# Patient Record
Sex: FEMALE | Race: WHITE | Employment: FULL TIME | URBAN - METROPOLITAN AREA
[De-identification: names, ages, dates, MRNs, and addresses within clinical notes are randomized per-mention and may not be internally consistent; named-entity substitution may affect disease eponyms.]

---

## 2017-03-01 RX ORDER — LORAZEPAM 1 MG/1
TABLET ORAL
Qty: 30 TABLET | Refills: 0 | Status: SHIPPED | OUTPATIENT
Start: 2017-03-01 | End: 2017-03-06

## 2017-03-01 NOTE — TELEPHONE ENCOUNTER
Dr. Saranya Glover, Rx request for Lorazepam 1mg, UNABLE to fill per protocol. Please review. Thank you.     Refill Protocol Appointment Criteria  · Appointment scheduled in the past 6 months or in the next 3 months

## 2017-03-06 RX ORDER — LORAZEPAM 1 MG/1
TABLET ORAL
Qty: 30 TABLET | Refills: 0 | Status: SHIPPED | OUTPATIENT
Start: 2017-03-06 | End: 2017-04-05

## 2017-04-05 ENCOUNTER — APPOINTMENT (OUTPATIENT)
Dept: LAB | Facility: HOSPITAL | Age: 52
End: 2017-04-05
Attending: INTERNAL MEDICINE
Payer: COMMERCIAL

## 2017-04-05 ENCOUNTER — OFFICE VISIT (OUTPATIENT)
Dept: RHEUMATOLOGY | Facility: CLINIC | Age: 52
End: 2017-04-05

## 2017-04-05 VITALS
SYSTOLIC BLOOD PRESSURE: 135 MMHG | WEIGHT: 173 LBS | HEIGHT: 61.75 IN | BODY MASS INDEX: 31.83 KG/M2 | HEART RATE: 90 BPM | DIASTOLIC BLOOD PRESSURE: 91 MMHG

## 2017-04-05 DIAGNOSIS — Z51.81 THERAPEUTIC DRUG MONITORING: ICD-10-CM

## 2017-04-05 DIAGNOSIS — M35.1 MCTD (MIXED CONNECTIVE TISSUE DISEASE) (HCC): Primary | ICD-10-CM

## 2017-04-05 DIAGNOSIS — E55.9 VITAMIN D DEFICIENCY: ICD-10-CM

## 2017-04-05 DIAGNOSIS — R74.8 ELEVATED SERUM GGT LEVEL: ICD-10-CM

## 2017-04-05 DIAGNOSIS — M35.1 MCTD (MIXED CONNECTIVE TISSUE DISEASE) (HCC): ICD-10-CM

## 2017-04-05 PROCEDURE — 86038 ANTINUCLEAR ANTIBODIES: CPT

## 2017-04-05 PROCEDURE — 85652 RBC SED RATE AUTOMATED: CPT

## 2017-04-05 PROCEDURE — 80076 HEPATIC FUNCTION PANEL: CPT

## 2017-04-05 PROCEDURE — 86235 NUCLEAR ANTIGEN ANTIBODY: CPT

## 2017-04-05 PROCEDURE — 99244 OFF/OP CNSLTJ NEW/EST MOD 40: CPT | Performed by: INTERNAL MEDICINE

## 2017-04-05 PROCEDURE — 81001 URINALYSIS AUTO W/SCOPE: CPT

## 2017-04-05 PROCEDURE — 86140 C-REACTIVE PROTEIN: CPT

## 2017-04-05 PROCEDURE — 82977 ASSAY OF GGT: CPT

## 2017-04-05 PROCEDURE — 82306 VITAMIN D 25 HYDROXY: CPT

## 2017-04-05 PROCEDURE — 99213 OFFICE O/P EST LOW 20 MIN: CPT | Performed by: INTERNAL MEDICINE

## 2017-04-05 PROCEDURE — 36415 COLL VENOUS BLD VENIPUNCTURE: CPT

## 2017-04-05 RX ORDER — MULTIVIT-MIN/IRON/FOLIC ACID/K 18-600-40
CAPSULE ORAL DAILY
COMMUNITY
End: 2017-06-30

## 2017-04-05 RX ORDER — DICYCLOMINE HYDROCHLORIDE 10 MG/1
10 CAPSULE ORAL AS NEEDED
COMMUNITY
End: 2017-06-30

## 2017-04-05 RX ORDER — MILNACIPRAN HYDROCHLORIDE 50 MG/1
TABLET, FILM COATED ORAL
Refills: 3 | COMMUNITY
Start: 2017-01-24 | End: 2017-08-28

## 2017-04-05 NOTE — PROGRESS NOTES
Christiana Kat is a 46year old female who presents for Patient presents with:  Mixed Connective Tissue Disease: past dx., establish RHE  Fibromyalgia Syndrome  Knee Pain: right   .    HPI:     I had the pleasure of seeing Christiana Kat on 4/5/2017 f kg)  07/13/16 : 183 lb (83.008 kg)    Body mass index is 31.92 kg/(m^2). Current Outpatient Prescriptions:  Flaxseed, Linseed, (FLAXSEED OIL OR) Take by mouth daily. Disp:  Rfl:    Omega-3 Fatty Acids (FISH OIL OR) Take by mouth.  Take 2 caps every d Cerebrovascular accident (Stroke)   • Heart Disease Paternal Grandmother      CAD   • Breast Cancer Maternal Aunt    • Heart Disease Maternal Aunt      CAD   • Heart Disease Maternal Aunt    • osteoarthritis[other] [OTHER] Mother    • psoriasis[other] HPI,   All other ROS are negative.      EXAM:   /91 mmHg  Pulse 90  Ht 5' 1.75\" (1.568 m)  Wt 173 lb (78.472 kg)  BMI 31.92 kg/m2  HEENT: Clear oropharynx, no oral ulcers, EOM intact, clear sclear, PERRLA, pleasant, no acute distress, no CAD, no neck 50mg bid and lyrica 150mg bid   - was on effexor and gabpentin without complete relief  - melxoicam as needed. 3. Elevated GGT - repeat liver tests   4. Right knee pain - seems like bakers cysts - meloxicma prn info given    Summary:  1. Check labs  2.  C

## 2017-04-05 NOTE — PATIENT INSTRUCTIONS
1. Check labs  2. Cont. hydroxychlroquine 200mg twice a day   3. Cont. lyrica 150mg twice a day   4. Cont. savella 50mg twice a day   5. Return to clinic in 2 weeks -   6.  Info on baker's cyst  Treatment for Baker’s Cyst (Popliteal Cyst)  A Baker’s cyst (p · Compartment syndrome. This causes intense pain and problems moving the foot or toes. Compartment syndrome is a medical emergency. It needs immediate surgery. It can lead to permanent muscle damage if not treated right away.   When to call the health care Symptoms of a Baker’s cyst  A Baker’s cyst often doesn’t cause symptoms. A cyst will more often be seen on an imaging test, like MRI, done for other reasons.  If you do have symptoms, they may include:  · Pain in the back of the knee  · Knee stiffness  · Se

## 2017-04-06 ENCOUNTER — TELEPHONE (OUTPATIENT)
Dept: GASTROENTEROLOGY | Facility: CLINIC | Age: 52
End: 2017-04-06

## 2017-04-06 ENCOUNTER — TELEPHONE (OUTPATIENT)
Dept: RHEUMATOLOGY | Facility: CLINIC | Age: 52
End: 2017-04-06

## 2017-04-06 DIAGNOSIS — R79.89 ABNORMAL LIVER FUNCTION TESTS: Primary | ICD-10-CM

## 2017-04-06 NOTE — TELEPHONE ENCOUNTER
Dr. Jaison Neil,     Pt completed the following labs on 4/5/17 ordered by Dr. Michael Arzola: CRP, Sed Rate-Westergren, GGT, Hepatic Function Panel, Holcomb/RVP Antibodies, Urinalysis, and Vitamin D.   Per Dr. Michael Arzola note he wanted the patient to have these review

## 2017-04-06 NOTE — TELEPHONE ENCOUNTER
Pt states she received results from Dr Clau Berry and requesting EMS to review as she was advised to speak with her. Pls call. THank you.

## 2017-04-07 NOTE — TELEPHONE ENCOUNTER
I reviewed the patient's records. Patient had normal screening colonoscopy in January 2014. Now with abnormal GGTP.   I advise that she get liver ultrasound, hepatitis viral serology and autoimmune serology, (ordered) and discontinue alcohol until the jarrod

## 2017-04-07 NOTE — TELEPHONE ENCOUNTER
Noted. See below where pt stated she does not drink alcohol - this was reviewed again.   Pt will get lab and US orders done prior to appt 5/30 as ordered    Pt already had RAINE 4/5 ordered by Dr Fenton Overall and normal.  Cancelled this lab

## 2017-04-10 ENCOUNTER — APPOINTMENT (OUTPATIENT)
Dept: LAB | Facility: HOSPITAL | Age: 52
End: 2017-04-10
Attending: INTERNAL MEDICINE
Payer: COMMERCIAL

## 2017-04-10 ENCOUNTER — HOSPITAL ENCOUNTER (OUTPATIENT)
Dept: ULTRASOUND IMAGING | Facility: HOSPITAL | Age: 52
Discharge: HOME | End: 2017-04-10
Attending: INTERNAL MEDICINE
Payer: COMMERCIAL

## 2017-04-10 DIAGNOSIS — R79.89 ABNORMAL LIVER FUNCTION TESTS: ICD-10-CM

## 2017-04-10 PROCEDURE — 86704 HEP B CORE ANTIBODY TOTAL: CPT

## 2017-04-10 PROCEDURE — 86803 HEPATITIS C AB TEST: CPT

## 2017-04-10 PROCEDURE — 86706 HEP B SURFACE ANTIBODY: CPT

## 2017-04-10 PROCEDURE — 86255 FLUORESCENT ANTIBODY SCREEN: CPT

## 2017-04-10 PROCEDURE — 86256 FLUORESCENT ANTIBODY TITER: CPT

## 2017-04-10 PROCEDURE — 86708 HEPATITIS A ANTIBODY: CPT

## 2017-04-10 PROCEDURE — 82728 ASSAY OF FERRITIN: CPT

## 2017-04-10 PROCEDURE — 87340 HEPATITIS B SURFACE AG IA: CPT

## 2017-04-10 PROCEDURE — 80500 HEPATITIS A B + C PROFILE: CPT

## 2017-04-10 PROCEDURE — 36415 COLL VENOUS BLD VENIPUNCTURE: CPT

## 2017-04-10 PROCEDURE — 76705 ECHO EXAM OF ABDOMEN: CPT

## 2017-04-13 ENCOUNTER — TELEPHONE (OUTPATIENT)
Dept: GASTROENTEROLOGY | Facility: CLINIC | Age: 52
End: 2017-04-13

## 2017-04-19 ENCOUNTER — OFFICE VISIT (OUTPATIENT)
Dept: RHEUMATOLOGY | Facility: CLINIC | Age: 52
End: 2017-04-19

## 2017-04-19 VITALS
HEIGHT: 61.75 IN | DIASTOLIC BLOOD PRESSURE: 83 MMHG | WEIGHT: 177 LBS | SYSTOLIC BLOOD PRESSURE: 123 MMHG | BODY MASS INDEX: 32.57 KG/M2 | HEART RATE: 93 BPM

## 2017-04-19 DIAGNOSIS — M79.7 FIBROMYALGIA: ICD-10-CM

## 2017-04-19 DIAGNOSIS — M35.1 MCTD (MIXED CONNECTIVE TISSUE DISEASE) (HCC): Primary | ICD-10-CM

## 2017-04-19 DIAGNOSIS — M71.21 BAKER'S CYST OF KNEE, RIGHT: ICD-10-CM

## 2017-04-19 PROCEDURE — 99213 OFFICE O/P EST LOW 20 MIN: CPT | Performed by: INTERNAL MEDICINE

## 2017-04-19 PROCEDURE — 99214 OFFICE O/P EST MOD 30 MIN: CPT | Performed by: INTERNAL MEDICINE

## 2017-04-19 RX ORDER — CYCLOBENZAPRINE HCL 10 MG
TABLET ORAL
Refills: 0 | COMMUNITY
Start: 2017-04-06 | End: 2017-06-30

## 2017-04-19 NOTE — PATIENT INSTRUCTIONS
1. Diclofenac gel 1% otc -   2. Change  hydroxychlroquine 200mg to once a day   3. Cont. lyrica 150mg twice a day   4. Cont. savella 50mg twice a day   5. If pain in right knee increases can get u/s righ tknee.    6. Info on baker's cyst

## 2017-04-19 NOTE — PROGRESS NOTES
Sukhwinder Trinidad is a 46year old female who presents for Patient presents with:  Mixed Connective Tissue Disease: leg and arm pain  . HPI:     I had the pleasure of seeing Sukhwinder Trinidad on 4/5/2017 for evaluation.  Referred here by Dr. Radha Daly and  has been having n/v. That' sbeen ahving a lot. She doesn's tknow if it's her IBS. seh didn't see GI yet. She got labs and us liver. She has been having loose bowel movement and goto the bathrrom and then she throws up. - did this easter Sunday.  Then ha needed  Disp:  Rfl: 0   diazepam (VALIUM) 5 MG Oral Tab Take 5 mg by mouth nightly as needed. Disp:  Rfl: 0   Acidophilus/Pectin (PROBIOTIC) Oral Cap Take 1 capsule by mouth daily.  Disp:  Rfl:       Past Medical History   Diagnosis Date   • Abnormal vagi hands always cold, no nasal ulcers, no parotid swelling, no neck pain, no jaw pain, no temple pain  Eyes: No visual changes, eye exam - 10 or 11/2016 - was done - with dr. Rosaura Hill, has contacts as well, episode of eye swelling left eye - in 10-11/2016.    C 1.002-1.035  1.004   PH, URINE      5.0-8.0  7.0   PROTEIN (URINE DIPSTICK)      Negative mg/dL  Negative   GLUCOSE (URINE DIPSTICK)      Negative mg/dL  Negative   KETONES (URINE DIPSTICK)      Negative mg/dL  Negative   BILIRUBIN      Negative  Negative without obvious complication. 2. Degenerative arthritis. 3. Limited range of motion but no abnormal motion. 4. IUD in the pelvis    3/17/2016 - c spine xray   1. Straightening of cervical curve.   2. Moderate localized degenerative arthritis which has pr

## 2017-05-11 ENCOUNTER — HOSPITAL ENCOUNTER (OUTPATIENT)
Dept: ULTRASOUND IMAGING | Facility: HOSPITAL | Age: 52
Discharge: HOME | End: 2017-05-11
Attending: INTERNAL MEDICINE
Payer: COMMERCIAL

## 2017-05-11 DIAGNOSIS — M71.21 BAKER'S CYST OF KNEE, RIGHT: ICD-10-CM

## 2017-05-11 PROCEDURE — 76882 US LMTD JT/FCL EVL NVASC XTR: CPT | Performed by: INTERNAL MEDICINE

## 2017-05-30 ENCOUNTER — OFFICE VISIT (OUTPATIENT)
Dept: GASTROENTEROLOGY | Facility: CLINIC | Age: 52
End: 2017-05-30

## 2017-05-30 VITALS
SYSTOLIC BLOOD PRESSURE: 144 MMHG | BODY MASS INDEX: 33.49 KG/M2 | HEART RATE: 96 BPM | DIASTOLIC BLOOD PRESSURE: 86 MMHG | HEIGHT: 62 IN | WEIGHT: 182 LBS

## 2017-05-30 DIAGNOSIS — K76.0 NAFLD (NONALCOHOLIC FATTY LIVER DISEASE): ICD-10-CM

## 2017-05-30 DIAGNOSIS — R79.89 ABNORMAL LIVER FUNCTION TEST: Primary | ICD-10-CM

## 2017-05-30 PROCEDURE — 99214 OFFICE O/P EST MOD 30 MIN: CPT | Performed by: INTERNAL MEDICINE

## 2017-05-30 PROCEDURE — 99212 OFFICE O/P EST SF 10 MIN: CPT | Performed by: INTERNAL MEDICINE

## 2017-05-30 NOTE — H&P
History of present illness: This is a 42-year-old female referred by Dr. Maxi Marin, and Dr. Angelito Ocampo who I last saw in 2014.   At that time she underwent screening colonoscopy and was found to have internal hemorrhoids otherwise normal colonoscopy to the colorectal screening and is not due for repeat colonoscopy until 2024 unless other signs, symptoms, family history, or anemia develop in the interim timeframe.

## 2017-05-30 NOTE — PROGRESS NOTES
HPI:    Patient ID: Mickey Vallejo is a 46year old female. HPI    Review of Systems   Constitutional: Negative for fever, chills, diaphoresis, activity change, appetite change, fatigue and unexpected weight change.    HENT: Negative for congestion, e mouth daily. Disp:  Rfl: 0   Acidophilus/Pectin (PROBIOTIC) Oral Cap Take 1 capsule by mouth daily. Disp:  Rfl:    Diclofenac Sodium 1 % Transdermal Gel Apply 4 g topically 4 (four) times daily.  Disp: 1 Tube Rfl: 3   Dicyclomine HCl 10 MG Oral Cap Take 1 Skin: Skin is warm and dry. No rash noted. She is not diaphoretic. No erythema. No pallor. Psychiatric: She has a normal mood and affect. Her behavior is normal. Judgment and thought content normal.   Nursing note and vitals reviewed.              ASSES

## 2017-05-30 NOTE — PATIENT INSTRUCTIONS
1.  Continue weight loss efforts, goal that body mass index is between 21-25 kg/m²    2. Your next screening colonoscopy is due in 2024 unless other signs, symptoms, anemia, or family history develop in the interim    3. Return visit in 1 year.   We will

## 2017-06-27 ENCOUNTER — TELEPHONE (OUTPATIENT)
Dept: OBGYN CLINIC | Facility: CLINIC | Age: 52
End: 2017-06-27

## 2017-06-27 NOTE — TELEPHONE ENCOUNTER
Pt calling to report a couple weeks ago she had burning with urination and went to [facility] urgent care. She had a UA and culture and was sent home with abx.  [facility] called 2 days later to inform her the urine culture came back negative, but there was blood in the

## 2017-06-27 NOTE — TELEPHONE ENCOUNTER
Per pt she would like to see JEROME for a iud removal, was told she needs to have a test before the appt, to check if she is menopausal or not ? Also has been having vaginal burning and pain during intercourse.  pls note pt would like to see JEROME only

## 2017-06-30 ENCOUNTER — APPOINTMENT (OUTPATIENT)
Dept: LAB | Facility: HOSPITAL | Age: 52
End: 2017-06-30
Attending: OBSTETRICS & GYNECOLOGY
Payer: COMMERCIAL

## 2017-06-30 ENCOUNTER — OFFICE VISIT (OUTPATIENT)
Dept: OBGYN CLINIC | Facility: CLINIC | Age: 52
End: 2017-06-30

## 2017-06-30 VITALS
HEART RATE: 96 BPM | DIASTOLIC BLOOD PRESSURE: 88 MMHG | BODY MASS INDEX: 33 KG/M2 | SYSTOLIC BLOOD PRESSURE: 129 MMHG | WEIGHT: 181 LBS

## 2017-06-30 DIAGNOSIS — N95.1 MENOPAUSAL SYNDROME: ICD-10-CM

## 2017-06-30 DIAGNOSIS — N76.0 VAGINITIS AND VULVOVAGINITIS: Primary | ICD-10-CM

## 2017-06-30 DIAGNOSIS — Z12.31 SCREENING MAMMOGRAM, ENCOUNTER FOR: ICD-10-CM

## 2017-06-30 LAB
ESTRADIOL SERPL-MCNC: <20 PG/ML
FSH SERPL-ACNC: 83.6 MIU/ML

## 2017-06-30 PROCEDURE — 99213 OFFICE O/P EST LOW 20 MIN: CPT | Performed by: OBSTETRICS & GYNECOLOGY

## 2017-06-30 PROCEDURE — 36415 COLL VENOUS BLD VENIPUNCTURE: CPT

## 2017-06-30 PROCEDURE — 83001 ASSAY OF GONADOTROPIN (FSH): CPT

## 2017-06-30 PROCEDURE — 82670 ASSAY OF TOTAL ESTRADIOL: CPT

## 2017-06-30 RX ORDER — FLUCONAZOLE 150 MG/1
150 TABLET ORAL ONCE
Qty: 1 TABLET | Refills: 0 | Status: SHIPPED | OUTPATIENT
Start: 2017-06-30 | End: 2017-06-30

## 2017-06-30 RX ORDER — NEOMYCIN SULFATE, POLYMYXIN B SULFATE, HYDROCORTISONE 3.5; 10000; 1 MG/ML; [USP'U]/ML; MG/ML
SOLUTION/ DROPS AURICULAR (OTIC)
Refills: 0 | COMMUNITY
Start: 2017-06-23 | End: 2017-07-03 | Stop reason: ALTCHOICE

## 2017-07-02 LAB
CANDIDA SCREEN: NEGATIVE
GENITAL VAGINOSIS SCREEN: NEGATIVE
TRICHOMONAS SCREEN: NEGATIVE

## 2017-07-03 ENCOUNTER — OFFICE VISIT (OUTPATIENT)
Dept: OBGYN CLINIC | Facility: CLINIC | Age: 52
End: 2017-07-03

## 2017-07-03 VITALS — DIASTOLIC BLOOD PRESSURE: 83 MMHG | HEART RATE: 71 BPM | SYSTOLIC BLOOD PRESSURE: 135 MMHG

## 2017-07-03 DIAGNOSIS — Z30.432 ENCOUNTER FOR REMOVAL OF INTRAUTERINE CONTRACEPTIVE DEVICE: Primary | ICD-10-CM

## 2017-07-03 PROCEDURE — 58301 REMOVE INTRAUTERINE DEVICE: CPT | Performed by: OBSTETRICS & GYNECOLOGY

## 2017-07-03 RX ORDER — NITROFURANTOIN 25; 75 MG/1; MG/1
100 CAPSULE ORAL 2 TIMES DAILY
Qty: 14 CAPSULE | Refills: 0 | Status: SHIPPED | OUTPATIENT
Start: 2017-07-03 | End: 2017-07-10

## 2017-07-03 NOTE — PROGRESS NOTES
IUD Removal   Reviewed FSH and E2 c/w menopause. Vaginosis screen negative. Still with recurrent dysuria and urgency. MacroBID Rx sent. Pregnancy Results: n/a  Birth control method(s) used:   Consent signed.   Procedure discussed with the patient in

## 2017-07-05 NOTE — PROGRESS NOTES
HPI:    Patient ID: Mike Macedo is a 46year old female. HPI  with amenorrhea on Mirena for [facility]. SHe has reached the 5 year navya and we did labs last year in lori-menopausal range.  I wanted to repeat prior to deciding on need for a new Yesenia Exam           ASSESSMENT/PLAN:   Vaginitis and vulvovaginitis  (primary encounter diagnosis)  Screening mammogram, encounter for  Menopausal syndrome  If labs c/w menopause, then plan IUD removal on Monday AM while I'm on call.      Orders Placed This Enco

## 2017-08-28 ENCOUNTER — APPOINTMENT (OUTPATIENT)
Dept: LAB | Facility: HOSPITAL | Age: 52
End: 2017-08-28
Attending: INTERNAL MEDICINE
Payer: COMMERCIAL

## 2017-08-28 ENCOUNTER — OFFICE VISIT (OUTPATIENT)
Dept: RHEUMATOLOGY | Facility: CLINIC | Age: 52
End: 2017-08-28

## 2017-08-28 VITALS
SYSTOLIC BLOOD PRESSURE: 146 MMHG | HEIGHT: 62 IN | BODY MASS INDEX: 34.78 KG/M2 | WEIGHT: 189 LBS | DIASTOLIC BLOOD PRESSURE: 91 MMHG | HEART RATE: 94 BPM

## 2017-08-28 DIAGNOSIS — M35.1 MCTD (MIXED CONNECTIVE TISSUE DISEASE) (HCC): ICD-10-CM

## 2017-08-28 DIAGNOSIS — M25.50 POLYARTHRALGIA: Primary | ICD-10-CM

## 2017-08-28 DIAGNOSIS — M25.50 POLYARTHRALGIA: ICD-10-CM

## 2017-08-28 DIAGNOSIS — M79.7 FIBROMYALGIA: ICD-10-CM

## 2017-08-28 LAB
CRP SERPL-MCNC: 0.5 MG/DL (ref 0–0.9)
ERYTHROCYTE [SEDIMENTATION RATE] IN BLOOD: 15 MM/HR (ref 0–30)
RHEUMATOID FACT SER QL: <5 IU/ML

## 2017-08-28 PROCEDURE — 86140 C-REACTIVE PROTEIN: CPT

## 2017-08-28 PROCEDURE — 86431 RHEUMATOID FACTOR QUANT: CPT

## 2017-08-28 PROCEDURE — 85652 RBC SED RATE AUTOMATED: CPT

## 2017-08-28 PROCEDURE — 36415 COLL VENOUS BLD VENIPUNCTURE: CPT

## 2017-08-28 PROCEDURE — 99212 OFFICE O/P EST SF 10 MIN: CPT | Performed by: INTERNAL MEDICINE

## 2017-08-28 PROCEDURE — 86200 CCP ANTIBODY: CPT

## 2017-08-28 PROCEDURE — 99214 OFFICE O/P EST MOD 30 MIN: CPT | Performed by: INTERNAL MEDICINE

## 2017-08-28 RX ORDER — MILNACIPRAN HYDROCHLORIDE 50 MG/1
50 TABLET, FILM COATED ORAL 2 TIMES DAILY
Qty: 60 TABLET | Refills: 6 | Status: SHIPPED | OUTPATIENT
Start: 2017-08-28 | End: 2018-07-19 | Stop reason: ALTCHOICE

## 2017-08-28 RX ORDER — OFLOXACIN 3 MG/ML
SOLUTION/ DROPS OPHTHALMIC
Refills: 0 | COMMUNITY
Start: 2017-08-26 | End: 2018-02-01

## 2017-08-28 RX ORDER — HYDROXYCHLOROQUINE SULFATE 200 MG/1
200 TABLET, FILM COATED ORAL 2 TIMES DAILY
Qty: 60 TABLET | Refills: 1 | Status: SHIPPED | OUTPATIENT
Start: 2017-08-28 | End: 2018-01-08

## 2017-08-28 NOTE — PATIENT INSTRUCTIONS
1. Increase the lyrica back to 150mg twice ad ay x 2 weeks, then if not better,   Then increase the savella back to 50mg twice a day x 2 weeks,   Then if you are still not feeling better,   Then increaease the hydroxychrouqine 200mg twice a day     2.  Cons

## 2017-08-28 NOTE — PROGRESS NOTES
Sukhwinder Trinidad is a 46year old female who presents for Patient presents with:  Joint Pain: Hips/LEYDI Arms/RT Knee/Lower Back [Pain Score: 5]  . HPI:     I had the pleasure of seeing Sukhwinder Trinidad on 4/5/2017 for evaluation.  Referred here by Dr. Bishnu Sheppard sob.   She has been having n/v. That' sbeen ahving a lot. She doesn's tknow if it's her IBS. seh didn't see GI yet. She got labs and us liver.    She has been having loose bowel movement and goto the bathrrom and then she throws up. - did this easter Sund Solution INT 1 GTT IN OD QID Disp:  Rfl: 0   diazepam (VALIUM) 5 MG Oral Tab Take 5 mg by mouth nightly as needed.    Disp:  Rfl: 0      Past Medical History:   Diagnosis Date   • Abnormal vaginal bleeding     polypectomy   • Fibromyalgia    • Hemorrhoids 2 cold, no nasal ulcers, no parotid swelling, no neck pain, no jaw pain, no temple pain  Eyes: No visual changes, eye exam - 10 or 11/2016 - was done - with dr. Blaine Sandoval, has contacts as well, episode of eye swelling left eye - in 10-11/2016.    CVS: No chest DIPSTICK)      Negative mg/dL  Negative   GLUCOSE (URINE DIPSTICK)      Negative mg/dL  Negative   KETONES (URINE DIPSTICK)      Negative mg/dL  Negative   BILIRUBIN      Negative  Negative   OCCULT BLOOD      Negative  Negative   NITRITE, URINE      Negat range of motion but no abnormal motion. 4. IUD in the pelvis    3/17/2016 - c spine xray   1. Straightening of cervical curve. 2. Moderate localized degenerative arthritis which has progressed     significantly since September 20, 2006. \    4/10/2017 - u 3. Return to clinic in 2-3 months.    4. Check labs today -       Lupe Lentz MD  8/28/2017   5:06 PM

## 2017-08-29 LAB — CCP IGG SERPL-ACNC: 0.5 U/ML (ref 0–6.9)

## 2017-09-06 ENCOUNTER — HOSPITAL ENCOUNTER (OUTPATIENT)
Dept: MAMMOGRAPHY | Facility: HOSPITAL | Age: 52
Discharge: HOME | End: 2017-09-06
Attending: OBSTETRICS & GYNECOLOGY
Payer: COMMERCIAL

## 2017-09-06 DIAGNOSIS — Z12.31 SCREENING MAMMOGRAM, ENCOUNTER FOR: ICD-10-CM

## 2017-09-06 PROCEDURE — 77067 SCR MAMMO BI INCL CAD: CPT | Performed by: OBSTETRICS & GYNECOLOGY

## 2017-10-19 ENCOUNTER — TELEPHONE (OUTPATIENT)
Dept: RHEUMATOLOGY | Facility: CLINIC | Age: 52
End: 2017-10-19

## 2017-10-19 NOTE — TELEPHONE ENCOUNTER
Script approved by provider at [address] [place] [address]. Verified with provider, okay to call script in. Verbal given to Johana at the pharmacy for Lyrica. Pharmacy to call pt when script is ready for . Summary:  1.  Increase the lyrica back to 150mg twice ad ay x 2 w

## 2017-10-19 NOTE — TELEPHONE ENCOUNTER
Pharmacy calling regarding refill for LYRICA 150 MG Oral Cap. Advised pharmacy that Dr. Agata Barbosa sent medication with 6 refills in aug. pharmacy states that they have not filled medication since June.   Please advise

## 2017-11-08 ENCOUNTER — HOSPITAL ENCOUNTER (OUTPATIENT)
Dept: ULTRASOUND IMAGING | Age: 52
Discharge: HOME | End: 2017-11-08
Attending: INTERNAL MEDICINE
Payer: COMMERCIAL

## 2017-11-08 DIAGNOSIS — R11.2 NAUSEA WITH VOMITING: ICD-10-CM

## 2017-11-08 PROCEDURE — 76705 ECHO EXAM OF ABDOMEN: CPT | Performed by: INTERNAL MEDICINE

## 2017-11-28 ENCOUNTER — TELEPHONE (OUTPATIENT)
Dept: INTERNAL MEDICINE CLINIC | Facility: CLINIC | Age: 52
End: 2017-11-28

## 2017-11-28 NOTE — TELEPHONE ENCOUNTER
Pt had an apt on 11-29 but was rescheduled to 12/1 and wont be able to make that appointment pt would like to reschedule to a sooner apt.  Pt wa told next available was at the end of the month and she can't wait that long

## 2017-11-28 NOTE — TELEPHONE ENCOUNTER
LMTCB appt has been place on hold with Dr. Carlos Strong on 11/30 at 10:40 am in [place] for pt. If pt cannot make that appt, she can see Dr. Francisco Flaherty in [place] tomorrow if she would like.

## 2017-11-30 ENCOUNTER — OFFICE VISIT (OUTPATIENT)
Dept: RHEUMATOLOGY | Facility: CLINIC | Age: 52
End: 2017-11-30

## 2017-11-30 ENCOUNTER — APPOINTMENT (OUTPATIENT)
Dept: LAB | Age: 52
End: 2017-11-30
Attending: INTERNAL MEDICINE
Payer: COMMERCIAL

## 2017-11-30 VITALS
BODY MASS INDEX: 35.15 KG/M2 | SYSTOLIC BLOOD PRESSURE: 145 MMHG | DIASTOLIC BLOOD PRESSURE: 86 MMHG | HEIGHT: 62 IN | HEART RATE: 102 BPM | WEIGHT: 191 LBS

## 2017-11-30 DIAGNOSIS — M79.7 FIBROMYALGIA: ICD-10-CM

## 2017-11-30 DIAGNOSIS — M35.1 MCTD (MIXED CONNECTIVE TISSUE DISEASE) (HCC): ICD-10-CM

## 2017-11-30 DIAGNOSIS — M79.7 FIBROMYALGIA: Primary | ICD-10-CM

## 2017-11-30 PROCEDURE — 36415 COLL VENOUS BLD VENIPUNCTURE: CPT

## 2017-11-30 PROCEDURE — 99212 OFFICE O/P EST SF 10 MIN: CPT | Performed by: INTERNAL MEDICINE

## 2017-11-30 PROCEDURE — 80053 COMPREHEN METABOLIC PANEL: CPT

## 2017-11-30 PROCEDURE — 99214 OFFICE O/P EST MOD 30 MIN: CPT | Performed by: INTERNAL MEDICINE

## 2017-11-30 RX ORDER — PREDNISONE 10 MG/1
TABLET ORAL
Qty: 21 TABLET | Refills: 0 | Status: SHIPPED | OUTPATIENT
Start: 2017-11-30 | End: 2018-02-01

## 2017-11-30 RX ORDER — OMEPRAZOLE 40 MG/1
40 CAPSULE, DELAYED RELEASE ORAL DAILY
Refills: 2 | COMMUNITY
Start: 2017-11-02 | End: 2018-03-01

## 2017-11-30 NOTE — PATIENT INSTRUCTIONS
1.  Lyrica 150mg twice a day   2.  savella back to 50mg twice a day   3.  hydroxychrouqine 200mg once  a day   4. melxoicam once a day - take with dinner   5.  Prednisone 10mg - Take 6 tabsx 1 day, take 5 tabsx 1 day, take 4 tabsx 1 day,take 3 tabsx 1 day,

## 2017-11-30 NOTE — PROGRESS NOTES
Donal Montez is a 45year old female who presents for Patient presents with:  Hip Pain  Arm Pain  Leg Pain  . HPI:     I had the pleasure of seeing Donal Montez on 4/5/2017 for evaluation. Referred here by Dr. Patricio Basurto and Dr. Eddi Dawson.      She is a That' sbeen ahving a lot. She doesn's tknow if it's her IBS. seh didn't see GI yet. She got labs and us liver. She has been having loose bowel movement and goto the bathrrom and then she throws up. - did this easter Sunday. Then has been fine .  X 1 day laying on it now. No rashes. She has 5/10 apin in her hips, legs and arms . Wt Readings from Last 2 Encounters:  11/30/17 : 191 lb (86.6 kg)  08/28/17 : 189 lb (85.7 kg)    Body mass index is 34.93 kg/m².         Current Outpatient Prescriptions: Grandmother      CAD   • Breast Cancer Maternal Aunt      76s    • Heart Disease Maternal Aunt      CAD   • Heart Disease Maternal Aunt    • osteoarthritis [OTHER] Mother    • psoriasis [OTHER] Sister    mom - has arthriits   Sister doesn't have psa. 3rd c are negative.      EXAM:   /86 (BP Location: Right arm, Patient Position: Sitting, Cuff Size: large)   Pulse 102   Ht 5' 2\" (1.575 m)   Wt 191 lb (86.6 kg)   LMP 09/06/2013 (Approximate)   BMI 34.93 kg/m²   HEENT: Clear oropharynx, no oral ulcers, EO Nonreactive    HEPATITIS B SURFACE AB QUAL      Nonreactive  Nonreactive    HEPATITIS B SURF AB QUANT      <10.00 mIU/mL <3.10    HEPATITIS C VIRUS ANTIBODY      Nonreactive Nonreactive    C-REACTIVE PROTEIN      0.0-0.9 mg/dL  <0.5   SED RATE      0-30 mm 200mg once daily .    - eye exam was done 11/2016 - dr. Carolina Perdomo   - will hold off on  baseline echo and pfts   - got work up from dr. Zina Velasco and the were not showing the RNP or RAINE - genreal labs normal -   - repeat prednisone burst 10mg -   Take 6 tabsx 1

## 2017-12-06 ENCOUNTER — TELEPHONE (OUTPATIENT)
Dept: RHEUMATOLOGY | Facility: CLINIC | Age: 52
End: 2017-12-06

## 2017-12-06 NOTE — TELEPHONE ENCOUNTER
Pt is requesting a call back for her lab results done on 11/30. She states thru [employer] it shows abnormality. pls advise. Thank you.

## 2018-01-08 NOTE — TELEPHONE ENCOUNTER
LOV:11-30  Last Filled:8-28, #60 with 1 refill  Labs:Per LOV notes had eye exam 11-16  Future Appointments  Date Time Provider Erin Marlene   1/10/2018 3:45 PM Pam Lanier MD [facility]   3/1/2018 1:20 PM Tye Watson MD [facility]       Please Advise

## 2018-01-09 RX ORDER — HYDROXYCHLOROQUINE SULFATE 200 MG/1
TABLET, FILM COATED ORAL
Qty: 180 TABLET | Refills: 2 | Status: SHIPPED | OUTPATIENT
Start: 2018-01-09 | End: 2019-06-24

## 2018-01-10 ENCOUNTER — OFFICE VISIT (OUTPATIENT)
Dept: GASTROENTEROLOGY | Facility: CLINIC | Age: 53
End: 2018-01-10

## 2018-01-10 ENCOUNTER — TELEPHONE (OUTPATIENT)
Dept: GASTROENTEROLOGY | Facility: CLINIC | Age: 53
End: 2018-01-10

## 2018-01-10 VITALS
BODY MASS INDEX: 35.15 KG/M2 | HEART RATE: 94 BPM | SYSTOLIC BLOOD PRESSURE: 112 MMHG | DIASTOLIC BLOOD PRESSURE: 72 MMHG | WEIGHT: 191 LBS | HEIGHT: 62 IN

## 2018-01-10 DIAGNOSIS — R11.2 NAUSEA AND VOMITING, INTRACTABILITY OF VOMITING NOT SPECIFIED, UNSPECIFIED VOMITING TYPE: ICD-10-CM

## 2018-01-10 DIAGNOSIS — K82.4 POLYP OF GALLBLADDER: Primary | ICD-10-CM

## 2018-01-10 DIAGNOSIS — R10.13 EPIGASTRIC ABDOMINAL PAIN: ICD-10-CM

## 2018-01-10 DIAGNOSIS — K82.4 GALLBLADDER POLYP: Primary | ICD-10-CM

## 2018-01-10 DIAGNOSIS — R10.13 ABDOMINAL PAIN, EPIGASTRIC: ICD-10-CM

## 2018-01-10 DIAGNOSIS — Z01.818 PREOPERATIVE CLEARANCE: ICD-10-CM

## 2018-01-10 PROCEDURE — 99214 OFFICE O/P EST MOD 30 MIN: CPT | Performed by: INTERNAL MEDICINE

## 2018-01-10 NOTE — PATIENT INSTRUCTIONS
1. Schedule EGD with IV or MAC  2. Avoid caffeine, high fat meals , spicy foods. Frequent small meals  3.  Stay on omeprazole the morning before breakfast.

## 2018-01-10 NOTE — PROGRESS NOTES
HPI:    Patient ID: Eliezer Baker is a 45year old female. HPI    Review of Systems   Constitutional: Positive for unexpected weight change. Negative for activity change, appetite change, chills, diaphoresis, fatigue and fever.         Weight gain MG Oral Tab Take 1 tablet by mouth daily. Disp:  Rfl: 0   diazepam (VALIUM) 5 MG Oral Tab Take 5 mg by mouth nightly as needed.    Disp:  Rfl: 0   PREDNISONE 10 MG Oral Tab Take 6 tabsx 1 day, take 5 tabsx 1 day, take 4 tabsx 1 day,take 3 tabsx 1 day, lin Nursing note and vitals reviewed.              ASSESSMENT/PLAN:   Gallbladder polyp  (primary encounter diagnosis)  Epigastric abdominal pain  Nausea and vomiting, intractability of vomiting not specified, unspecified vomiting type    No orders of the def

## 2018-01-10 NOTE — TELEPHONE ENCOUNTER
Scheduled for:  EGD - [address]  Provider Name:  Dr. Jaison Neil  Date:  1/18/18  Location:  [facility]  Sedation:  IV  Time:  11:00 am (pt is aware to arrive at 10:00 am)  Prep:  NPO after midnight, Prep instructions were given to pt in the office, pt verbalized understand

## 2018-01-16 ENCOUNTER — TELEPHONE (OUTPATIENT)
Dept: GASTROENTEROLOGY | Facility: CLINIC | Age: 53
End: 2018-01-16

## 2018-01-16 NOTE — TELEPHONE ENCOUNTER
I advised the pt it was ok for her to take Tylenol or Advil due to her headache. She has a cold. I advised if she starts to run a fever or her symptoms worsen, she should call us back.  She verbalizes understanding

## 2018-01-16 NOTE — TELEPHONE ENCOUNTER
Pt has CLN scheduled for 1/18/18 and would like to know if she can take Advil or Tylenol due to a headache.  Please call thank you [phone]

## 2018-01-17 ENCOUNTER — TELEPHONE (OUTPATIENT)
Dept: GASTROENTEROLOGY | Facility: CLINIC | Age: 53
End: 2018-01-17

## 2018-01-17 NOTE — TELEPHONE ENCOUNTER
[facility] [employer] verifier calling to inform that she has gotten a auth for EGD  - [phone], but clinicals are needed to be faxed to Attn: Medardo Gaspar. at [facility] - Fax # [phone]. Procedure Auth # [id number].

## 2018-01-18 ENCOUNTER — TELEPHONE (OUTPATIENT)
Dept: GASTROENTEROLOGY | Facility: CLINIC | Age: 53
End: 2018-01-18

## 2018-01-18 ENCOUNTER — SURGERY (OUTPATIENT)
Age: 53
End: 2018-01-18

## 2018-01-18 ENCOUNTER — HOSPITAL ENCOUNTER (OUTPATIENT)
Facility: HOSPITAL | Age: 53
Setting detail: HOSPITAL OUTPATIENT SURGERY
Discharge: HOME | End: 2018-01-18
Attending: INTERNAL MEDICINE | Admitting: INTERNAL MEDICINE
Payer: COMMERCIAL

## 2018-01-18 VITALS
DIASTOLIC BLOOD PRESSURE: 87 MMHG | SYSTOLIC BLOOD PRESSURE: 118 MMHG | RESPIRATION RATE: 17 BRPM | HEART RATE: 100 BPM | OXYGEN SATURATION: 100 % | HEIGHT: 62 IN | WEIGHT: 194 LBS | BODY MASS INDEX: 35.7 KG/M2

## 2018-01-18 DIAGNOSIS — R11.2 NAUSEA AND VOMITING, INTRACTABILITY OF VOMITING NOT SPECIFIED, UNSPECIFIED VOMITING TYPE: ICD-10-CM

## 2018-01-18 DIAGNOSIS — K82.4 POLYP OF GALLBLADDER: ICD-10-CM

## 2018-01-18 DIAGNOSIS — R10.13 ABDOMINAL PAIN, EPIGASTRIC: ICD-10-CM

## 2018-01-18 PROBLEM — K31.9 GASTROPATHY: Status: ACTIVE | Noted: 2018-01-18

## 2018-01-18 PROCEDURE — G0500 MOD SEDAT ENDO SERVICE >5YRS: HCPCS | Performed by: INTERNAL MEDICINE

## 2018-01-18 PROCEDURE — 43239 EGD BIOPSY SINGLE/MULTIPLE: CPT | Performed by: INTERNAL MEDICINE

## 2018-01-18 PROCEDURE — 0DB78ZX EXCISION OF STOMACH, PYLORUS, VIA NATURAL OR ARTIFICIAL OPENING ENDOSCOPIC, DIAGNOSTIC: ICD-10-PCS | Performed by: INTERNAL MEDICINE

## 2018-01-18 PROCEDURE — 0DB68ZX EXCISION OF STOMACH, VIA NATURAL OR ARTIFICIAL OPENING ENDOSCOPIC, DIAGNOSTIC: ICD-10-PCS | Performed by: INTERNAL MEDICINE

## 2018-01-18 RX ORDER — SODIUM CHLORIDE, SODIUM LACTATE, POTASSIUM CHLORIDE, CALCIUM CHLORIDE 600; 310; 30; 20 MG/100ML; MG/100ML; MG/100ML; MG/100ML
INJECTION, SOLUTION INTRAVENOUS CONTINUOUS
Status: DISCONTINUED | OUTPATIENT
Start: 2018-01-18 | End: 2018-01-18

## 2018-01-18 RX ORDER — SODIUM CHLORIDE 0.9 % (FLUSH) 0.9 %
10 SYRINGE (ML) INJECTION AS NEEDED
Status: DISCONTINUED | OUTPATIENT
Start: 2018-01-18 | End: 2018-01-18

## 2018-01-18 RX ORDER — MIDAZOLAM HYDROCHLORIDE 1 MG/ML
1 INJECTION INTRAMUSCULAR; INTRAVENOUS EVERY 5 MIN PRN
Status: DISCONTINUED | OUTPATIENT
Start: 2018-01-18 | End: 2018-01-18

## 2018-01-18 RX ORDER — MIDAZOLAM HYDROCHLORIDE 1 MG/ML
INJECTION INTRAMUSCULAR; INTRAVENOUS
Status: DISCONTINUED | OUTPATIENT
Start: 2018-01-18 | End: 2018-01-18

## 2018-01-18 NOTE — H&P
History & Physical Examination    Patient Name: Mary Judd  MRN: [id number]  CSN: [id number]  YOB: 1965    Diagnosis: N/V, epigastric pain      Prescriptions Prior to Admission:  HYDROXYCHLOROQUINE SULFATE 200 MG Oral Tab TAKE 1 TABLET moved in.\"   • Prediabetes    • Spinal stenosis     Comments:  spondylosis. Management:  surgery in 12/2009.    • Visual impairment      Past Surgical History:  No date: BACK SURGERY  2014: COLONOSCOPY  08/2006: HYSTEROSCOPY,DIAGNOSTIC      Comment: polyp

## 2018-01-18 NOTE — OPERATIVE REPORT
[facility]    PATIENT'S NAME: Gabriella NUNEZ   ATTENDING PHYSICIAN: Michelle Myers MD   OPERATING PHYSICIAN: Michelle Myers MD   PATIENT ACCOUNT#:   [id number]    LOCATION:  [facility] ROOM [address] [place] [address] moderate pangastric erythema. Biopsies x4 were taken of the gastric antrum. Biopsies x4 were taken of the gastric body. Retroflexion of the endoscope showed a normal cardia and GE junction.   The pylorus was normal.  4.   The duodenum showed a normal bul

## 2018-01-18 NOTE — BRIEF OP NOTE
Pre-Operative Diagnosis: Abdominal pain, epigastric,  Nausea and vomiting, intractability of vomiting not specified, unspecified vomiting type     Post-Operative Diagnosis: Mild gastropathy     Procedure Performed:   Procedure(s):  ESOPHAGOGASTRODUODENO

## 2018-02-01 ENCOUNTER — OFFICE VISIT (OUTPATIENT)
Dept: OBGYN CLINIC | Facility: CLINIC | Age: 53
End: 2018-02-01

## 2018-02-01 ENCOUNTER — APPOINTMENT (OUTPATIENT)
Dept: LAB | Facility: HOSPITAL | Age: 53
End: 2018-02-01
Attending: CLINICAL NURSE SPECIALIST
Payer: COMMERCIAL

## 2018-02-01 VITALS
WEIGHT: 194 LBS | HEART RATE: 89 BPM | BODY MASS INDEX: 35 KG/M2 | DIASTOLIC BLOOD PRESSURE: 93 MMHG | SYSTOLIC BLOOD PRESSURE: 156 MMHG

## 2018-02-01 DIAGNOSIS — N89.8 VAGINAL ITCHING: Primary | ICD-10-CM

## 2018-02-01 DIAGNOSIS — R82.90 ABNORMAL URINE ODOR: ICD-10-CM

## 2018-02-01 LAB
BILIRUB UR QL: NEGATIVE
CLARITY UR: CLEAR
COLOR UR: YELLOW
GLUCOSE UR-MCNC: NEGATIVE MG/DL
HGB UR QL STRIP.AUTO: NEGATIVE
KETONES UR-MCNC: NEGATIVE MG/DL
NITRITE UR QL STRIP.AUTO: NEGATIVE
PH UR: 7 [PH] (ref 5–8)
PROT UR-MCNC: NEGATIVE MG/DL
RBC #/AREA URNS AUTO: 0 /HPF
SP GR UR STRIP: 1.01 (ref 1–1.03)
UROBILINOGEN UR STRIP-ACNC: <2
VIT C UR-MCNC: NEGATIVE MG/DL
WBC #/AREA URNS AUTO: 2 /HPF

## 2018-02-01 PROCEDURE — 81001 URINALYSIS AUTO W/SCOPE: CPT

## 2018-02-01 PROCEDURE — 87186 SC STD MICRODIL/AGAR DIL: CPT

## 2018-02-01 PROCEDURE — 87086 URINE CULTURE/COLONY COUNT: CPT

## 2018-02-01 PROCEDURE — 99213 OFFICE O/P EST LOW 20 MIN: CPT | Performed by: CLINICAL NURSE SPECIALIST

## 2018-02-01 NOTE — PROGRESS NOTES
Mario Olvera is a 45year old female [id number] Patient's last menstrual period was 09/06/2013 (approximate). Patient presents with:  Gyn Problem: Itching, odor in urine for a month  Vaginal itching x 1 week and \"foul\" odor in urine for the last month. daily.  , Disp: , Rfl: 0    ALLERGIES:  No Known Allergies      Review of Systems:  Gastrointestinal:  denies heartburn, abdominal pain, diarrhea or constipation  Genitourinary:  denies dysuria, incontinence, abnormal vaginal discharge, vaginal itching  Mu

## 2018-02-03 LAB
GENITAL VAGINOSIS SCREEN: NEGATIVE
TRICHOMONAS SCREEN: NEGATIVE

## 2018-02-07 ENCOUNTER — TELEPHONE (OUTPATIENT)
Dept: OBGYN CLINIC | Facility: CLINIC | Age: 53
End: 2018-02-07

## 2018-02-07 RX ORDER — NITROFURANTOIN 25; 75 MG/1; MG/1
100 CAPSULE ORAL 2 TIMES DAILY
Qty: 14 CAPSULE | Refills: 0 | Status: SHIPPED | OUTPATIENT
Start: 2018-02-07 | End: 2018-02-14

## 2018-02-07 NOTE — TELEPHONE ENCOUNTER
----- Message from NEELAM Curtis sent at 2/7/2018 11:32 AM CST -----  Please let pt know she has a UTI. Rx for Macrobid sent to pharmacy.      MAF

## 2018-03-01 ENCOUNTER — OFFICE VISIT (OUTPATIENT)
Dept: INTERNAL MEDICINE CLINIC | Facility: CLINIC | Age: 53
End: 2018-03-01

## 2018-03-01 VITALS
DIASTOLIC BLOOD PRESSURE: 85 MMHG | WEIGHT: 189.19 LBS | HEART RATE: 89 BPM | SYSTOLIC BLOOD PRESSURE: 126 MMHG | HEIGHT: 62 IN | BODY MASS INDEX: 34.82 KG/M2

## 2018-03-01 DIAGNOSIS — R11.15 NON-INTRACTABLE CYCLICAL VOMITING WITH NAUSEA: ICD-10-CM

## 2018-03-01 DIAGNOSIS — Z00.00 ROUTINE HEALTH MAINTENANCE: Primary | ICD-10-CM

## 2018-03-01 DIAGNOSIS — K58.0 IRRITABLE BOWEL SYNDROME WITH DIARRHEA: ICD-10-CM

## 2018-03-01 DIAGNOSIS — K76.0 NAFLD (NONALCOHOLIC FATTY LIVER DISEASE): ICD-10-CM

## 2018-03-01 DIAGNOSIS — I10 ESSENTIAL HYPERTENSION: ICD-10-CM

## 2018-03-01 DIAGNOSIS — M79.7 FIBROMYALGIA: ICD-10-CM

## 2018-03-01 PROCEDURE — 99386 PREV VISIT NEW AGE 40-64: CPT | Performed by: INTERNAL MEDICINE

## 2018-03-01 PROCEDURE — 99214 OFFICE O/P EST MOD 30 MIN: CPT | Performed by: INTERNAL MEDICINE

## 2018-03-01 PROCEDURE — 99212 OFFICE O/P EST SF 10 MIN: CPT | Performed by: INTERNAL MEDICINE

## 2018-03-01 RX ORDER — ONDANSETRON 4 MG/1
4 TABLET, ORALLY DISINTEGRATING ORAL EVERY 8 HOURS PRN
Qty: 20 TABLET | Refills: 1 | Status: SHIPPED | OUTPATIENT
Start: 2018-03-01 | End: 2018-07-19

## 2018-03-01 NOTE — PATIENT INSTRUCTIONS
See Dr. Kat Santiago (gastroenterologist) again for follow-up. [phone]    Please find out when you had your last tetanus or Tdap shot.

## 2018-03-01 NOTE — ASSESSMENT & PLAN NOTE
Unremarkable exam.  She is up-to-date on her colonoscopy  Immunizations were reviewed. Counseled pt regarding diet and exercise.

## 2018-03-01 NOTE — PROGRESS NOTES
HPI:    Patient ID: Missy Duran is a 45year old female. New pt. Pt is here for a physical.    She has 2 ongoing problems. She has left arm and shoulder pain. She has had cortisone injections in the past.  She started PT.   She has vomiting for Comment: polypectomy  12/2009: OTHER SURGICAL HISTORY      Comment: Surgery (due to spinal stenosis/spondylosis).   No date: OTHER SURGICAL HISTORY Right      Comment: ankle surgery         Current Outpatient Prescriptions:  ondansetron 4 MG Oral Tablet Dis time. She appears well-developed and well-nourished. No distress. HENT:   Head: Normocephalic and atraumatic.    Right Ear: Tympanic membrane and ear canal normal.   Left Ear: Tympanic membrane and ear canal normal.   Nose: Nose normal.   Mouth/Throat: Or nausea and vomiting. Her EGD was negative. Patient will follow up with Dr. Karissa Rodas. Will Rx Zofran. Relevant Medications    ondansetron 4 MG Oral Tablet Dispersible    Essential hypertension     Controlled. Continue present management.          Quinton Goltz

## 2018-03-01 NOTE — ASSESSMENT & PLAN NOTE
Patient has frequent episodes of nausea and vomiting. Her EGD was negative. Patient will follow up with Dr. Neyda Ely. Will Rx Zofran.

## 2018-03-14 ENCOUNTER — LAB ENCOUNTER (OUTPATIENT)
Dept: LAB | Facility: HOSPITAL | Age: 53
End: 2018-03-14
Attending: INTERNAL MEDICINE
Payer: COMMERCIAL

## 2018-03-14 DIAGNOSIS — Z00.00 ROUTINE HEALTH MAINTENANCE: ICD-10-CM

## 2018-03-14 LAB
ALBUMIN SERPL BCP-MCNC: 4.4 G/DL (ref 3.5–4.8)
ALBUMIN/GLOB SERPL: 1.5 {RATIO} (ref 1–2)
ALP SERPL-CCNC: 54 U/L (ref 32–100)
ALT SERPL-CCNC: 17 U/L (ref 14–54)
ANION GAP SERPL CALC-SCNC: 7 MMOL/L (ref 0–18)
AST SERPL-CCNC: 15 U/L (ref 15–41)
BASOPHILS # BLD: 0.1 K/UL (ref 0–0.2)
BASOPHILS NFR BLD: 1 %
BILIRUB SERPL-MCNC: 0.6 MG/DL (ref 0.3–1.2)
BUN SERPL-MCNC: 13 MG/DL (ref 8–20)
BUN/CREAT SERPL: 17.8 (ref 10–20)
CALCIUM SERPL-MCNC: 9.7 MG/DL (ref 8.5–10.5)
CHLORIDE SERPL-SCNC: 101 MMOL/L (ref 95–110)
CHOLEST SERPL-MCNC: 231 MG/DL (ref 110–200)
CO2 SERPL-SCNC: 28 MMOL/L (ref 22–32)
CREAT SERPL-MCNC: 0.73 MG/DL (ref 0.5–1.5)
EOSINOPHIL # BLD: 0.2 K/UL (ref 0–0.7)
EOSINOPHIL NFR BLD: 3 %
ERYTHROCYTE [DISTWIDTH] IN BLOOD BY AUTOMATED COUNT: 12.3 % (ref 11–15)
GLOBULIN PLAS-MCNC: 2.9 G/DL (ref 2.5–3.7)
GLUCOSE SERPL-MCNC: 131 MG/DL (ref 70–99)
HBA1C MFR BLD: 6 % (ref 4–6)
HCT VFR BLD AUTO: 37.1 % (ref 35–48)
HDLC SERPL-MCNC: 58 MG/DL
HGB BLD-MCNC: 12.7 G/DL (ref 12–16)
LDLC SERPL CALC-MCNC: 149 MG/DL (ref 0–99)
LYMPHOCYTES # BLD: 2.3 K/UL (ref 1–4)
LYMPHOCYTES NFR BLD: 41 %
MCH RBC QN AUTO: 30.5 PG (ref 27–32)
MCHC RBC AUTO-ENTMCNC: 34.3 G/DL (ref 32–37)
MCV RBC AUTO: 88.9 FL (ref 80–100)
MONOCYTES # BLD: 0.4 K/UL (ref 0–1)
MONOCYTES NFR BLD: 8 %
NEUTROPHILS # BLD AUTO: 2.6 K/UL (ref 1.8–7.7)
NEUTROPHILS NFR BLD: 47 %
NONHDLC SERPL-MCNC: 173 MG/DL
OSMOLALITY UR CALC.SUM OF ELEC: 284 MOSM/KG (ref 275–295)
PATIENT FASTING: YES
PLATELET # BLD AUTO: 255 K/UL (ref 140–400)
PMV BLD AUTO: 8.6 FL (ref 7.4–10.3)
POTASSIUM SERPL-SCNC: 3.9 MMOL/L (ref 3.3–5.1)
PROT SERPL-MCNC: 7.3 G/DL (ref 5.9–8.4)
RBC # BLD AUTO: 4.18 M/UL (ref 3.7–5.4)
SODIUM SERPL-SCNC: 136 MMOL/L (ref 136–144)
TRIGL SERPL-MCNC: 120 MG/DL (ref 1–149)
TSH SERPL-ACNC: 2.63 UIU/ML (ref 0.45–5.33)
WBC # BLD AUTO: 5.6 K/UL (ref 4–11)

## 2018-03-14 PROCEDURE — 36415 COLL VENOUS BLD VENIPUNCTURE: CPT

## 2018-03-14 PROCEDURE — 80050 GENERAL HEALTH PANEL: CPT

## 2018-03-14 PROCEDURE — 83036 HEMOGLOBIN GLYCOSYLATED A1C: CPT

## 2018-03-14 PROCEDURE — 80061 LIPID PANEL: CPT

## 2018-04-04 ENCOUNTER — TELEPHONE (OUTPATIENT)
Dept: RHEUMATOLOGY | Facility: CLINIC | Age: 53
End: 2018-04-04

## 2018-04-04 RX ORDER — NABUMETONE 750 MG/1
750 TABLET, FILM COATED ORAL 2 TIMES DAILY
Qty: 60 TABLET | Refills: 0 | Status: SHIPPED | OUTPATIENT
Start: 2018-04-04 | End: 2021-12-16 | Stop reason: ALTCHOICE

## 2018-04-04 NOTE — TELEPHONE ENCOUNTER
Call pt.  And let her know I called in nabumetone - to take that rather than meloixcam - will d/w her more on 4/11

## 2018-04-04 NOTE — TELEPHONE ENCOUNTER
Please see below. Reports symptoms started about 2 weeks ago. She is taking meloxicam but it is not helping, uncertain of dosage. She reports she was off of [address] [place], [place] [address]in [facility] for ago 1 and 1/2 weeks due to insurance issue. She is slowly starting back on it today.  Sh

## 2018-04-04 NOTE — TELEPHONE ENCOUNTER
Pt. States that she is having pain in her neck and shoulder, and Meloxicam med is not helping. Pt. Requesting to speak to RN.

## 2018-04-05 NOTE — TELEPHONE ENCOUNTER
Left message provider sent in nabumetone to take BID instead of meloxicam. Will discuss medications at F/U appt. Call office back with questions.

## 2018-04-11 ENCOUNTER — OFFICE VISIT (OUTPATIENT)
Dept: RHEUMATOLOGY | Facility: CLINIC | Age: 53
End: 2018-04-11

## 2018-04-11 VITALS
HEART RATE: 109 BPM | HEIGHT: 62 IN | BODY MASS INDEX: 35.88 KG/M2 | WEIGHT: 195 LBS | DIASTOLIC BLOOD PRESSURE: 92 MMHG | SYSTOLIC BLOOD PRESSURE: 136 MMHG

## 2018-04-11 DIAGNOSIS — Z51.81 THERAPEUTIC DRUG MONITORING: ICD-10-CM

## 2018-04-11 DIAGNOSIS — M79.7 FIBROMYALGIA: ICD-10-CM

## 2018-04-11 DIAGNOSIS — M35.1 MCTD (MIXED CONNECTIVE TISSUE DISEASE) (HCC): Primary | ICD-10-CM

## 2018-04-11 PROCEDURE — 99212 OFFICE O/P EST SF 10 MIN: CPT | Performed by: INTERNAL MEDICINE

## 2018-04-11 PROCEDURE — 99214 OFFICE O/P EST MOD 30 MIN: CPT | Performed by: INTERNAL MEDICINE

## 2018-04-11 NOTE — PATIENT INSTRUCTIONS
1.  Lyrica 150mg twice a day   2.  savella back to 50mg- try going back up to  twice a day - take 1/2 tablet twice a day x 2 weeks, then increase to 50mg twice ad ay -   3.  hydroxychrouqine 200mg - once ad ay   4. nabuemtone 750mg twice a day -   5.   Retu

## 2018-04-11 NOTE — PROGRESS NOTES
Mary Judd is a 45year old female who presents for Patient presents with:  Fibromyalgia Syndrome  . HPI:     I had the pleasure of seeing Mary Judd on 4/5/2017 for evaluation. Referred here by Dr. Johnson Del Angel and Dr. Neel Farrell.      She is a pleasajourdan sbeen ahving a lot. She doesn's tknow if it's her IBS. seh didn't see GI yet. She got labs and us liver. She has been having loose bowel movement and goto the bathrrom and then she throws up. - did this easter Sunday. Then has been fine . X 1 days.  She on it now. No rashes. She has 5/10 apin in her hips, legs and arms . 4/11/2018   She had a bad flare of neck pain 1-2 weeks ago. She felt advil and tyelnol wasn't helping. She's been having improved right arm funciton after physical therapy. polypectomy   • Fibromyalgia    • Gastropathy 1/18/2018   • Hemorrhoids 2014   • High blood pressure    • High cholesterol    • MCTD (mixed connective tissue disease) (HCC)    • Mood disorder ([facility]    • Other ill-defined conditions(799.89)     Per [employer]: pain, no jaw pain, no temple pain  Eyes: No visual changes, eye exam - 10 or 11/2016 - was done - with dr. Stone Barragan, has contacts as well, episode of eye swelling left eye - in 10-11/2016.    CVS: No chest pain, no heart disease, had echo long time ago - [facility] URINE      5.0-8.0  7.0   PROTEIN (URINE DIPSTICK)      Negative mg/dL  Negative   GLUCOSE (URINE DIPSTICK)      Negative mg/dL  Negative   KETONES (URINE DIPSTICK)      Negative mg/dL  Negative   BILIRUBIN      Negative  Negative   OCCULT BLOOD      Negat complication. 2. Degenerative arthritis. 3. Limited range of motion but no abnormal motion. 4. IUD in the pelvis    3/17/2016 - c spine xray   1. Straightening of cervical curve.   2. Moderate localized degenerative arthritis which has progressed     sig cysts - meloxicma prn info given  5/2017 -  u/s of right knee - poplitleat area was normal  - if pain increases she can get this. dicofenac gel 1%. - didn't help her.   -     Summary:  1.   Lyrica 150mg twice a day   2.  savella back to 50mg- try going ba

## 2018-05-04 ENCOUNTER — HOSPITAL ENCOUNTER (OUTPATIENT)
Dept: RESPIRATORY THERAPY | Facility: HOSPITAL | Age: 53
Discharge: HOME | End: 2018-05-04
Attending: INTERNAL MEDICINE
Payer: COMMERCIAL

## 2018-05-04 ENCOUNTER — HOSPITAL ENCOUNTER (OUTPATIENT)
Dept: CV DIAGNOSTICS | Facility: HOSPITAL | Age: 53
Discharge: HOME | End: 2018-05-04
Attending: INTERNAL MEDICINE
Payer: COMMERCIAL

## 2018-05-04 DIAGNOSIS — M35.1 MCTD (MIXED CONNECTIVE TISSUE DISEASE) (HCC): ICD-10-CM

## 2018-05-04 PROCEDURE — 94060 EVALUATION OF WHEEZING: CPT | Performed by: INTERNAL MEDICINE

## 2018-05-04 PROCEDURE — 94726 PLETHYSMOGRAPHY LUNG VOLUMES: CPT | Performed by: INTERNAL MEDICINE

## 2018-05-04 PROCEDURE — 93306 TTE W/DOPPLER COMPLETE: CPT | Performed by: INTERNAL MEDICINE

## 2018-05-04 PROCEDURE — 94729 DIFFUSING CAPACITY: CPT | Performed by: INTERNAL MEDICINE

## 2018-05-14 NOTE — ADDENDUM NOTE
Encounter addended by: Casie Gaitan MD on: 5/14/2018 12:08 PM<BR>    Actions taken: Sign clinical note, Charge Capture section accepted

## 2018-05-14 NOTE — PROCEDURES
[facility]    Patient's Name Sanchez Anthony MRN [id number]    1965 Pulmonologist Stefano Lombardo MD   Location [facility] PCP Wendy Mccoy MD     IMPRESSION:    The PFTs are Normal.      Please click on

## 2018-05-15 NOTE — TELEPHONE ENCOUNTER
LOV: 4/11/18  Future Appointments  Date Time Provider Erin Rosario   6/14/2018 3:00 PM Amari Montenegro MD [facility]   8/15/2018 3:10 PM Mk Barroso MD [address] [place] [address]     Please advise

## 2018-05-16 NOTE — TELEPHONE ENCOUNTER
[facility] [phone]. Confirmation received.

## 2018-05-23 ENCOUNTER — TELEPHONE (OUTPATIENT)
Dept: GASTROENTEROLOGY | Facility: CLINIC | Age: 53
End: 2018-05-23

## 2018-05-23 NOTE — TELEPHONE ENCOUNTER
Pt states PCP is requesting pt to consult with EBS regarding any additional treatment she can receive from  EBS to control vomiting.  Please call thank you [phone]

## 2018-05-24 NOTE — TELEPHONE ENCOUNTER
Pt states Dr. Yannick Nielson (PCP) is inquiring if there are any additional recommendations from Dr. Mina Colby regarding vomiting incidents. Pt had EGD on 1/18/18. States has f/u appt with Dr. Yannick Nielson on 6/14/18.  Pt states vomiting incident, are on and off states afte

## 2018-05-25 NOTE — TELEPHONE ENCOUNTER
Dr Tom Chan,    appt scheduled. Do you want to see her sooner or is 06/20/18 ok?     Future Appointments  Date Time Provider Erin Rosario   6/14/2018 3:00 PM Mario Alberto MD [facility]   6/20/2018 4:30 PM Rebeca Thomas MD [facility]

## 2018-05-30 ENCOUNTER — OFFICE VISIT (OUTPATIENT)
Dept: INTERNAL MEDICINE CLINIC | Facility: CLINIC | Age: 53
End: 2018-05-30

## 2018-05-30 ENCOUNTER — NURSE TRIAGE (OUTPATIENT)
Dept: INTERNAL MEDICINE CLINIC | Facility: CLINIC | Age: 53
End: 2018-05-30

## 2018-05-30 VITALS
DIASTOLIC BLOOD PRESSURE: 80 MMHG | HEIGHT: 62 IN | BODY MASS INDEX: 36.1 KG/M2 | WEIGHT: 196.19 LBS | TEMPERATURE: 98 F | SYSTOLIC BLOOD PRESSURE: 124 MMHG | HEART RATE: 90 BPM

## 2018-05-30 DIAGNOSIS — R73.03 PREDIABETES: ICD-10-CM

## 2018-05-30 DIAGNOSIS — R11.15 NON-INTRACTABLE CYCLICAL VOMITING WITH NAUSEA: ICD-10-CM

## 2018-05-30 DIAGNOSIS — R39.9 UTI SYMPTOMS: Primary | ICD-10-CM

## 2018-05-30 DIAGNOSIS — R30.9 PAIN WITH URINATION: Primary | ICD-10-CM

## 2018-05-30 PROCEDURE — 81002 URINALYSIS NONAUTO W/O SCOPE: CPT | Performed by: INTERNAL MEDICINE

## 2018-05-30 PROCEDURE — 99212 OFFICE O/P EST SF 10 MIN: CPT | Performed by: INTERNAL MEDICINE

## 2018-05-30 PROCEDURE — 99214 OFFICE O/P EST MOD 30 MIN: CPT | Performed by: INTERNAL MEDICINE

## 2018-05-30 RX ORDER — CEPHALEXIN 500 MG/1
500 CAPSULE ORAL 2 TIMES DAILY
Qty: 14 CAPSULE | Refills: 0 | Status: SHIPPED | OUTPATIENT
Start: 2018-05-30 | End: 2018-06-06

## 2018-05-30 NOTE — TELEPHONE ENCOUNTER
Patient will be at [facility] with LV at 4:15pm

## 2018-05-30 NOTE — PROGRESS NOTES
HPI:    Patient ID: Rmoain Del Real is a 45year old female. Pt c/o burning with urination and an odor. She still has the vomiting and the ondansetron doesn't help. It happens a few times a week. It lasts for 12 hours.       Urinary   This is a ne Oral Cap Take 1 capsule (150 mg total) by mouth 2 (two) times daily. Disp: 60 capsule Rfl: 6   Nabumetone 750 MG Oral Tab Take 1 tablet (750 mg total) by mouth 2 (two) times daily.  Disp: 60 tablet Rfl: 0   HYDROXYCHLOROQUINE SULFATE 200 MG Oral Tab TAKE 1 time. She appears well-developed and well-nourished. HENT:   Head: Normocephalic and atraumatic. Cardiovascular: Normal rate and regular rhythm. Pulmonary/Chest: Effort normal. No respiratory distress. Abdominal: Soft.  There is tenderness in the s

## 2018-05-30 NOTE — TELEPHONE ENCOUNTER
Action Requested: Summary for Provider     []  Critical Lab, Recommendations Needed  [] Need Additional Advice  []   FYI    [x]   Need Orders  [] Need Medications Sent to Pharmacy  []  Other     SUMMARY: Requesting urine studies - scheduled to see you 6/14

## 2018-06-20 ENCOUNTER — TELEPHONE (OUTPATIENT)
Dept: GASTROENTEROLOGY | Facility: CLINIC | Age: 53
End: 2018-06-20

## 2018-06-20 ENCOUNTER — OFFICE VISIT (OUTPATIENT)
Dept: GASTROENTEROLOGY | Facility: CLINIC | Age: 53
End: 2018-06-20

## 2018-06-20 VITALS
WEIGHT: 193 LBS | HEIGHT: 61 IN | HEART RATE: 120 BPM | DIASTOLIC BLOOD PRESSURE: 80 MMHG | BODY MASS INDEX: 36.44 KG/M2 | SYSTOLIC BLOOD PRESSURE: 123 MMHG

## 2018-06-20 DIAGNOSIS — M35.1 MIXED CONNECTIVE TISSUE DISEASE (HCC): ICD-10-CM

## 2018-06-20 DIAGNOSIS — R10.13 EPIGASTRIC ABDOMINAL PAIN OF UNKNOWN ETIOLOGY: ICD-10-CM

## 2018-06-20 DIAGNOSIS — R11.2 NAUSEA AND VOMITING, INTRACTABILITY OF VOMITING NOT SPECIFIED, UNSPECIFIED VOMITING TYPE: Primary | ICD-10-CM

## 2018-06-20 PROCEDURE — 99214 OFFICE O/P EST MOD 30 MIN: CPT | Performed by: INTERNAL MEDICINE

## 2018-06-20 NOTE — H&P
History of present illness: This is a 27-year-old female patient of Dr. Lundy Schilder with a history of mixed connective tissue disease who comes here for follow-up of vomiting and EGD in January. He has a history of crest syndrome.   She has a history of fat mg daily before breakfast with antireflux precautions.   3.       Follow up if symptoms persist.     Dictated By Fabricio Leahy MD  d:     01/18/2018 11:24:38\"    Pathology of gastric biopsies showed minimal changes without H. pylori bacteri

## 2018-06-20 NOTE — TELEPHONE ENCOUNTER
Called patient to see if she can come in early due to cancellations at 2:00pm and 2:30pm but she did not answer. If patient calls back please ask to come in anywhere between now at 2:15pm.     Per LEONARDB.

## 2018-06-20 NOTE — PROGRESS NOTES
HPI:    Patient ID: Carmine Hackett is a 45year old female. HPI    Review of Systems   Constitutional: Positive for fatigue and unexpected weight change. Negative for activity change, appetite change, chills, diaphoresis and fever.    HENT: Negative f not apply Misc  Disp:  Rfl: 3   Losartan Potassium-HCTZ (HYZAAR) 50-12.5 MG Oral Tab Take 1 tablet by mouth daily. Disp:  Rfl: 0   ondansetron 4 MG Oral Tablet Dispersible Take 1 tablet (4 mg total) by mouth every 8 (eight) hours as needed for Nausea.  Rudy Hickey reviewed. ASSESSMENT/PLAN:   Nausea and vomiting, intractability of vomiting not specified, unspecified vomiting type  (primary encounter diagnosis)    No orders of the defined types were placed in this encounter.       Meds This Visit:  No pres

## 2018-06-20 NOTE — PATIENT INSTRUCTIONS
1. Stop Glory Nan, and keep a diary of timing of your symptoms in relation to meals    2. Schedule gastric emptying scan    3. Schedule abdominal ultrasound as ordered by Dr. Valeria Solorio.     4.  Frequent small meals, decrease caffeine consumption, avoid precipit

## 2018-06-26 ENCOUNTER — HOSPITAL ENCOUNTER (OUTPATIENT)
Dept: NUCLEAR MEDICINE | Facility: HOSPITAL | Age: 53
Discharge: HOME | End: 2018-06-26
Attending: INTERNAL MEDICINE
Payer: COMMERCIAL

## 2018-06-26 ENCOUNTER — HOSPITAL ENCOUNTER (OUTPATIENT)
Dept: ULTRASOUND IMAGING | Facility: HOSPITAL | Age: 53
Discharge: HOME | End: 2018-06-26
Attending: INTERNAL MEDICINE
Payer: COMMERCIAL

## 2018-06-26 DIAGNOSIS — R11.2 NAUSEA AND VOMITING, INTRACTABILITY OF VOMITING NOT SPECIFIED, UNSPECIFIED VOMITING TYPE: ICD-10-CM

## 2018-06-26 DIAGNOSIS — R11.15 NON-INTRACTABLE CYCLICAL VOMITING WITH NAUSEA: ICD-10-CM

## 2018-06-26 PROCEDURE — 76700 US EXAM ABDOM COMPLETE: CPT | Performed by: INTERNAL MEDICINE

## 2018-06-26 PROCEDURE — 78264 GASTRIC EMPTYING IMG STUDY: CPT | Performed by: INTERNAL MEDICINE

## 2018-07-03 ENCOUNTER — TELEPHONE (OUTPATIENT)
Dept: GASTROENTEROLOGY | Facility: CLINIC | Age: 53
End: 2018-07-03

## 2018-07-19 ENCOUNTER — OFFICE VISIT (OUTPATIENT)
Dept: INTERNAL MEDICINE CLINIC | Facility: CLINIC | Age: 53
End: 2018-07-19
Payer: COMMERCIAL

## 2018-07-19 VITALS
WEIGHT: 189 LBS | HEIGHT: 62 IN | BODY MASS INDEX: 34.78 KG/M2 | DIASTOLIC BLOOD PRESSURE: 85 MMHG | SYSTOLIC BLOOD PRESSURE: 126 MMHG | HEART RATE: 76 BPM

## 2018-07-19 DIAGNOSIS — Z12.39 SCREENING FOR BREAST CANCER: ICD-10-CM

## 2018-07-19 DIAGNOSIS — M79.7 FIBROMYALGIA: Primary | ICD-10-CM

## 2018-07-19 DIAGNOSIS — R73.03 PREDIABETES: ICD-10-CM

## 2018-07-19 DIAGNOSIS — J30.9 ALLERGIC RHINITIS, UNSPECIFIED SEASONALITY, UNSPECIFIED TRIGGER: ICD-10-CM

## 2018-07-19 DIAGNOSIS — I10 ESSENTIAL HYPERTENSION: ICD-10-CM

## 2018-07-19 PROBLEM — R11.15 NON-INTRACTABLE CYCLICAL VOMITING WITH NAUSEA: Status: RESOLVED | Noted: 2018-03-01 | Resolved: 2018-07-19

## 2018-07-19 PROBLEM — R39.9 UTI SYMPTOMS: Status: RESOLVED | Noted: 2018-05-30 | Resolved: 2018-07-19

## 2018-07-19 PROCEDURE — 99212 OFFICE O/P EST SF 10 MIN: CPT | Performed by: INTERNAL MEDICINE

## 2018-07-19 PROCEDURE — 99214 OFFICE O/P EST MOD 30 MIN: CPT | Performed by: INTERNAL MEDICINE

## 2018-07-19 NOTE — PROGRESS NOTES
HPI:    Patient ID: Rosanne Sicard is a 52year old female. Pt had cyclical vomiting. She saw Dr. Mina Colby who advised her to stop the Lyrica and Vernard Pole which she was taking for fibromyalgia. The cyclical vomiting resolved.   The fibromyalgia is somewh Vitro Strip TEST QD Disp:  Rfl:    LORazepam 1 MG Oral Tab TK 1 T PO QHS Disp:  Rfl:    Phentermine HCl 37.5 MG Oral Tab daily. Disp:  Rfl: 0   Nabumetone 750 MG Oral Tab Take 1 tablet (750 mg total) by mouth 2 (two) times daily.  (Patient taking differentl time.  The Lyrica and Savella caused severe nausea and vomiting and she doesn't want to try anything else for the pain. Unprioritized    Essential hypertension     Controlled. Continue present management.          Prediabetes     Continue diet a

## 2018-07-20 NOTE — ASSESSMENT & PLAN NOTE
She has pain, but she doesn't want medication at this time. The Lyrica and Savella caused severe nausea and vomiting and she doesn't want to try anything else for the pain.

## 2018-08-15 ENCOUNTER — OFFICE VISIT (OUTPATIENT)
Dept: RHEUMATOLOGY | Facility: CLINIC | Age: 53
End: 2018-08-15
Payer: COMMERCIAL

## 2018-08-15 VITALS
BODY MASS INDEX: 34.04 KG/M2 | DIASTOLIC BLOOD PRESSURE: 85 MMHG | HEIGHT: 62 IN | SYSTOLIC BLOOD PRESSURE: 125 MMHG | HEART RATE: 81 BPM | WEIGHT: 185 LBS

## 2018-08-15 DIAGNOSIS — M35.1 MCTD (MIXED CONNECTIVE TISSUE DISEASE) (HCC): Primary | ICD-10-CM

## 2018-08-15 DIAGNOSIS — M79.7 FIBROMYALGIA: ICD-10-CM

## 2018-08-15 DIAGNOSIS — Z51.81 THERAPEUTIC DRUG MONITORING: ICD-10-CM

## 2018-08-15 PROCEDURE — 99212 OFFICE O/P EST SF 10 MIN: CPT | Performed by: INTERNAL MEDICINE

## 2018-08-15 PROCEDURE — 99214 OFFICE O/P EST MOD 30 MIN: CPT | Performed by: INTERNAL MEDICINE

## 2018-08-15 RX ORDER — PHENTERMINE HYDROCHLORIDE 37.5 MG/1
TABLET ORAL DAILY
Refills: 0 | COMMUNITY
Start: 2018-08-09 | End: 2018-12-17

## 2018-08-15 NOTE — PROGRESS NOTES
Kortney Hendrickson is a 45year old female who presents for Patient presents with:  Fibromyalgia Syndrome  Knee Pain: right  Wrist Pain: right  Arm Pain  Hand Pain  . HPI:     I had the pleasure of seeing Kortney Hendrickson on 4/5/2017 for evaluation.  Refe her.   She has no sob. She has been having n/v. That' sbeen ahving a lot. She doesn's tknow if it's her IBS. seh didn't see GI yet. She got labs and us liver.    She has been having loose bowel movement and goto the bathrrom and then she throws up. - di felt her right knee was bad. Now she' sbetter with laying on it now. No rashes. She has 5/10 apin in her hips, legs and arms . 4/11/2018   She had a bad flare of neck pain 1-2 weeks ago. She felt advil and tyelnol wasn't helping.    She's been hav HYDROXYCHLOROQUINE SULFATE 200 MG Oral Tab TAKE 1 TABLET(200 MG) BY MOUTH TWICE DAILY Disp: 180 tablet Rfl: 2   FREESTYLE LITE TEST In Vitro Strip  Disp:  Rfl: 3   FREESTYLE LANCETS Does not apply Misc  Disp:  Rfl: 3   Losartan Potassium-HCTZ (HYZAAR) 50 Occasionally wine   35 years ago she quit smoking. Works in [employer].    , 3 boys, youngest will live at home -      REVIEW OF SYSTEMS:   Review Of Systems:  Fatigue  Constitutional:No fever, no change in weight or appetitie  Derm: No rashes, no knees and elbows - right knee and b/l elbow worse 14/18 tender points. Lower back is not as tneder. Right hip is mild to mod tender - rigth knee better. Right knee medial area tender  no crepiuts   No synvoitis seen  Good cap refill   Good grasp.    Uzma Vieira Antibody      <20 <20        Component      Latest Ref Rng & Units 5/30/2018 3/14/2018   GLUCOSE (URINE DIPSTICK)      mg/dL Negative    BILIRUBIN      Negative Negative    KETONES (URINE DIPSTICK)      Negative mg/dL Negative    SPECIFIC GRAVITY      1.00 %      %  41   Monocytes %      %  8   Eosinophils %      %  3   Basophils %      %  1   Neutrophils Absolute      1.8 - 7.7 K/UL  2.6   Lymphocytes Absolute      1.0 - 4.0 K/UL  2.3   Monocytes Absolute      0.0 - 1.0 K/UL  0.4   Eosinophils Absolute function parameters were normal.    5/14/2018 - pfts -   The PFTs are Normal. DLCO 92%.    ASSESSMENT AND PLAN:   Alexey Osorio is a 45year old female who presents for Patient presents with:  Fibromyalgia Syndrome  Knee Pain: right  Wrist Pain: right .  hydroxychrouqine 200mg - once ad ay   2. .  nabuemtone 750mg twice a day - as needed -   3. .  Return to clinic in 4 -6 months. - cont.  cbd oil is ok -       Jayden Dong MD  8/15/2018   3:19 PM  - [place]ed [place]  through Epic

## 2018-08-15 NOTE — PATIENT INSTRUCTIONS
1. .  hydroxychrouqine 200mg - once ad ay   2. .  nabuemtone 750mg twice a day - as needed -   3. .  Return to clinic in 4 -6 months.

## 2018-11-01 ENCOUNTER — NURSE TRIAGE (OUTPATIENT)
Dept: OTHER | Age: 53
End: 2018-11-01

## 2018-11-01 NOTE — TELEPHONE ENCOUNTER
Action Requested: Summary for Provider     []  Critical Lab, Recommendations Needed  [] Need Additional Advice  []   FYI    []   Need Orders  [] Need Medications Sent to Pharmacy  []  Other     SUMMARY: Patient calling with complaint of urinary frequency,

## 2018-11-01 NOTE — TELEPHONE ENCOUNTER
Patient called back stating [facility] will [facility] over preliminary results to Dr. Tommy Goltz. Per patient, [facility] walk in clinic will fax finalized results once available. Dr. Tommy Goltz:   Please advise on urine culture results once received.    Patient

## 2018-11-01 NOTE — TELEPHONE ENCOUNTER
Relayed  message- will send Phone Rm message RE:Cancelations for Saturday     Pt stated she have taken 4 doses of rx, don't feel it's doing much-s/s burning with urination,frequency ,uncomfortable- discomfort after urination in lower pelvic

## 2018-11-01 NOTE — TELEPHONE ENCOUNTER
We haven't received the fax from [facility], but will call. Please put her on the cancellation list for Saturday at [facility]. I am booked, but we usually get cancellations.   I cannot see her today because I have an emergency dental appointment and am leaving as

## 2018-11-01 NOTE — TELEPHONE ENCOUNTER
[facility]. It showed an infection, but the results aren't final.  I do not have the antibiotic susceptibility result. Is she not feeling better on the nitrofurantoin?

## 2018-11-02 RX ORDER — CEPHALEXIN 500 MG/1
500 CAPSULE ORAL 2 TIMES DAILY
Qty: 14 CAPSULE | Refills: 0 | Status: SHIPPED | OUTPATIENT
Start: 2018-11-02 | End: 2018-11-09

## 2018-11-02 NOTE — TELEPHONE ENCOUNTER
Received results from [facility] urgent care. E.coli was sensitive to nitrofurantoin. Called pt. She still has symptoms. Will send Keflex.

## 2018-11-02 NOTE — TELEPHONE ENCOUNTER
Reviewed with pt, pt states she hasn't heard from [facility] yet. Explained to pt doctor's message as noted below. Informed pt again that [facility] should be following up with pt.  Pt upset because she was informed by [facility] her doctor would be following up with her regard

## 2018-11-02 NOTE — TELEPHONE ENCOUNTER
I don't have them. She needs to call [facility].  They treated her and they have the results. They should be following up.

## 2018-11-02 NOTE — TELEPHONE ENCOUNTER
I reached a rep, Abdirizak Matson, at [address] [place] [address]. Had her fax final culture report to /[facility]. [phone]. [facility] forwarded fax to triage and placed hard report on Dr. Carmen Joshi desk. I relayed the culture result to Dr. James Srivastava.  Dr. James Srivastava said she will contact

## 2018-11-05 ENCOUNTER — MED REC SCAN ONLY (OUTPATIENT)
Dept: INTERNAL MEDICINE CLINIC | Facility: CLINIC | Age: 53
End: 2018-11-05

## 2018-11-29 ENCOUNTER — OFFICE VISIT (OUTPATIENT)
Dept: OBGYN CLINIC | Facility: CLINIC | Age: 53
End: 2018-11-29

## 2018-11-29 ENCOUNTER — APPOINTMENT (OUTPATIENT)
Dept: LAB | Facility: HOSPITAL | Age: 53
End: 2018-11-29
Attending: CLINICAL NURSE SPECIALIST
Payer: COMMERCIAL

## 2018-11-29 VITALS
SYSTOLIC BLOOD PRESSURE: 128 MMHG | HEART RATE: 97 BPM | DIASTOLIC BLOOD PRESSURE: 84 MMHG | WEIGHT: 184.38 LBS | BODY MASS INDEX: 34 KG/M2

## 2018-11-29 DIAGNOSIS — R39.11 URINARY HESITANCY: ICD-10-CM

## 2018-11-29 DIAGNOSIS — R30.0 DYSURIA: Primary | ICD-10-CM

## 2018-11-29 DIAGNOSIS — R82.90 ABNORMAL URINE ODOR: ICD-10-CM

## 2018-11-29 DIAGNOSIS — R30.0 DYSURIA: ICD-10-CM

## 2018-11-29 PROCEDURE — 99213 OFFICE O/P EST LOW 20 MIN: CPT | Performed by: CLINICAL NURSE SPECIALIST

## 2018-11-29 PROCEDURE — 81001 URINALYSIS AUTO W/SCOPE: CPT

## 2018-11-29 PROCEDURE — 87186 SC STD MICRODIL/AGAR DIL: CPT

## 2018-11-29 PROCEDURE — 87088 URINE BACTERIA CULTURE: CPT

## 2018-11-29 PROCEDURE — 87086 URINE CULTURE/COLONY COUNT: CPT

## 2018-11-29 RX ORDER — BLOOD-GLUCOSE METER
KIT MISCELLANEOUS
COMMUNITY
Start: 2016-10-10 | End: 2019-01-16

## 2018-11-29 RX ORDER — LORAZEPAM 1 MG/1
TABLET ORAL
COMMUNITY
Start: 2017-03-06 | End: 2021-12-16 | Stop reason: ALTCHOICE

## 2018-11-29 RX ORDER — NITROFURANTOIN 25; 75 MG/1; MG/1
100 CAPSULE ORAL 2 TIMES DAILY
Qty: 14 CAPSULE | Refills: 0 | Status: SHIPPED | OUTPATIENT
Start: 2018-11-29 | End: 2018-12-06

## 2018-11-29 RX ORDER — NITROFURANTOIN 25; 75 MG/1; MG/1
CAPSULE ORAL
Refills: 0 | COMMUNITY
Start: 2018-10-30 | End: 2018-11-29

## 2018-11-30 NOTE — PROGRESS NOTES
Greenlee Rape is a 52year old female [id number] Patient's last menstrual period was 09/06/2013 (approximate). Patient presents with:  Gyn Problem: UTI  Her with c/o pain with urination, abnormal urine odor, and urinary hesitancy.  This will be 5th UTI sin Asked        Back Care: Not Asked        Exercise: Not Asked        Bike Helmet: Not Asked        Seat Belt: Not Asked        Self-Exams: Not Asked        Grew up on a farm: Not Asked        History of tanning: Not Asked        Outdoor occupation: Not Aske without lesions and prolapse  Bladder:  No fullness, masses or tenderness  Vagina:  Normal appearance without lesions, no abnormal discharge  Cervix:  Normal   Perineum: normal  Anus: no hemorroids   Lymph node: no inguinal lymph nodes    Assessment & Plan

## 2018-12-03 ENCOUNTER — OFFICE VISIT (OUTPATIENT)
Dept: SURGERY | Facility: CLINIC | Age: 53
End: 2018-12-03
Payer: COMMERCIAL

## 2018-12-03 VITALS
HEIGHT: 62 IN | BODY MASS INDEX: 33.86 KG/M2 | SYSTOLIC BLOOD PRESSURE: 120 MMHG | WEIGHT: 184 LBS | DIASTOLIC BLOOD PRESSURE: 64 MMHG

## 2018-12-03 DIAGNOSIS — N39.0 RECURRENT UTI: Primary | ICD-10-CM

## 2018-12-03 PROCEDURE — 99212 OFFICE O/P EST SF 10 MIN: CPT | Performed by: NURSE PRACTITIONER

## 2018-12-03 PROCEDURE — 99243 OFF/OP CNSLTJ NEW/EST LOW 30: CPT | Performed by: NURSE PRACTITIONER

## 2018-12-03 RX ORDER — NITROFURANTOIN 25; 75 MG/1; MG/1
100 CAPSULE ORAL AS NEEDED
Qty: 30 CAPSULE | Refills: 6 | Status: SHIPPED | OUTPATIENT
Start: 2018-12-03 | End: 2018-12-17

## 2018-12-03 NOTE — PROGRESS NOTES
HPI:    Patient ID: Arun Joshi is a 52year old female. Patient is a 52year old female who presents to the clinic for a consult at the request of, and a copy of this note will be sent to, Lila CASTANEDA regarding recurrent UTI.   Patient stat Negative for difficulty urinating, flank pain, hematuria and menstrual problem. Musculoskeletal: Negative for gait problem. Neurological: Negative for dizziness and light-headedness.            Current Outpatient Medications:  Glucose Blood (FREESTYLE L SURGICAL HISTORY Right     ankle surgery      Family History   Problem Relation Age of Onset   • Heart Disease Father         CAD, (cause of death) age 52   • Diabetes Father    • Stroke Maternal Grandmother         Cerebrovascular accident (Stroke)   • He She does try to urinate after sex. We discussed UTI prevention strategies. I recommended a bladder US to rule out any abnormality or increased PVR.   After discussing treatment options including cranberry supplements, suppressive antibiotic therapy, and H

## 2018-12-11 RX ORDER — LOSARTAN POTASSIUM AND HYDROCHLOROTHIAZIDE 12.5; 5 MG/1; MG/1
1 TABLET ORAL DAILY
Qty: 30 TABLET | Refills: 0 | Status: SHIPPED | OUTPATIENT
Start: 2018-12-11 | End: 2019-01-14

## 2018-12-11 NOTE — TELEPHONE ENCOUNTER
Patient is out of medication for 2 weeks. Has headaches.      Patient has been getting her refills from previous MD.    Hypertensive Medications  Protocol Criteria:  · Appointment scheduled in the past 6 months or in the next 3 months  · BMP or CMP in the

## 2018-12-17 ENCOUNTER — HOSPITAL ENCOUNTER (OUTPATIENT)
Age: 53
Discharge: HOME | End: 2018-12-17
Attending: FAMILY MEDICINE
Payer: COMMERCIAL

## 2018-12-17 VITALS
WEIGHT: 182 LBS | OXYGEN SATURATION: 100 % | DIASTOLIC BLOOD PRESSURE: 81 MMHG | HEIGHT: 62 IN | HEART RATE: 68 BPM | RESPIRATION RATE: 16 BRPM | TEMPERATURE: 98 F | BODY MASS INDEX: 33.49 KG/M2 | SYSTOLIC BLOOD PRESSURE: 156 MMHG

## 2018-12-17 DIAGNOSIS — M54.2 NECK PAIN: Primary | ICD-10-CM

## 2018-12-17 PROCEDURE — 99204 OFFICE O/P NEW MOD 45 MIN: CPT

## 2018-12-17 PROCEDURE — 99213 OFFICE O/P EST LOW 20 MIN: CPT

## 2018-12-17 RX ORDER — ACETAMINOPHEN AND CODEINE PHOSPHATE 300; 30 MG/1; MG/1
1-2 TABLET ORAL EVERY 6 HOURS PRN
Qty: 16 TABLET | Refills: 0 | Status: SHIPPED | OUTPATIENT
Start: 2018-12-17 | End: 2018-12-19

## 2018-12-17 RX ORDER — PREDNISONE 20 MG/1
40 TABLET ORAL DAILY
Qty: 10 TABLET | Refills: 0 | Status: SHIPPED | OUTPATIENT
Start: 2018-12-17 | End: 2018-12-22

## 2018-12-17 NOTE — ED INITIAL ASSESSMENT (HPI)
Pt presents to the IC with c/o neck stiffness that started on the right side 2 days ago. Pt saw a massage therapist with improvement. Pt notes it is now on the left side.  Pt has been taking an anti-inflammatory prescribed by her rheumatologist without impr

## 2018-12-17 NOTE — ED PROVIDER NOTES
Patient Seen in: [address]    History   Patient presents with:  Pain (neurologic)    Stated Complaint: pinched nerve    HPI    Patient complains of upper back pain. Just below the posterior neck. For the past 2 days. quittin.9      Smokeless tobacco: Never Used    Alcohol use:  Yes      Alcohol/week: 0.6 oz      Types: 1 Glasses of wine per week      Comment: Occasionally wine    Drug use: Yes      Types: Cannabis      Comment: last Dec. 2017      Review of Systems rheumatology.     Disposition:  Discharge  12/17/2018  5:07 pm    Follow-up:  Mk Barroso MD  [address] [place] [address] [phone]    Schedule an appointment as soon as possible for a visit           Medications Prescribed:

## 2018-12-26 ENCOUNTER — OFFICE VISIT (OUTPATIENT)
Dept: INTERNAL MEDICINE CLINIC | Facility: CLINIC | Age: 53
End: 2018-12-26
Payer: COMMERCIAL

## 2018-12-26 VITALS
WEIGHT: 188.19 LBS | TEMPERATURE: 99 F | RESPIRATION RATE: 17 BRPM | HEIGHT: 62 IN | HEART RATE: 76 BPM | SYSTOLIC BLOOD PRESSURE: 118 MMHG | DIASTOLIC BLOOD PRESSURE: 80 MMHG | BODY MASS INDEX: 34.63 KG/M2 | OXYGEN SATURATION: 99 %

## 2018-12-26 DIAGNOSIS — I10 ESSENTIAL HYPERTENSION: ICD-10-CM

## 2018-12-26 DIAGNOSIS — Z12.39 SCREENING FOR BREAST CANCER: ICD-10-CM

## 2018-12-26 DIAGNOSIS — E78.5 HYPERLIPIDEMIA, UNSPECIFIED HYPERLIPIDEMIA TYPE: ICD-10-CM

## 2018-12-26 DIAGNOSIS — F32.A DEPRESSION, UNSPECIFIED DEPRESSION TYPE: Primary | ICD-10-CM

## 2018-12-26 DIAGNOSIS — R73.03 PREDIABETES: ICD-10-CM

## 2018-12-26 PROCEDURE — 83036 HEMOGLOBIN GLYCOSYLATED A1C: CPT | Performed by: INTERNAL MEDICINE

## 2018-12-26 PROCEDURE — 99203 OFFICE O/P NEW LOW 30 MIN: CPT | Performed by: INTERNAL MEDICINE

## 2018-12-26 PROCEDURE — 80061 LIPID PANEL: CPT | Performed by: INTERNAL MEDICINE

## 2018-12-26 PROCEDURE — 80053 COMPREHEN METABOLIC PANEL: CPT | Performed by: INTERNAL MEDICINE

## 2018-12-26 RX ORDER — VENLAFAXINE HYDROCHLORIDE 37.5 MG/1
37.5 CAPSULE, EXTENDED RELEASE ORAL 2 TIMES DAILY
Qty: 60 CAPSULE | Refills: 0 | Status: SHIPPED | OUTPATIENT
Start: 2018-12-26 | End: 2019-04-16 | Stop reason: DRUGHIGH

## 2018-12-26 RX ORDER — CLONAZEPAM 0.25 MG/1
0.25 TABLET, ORALLY DISINTEGRATING ORAL 2 TIMES DAILY PRN
Qty: 20 TABLET | Refills: 0 | Status: SHIPPED | OUTPATIENT
Start: 2018-12-26 | End: 2021-06-02 | Stop reason: ALTCHOICE

## 2018-12-26 NOTE — PROGRESS NOTES
Carmine Hackett is a 52year old female.     Chief complaint:  Establish care , depression, predm   HPI:   52year old female with [address] as listed below here for   Establish care, depression and predm   Patient with hx of depression and mixed MCTD  Stopped HYSTEROSCOPY,DIAGNOSTIC  08/2006    polypectomy   • OTHER SURGICAL HISTORY  12/2009    Surgery (due to spinal stenosis/spondylosis).    • OTHER SURGICAL HISTORY Right     ankle surgery        Social History:  Social History    Tobacco Use      Smoking statu auscultation  CARDIO: RRR without murmur  GI: good BS's,no masses, HSM or tenderness  EXTREMITIES: no cyanosis, clubbing or edema  NEURO: no gross deficits              No orders of the defined types were placed in this encounter.         ASSESSMENT AND JEFFREY

## 2018-12-26 NOTE — PATIENT INSTRUCTIONS
Venlafaxine extended-release capsules  Brand Name: Effexor XR  What is this medicine? VENLAFAXINE(JIGNA la fax een) is used to treat depression, anxiety and panic disorder. How should I use this medicine?   Take this medicine by mouth with a full glass of · restlessness, pacing, inability to keep still  · seizures  · stiff muscles  · suicidal thoughts or other mood changes  · trouble passing urine or change in the amount of urine  · trouble sleeping  · unusual bleeding or bruising  · unusually weak or tired If you miss a dose, take it as soon as you can. If it is almost time for your next dose, take only that dose. Do not take double or extra doses. Where should I keep my medicine? Keep out of the reach of children.   Store at a controlled temperature [facility] This medicine can cause an increase in blood pressure. Check with your doctor for instructions on monitoring your blood pressure while taking this medicine. You may get drowsy or dizzy.  Do not drive, use machinery, or do anything that needs mental alertne Prediabetes is a disease where the body’s cells have trouble using glucose in the blood for energy. As a result, too much glucose stays in the blood and can affect how your heart and blood vessels work.  Without changes in diet and lifestyle, the problem ca The best way to treat prediabetes is to lose at least 5% to 7% of your current weight and be more physically active by getting at least 150 minutes a week of physical activity.  When sitting for long periods of time, get up for short sessions of light activ

## 2018-12-28 ENCOUNTER — HOSPITAL ENCOUNTER (OUTPATIENT)
Dept: MAMMOGRAPHY | Facility: HOSPITAL | Age: 53
Discharge: HOME | End: 2018-12-28
Attending: INTERNAL MEDICINE
Payer: COMMERCIAL

## 2018-12-28 ENCOUNTER — HOSPITAL ENCOUNTER (OUTPATIENT)
Dept: ULTRASOUND IMAGING | Facility: HOSPITAL | Age: 53
Discharge: HOME | End: 2018-12-28
Attending: NURSE PRACTITIONER
Payer: COMMERCIAL

## 2018-12-28 DIAGNOSIS — Z12.39 SCREENING FOR BREAST CANCER: ICD-10-CM

## 2018-12-28 DIAGNOSIS — I10 ESSENTIAL HYPERTENSION: ICD-10-CM

## 2018-12-28 DIAGNOSIS — F32.A DEPRESSION, UNSPECIFIED DEPRESSION TYPE: ICD-10-CM

## 2018-12-28 DIAGNOSIS — E78.5 HYPERLIPIDEMIA, UNSPECIFIED HYPERLIPIDEMIA TYPE: ICD-10-CM

## 2018-12-28 DIAGNOSIS — R73.03 PREDIABETES: ICD-10-CM

## 2018-12-28 DIAGNOSIS — N39.0 RECURRENT UTI: ICD-10-CM

## 2018-12-28 PROCEDURE — 77067 SCR MAMMO BI INCL CAD: CPT | Performed by: INTERNAL MEDICINE

## 2018-12-28 PROCEDURE — 76857 US EXAM PELVIC LIMITED: CPT | Performed by: NURSE PRACTITIONER

## 2018-12-28 PROCEDURE — 77063 BREAST TOMOSYNTHESIS BI: CPT | Performed by: INTERNAL MEDICINE

## 2019-01-02 DIAGNOSIS — R74.8 ELEVATED LIVER ENZYMES: Primary | ICD-10-CM

## 2019-01-04 ENCOUNTER — APPOINTMENT (OUTPATIENT)
Dept: LAB | Facility: HOSPITAL | Age: 54
End: 2019-01-04
Attending: INTERNAL MEDICINE
Payer: COMMERCIAL

## 2019-01-04 DIAGNOSIS — R74.8 ELEVATED LIVER ENZYMES: ICD-10-CM

## 2019-01-04 DIAGNOSIS — N39.0 RECURRENT UTI: ICD-10-CM

## 2019-01-04 LAB
BILIRUB UR QL: NEGATIVE
COLOR UR: YELLOW
GLUCOSE UR-MCNC: NEGATIVE MG/DL
HAV AB SER QL IA: NONREACTIVE
HBV CORE AB SERPL QL IA: NONREACTIVE
HBV SURFACE AB SER-ACNC: <3.1 MIU/ML (ref ?–10)
HBV SURFACE AG SERPL QL IA: NONREACTIVE
HBV SURFACE AG SERPL QL IA: NONREACTIVE
HCV AB SERPL QL IA: NONREACTIVE
HGB UR QL STRIP.AUTO: NEGATIVE
KETONES UR-MCNC: NEGATIVE MG/DL
NITRITE UR QL STRIP.AUTO: NEGATIVE
PH UR: 5 [PH] (ref 5–8)
PROT UR-MCNC: 30 MG/DL
RBC #/AREA URNS AUTO: 6 /HPF
SP GR UR STRIP: 1.03 (ref 1–1.03)
UROBILINOGEN UR STRIP-ACNC: <2
VIT C UR-MCNC: NEGATIVE MG/DL
WBC #/AREA URNS AUTO: 69 /HPF

## 2019-01-04 PROCEDURE — 87088 URINE BACTERIA CULTURE: CPT

## 2019-01-04 PROCEDURE — 36415 COLL VENOUS BLD VENIPUNCTURE: CPT

## 2019-01-04 PROCEDURE — 87340 HEPATITIS B SURFACE AG IA: CPT

## 2019-01-04 PROCEDURE — 81001 URINALYSIS AUTO W/SCOPE: CPT

## 2019-01-04 PROCEDURE — 86803 HEPATITIS C AB TEST: CPT

## 2019-01-04 PROCEDURE — 86706 HEP B SURFACE ANTIBODY: CPT

## 2019-01-04 PROCEDURE — 86704 HEP B CORE ANTIBODY TOTAL: CPT

## 2019-01-04 PROCEDURE — 87186 SC STD MICRODIL/AGAR DIL: CPT

## 2019-01-04 PROCEDURE — 80500 HEPATITIS A B + C PROFILE: CPT

## 2019-01-04 PROCEDURE — 86708 HEPATITIS A ANTIBODY: CPT

## 2019-01-04 PROCEDURE — 87086 URINE CULTURE/COLONY COUNT: CPT

## 2019-01-07 DIAGNOSIS — N30.00 ACUTE CYSTITIS WITHOUT HEMATURIA: Primary | ICD-10-CM

## 2019-01-07 RX ORDER — NITROFURANTOIN 25; 75 MG/1; MG/1
100 CAPSULE ORAL 2 TIMES DAILY
Qty: 14 CAPSULE | Refills: 0 | Status: SHIPPED | OUTPATIENT
Start: 2019-01-07 | End: 2019-01-14

## 2019-01-08 ENCOUNTER — HOSPITAL ENCOUNTER (OUTPATIENT)
Dept: ULTRASOUND IMAGING | Age: 54
Discharge: HOME | End: 2019-01-08
Attending: INTERNAL MEDICINE
Payer: COMMERCIAL

## 2019-01-08 DIAGNOSIS — R74.8 ELEVATED LIVER ENZYMES: ICD-10-CM

## 2019-01-08 PROCEDURE — 76705 ECHO EXAM OF ABDOMEN: CPT | Performed by: INTERNAL MEDICINE

## 2019-01-12 ENCOUNTER — TELEPHONE (OUTPATIENT)
Dept: SURGERY | Facility: CLINIC | Age: 54
End: 2019-01-12

## 2019-01-12 RX ORDER — SULFAMETHOXAZOLE AND TRIMETHOPRIM 800; 160 MG/1; MG/1
1 TABLET ORAL 2 TIMES DAILY
Qty: 14 TABLET | Refills: 0 | Status: SHIPPED | OUTPATIENT
Start: 2019-01-12 | End: 2019-01-19

## 2019-01-12 NOTE — TELEPHONE ENCOUNTER
She called me this morning. Has had persistent UTI symptoms. Has been on nitrofurntoin but states she is taking only once daily. Reviewed her last urine culture from 1/4/2019 showing pansensitive E.  Coli  Will prescribe Bactrim x 1 week  She has an appt

## 2019-01-14 ENCOUNTER — PATIENT MESSAGE (OUTPATIENT)
Dept: INTERNAL MEDICINE CLINIC | Facility: CLINIC | Age: 54
End: 2019-01-14

## 2019-01-14 DIAGNOSIS — K82.4 GALLBLADDER POLYP: Primary | ICD-10-CM

## 2019-01-14 RX ORDER — LOSARTAN POTASSIUM AND HYDROCHLOROTHIAZIDE 12.5; 5 MG/1; MG/1
1 TABLET ORAL DAILY
Qty: 90 TABLET | Refills: 1 | Status: SHIPPED | OUTPATIENT
Start: 2019-01-14 | End: 2019-08-25

## 2019-01-15 ENCOUNTER — OFFICE VISIT (OUTPATIENT)
Dept: SURGERY | Facility: CLINIC | Age: 54
End: 2019-01-15
Payer: COMMERCIAL

## 2019-01-15 VITALS
DIASTOLIC BLOOD PRESSURE: 88 MMHG | RESPIRATION RATE: 16 BRPM | TEMPERATURE: 98 F | SYSTOLIC BLOOD PRESSURE: 132 MMHG | WEIGHT: 184 LBS | BODY MASS INDEX: 33.86 KG/M2 | HEIGHT: 62 IN | HEART RATE: 88 BPM

## 2019-01-15 DIAGNOSIS — N39.0 RECURRENT UTI: Primary | ICD-10-CM

## 2019-01-15 PROCEDURE — 99212 OFFICE O/P EST SF 10 MIN: CPT | Performed by: NURSE PRACTITIONER

## 2019-01-15 PROCEDURE — 99213 OFFICE O/P EST LOW 20 MIN: CPT | Performed by: NURSE PRACTITIONER

## 2019-01-15 RX ORDER — PHENAZOPYRIDINE HYDROCHLORIDE 200 MG/1
200 TABLET, FILM COATED ORAL 3 TIMES DAILY PRN
Qty: 15 TABLET | Refills: 0 | Status: SHIPPED | OUTPATIENT
Start: 2019-01-15 | End: 2019-03-13 | Stop reason: ALTCHOICE

## 2019-01-15 RX ORDER — NITROFURANTOIN 25; 75 MG/1; MG/1
100 CAPSULE ORAL NIGHTLY
Qty: 90 CAPSULE | Refills: 0 | Status: SHIPPED | OUTPATIENT
Start: 2019-01-15 | End: 2019-08-07 | Stop reason: SINTOL

## 2019-01-15 NOTE — PROGRESS NOTES
HPI:    Patient ID: Carmine Hackett is a 52year old female. Patient is a 52year old female who presents to the clinic for a follow up regarding recurrent UTI. Last seen 12/3/18 with a history of 5 UTI over the past year.   She was prescribed WAUPUN MEM HSPTL mg total) by mouth 2 (two) times daily. Disp: 60 capsule Rfl: 0   clonazePAM 0.25 MG Oral Tablet Dispersible Take 1 tablet (0.25 mg total) by mouth 2 (two) times daily as needed.  Disp: 20 tablet Rfl: 0   Glucose Blood (FREESTYLE LITE TEST) In Vitro Strip T Maternal Aunt         70s    • Heart Disease Maternal Aunt         CAD   • Heart Disease Maternal Aunt    • Other (osteoarthritis) Mother    • Other (psoriasis) Sister       Social History: Social History    Tobacco Use      Smoking status: Former Smoker this encounter. Meds This Visit:  Requested Prescriptions     Signed Prescriptions Disp Refills   • Nitrofurantoin Monohyd Macro 100 MG Oral Cap 90 capsule 0     Sig: Take 1 capsule (100 mg total) by mouth nightly.    • Phenazopyridine HCl 200 MG Oral

## 2019-01-16 ENCOUNTER — OFFICE VISIT (OUTPATIENT)
Dept: RHEUMATOLOGY | Facility: CLINIC | Age: 54
End: 2019-01-16
Payer: COMMERCIAL

## 2019-01-16 VITALS
BODY MASS INDEX: 34.78 KG/M2 | SYSTOLIC BLOOD PRESSURE: 108 MMHG | DIASTOLIC BLOOD PRESSURE: 76 MMHG | HEIGHT: 62 IN | WEIGHT: 189 LBS | HEART RATE: 98 BPM

## 2019-01-16 DIAGNOSIS — M54.2 NECK PAIN: ICD-10-CM

## 2019-01-16 DIAGNOSIS — Z51.81 THERAPEUTIC DRUG MONITORING: ICD-10-CM

## 2019-01-16 DIAGNOSIS — M79.7 FIBROMYALGIA: ICD-10-CM

## 2019-01-16 DIAGNOSIS — M35.1 MCTD (MIXED CONNECTIVE TISSUE DISEASE) (HCC): Primary | ICD-10-CM

## 2019-01-16 PROCEDURE — 99212 OFFICE O/P EST SF 10 MIN: CPT | Performed by: INTERNAL MEDICINE

## 2019-01-16 PROCEDURE — 99214 OFFICE O/P EST MOD 30 MIN: CPT | Performed by: INTERNAL MEDICINE

## 2019-01-16 RX ORDER — CYCLOBENZAPRINE HCL 5 MG
TABLET ORAL
Qty: 60 TABLET | Refills: 1 | Status: SHIPPED | OUTPATIENT
Start: 2019-01-16 | End: 2019-04-02

## 2019-01-16 RX ORDER — AMITRIPTYLINE HYDROCHLORIDE 10 MG/1
10 TABLET, FILM COATED ORAL NIGHTLY
Qty: 30 TABLET | Refills: 3 | Status: SHIPPED | OUTPATIENT
Start: 2019-01-16 | End: 2019-04-02

## 2019-01-16 NOTE — PROGRESS NOTES
Christiana Kat is a 52year old female who presents for Patient presents with:  Mixed Connective Tissue Disease  Neck Pain  Shoulder Pain  . HPI:     I had the pleasure of seeing Christiana Kat on 4/5/2017 for evaluation.  Referred here by Dr. Sarita Cogan sob.   She has been having n/v. That' sbeen ahving a lot. She doesn's tknow if it's her IBS. seh didn't see GI yet. She got labs and us liver.    She has been having loose bowel movement and goto the bathrrom and then she throws up. - did this easter Sund was bad. Now she' sbetter with laying on it now. No rashes. She has 5/10 apin in her hips, legs and arms . 4/11/2018   She had a bad flare of neck pain 1-2 weeks ago. She felt advil and tyelnol wasn't helping.    She's been having improved right a UTI's.   She doesn't have pain much at times and then it can come on suddenly. She had physical therapy for her neck years ago.              Wt Readings from Last 2 Encounters:  01/16/19 : 189 lb (85.7 kg)  01/15/19 : 184 lb (83.5 kg)    Body mass index i conditions(799.89)     Per [employer]:  \"son and girlfriend with 4 kids moved in.\"   • Prediabetes    • Spinal stenosis     Comments:  spondylosis. Management:  surgery in 12/2009.    • Visual impairment       Past Surgical History:   Procedure Laterality D temple pain  Eyes: No visual changes, eye exam - 10 or 11/2016 - was done - with dr. Richard Garrison, has contacts as well, episode of eye swelling left eye - in 10-11/2016.    [facility]: No chest pain, no heart disease, had echo long time ago - thought she had skipped b PROTEIN (URINE DIPSTICK)      Negative mg/dL  Negative   GLUCOSE (URINE DIPSTICK)      Negative mg/dL  Negative   KETONES (URINE DIPSTICK)      Negative mg/dL  Negative   BILIRUBIN      Negative  Negative   OCCULT BLOOD      Negative  Negative   NITRITE, (SGOT)      15 - 41 U/L  50 (H)    ALKALINE PHOSPHATASE      32 - 100 U/L  75    Total Bilirubin      0.3 - 1.2 mg/dL  0.7    TOTAL PROTEIN      5.9 - 8.4 g/dL  6.9    Albumin      3.5 - 4.8 g/dL  4.2    GLOBULIN, TOTAL      2.5 - 3.7 g/dL  2.7    A/G RATI HEMOGLOBIN A1c      <5.7 %  6.0 (H)    ESTIMATED AVERAGE GLUCOSE      68 - 126 mg/dL  126      1/10/2014 - ct abd/pelvis  1. Stable findings from January 8, 2012. 2. No acute disease. 3. Normal appendix. 4. 1.57 cm splenule.   5. Small umbilical hernia hydroxychlrqouien 200mg one day -    - eye exam was done 11/2016 - dr. Miladis Seymour  - due again  - 5/2018 - baseline echo and pfts   - got work up from dr. Emre Torres and the were not showing the RNP or RAINE - genreal labs normal -   - repeat prednisone burst 10mg

## 2019-01-16 NOTE — PATIENT INSTRUCTIONS
1. . hydroxychrouqine 200mg - once ad ay   2. . Cyclobenzaprine 5-10mg at night   3. . amitriptyline 10mg at night   4. Return to clinic in 1-2 months.    5. Physical therapy for neck

## 2019-01-17 ENCOUNTER — HOSPITAL ENCOUNTER (OUTPATIENT)
Dept: ULTRASOUND IMAGING | Facility: HOSPITAL | Age: 54
Discharge: HOME | End: 2019-01-17
Attending: INTERNAL MEDICINE
Payer: COMMERCIAL

## 2019-01-17 ENCOUNTER — HOSPITAL ENCOUNTER (OUTPATIENT)
Dept: MAMMOGRAPHY | Facility: HOSPITAL | Age: 54
Discharge: HOME | End: 2019-01-17
Attending: INTERNAL MEDICINE
Payer: COMMERCIAL

## 2019-01-17 DIAGNOSIS — R92.8 ABNORMAL MAMMOGRAM: ICD-10-CM

## 2019-01-17 PROCEDURE — 77061 BREAST TOMOSYNTHESIS UNI: CPT | Performed by: INTERNAL MEDICINE

## 2019-01-17 PROCEDURE — 77065 DX MAMMO INCL CAD UNI: CPT | Performed by: INTERNAL MEDICINE

## 2019-01-17 PROCEDURE — 76642 ULTRASOUND BREAST LIMITED: CPT | Performed by: INTERNAL MEDICINE

## 2019-01-21 DIAGNOSIS — N63.0 BREAST MASS: Primary | ICD-10-CM

## 2019-02-02 ENCOUNTER — PRIOR ORIGINAL RECORDS (OUTPATIENT)
Dept: OTHER | Age: 54
End: 2019-02-02

## 2019-02-05 ENCOUNTER — OFFICE VISIT (OUTPATIENT)
Dept: INTERNAL MEDICINE CLINIC | Facility: CLINIC | Age: 54
End: 2019-02-05
Payer: COMMERCIAL

## 2019-02-05 VITALS
OXYGEN SATURATION: 97 % | TEMPERATURE: 98 F | WEIGHT: 188 LBS | HEIGHT: 62 IN | RESPIRATION RATE: 17 BRPM | HEART RATE: 97 BPM | DIASTOLIC BLOOD PRESSURE: 72 MMHG | SYSTOLIC BLOOD PRESSURE: 130 MMHG | BODY MASS INDEX: 34.6 KG/M2

## 2019-02-05 DIAGNOSIS — F32.A DEPRESSION, UNSPECIFIED DEPRESSION TYPE: ICD-10-CM

## 2019-02-05 DIAGNOSIS — Z71.2 ENCOUNTER TO DISCUSS TEST RESULTS: Primary | ICD-10-CM

## 2019-02-05 DIAGNOSIS — R00.2 PALPITATIONS: ICD-10-CM

## 2019-02-05 DIAGNOSIS — E83.52 HYPERCALCEMIA: ICD-10-CM

## 2019-02-05 DIAGNOSIS — I10 ESSENTIAL HYPERTENSION: ICD-10-CM

## 2019-02-05 LAB — CALCIUM SERPL-MCNC: 9.7 MG/DL (ref 8.5–10.5)

## 2019-02-05 PROCEDURE — 82310 ASSAY OF CALCIUM: CPT | Performed by: INTERNAL MEDICINE

## 2019-02-05 PROCEDURE — 99214 OFFICE O/P EST MOD 30 MIN: CPT | Performed by: INTERNAL MEDICINE

## 2019-02-05 PROCEDURE — 82306 VITAMIN D 25 HYDROXY: CPT | Performed by: INTERNAL MEDICINE

## 2019-02-05 PROCEDURE — 93000 ELECTROCARDIOGRAM COMPLETE: CPT | Performed by: INTERNAL MEDICINE

## 2019-02-05 PROCEDURE — 83970 ASSAY OF PARATHORMONE: CPT | Performed by: INTERNAL MEDICINE

## 2019-02-05 RX ORDER — VENLAFAXINE HYDROCHLORIDE 75 MG/1
75 CAPSULE, EXTENDED RELEASE ORAL DAILY
Qty: 90 CAPSULE | Refills: 1 | Status: SHIPPED | OUTPATIENT
Start: 2019-02-05 | End: 2019-10-03

## 2019-02-05 NOTE — PROGRESS NOTES
Ren Castanon is a 52year old female.     Chief complaint:follow up on chronic condition , discuss test results       HPI:   52year old female with PMH as listed below here for   Follow up on chronic conditions, depression and discuss test results   D HYDROXYCHLOROQUINE SULFATE 200 MG Oral Tab TAKE 1 TABLET(200 MG) BY MOUTH TWICE DAILY Disp: 180 tablet Rfl: 2   FREESTYLE LITE TEST In Vitro Strip  Disp:  Rfl: 3   FREESTYLE LANCETS Does not apply Misc  Disp:  Rfl: 3      Past Medical History:   Rima Berry (psoriasis) Sister      Patient Active Problem List:     NAFLD (nonalcoholic fatty liver disease)     Gallbladder polyp     Gastropathy     Routine health maintenance     Essential hypertension     Irritable bowel syndrome with diarrhea     Fibromyalgia t wave changes : referral to cardiology for further evaluation   Continue losartan hctz       Please return to the clinic if you are having persistent or worsening symptoms   Rima Golden MD,   Diplomate of the American Board of Internal Medicine  Diplom

## 2019-02-06 LAB
25(OH)D3 SERPL-MCNC: 19.8 NG/ML (ref 30–100)
PTH-INTACT SERPL-MCNC: 52.5 PG/ML (ref 12–88)

## 2019-02-07 RX ORDER — ERGOCALCIFEROL 1.25 MG/1
50000 CAPSULE ORAL WEEKLY
Qty: 12 CAPSULE | Refills: 0 | Status: SHIPPED | OUTPATIENT
Start: 2019-02-07 | End: 2019-02-19

## 2019-02-18 ENCOUNTER — PRIOR ORIGINAL RECORDS (OUTPATIENT)
Dept: OTHER | Age: 54
End: 2019-02-18

## 2019-02-18 LAB
ALT (SGPT): 28 U/L
AST (SGOT): 19 U/L
BUN: 16 MG/DL
CALCIUM: 10.3 MG/DL
CHLORIDE: 99 MEQ/L
CHOLESTEROL, TOTAL: 235 MG/DL
CREATININE, SERUM: 0.89 MG/DL
GLOBULIN: 2.8 G/DL
HDL CHOLESTEROL: 59 MG/DL
HEMATOCRIT: 38.6 %
HEMOGLOBIN: 12.9 G/DL
LDL CHOLESTEROL: 148 MG/DL
MCH: 30.6 PG
MCHC: 33.4 G/DL
MCV: 92 FL
PLATELETS: 301 K/UL
POTASSIUM, SERUM: 4.5 MEQ/L
PROTEIN, TOTAL: 7.7 G/DL
RED BLOOD COUNT: 4.22 X 10-6/U
SGOT (AST): 19 IU/L
SGPT (ALT): 28 IU/L
SODIUM: 139 MEQ/L
TRIGLYCERIDES: 138 MG/DL
WHITE BLOOD COUNT: 5.1 X 10-3/U

## 2019-02-25 ENCOUNTER — PRIOR ORIGINAL RECORDS (OUTPATIENT)
Dept: OTHER | Age: 54
End: 2019-02-25

## 2019-02-27 ENCOUNTER — PRIOR ORIGINAL RECORDS (OUTPATIENT)
Dept: OTHER | Age: 54
End: 2019-02-27

## 2019-02-28 VITALS
WEIGHT: 189 LBS | BODY MASS INDEX: 34.78 KG/M2 | HEART RATE: 119 BPM | DIASTOLIC BLOOD PRESSURE: 80 MMHG | HEIGHT: 62 IN | SYSTOLIC BLOOD PRESSURE: 130 MMHG

## 2019-03-04 ENCOUNTER — PRIOR ORIGINAL RECORDS (OUTPATIENT)
Dept: OTHER | Age: 54
End: 2019-03-04

## 2019-03-07 ENCOUNTER — PATIENT MESSAGE (OUTPATIENT)
Dept: GASTROENTEROLOGY | Facility: CLINIC | Age: 54
End: 2019-03-07

## 2019-03-11 NOTE — TELEPHONE ENCOUNTER
From: Ruben Holt  To: Cassie Smallwood MD  Sent: 3/7/2019 11:18 AM CST  Subject: Prescription Question    My irritable bowel is acting up and my prescription was out dated. I don't rememberer the name of it . May I get a refill please.  Danya Zarate

## 2019-03-13 ENCOUNTER — HOSPITAL ENCOUNTER (OUTPATIENT)
Age: 54
Discharge: HOME | End: 2019-03-13
Attending: EMERGENCY MEDICINE
Payer: COMMERCIAL

## 2019-03-13 VITALS
RESPIRATION RATE: 18 BRPM | DIASTOLIC BLOOD PRESSURE: 84 MMHG | HEART RATE: 105 BPM | SYSTOLIC BLOOD PRESSURE: 125 MMHG | TEMPERATURE: 98 F | OXYGEN SATURATION: 97 %

## 2019-03-13 DIAGNOSIS — B37.0 THRUSH, ORAL: Primary | ICD-10-CM

## 2019-03-13 LAB — S PYO AG THROAT QL: NEGATIVE

## 2019-03-13 PROCEDURE — 99213 OFFICE O/P EST LOW 20 MIN: CPT

## 2019-03-13 PROCEDURE — 99214 OFFICE O/P EST MOD 30 MIN: CPT

## 2019-03-13 PROCEDURE — 87430 STREP A AG IA: CPT

## 2019-03-13 NOTE — ED PROVIDER NOTES
Patient Seen in: [facility] [address] [place] [address]    History   Patient presents with:  Burning Tongue    Stated Complaint: tongue burning    HPI    The patient is a 10-year-old female with past history of fibromyalgia, mixed connective tissue 2017      Review of Systems    Positive for stated complaint: tongue burning  Other systems are as noted in HPI. Constitutional and vital signs reviewed. All other systems reviewed and negative except as noted above.     Physical Exam     ED Triage Vi

## 2019-03-14 NOTE — PROGRESS NOTES
[facility] Follow-up Visit    Sushila IBS/abdominal cramping and irregular bowel habits. She believes Bentyl has been helpful in the past.    She notes that her bowel movements have been looser as of late. She was recently on a long-term antibiotic.     No other changes in medications, si at [address] [place] [address] ENDOSCOPY   • Goldie  08/2006    polypectomy   • OTHER SURGICAL HISTORY  12/2009    Surgery (due to spinal stenosis/spondylosis).    • OTHER SURGICAL HISTORY Right     ankle surgery      Family History   Problem Relation Age of Onset PO QHS Disp:  Rfl:    Nabumetone 750 MG Oral Tab Take 1 tablet (750 mg total) by mouth 2 (two) times daily.  (Patient taking differently: Take 750 mg by mouth as needed.  ) Disp: 60 tablet Rfl: 0   HYDROXYCHLOROQUINE SULFATE 200 MG Oral Tab TAKE 1 TABLET(20 noted, no masses are palpated  : no CVA tenderness  Skin: dry, warm, no jaundice  Ext: no cyanosis, clubbing or edema is evident.    Neuro: Alert and oriented x4, and patient is having movements of all 4 extremities   Psych: Pt has a normal mood and affec capsule (10 mg total) by mouth 2 (two) times daily as needed. Imaging & Referrals:  None       NEELAM Reddy  3/26/2019

## 2019-03-15 ENCOUNTER — TELEPHONE (OUTPATIENT)
Dept: INTERNAL MEDICINE CLINIC | Facility: CLINIC | Age: 54
End: 2019-03-15

## 2019-03-15 NOTE — TELEPHONE ENCOUNTER
[address] Kevin diagnosed her with Thrush several days ago. Has been using Nystatin Swish & Swallow for three days. Tongue still hurting. Now she has red spots with white dot in center all over tongue.  Educated patient that this is still another  Symptom of Thrush

## 2019-03-19 NOTE — TELEPHONE ENCOUNTER
Luis Heath,    Can you send Zofran to this pt's pharmacy.   She has nausea in the morning with her bowel movements       Future Appointments   Date Time Provider Erin Rosario   3/26/2019  8:00 AM TONYA Berger [facility] Ange SANTIAGO   3/27/2019  3

## 2019-03-26 ENCOUNTER — OFFICE VISIT (OUTPATIENT)
Dept: GASTROENTEROLOGY | Facility: CLINIC | Age: 54
End: 2019-03-26
Payer: COMMERCIAL

## 2019-03-26 VITALS
HEART RATE: 84 BPM | DIASTOLIC BLOOD PRESSURE: 86 MMHG | SYSTOLIC BLOOD PRESSURE: 139 MMHG | HEIGHT: 62 IN | WEIGHT: 193 LBS | BODY MASS INDEX: 35.51 KG/M2

## 2019-03-26 DIAGNOSIS — R19.8 IRREGULAR BOWEL HABITS: ICD-10-CM

## 2019-03-26 DIAGNOSIS — R11.2 NAUSEA AND VOMITING, INTRACTABILITY OF VOMITING NOT SPECIFIED, UNSPECIFIED VOMITING TYPE: Primary | ICD-10-CM

## 2019-03-26 PROCEDURE — 99212 OFFICE O/P EST SF 10 MIN: CPT | Performed by: NURSE PRACTITIONER

## 2019-03-26 PROCEDURE — 99213 OFFICE O/P EST LOW 20 MIN: CPT | Performed by: NURSE PRACTITIONER

## 2019-03-26 RX ORDER — DICYCLOMINE HYDROCHLORIDE 10 MG/1
10 CAPSULE ORAL 2 TIMES DAILY PRN
Qty: 60 CAPSULE | Refills: 1 | Status: SHIPPED | OUTPATIENT
Start: 2019-03-26 | End: 2019-09-18 | Stop reason: ALTCHOICE

## 2019-03-26 NOTE — PATIENT INSTRUCTIONS
1.  Complete lab work  2. May use Bentyl as needed  3. I will let you know the lab results when they have come in  4.   Follow-up in 2-3 months

## 2019-04-02 ENCOUNTER — OFFICE VISIT (OUTPATIENT)
Dept: RHEUMATOLOGY | Facility: CLINIC | Age: 54
End: 2019-04-02
Payer: COMMERCIAL

## 2019-04-02 VITALS
WEIGHT: 195 LBS | SYSTOLIC BLOOD PRESSURE: 127 MMHG | HEIGHT: 62 IN | DIASTOLIC BLOOD PRESSURE: 84 MMHG | HEART RATE: 98 BPM | BODY MASS INDEX: 35.88 KG/M2

## 2019-04-02 DIAGNOSIS — M25.562 CHRONIC PAIN OF LEFT KNEE: ICD-10-CM

## 2019-04-02 DIAGNOSIS — Z51.81 THERAPEUTIC DRUG MONITORING: ICD-10-CM

## 2019-04-02 DIAGNOSIS — M79.7 FIBROMYALGIA: ICD-10-CM

## 2019-04-02 DIAGNOSIS — M35.1 MCTD (MIXED CONNECTIVE TISSUE DISEASE) (HCC): Primary | ICD-10-CM

## 2019-04-02 DIAGNOSIS — G89.29 CHRONIC PAIN OF LEFT KNEE: ICD-10-CM

## 2019-04-02 PROCEDURE — 99214 OFFICE O/P EST MOD 30 MIN: CPT | Performed by: INTERNAL MEDICINE

## 2019-04-02 PROCEDURE — 99212 OFFICE O/P EST SF 10 MIN: CPT | Performed by: INTERNAL MEDICINE

## 2019-04-02 RX ORDER — AMITRIPTYLINE HYDROCHLORIDE 10 MG/1
TABLET, FILM COATED ORAL
Qty: 60 TABLET | Refills: 3 | Status: SHIPPED | OUTPATIENT
Start: 2019-04-02 | End: 2019-10-17

## 2019-04-02 RX ORDER — CYCLOBENZAPRINE HCL 5 MG
TABLET ORAL
Qty: 60 TABLET | Refills: 3 | Status: SHIPPED | OUTPATIENT
Start: 2019-04-02 | End: 2019-12-23

## 2019-04-02 NOTE — PATIENT INSTRUCTIONS
1. . hydroxychrouqine 200mg - once ad ay   2. . Cyclobenzaprine 5-10mg at night - to take as needed. 3. .amitriptyline 10 - 20mg at night  - increase to 20mg at times when trouble sleeping   4. Return to clinic in 4-6 months. 5. Cont.  Physical therapy

## 2019-04-02 NOTE — PROGRESS NOTES
Christiana Kat is a 52year old female who presents for Patient presents with:  Fibromyalgia Syndrome  Hip Pain  Knee Pain: left  . HPI:     I had the pleasure of seeing Christiana Kat on 4/5/2017 for evaluation.  Referred here by Dr. Sarita Cogan and  has been having n/v. That' sbeen ahving a lot. She doesn's tknow if it's her IBS. seh didn't see GI yet. She got labs and us liver. She has been having loose bowel movement and goto the bathrrom and then she throws up. - did this easter Sunday.  Then ha Now she' sbetter with laying on it now. No rashes. She has 5/10 apin in her hips, legs and arms . 4/11/2018   She had a bad flare of neck pain 1-2 weeks ago. She felt advil and tyelnol wasn't helping.    She's been having improved right arm funcit doesn't have pain much at times and then it can come on suddenly. She had physical therapy for her neck years ago. 4/2/2019  Her hips and knee are bad. For the most part , she feels ok. But she gets bouts of pain. Her arms are much better.  She is sti nystatin [id number] UNIT/ML Mouth/Throat Suspension Take 5 mL (500,000 Units total) by mouth 4 (four) times daily. Disp: 473 mL Rfl: 0   Nitrofurantoin Monohyd Macro 100 MG Oral Cap Take 1 capsule (100 mg total) by mouth nightly.  Disp: 90 capsule Rfl: 0   Ve Other (psoriasis) Sister    mom - has arthriits   Sister doesn't have psa. 3rd cousin has psa.     Social History:  Social History    Tobacco Use      Smoking status: Former Smoker        Types: Cigarettes        Quit date: 1/19/1989        Years since quit Position: Sitting, Cuff Size: adult)   Pulse 98   Ht 5' 2\" (1.575 m)   Wt 195 lb (88.5 kg)   LMP 09/06/2013 (Approximate)   BMI 35.67 kg/m²   HEENT: Clear oropharynx, no oral ulcers, EOM intact, clear sclear, PERRLA, pleasant, no acute distress, no CAD, n Nonreactive  Nonreactive    HEPATITIS B SURFACE AB QUAL      Nonreactive  Nonreactive    HEPATITIS B SURF AB QUANT      <10.00 mIU/mL <3.10    HEPATITIS C VIRUS ANTIBODY      Nonreactive Nonreactive    C-REACTIVE PROTEIN      0.0-0.9 mg/dL  <0.5   SED RATE Esterase Urine      Negative Large (A)    ASCORBIC ACID      Negative mg/dL Negative    SQUAM EPI CELLS UR      /HPF Few    RENAL TUBULAR EPITHELIAL CELLS      /HPF Few    WBC Urine      0 - 5 /HPF 69 (H)    RBC URINE      0 - 3 /HPF 6 (H)    Bacteria Urin normal; there were no regional wall motion abnormalities.     Left ventricular diastolic function parameters were normal.    5/14/2018 - pfts -   The PFTs are Normal. DLCO 92%.    ASSESSMENT AND PLAN:   Elmore Rape is a 52year old female who presents emotionally - crabby -   She didn't feel the cycmbalta and gabapentin helped her in the past - no side effects -   6. Eye exam - dr. Francisco Javier Farmer - yearly  - going to see him this summer. Summary:     1. . hydroxychrouqine 200mg - once ad ay   2.  Ileene Meckel

## 2019-04-16 ENCOUNTER — OFFICE VISIT (OUTPATIENT)
Dept: SURGERY | Facility: CLINIC | Age: 54
End: 2019-04-16
Payer: COMMERCIAL

## 2019-04-16 VITALS
DIASTOLIC BLOOD PRESSURE: 81 MMHG | TEMPERATURE: 98 F | SYSTOLIC BLOOD PRESSURE: 119 MMHG | HEART RATE: 109 BPM | BODY MASS INDEX: 34.96 KG/M2 | RESPIRATION RATE: 16 BRPM | HEIGHT: 62 IN | WEIGHT: 190 LBS

## 2019-04-16 DIAGNOSIS — N39.0 RECURRENT UTI: Primary | ICD-10-CM

## 2019-04-16 PROCEDURE — 99212 OFFICE O/P EST SF 10 MIN: CPT | Performed by: NURSE PRACTITIONER

## 2019-04-16 PROCEDURE — 99213 OFFICE O/P EST LOW 20 MIN: CPT | Performed by: NURSE PRACTITIONER

## 2019-04-16 NOTE — PROGRESS NOTES
HPI:    Patient ID: Yuko Fields is a 52year old female. Patient is a 52year old female who presents to the clinic for a follow up regarding recurrent UTI. Patient was last seen in the office on 1/15/19.   She had tried macrobid post coital and Dicyclomine HCl 10 MG Oral Cap Take 1 capsule (10 mg total) by mouth 2 (two) times daily as needed. Disp: 60 capsule Rfl: 1   nystatin [id number] UNIT/ML Mouth/Throat Suspension Take 5 mL (500,000 Units total) by mouth 4 (four) times daily.  Disp: 473 mL Rfl: (osteoarthritis) Mother    • Other (psoriasis) Sister       Social History: Social History    Tobacco Use      Smoking status: Former Smoker        Types: Cigarettes        Quit date: 1989        Years since quittin.2      Smokeless tobacco: [place] [employer]

## 2019-04-19 ENCOUNTER — APPOINTMENT (OUTPATIENT)
Dept: LAB | Facility: HOSPITAL | Age: 54
End: 2019-04-19
Attending: NURSE PRACTITIONER
Payer: COMMERCIAL

## 2019-04-19 DIAGNOSIS — N39.0 RECURRENT UTI: ICD-10-CM

## 2019-04-19 PROCEDURE — 87086 URINE CULTURE/COLONY COUNT: CPT

## 2019-04-19 PROCEDURE — 87186 SC STD MICRODIL/AGAR DIL: CPT

## 2019-04-19 PROCEDURE — 81001 URINALYSIS AUTO W/SCOPE: CPT

## 2019-04-19 PROCEDURE — 87088 URINE BACTERIA CULTURE: CPT

## 2019-04-22 ENCOUNTER — TELEPHONE (OUTPATIENT)
Dept: SURGERY | Facility: CLINIC | Age: 54
End: 2019-04-22

## 2019-04-22 RX ORDER — SULFAMETHOXAZOLE AND TRIMETHOPRIM 800; 160 MG/1; MG/1
1 TABLET ORAL 2 TIMES DAILY
Qty: 6 TABLET | Refills: 0 | Status: SHIPPED | OUTPATIENT
Start: 2019-04-22 | End: 2019-04-25

## 2019-04-22 NOTE — TELEPHONE ENCOUNTER
Pt is having UTI symptoms, states she submitted a urine sample last week and needs to know if she needs to be on ABX. Please advise thank you.

## 2019-04-25 ENCOUNTER — TELEPHONE (OUTPATIENT)
Dept: SURGERY | Facility: CLINIC | Age: 54
End: 2019-04-25

## 2019-04-25 ENCOUNTER — TELEPHONE (OUTPATIENT)
Dept: GASTROENTEROLOGY | Facility: CLINIC | Age: 54
End: 2019-04-25

## 2019-04-25 RX ORDER — FLUCONAZOLE 200 MG/1
200 TABLET ORAL DAILY
Qty: 7 TABLET | Refills: 0 | Status: SHIPPED | OUTPATIENT
Start: 2019-04-25 | End: 2019-05-01

## 2019-04-25 NOTE — PROGRESS NOTES
Received call from patient. She complains of thrush. Had last month related to antibiotic use and was treated with nystatin oral suspension. She was recently prescribed bactrim and states she started to have symptoms again of thursh.   She immediately st

## 2019-04-25 NOTE — TELEPHONE ENCOUNTER
Patient contacted. Patient states she spoke with [facility] APLEELEE this morning and is \"all set\". Thank you [place] APLEELEE.

## 2019-04-25 NOTE — TELEPHONE ENCOUNTER
Overdue labs letter mailed to pt.     Labs:   H. Pylori Stool Ag, EIA      Clostridium difficile(toxigenic)PCR      Ova and Parasites Non-traveler Giardia + Crypto Antigen, Stool      Stool Culture W/ Shigatoxin

## 2019-05-01 ENCOUNTER — TELEPHONE (OUTPATIENT)
Dept: SURGERY | Facility: CLINIC | Age: 54
End: 2019-05-01

## 2019-05-01 RX ORDER — FLUCONAZOLE 200 MG/1
200 TABLET ORAL DAILY
Qty: 7 TABLET | Refills: 0 | Status: SHIPPED | OUTPATIENT
Start: 2019-05-01 | End: 2019-05-08

## 2019-05-01 NOTE — TELEPHONE ENCOUNTER
Spoke with pt and she informed that she took the last Fluconazole tab this am and states that the yeast infection in her mouth is better but still there and is wondering if she needs more of the Fluconazole.  I told her that I will send this msg to [facility] with

## 2019-05-01 NOTE — TELEPHONE ENCOUNTER
Will send additional 7 days. I called patient and left message notifying her.       Thank you,  Avery RICHTER  [facility]

## 2019-05-01 NOTE — TELEPHONE ENCOUNTER
Pt states she has finished the rx she was given but is having symptoms again - she is going into PT now - pls call after 4 pm

## 2019-05-07 ENCOUNTER — OFFICE VISIT (OUTPATIENT)
Dept: INTERNAL MEDICINE CLINIC | Facility: CLINIC | Age: 54
End: 2019-05-07
Payer: COMMERCIAL

## 2019-05-07 VITALS
TEMPERATURE: 98 F | RESPIRATION RATE: 17 BRPM | OXYGEN SATURATION: 96 % | HEIGHT: 62 IN | HEART RATE: 88 BPM | BODY MASS INDEX: 35.33 KG/M2 | SYSTOLIC BLOOD PRESSURE: 120 MMHG | WEIGHT: 192 LBS | DIASTOLIC BLOOD PRESSURE: 80 MMHG

## 2019-05-07 DIAGNOSIS — I10 ESSENTIAL HYPERTENSION: ICD-10-CM

## 2019-05-07 DIAGNOSIS — R73.03 PREDIABETES: ICD-10-CM

## 2019-05-07 DIAGNOSIS — Z83.49 FAMILY HISTORY OF THYROID DISEASE: ICD-10-CM

## 2019-05-07 DIAGNOSIS — R63.5 ABNORMAL WEIGHT GAIN: ICD-10-CM

## 2019-05-07 DIAGNOSIS — E78.5 HYPERLIPIDEMIA, UNSPECIFIED HYPERLIPIDEMIA TYPE: Primary | ICD-10-CM

## 2019-05-07 PROCEDURE — 83036 HEMOGLOBIN GLYCOSYLATED A1C: CPT | Performed by: INTERNAL MEDICINE

## 2019-05-07 PROCEDURE — 86376 MICROSOMAL ANTIBODY EACH: CPT | Performed by: INTERNAL MEDICINE

## 2019-05-07 PROCEDURE — 84443 ASSAY THYROID STIM HORMONE: CPT | Performed by: INTERNAL MEDICINE

## 2019-05-07 PROCEDURE — 99214 OFFICE O/P EST MOD 30 MIN: CPT | Performed by: INTERNAL MEDICINE

## 2019-05-07 PROCEDURE — 80061 LIPID PANEL: CPT | Performed by: INTERNAL MEDICINE

## 2019-05-07 NOTE — PROGRESS NOTES
Romain Del Real is a 52year old female.     Chief complaint:   follow up on chronic conditions   HPI:   Here for follow up on chronic conditions   Has been having frequent UTI   When she had it last had 2 episodes of bad oral thrush and was treated with (Patient taking differently: TAKE 1 TABLET(200 MG) BY MOUTH  DAILY) Disp: 180 tablet Rfl: 2   FREESTYLE LITE TEST In Vitro Strip  Disp:  Rfl: 3   FREESTYLE LANCETS Does not apply Misc  Disp:  Rfl: 3      Past Medical History:   Diagnosis Date   • Abnormal Patient Active Problem List:     NAFLD (nonalcoholic fatty liver disease)     Gallbladder polyp     Gastropathy     Routine health maintenance     Essential hypertension     Irritable bowel syndrome with diarrhea     Fibromyalgia     Mixed connective t REFLEX TO FREE T4  - LIPID PANEL  - THYROID PEROXIDASE (TPO) AB  - HEMOGLOBIN A1C    4. Essential hypertension  Continue losartan hctz       5.  Abnormal weight gain  Advised to schedule a weight loss consult to go over  Diet plans and medication options

## 2019-05-08 ENCOUNTER — OFFICE VISIT (OUTPATIENT)
Dept: INTERNAL MEDICINE CLINIC | Facility: CLINIC | Age: 54
End: 2019-05-08
Payer: COMMERCIAL

## 2019-05-08 VITALS
OXYGEN SATURATION: 98 % | WEIGHT: 192 LBS | SYSTOLIC BLOOD PRESSURE: 130 MMHG | RESPIRATION RATE: 17 BRPM | HEIGHT: 62 IN | DIASTOLIC BLOOD PRESSURE: 72 MMHG | HEART RATE: 97 BPM | BODY MASS INDEX: 35.33 KG/M2 | TEMPERATURE: 98 F

## 2019-05-08 DIAGNOSIS — E66.01 CLASS 2 SEVERE OBESITY WITH SERIOUS COMORBIDITY AND BODY MASS INDEX (BMI) OF 35.0 TO 35.9 IN ADULT, UNSPECIFIED OBESITY TYPE (HCC): ICD-10-CM

## 2019-05-08 DIAGNOSIS — R73.03 PREDIABETES: Primary | ICD-10-CM

## 2019-05-08 DIAGNOSIS — I10 ESSENTIAL HYPERTENSION: ICD-10-CM

## 2019-05-08 DIAGNOSIS — E78.5 HYPERLIPIDEMIA, UNSPECIFIED HYPERLIPIDEMIA TYPE: ICD-10-CM

## 2019-05-08 PROCEDURE — 99215 OFFICE O/P EST HI 40 MIN: CPT | Performed by: INTERNAL MEDICINE

## 2019-05-08 RX ORDER — PEN NEEDLE, DIABETIC 30 GX3/16"
1 NEEDLE, DISPOSABLE MISCELLANEOUS DAILY
Qty: 90 EACH | Refills: 0 | Status: SHIPPED | OUTPATIENT
Start: 2019-05-08 | End: 2019-08-06

## 2019-05-08 NOTE — PATIENT INSTRUCTIONS
Minh [address]    Welcome to [facility]. We are excited that you are committed to improving your health and have invited our practice to be part of your journey.  Our approach to the medical management of weight loss is similar to that of other of water per day, add fiber ( benefiber) to the water to increase fullness, overcome constipation    · Eat slowly    · Do not drink your calories ( no regular pop, juice, high calorie coffee drinks, limit alcohol) Also stay away from artificially sweetened the skin of your upper leg, stomach area, or upper arm. You will be taught how to prepare and give this medicine. Use exactly as directed. Take your medicine at regular intervals. Do not take it more often than directed.    It is important that you put your and other medicines for diabetes  What if I miss a dose? If you miss a dose, take it as soon as you can. If it is almost time for your next dose, take only that dose. Do not take double or extra doses.  If you miss your dose for 3 days or more, call your d addition to a healthy diet and appropriate exercise. The best results are achieved this way. Do not increase or in any way change your dose without consulting your doctor or health care professional.  Drink plenty of fluids while taking this medicine.  [facility]

## 2019-05-08 NOTE — PROGRESS NOTES
Mike Macedo is a 52year old female. Chief complaint:  weight loss management and obesity related comorbidities, prediabetes, HTN HL     HPI:     Mike Macedo is a 52year old female new to our office today.    with PMH as listed below here for HCl 10 MG Oral Tab Take 10-20mg at night as needed Disp: 60 tablet Rfl: 3   Cyclobenzaprine HCl 5 MG Oral Tab 5-10mg at night Disp: 60 tablet Rfl: 3   Dicyclomine HCl 10 MG Oral Cap Take 1 capsule (10 mg total) by mouth 2 (two) times daily as needed.  Disp: History:   Procedure Laterality Date   • BACK SURGERY     • COLONOSCOPY  2014   • ESOPHAGOGASTRODUODENOSCOPY (EGD) N/A 1/18/2018    Performed by Bety Lynch MD at [place] [address]  08/2006    polypectomy   • OTHER S 190 lb  04/02/19 : 195 lb  03/26/19 : 193 lb  02/05/19 : 188 lb       GENERAL: well developed, well nourished,in no apparent distress  SKIN: no rashes,no suspicious lesions  HEENT: atraumatic, normocephalic,ears and throat are clear  NECK: supple,no adenop control  -Limit carbohydrates  -Eat breakfast every day   -Don't skip meals   -Read nutrition labels and keep a food log  -drink a lot of water 65 oz of water per day  - Eat slowly   - Do not drink your calories (no regular pop, juice, high calorie coffee

## 2019-05-11 ENCOUNTER — APPOINTMENT (OUTPATIENT)
Dept: LAB | Facility: HOSPITAL | Age: 54
End: 2019-05-11
Attending: NURSE PRACTITIONER
Payer: COMMERCIAL

## 2019-05-11 DIAGNOSIS — R11.2 NAUSEA AND VOMITING, INTRACTABILITY OF VOMITING NOT SPECIFIED, UNSPECIFIED VOMITING TYPE: ICD-10-CM

## 2019-05-11 PROCEDURE — 87338 HPYLORI STOOL AG IA: CPT

## 2019-05-14 ENCOUNTER — LAB ENCOUNTER (OUTPATIENT)
Dept: LAB | Facility: HOSPITAL | Age: 54
End: 2019-05-14
Attending: NURSE PRACTITIONER
Payer: COMMERCIAL

## 2019-05-14 ENCOUNTER — TELEPHONE (OUTPATIENT)
Dept: GASTROENTEROLOGY | Facility: CLINIC | Age: 54
End: 2019-05-14

## 2019-05-14 DIAGNOSIS — R19.8 IRREGULAR BOWEL HABITS: ICD-10-CM

## 2019-05-14 PROCEDURE — 87046 STOOL CULTR AEROBIC BACT EA: CPT

## 2019-05-14 PROCEDURE — 87329 GIARDIA AG IA: CPT

## 2019-05-14 PROCEDURE — 87427 SHIGA-LIKE TOXIN AG IA: CPT

## 2019-05-14 PROCEDURE — 87045 FECES CULTURE AEROBIC BACT: CPT

## 2019-05-14 PROCEDURE — 87272 CRYPTOSPORIDIUM AG IF: CPT

## 2019-05-14 PROCEDURE — 87077 CULTURE AEROBIC IDENTIFY: CPT

## 2019-05-14 PROCEDURE — 87493 C DIFF AMPLIFIED PROBE: CPT

## 2019-05-14 NOTE — TELEPHONE ENCOUNTER
Rosemary Montalvo calling from the lab.     States received formed stool specimen for c diff, therefore cancelled

## 2019-05-16 RX ORDER — PANTOPRAZOLE SODIUM 40 MG/1
40 TABLET, DELAYED RELEASE ORAL
Qty: 30 TABLET | Refills: 5 | Status: SHIPPED | OUTPATIENT
Start: 2019-05-16 | End: 2019-06-15

## 2019-05-22 ENCOUNTER — TELEPHONE (OUTPATIENT)
Dept: INTERNAL MEDICINE CLINIC | Facility: CLINIC | Age: 54
End: 2019-05-22

## 2019-05-23 ENCOUNTER — APPOINTMENT (OUTPATIENT)
Dept: LAB | Facility: HOSPITAL | Age: 54
End: 2019-05-23
Attending: NURSE PRACTITIONER
Payer: COMMERCIAL

## 2019-05-23 DIAGNOSIS — N39.0 RECURRENT UTI: ICD-10-CM

## 2019-05-23 PROCEDURE — 87086 URINE CULTURE/COLONY COUNT: CPT

## 2019-05-23 PROCEDURE — 81001 URINALYSIS AUTO W/SCOPE: CPT

## 2019-05-23 PROCEDURE — 87186 SC STD MICRODIL/AGAR DIL: CPT

## 2019-05-23 PROCEDURE — 87077 CULTURE AEROBIC IDENTIFY: CPT

## 2019-05-24 ENCOUNTER — TELEPHONE (OUTPATIENT)
Dept: SURGERY | Facility: CLINIC | Age: 54
End: 2019-05-24

## 2019-05-24 NOTE — TELEPHONE ENCOUNTER
Patient submitted UA. History of recurrent UTI with standing order. Called to see hwo patient was feeling. LMTCB.

## 2019-05-28 RX ORDER — FLUCONAZOLE 100 MG/1
100 TABLET ORAL EVERY OTHER DAY
Qty: 3 TABLET | Refills: 0 | Status: SHIPPED | OUTPATIENT
Start: 2019-05-28 | End: 2019-06-26

## 2019-05-28 RX ORDER — CIPROFLOXACIN 500 MG/1
500 TABLET, FILM COATED ORAL 2 TIMES DAILY
Qty: 14 TABLET | Refills: 0 | Status: SHIPPED | OUTPATIENT
Start: 2019-05-28 | End: 2019-06-04

## 2019-05-28 NOTE — TELEPHONE ENCOUNTER
Patient with positive urine culture . States she is having UTI symptoms. Will prescribe cipro 500 mg PO BID X 7 days. She has a history of severe thrush with antibiotic use. Will prescribe fluconazole 100 mg PO every other day x 3 doses prophylactic.   P

## 2019-05-29 ENCOUNTER — TELEPHONE (OUTPATIENT)
Dept: INTERNAL MEDICINE CLINIC | Facility: CLINIC | Age: 54
End: 2019-05-29

## 2019-05-29 NOTE — TELEPHONE ENCOUNTER
Saxenda script given today. Insurance PA needs to go to [facility].     Ph: [phone]  [address] [place] [address]   Fax: [phone]

## 2019-06-11 ENCOUNTER — OFFICE VISIT (OUTPATIENT)
Dept: INTERNAL MEDICINE CLINIC | Facility: CLINIC | Age: 54
End: 2019-06-11
Payer: COMMERCIAL

## 2019-06-11 VITALS
TEMPERATURE: 99 F | DIASTOLIC BLOOD PRESSURE: 86 MMHG | SYSTOLIC BLOOD PRESSURE: 130 MMHG | HEART RATE: 97 BPM | WEIGHT: 191.38 LBS | HEIGHT: 62 IN | OXYGEN SATURATION: 97 % | BODY MASS INDEX: 35.22 KG/M2 | RESPIRATION RATE: 17 BRPM

## 2019-06-11 DIAGNOSIS — E88.81 METABOLIC SYNDROME: Primary | ICD-10-CM

## 2019-06-11 DIAGNOSIS — R73.03 PREDIABETES: ICD-10-CM

## 2019-06-11 DIAGNOSIS — E78.5 HYPERLIPIDEMIA, UNSPECIFIED HYPERLIPIDEMIA TYPE: ICD-10-CM

## 2019-06-11 DIAGNOSIS — I10 ESSENTIAL HYPERTENSION: ICD-10-CM

## 2019-06-11 DIAGNOSIS — R53.83 OTHER FATIGUE: ICD-10-CM

## 2019-06-11 DIAGNOSIS — E66.01 CLASS 2 SEVERE OBESITY WITH SERIOUS COMORBIDITY AND BODY MASS INDEX (BMI) OF 35.0 TO 35.9 IN ADULT, UNSPECIFIED OBESITY TYPE (HCC): ICD-10-CM

## 2019-06-11 PROCEDURE — 99214 OFFICE O/P EST MOD 30 MIN: CPT | Performed by: INTERNAL MEDICINE

## 2019-06-11 RX ORDER — METFORMIN HYDROCHLORIDE 750 MG/1
750 TABLET, EXTENDED RELEASE ORAL DAILY
Qty: 30 TABLET | Refills: 1 | Status: SHIPPED | OUTPATIENT
Start: 2019-06-11 | End: 2020-02-24

## 2019-06-11 RX ORDER — CYCLOBENZAPRINE HCL 5 MG
5 TABLET ORAL NIGHTLY PRN
Qty: 5 TABLET | Refills: 0 | Status: SHIPPED | OUTPATIENT
Start: 2019-06-11 | End: 2020-02-24

## 2019-06-11 RX ORDER — LOSARTAN POTASSIUM AND HYDROCHLOROTHIAZIDE 12.5; 5 MG/1; MG/1
TABLET ORAL
Refills: 0 | COMMUNITY
Start: 2019-06-05 | End: 2019-09-18 | Stop reason: DRUGHIGH

## 2019-06-11 NOTE — PATIENT INSTRUCTIONS
Build Muscle & Lose Fat  The great fat vs muscle diagram below paints a clear picture of why it’s so important for you to build muscle in order to lose fat.   Maybe Codie Hussein wondered about muscle vs fat and why you need to build muscle to lose fat, look slim 3. Build confidence. Strong muscles and joints increase your level of confidence in your abilities to perform many lifestyle activities. 4. Prevent injury.  Strength training can build stronger muscles and more limber, flexible joints, which play a crucial · signs and symptoms of low blood sugar such as feeling anxious, confusion, dizziness, increased hunger, unusually weak or tired, sweating, shakiness, cold, irritable, headache, blurred vision, fast heartbeat, loss of consciousness  · slow or irregular hea Keep out of the reach of children. Store at room temperature between 15 and 30 degrees C (59 and 86 degrees F). Protect from moisture and light. Throw away any unused medicine after the expiration date.   What should I tell my health care provider before I This medicine may cause ovulation in premenopausal women who do not have regular monthly periods. This may increase your chances of becoming pregnant. You should not take this medicine if you become pregnant or think you may be pregnant.  Talk with your doc

## 2019-06-11 NOTE — PROGRESS NOTES
Christiana Kat is a 52year old female.     Chief complaint:weight loss follow up , medication refill, predm, hl, htn  ,  HPI:   Zoraida Loredo here for follow up on weight loss   Wt Readings from Last 12 Encounters:  06/11/19 : 191 lb 6.4 oz  05/08/19 : 192 lb 90 each Rfl: 0   Amitriptyline HCl 10 MG Oral Tab Take 10-20mg at night as needed Disp: 60 tablet Rfl: 3   Cyclobenzaprine HCl 5 MG Oral Tab 5-10mg at night Disp: 60 tablet Rfl: 3   Dicyclomine HCl 10 MG Oral Cap Take 1 capsule (10 mg total) by mouth 2 (tw Visual impairment      Past Surgical History:   Procedure Laterality Date   • BACK SURGERY     • COLONOSCOPY  2014   • ESOPHAGOGASTRODUODENOSCOPY (EGD) N/A 1/18/2018    Performed by Cassie Smallwood MD at [address] [place] [address] ENDOSCOPY   • HYSTEROSCOPY,DIAGNOSTI SpO2 97%   BMI 35.01 kg/m²   GENERAL: well developed, well nourished,in no apparent distress  SKIN: no rashes,no suspicious lesions  HEENT: atraumatic, normocephalic,ears and throat are clear  NECK: supple,no adenopathy  LUNGS: clear to auscultation  CARDI ordered:    No   3. Hidden CHO/carbohydrate sources  4. Alcohol as possible source of hidden/amnesia calories and its effects  5. Importance of physical activity and reducing sedentary time  6.  Treatment plan       Please return to the clinic if you are ha

## 2019-06-24 ENCOUNTER — APPOINTMENT (OUTPATIENT)
Dept: LAB | Facility: HOSPITAL | Age: 54
End: 2019-06-24
Attending: NURSE PRACTITIONER
Payer: COMMERCIAL

## 2019-06-24 DIAGNOSIS — N39.0 RECURRENT UTI: ICD-10-CM

## 2019-06-24 PROCEDURE — 87088 URINE BACTERIA CULTURE: CPT

## 2019-06-24 PROCEDURE — 81001 URINALYSIS AUTO W/SCOPE: CPT

## 2019-06-24 PROCEDURE — 87086 URINE CULTURE/COLONY COUNT: CPT

## 2019-06-24 PROCEDURE — 87186 SC STD MICRODIL/AGAR DIL: CPT

## 2019-06-25 ENCOUNTER — TELEPHONE (OUTPATIENT)
Dept: RHEUMATOLOGY | Facility: CLINIC | Age: 54
End: 2019-06-25

## 2019-06-25 RX ORDER — HYDROXYCHLOROQUINE SULFATE 200 MG/1
TABLET, FILM COATED ORAL
Qty: 90 TABLET | Refills: 3 | Status: SHIPPED | OUTPATIENT
Start: 2019-06-25 | End: 2020-09-02

## 2019-06-25 RX ORDER — HYDROXYCHLOROQUINE SULFATE 200 MG/1
TABLET, FILM COATED ORAL
Qty: 180 TABLET | Refills: 3 | Status: SHIPPED | OUTPATIENT
Start: 2019-06-25 | End: 2019-06-25

## 2019-06-25 NOTE — TELEPHONE ENCOUNTER
Please see below. FYI- Script resent for daily #90 instead of #180 per LOV notes. Daily dosing. Summary:     1. . hydroxychrouqine 200mg - once ad ay   2. . Cyclobenzaprine 5-10mg at night - to take as needed.    3. .amitriptyline 10 - 20mg at night  - i

## 2019-06-25 NOTE — TELEPHONE ENCOUNTER
Spoke with Pt. She did get eye exam done last month. She will have the clinic fax over notes to our office. Pt also stts she will be going out of town Friday 6/28.

## 2019-06-25 NOTE — TELEPHONE ENCOUNTER
Pt's pharmacy called in requesting clarification on instructions for the following medication:  Please advise       Current Outpatient Medications:   •  Hydroxychloroquine Sulfate 200 MG Oral Tab, TAKE 1 TABLET(200 MG) BY MOUTH  DAILY, Disp: 180 tablet, Rf

## 2019-06-26 ENCOUNTER — TELEPHONE (OUTPATIENT)
Dept: SURGERY | Facility: CLINIC | Age: 54
End: 2019-06-26

## 2019-06-26 RX ORDER — CIPROFLOXACIN 500 MG/1
500 TABLET, FILM COATED ORAL 2 TIMES DAILY
Qty: 14 TABLET | Refills: 0 | Status: SHIPPED | OUTPATIENT
Start: 2019-06-26 | End: 2019-07-03

## 2019-06-26 RX ORDER — PHENAZOPYRIDINE HYDROCHLORIDE 200 MG/1
200 TABLET, FILM COATED ORAL 3 TIMES DAILY PRN
Qty: 15 TABLET | Refills: 0 | Status: SHIPPED | OUTPATIENT
Start: 2019-06-26

## 2019-06-26 RX ORDER — FLUCONAZOLE 100 MG/1
100 TABLET ORAL EVERY OTHER DAY
Qty: 3 TABLET | Refills: 0 | Status: SHIPPED | OUTPATIENT
Start: 2019-06-26 | End: 2019-07-01

## 2019-06-26 NOTE — TELEPHONE ENCOUNTER
I will send Cipro 500 mg PO BID x 7 days to her pharmacy as well as fluconazole 100 mg PO by mouth every other day x 3 doses. Patient also requesting phenazopyridine for her discomfort.       Patient has had recurrent UTI and requires fluconazole as wel

## 2019-06-26 NOTE — TELEPHONE ENCOUNTER
I tasked this msg to [facility] for a response on pt's request. See results of culture below. I called pt to inform that I am send msg to [facility] about the resulys and will get back to her when she responds.  Pt states she will also need to have a script for Phenazopy

## 2019-06-26 NOTE — TELEPHONE ENCOUNTER
Pt had UA done on Monday - states she is in pain and knows she has an infection - she is leaving the country in 2 days - looking for rx

## 2019-06-27 RX ORDER — PHENAZOPYRIDINE HYDROCHLORIDE 200 MG/1
TABLET, FILM COATED ORAL
Qty: 15 TABLET | Refills: 0 | OUTPATIENT
Start: 2019-06-27

## 2019-06-27 NOTE — TELEPHONE ENCOUNTER
Done 6/26/19          RaulDimitri APRN Raul, TONYA Pena   Medication Detail     Medication Quantity Refills Start End   Phenazopyridine HCl 200 MG Oral Tab 15 tablet 0 6/26/2019    Sig:   Take 1 tablet (200 mg total) by mouth 3 (three)

## 2019-07-16 ENCOUNTER — OFFICE VISIT (OUTPATIENT)
Dept: INTERNAL MEDICINE CLINIC | Facility: CLINIC | Age: 54
End: 2019-07-16
Payer: COMMERCIAL

## 2019-07-16 VITALS
TEMPERATURE: 98 F | SYSTOLIC BLOOD PRESSURE: 126 MMHG | WEIGHT: 193.63 LBS | HEIGHT: 62 IN | HEART RATE: 93 BPM | DIASTOLIC BLOOD PRESSURE: 80 MMHG | RESPIRATION RATE: 18 BRPM | BODY MASS INDEX: 35.63 KG/M2 | OXYGEN SATURATION: 98 %

## 2019-07-16 DIAGNOSIS — E66.01 CLASS 2 SEVERE OBESITY WITH SERIOUS COMORBIDITY AND BODY MASS INDEX (BMI) OF 35.0 TO 35.9 IN ADULT, UNSPECIFIED OBESITY TYPE (HCC): ICD-10-CM

## 2019-07-16 DIAGNOSIS — N39.0 RECURRENT UTI: ICD-10-CM

## 2019-07-16 DIAGNOSIS — R63.5 ABNORMAL WEIGHT GAIN: ICD-10-CM

## 2019-07-16 DIAGNOSIS — I10 ESSENTIAL HYPERTENSION: ICD-10-CM

## 2019-07-16 DIAGNOSIS — E78.5 HYPERLIPIDEMIA, UNSPECIFIED HYPERLIPIDEMIA TYPE: ICD-10-CM

## 2019-07-16 DIAGNOSIS — R73.03 PREDIABETES: ICD-10-CM

## 2019-07-16 DIAGNOSIS — E88.81 METABOLIC SYNDROME: Primary | ICD-10-CM

## 2019-07-16 PROCEDURE — 99213 OFFICE O/P EST LOW 20 MIN: CPT | Performed by: INTERNAL MEDICINE

## 2019-07-16 RX ORDER — METFORMIN HYDROCHLORIDE 750 MG/1
750 TABLET, EXTENDED RELEASE ORAL 2 TIMES DAILY WITH MEALS
Qty: 120 TABLET | Refills: 0 | Status: SHIPPED | OUTPATIENT
Start: 2019-07-16 | End: 2019-10-03

## 2019-07-16 NOTE — PROGRESS NOTES
Pallavi Armenta is a 52year old female.     Chief complaint:weight loss follow up , medication refill, therapeutic drug monitoring, metabolic syndrome, prediabetes     HPI:   [facility] here for follow up on weight loss   Wt Readings from Last 12 Encounters Disp: 5 tablet Rfl: 0   Insulin Pen Needle (PEN NEEDLES) 32G X 4 MM Does not apply Misc 1 each by Does not apply route daily.  Disp: 90 each Rfl: 0   Amitriptyline HCl 10 MG Oral Tab Take 10-20mg at night as needed Disp: 60 tablet Rfl: 3   Cyclobenzaprine H Procedure Laterality Date   • BACK SURGERY     • COLONOSCOPY  2014   • ESOPHAGOGASTRODUODENOSCOPY (EGD) N/A 1/18/2018    Performed by Lizette Wilhelm MD at [place] [address]  08/2006    polypectomy   • OTHER SURGICAL H developed, well nourished,in no apparent distress  SKIN: no rashes,no suspicious lesions  HEENT: atraumatic, normocephalic,ears and throat are clear  NECK: supple,no adenopathy  LUNGS: clear to auscultation  CARDIO: RRR without murmur  GI: good BS's,no mas reducing sedentary time  6.  Treatment plan     Please return to the clinic if you are having persistent or worsening symptoms   Estevan Munguia MD,   Diplomate of the American Board of Internal Medicine  Diplomate of the American Board of Obesity Medicine

## 2019-07-19 ENCOUNTER — HOSPITAL ENCOUNTER (OUTPATIENT)
Dept: ULTRASOUND IMAGING | Facility: HOSPITAL | Age: 54
Discharge: HOME | End: 2019-07-19
Attending: INTERNAL MEDICINE
Payer: COMMERCIAL

## 2019-07-19 ENCOUNTER — HOSPITAL ENCOUNTER (OUTPATIENT)
Dept: MAMMOGRAPHY | Facility: HOSPITAL | Age: 54
Discharge: HOME | End: 2019-07-19
Attending: INTERNAL MEDICINE
Payer: COMMERCIAL

## 2019-07-19 DIAGNOSIS — N63.0 BREAST MASS: ICD-10-CM

## 2019-07-19 PROCEDURE — 76642 ULTRASOUND BREAST LIMITED: CPT | Performed by: INTERNAL MEDICINE

## 2019-07-19 PROCEDURE — 77065 DX MAMMO INCL CAD UNI: CPT | Performed by: INTERNAL MEDICINE

## 2019-07-19 PROCEDURE — 77061 BREAST TOMOSYNTHESIS UNI: CPT | Performed by: INTERNAL MEDICINE

## 2019-07-22 DIAGNOSIS — R92.8 ABNORMAL MAMMOGRAM: Primary | ICD-10-CM

## 2019-07-24 DIAGNOSIS — R92.8 ABNORMAL MAMMOGRAM: Primary | ICD-10-CM

## 2019-08-07 ENCOUNTER — OFFICE VISIT (OUTPATIENT)
Dept: SURGERY | Facility: CLINIC | Age: 54
End: 2019-08-07
Payer: COMMERCIAL

## 2019-08-07 VITALS
BODY MASS INDEX: 35.33 KG/M2 | HEIGHT: 62 IN | DIASTOLIC BLOOD PRESSURE: 87 MMHG | TEMPERATURE: 98 F | SYSTOLIC BLOOD PRESSURE: 145 MMHG | WEIGHT: 192 LBS | HEART RATE: 85 BPM

## 2019-08-07 DIAGNOSIS — R82.90 URINE FINDING: ICD-10-CM

## 2019-08-07 DIAGNOSIS — N39.0 RECURRENT UTI: Primary | ICD-10-CM

## 2019-08-07 LAB
APPEARANCE: CLEAR
BACTERIA UR QL AUTO: NEGATIVE /HPF
BILIRUB UR QL: NEGATIVE
CLARITY UR: CLEAR
COLOR UR: YELLOW
GLUCOSE UR-MCNC: NEGATIVE MG/DL
KETONES UR-MCNC: NEGATIVE MG/DL
MULTISTIX LOT#: NORMAL NUMERIC
NITRITE UR QL STRIP.AUTO: NEGATIVE
PH UR: 5 [PH] (ref 5–8)
PH, URINE: 6 (ref 4.5–8)
PROT UR-MCNC: NEGATIVE MG/DL
RBC #/AREA URNS AUTO: 1 /HPF
SP GR UR STRIP: 1.01 (ref 1–1.03)
SPECIFIC GRAVITY: 1.02 (ref 1–1.03)
URINE-COLOR: YELLOW
UROBILINOGEN UR STRIP-ACNC: <2
UROBILINOGEN,SEMI-QN: 0.2 MG/DL (ref 0–1.9)
VIT C UR-MCNC: NEGATIVE MG/DL
WBC #/AREA URNS AUTO: 120 /HPF

## 2019-08-07 PROCEDURE — 81003 URINALYSIS AUTO W/O SCOPE: CPT | Performed by: UROLOGY

## 2019-08-07 PROCEDURE — 99214 OFFICE O/P EST MOD 30 MIN: CPT | Performed by: UROLOGY

## 2019-08-07 NOTE — PROGRESS NOTES
[facility]  Follow-Up Office Visit    HPI:   Carmine Hackett is a 52year old female here today for consultation at the request of, and a copy of this note will be sent to, Gina Branch MD.  She was last seen by [facility] on 4/16/2 pain or shortness of breath, nausea or vomiting, constipation or diarrhea. SOCIAL Hx: Patient is . She has 1 sexual partner. She is a former smoker and quit in [employer]. She drinks alcohol socially.     Family Hx: Patient denies a family history of Head: Normocephalic. Eyes: Conjunctivae are normal.   Cardiovascular: Normal rate. Pulmonary/Chest: Effort normal.   Abdominal: Soft. She exhibits no distension. Mild suprapubic discomfort to palpation.     Genitourinary:   Genitourinary Comments: antibiotic prophylaxis, Hiprex, or vaginal estrogen. At the end of the visit, 25 mintues were spent with patient, more than half the time in face-to-face discussion involving counseling, further management and treatment planning.      Domo Guzman MD

## 2019-08-09 ENCOUNTER — TELEPHONE (OUTPATIENT)
Dept: SURGERY | Facility: CLINIC | Age: 54
End: 2019-08-09

## 2019-08-09 DIAGNOSIS — N30.00 ACUTE CYSTITIS WITHOUT HEMATURIA: Primary | ICD-10-CM

## 2019-08-09 DIAGNOSIS — N39.0 RECURRENT UTI: ICD-10-CM

## 2019-08-09 RX ORDER — SULFAMETHOXAZOLE AND TRIMETHOPRIM 800; 160 MG/1; MG/1
1 TABLET ORAL 2 TIMES DAILY
Qty: 14 TABLET | Refills: 0 | Status: SHIPPED | OUTPATIENT
Start: 2019-08-09 | End: 2019-08-16

## 2019-08-09 NOTE — TELEPHONE ENCOUNTER
Spoke to patient about positive urine culture result. She states that she is still feeling uncomfortable. Will treat with Bactrim DS PO BID x 7 days. She had an oral thrush when she was on the nitrofurantoin.      Advised to start the bactrim and see how sh

## 2019-08-11 ENCOUNTER — TELEPHONE (OUTPATIENT)
Dept: SURGERY | Facility: CLINIC | Age: 54
End: 2019-08-11

## 2019-08-11 DIAGNOSIS — B37.0 ORAL THRUSH: Primary | ICD-10-CM

## 2019-08-11 RX ORDER — FLUCONAZOLE 100 MG/1
100 TABLET ORAL DAILY
Qty: 1 TABLET | Refills: 0 | Status: SHIPPED | OUTPATIENT
Start: 2019-08-11 | End: 2019-08-12 | Stop reason: DRUGHIGH

## 2019-08-12 RX ORDER — FLUCONAZOLE 100 MG/1
100 TABLET ORAL SEE ADMIN INSTRUCTIONS
Qty: 11 TABLET | Refills: 0 | Status: SHIPPED | OUTPATIENT
Start: 2019-08-12 | End: 2019-11-08

## 2019-08-12 NOTE — TELEPHONE ENCOUNTER
Patient called me about symptoms of thrush. She is on antibiotics. States she usually is placed on Diflucan 100 mg which I will prescribe.

## 2019-08-12 NOTE — TELEPHONE ENCOUNTER
Spoke with pt who called to say that because she was only given 1 tab of Fluconazole she had to stop the ABX because the thrush on her tongue is so bad can barely talk and eat. I did note that I could hardly understand what pt was saying.  Pt is requesting

## 2019-08-12 NOTE — TELEPHONE ENCOUNTER
Called patient and spoke to her. I sent a prescription to her pharmacy for Diflucan. Instructed to take 200 mg orally on day 1 and then 100 mg daily for a total of 10 days.  Patient should call and inform us of her oral thrush or voiding symptoms do no

## 2019-08-19 ENCOUNTER — APPOINTMENT (OUTPATIENT)
Dept: LAB | Facility: HOSPITAL | Age: 54
End: 2019-08-19
Attending: UROLOGY
Payer: COMMERCIAL

## 2019-08-19 ENCOUNTER — HOSPITAL ENCOUNTER (OUTPATIENT)
Dept: ULTRASOUND IMAGING | Facility: HOSPITAL | Age: 54
Discharge: HOME | End: 2019-08-19
Attending: UROLOGY
Payer: COMMERCIAL

## 2019-08-19 DIAGNOSIS — N39.0 RECURRENT UTI: ICD-10-CM

## 2019-08-19 DIAGNOSIS — N30.00 ACUTE CYSTITIS WITHOUT HEMATURIA: ICD-10-CM

## 2019-08-19 PROCEDURE — 87086 URINE CULTURE/COLONY COUNT: CPT

## 2019-08-19 PROCEDURE — 76775 US EXAM ABDO BACK WALL LIM: CPT | Performed by: UROLOGY

## 2019-08-26 RX ORDER — LOSARTAN POTASSIUM AND HYDROCHLOROTHIAZIDE 12.5; 5 MG/1; MG/1
1 TABLET ORAL DAILY
Qty: 90 TABLET | Refills: 0 | Status: SHIPPED | OUTPATIENT
Start: 2019-08-26 | End: 2019-09-18

## 2019-08-27 ENCOUNTER — OFFICE VISIT (OUTPATIENT)
Dept: INTERNAL MEDICINE CLINIC | Facility: CLINIC | Age: 54
End: 2019-08-27
Payer: COMMERCIAL

## 2019-08-27 ENCOUNTER — TELEPHONE (OUTPATIENT)
Dept: INTERNAL MEDICINE CLINIC | Facility: CLINIC | Age: 54
End: 2019-08-27

## 2019-08-27 VITALS
RESPIRATION RATE: 18 BRPM | HEIGHT: 62 IN | WEIGHT: 191.81 LBS | OXYGEN SATURATION: 98 % | HEART RATE: 85 BPM | SYSTOLIC BLOOD PRESSURE: 142 MMHG | BODY MASS INDEX: 35.3 KG/M2 | TEMPERATURE: 98 F | DIASTOLIC BLOOD PRESSURE: 82 MMHG

## 2019-08-27 DIAGNOSIS — R73.09 BLOOD GLUCOSE ABNORMAL: ICD-10-CM

## 2019-08-27 DIAGNOSIS — K31.9 GASTROPATHY: ICD-10-CM

## 2019-08-27 DIAGNOSIS — E88.81 METABOLIC SYNDROME: ICD-10-CM

## 2019-08-27 DIAGNOSIS — E66.01 CLASS 2 SEVERE OBESITY WITH SERIOUS COMORBIDITY AND BODY MASS INDEX (BMI) OF 35.0 TO 35.9 IN ADULT, UNSPECIFIED OBESITY TYPE (HCC): ICD-10-CM

## 2019-08-27 DIAGNOSIS — M35.1 MIXED CONNECTIVE TISSUE DISEASE (HCC): ICD-10-CM

## 2019-08-27 DIAGNOSIS — B37.0 ORAL THRUSH: ICD-10-CM

## 2019-08-27 DIAGNOSIS — I10 ESSENTIAL HYPERTENSION: Primary | ICD-10-CM

## 2019-08-27 LAB
ALBUMIN SERPL-MCNC: 4.1 G/DL (ref 3.4–5)
ALBUMIN/GLOB SERPL: 1.2 {RATIO} (ref 1–2)
ALP LIVER SERPL-CCNC: 68 U/L (ref 41–108)
ALT SERPL-CCNC: 38 U/L (ref 13–56)
ANION GAP SERPL CALC-SCNC: 8 MMOL/L (ref 0–18)
AST SERPL-CCNC: 23 U/L (ref 15–37)
BILIRUB SERPL-MCNC: 0.4 MG/DL (ref 0.1–2)
BUN BLD-MCNC: 13 MG/DL (ref 7–18)
BUN/CREAT SERPL: 16.7 (ref 10–20)
CALCIUM BLD-MCNC: 9.3 MG/DL (ref 8.5–10.1)
CHLORIDE SERPL-SCNC: 104 MMOL/L (ref 98–112)
CO2 SERPL-SCNC: 27 MMOL/L (ref 21–32)
CREAT BLD-MCNC: 0.78 MG/DL (ref 0.55–1.02)
DEPRECATED HBV CORE AB SER IA-ACNC: 68.4 NG/ML (ref 18–340)
GLOBULIN PLAS-MCNC: 3.4 G/DL (ref 2.8–4.4)
GLUCOSE BLD-MCNC: 122 MG/DL (ref 70–99)
M PROTEIN MFR SERPL ELPH: 7.5 G/DL (ref 6.4–8.2)
OSMOLALITY SERPL CALC.SUM OF ELEC: 289 MOSM/KG (ref 275–295)
PATIENT FASTING: NO
POTASSIUM SERPL-SCNC: 3.9 MMOL/L (ref 3.5–5.1)
SODIUM SERPL-SCNC: 139 MMOL/L (ref 136–145)
VIT B12 SERPL-MCNC: 655 PG/ML (ref 193–986)

## 2019-08-27 PROCEDURE — 87389 HIV-1 AG W/HIV-1&-2 AB AG IA: CPT | Performed by: INTERNAL MEDICINE

## 2019-08-27 PROCEDURE — 82728 ASSAY OF FERRITIN: CPT | Performed by: INTERNAL MEDICINE

## 2019-08-27 PROCEDURE — 82607 VITAMIN B-12: CPT | Performed by: INTERNAL MEDICINE

## 2019-08-27 PROCEDURE — 99214 OFFICE O/P EST MOD 30 MIN: CPT | Performed by: INTERNAL MEDICINE

## 2019-08-27 PROCEDURE — 80053 COMPREHEN METABOLIC PANEL: CPT | Performed by: INTERNAL MEDICINE

## 2019-08-27 RX ORDER — LOSARTAN POTASSIUM AND HYDROCHLOROTHIAZIDE 12.5; 1 MG/1; MG/1
1 TABLET ORAL DAILY
Qty: 90 TABLET | Refills: 0 | Status: SHIPPED | OUTPATIENT
Start: 2019-08-27 | End: 2019-11-25

## 2019-08-27 RX ORDER — PEN NEEDLE, DIABETIC 30 GX3/16"
1 NEEDLE, DISPOSABLE MISCELLANEOUS DAILY
Qty: 90 EACH | Refills: 0 | Status: SHIPPED | OUTPATIENT
Start: 2019-08-27

## 2019-08-27 NOTE — PATIENT INSTRUCTIONS
Liraglutide injection  Brand Name: Jessy Han  What is this medicine? LIRAGLUTIDE (LIR a GLOO tide) is used to improve blood sugar control in adults with type 2 diabetes. This medicine may be used with other diabetes medicines.  This drug may also reduce th · unusual stomach upset or pain  · vomiting  Side effects that usually do not require medical attention (report to your doctor or health care professional if they continue or are bothersome):  · constipation  · decreased appetite  · diarrhea  · fatigue  · If you miss a dose, take it as soon as you can. If it is almost time for your next dose, take only that dose. Do not take double or extra doses. Where should I keep my medicine? Keep out of the reach of children.   Store unopened pen in a refrigerator bet A test called the HbA1C (A1C) will be monitored. This is a simple blood test. It measures your blood sugar control over the last 2 to 3 months. You will receive this test every 3 to 6 months. Learn how to check your blood sugar.  Learn the symptoms of low

## 2019-08-27 NOTE — PROGRESS NOTES
Armando Bustillo is a 52year old female.     Chief complaint:weight loss follow up , medication refill, therapeutic drug monitoring,  HTn and prediabetes     HPI:   [facility] here for follow up on weight loss   Wt Readings from Last 12 Encounters:  08/27/19 50-12.5 MG Oral Tab TK 1 T PO D Disp:  Rfl: 0   metFORMIN HCl  MG Oral Tablet 24 Hr Take 1 tablet (750 mg total) by mouth daily.  Disp: 30 tablet Rfl: 1   Cyclobenzaprine HCl 5 MG Oral Tab Take 1 tablet (5 mg total) by mouth nightly as needed for Musc Surgical History:   Procedure Laterality Date   • BACK SURGERY     • COLONOSCOPY  2014   • ESOPHAGOGASTRODUODENOSCOPY (EGD) N/A 1/18/2018    Performed by Constantin Peace MD at [facility] ENDOSCOPY   • HYSTEROSCOPY,DIAGNOSTIC  08/2006    polypectomy apparent distress  SKIN: no rashes,no suspicious lesions  HEENT: atraumatic, normocephalic,ears and throat are clear  NECK: supple,no adenopathy  LUNGS: clear to auscultation  CARDIO: RRR without murmur  GI: good BS's,no masses, HSM or tenderness  EXTREMIT goal si 50 min 3 times per week   · Increase water intake   · Continue metformin   Will start Victoza discussed common side effects of ( nausea and headache ) get better with time and with slow titration   · Discussed more serious side effects ( thyroid ca

## 2019-08-28 RX ORDER — LOSARTAN POTASSIUM 100 MG/1
100 TABLET ORAL DAILY
Qty: 90 TABLET | Refills: 1 | Status: SHIPPED | OUTPATIENT
Start: 2019-08-28 | End: 2019-09-18 | Stop reason: ALTCHOICE

## 2019-08-28 RX ORDER — HYDROCHLOROTHIAZIDE 12.5 MG/1
12.5 TABLET ORAL DAILY
Qty: 90 TABLET | Refills: 1 | Status: SHIPPED | OUTPATIENT
Start: 2019-08-28 | End: 2019-09-18 | Stop reason: ALTCHOICE

## 2019-09-04 ENCOUNTER — TELEPHONE (OUTPATIENT)
Dept: SURGERY | Facility: CLINIC | Age: 54
End: 2019-09-04

## 2019-09-04 NOTE — TELEPHONE ENCOUNTER
Called patient, verified patients name and date of birth. Informed patient that we unfortunately have to cancel appointment due to power outage. Patient cystoscopy rescheduled to 9/18/19 at 3:20 pm. Instructed patient to arrive 30 minutes early.

## 2019-09-05 ENCOUNTER — TELEPHONE (OUTPATIENT)
Dept: GASTROENTEROLOGY | Facility: CLINIC | Age: 54
End: 2019-09-05

## 2019-09-05 NOTE — TELEPHONE ENCOUNTER
Pt states in the middle of night she gets burning in throat and upper stomach and starts to vomit bile. Pt also states she has been getting a lot of bladder infections and every time she gets antibiotic prescribed she gets thrush.  Usually she gets pres

## 2019-09-05 NOTE — TELEPHONE ENCOUNTER
Nursing: Please confirm that the patient has been taking Protonix 40 mg daily consistently since our last office visit. If so, then yes, she may increase to twice daily dosing and we will discuss at the visit.      As it relates to oral thrush, I have not

## 2019-09-05 NOTE — TELEPHONE ENCOUNTER
Pt notified, will increase pantoprazole to BID. Will keep appt as scheduled on 9/19. Will keep clinic apprised of her progress.

## 2019-09-11 NOTE — TELEPHONE ENCOUNTER
I received a staff message from Dr. Chriss Segovia who indicated the patient may be confused about whether or not she should keep her upcoming scheduled appointment. I would recommend she follow-up as currently scheduled.     Nursing: Please confirm the patient'

## 2019-09-11 NOTE — TELEPHONE ENCOUNTER
Pt informed per [facility] she should keep schedule appt for:  Future Appointments   Date Time Provider Erin Marlene   9/19/2019  2:30 PM TONYA Nuñez, okay to close TE after viewing above message.

## 2019-09-11 NOTE — TELEPHONE ENCOUNTER
Self note: Dr. Kendra Wylie contacted me with concerns that the patient was having recurrent oral thrush due to persistent UTI/antibiotic therapy. The patient also reported worsening reflux/dysphagia. There was a question of Candida esophagitis.   Patient vincent

## 2019-09-12 NOTE — PROGRESS NOTES
[facility] Follow-up Visit    Sushila She describes burning in the epigastrium and chest region and the sensation of acid coming up into her throat. She was increased to Protonix 40 mg twice daily following a phone discussion about 1 week ago.     She denies nausea, vomiting, dysphagia, odyn cholesterol    • MCTD (mixed connective tissue disease) (HCC)    • Mood disorder (HCC)    • Other ill-defined conditions(769.89)     Per [employer]:  \"son and girlfriend with 4 kids moved in.\"   • Prediabetes    • Spinal stenosis     Comments:  spondylosis. Hydroxychloroquine Sulfate 200 MG Oral Tab TAKE 1 TABLET(200 MG) BY MOUTH  DAILY Disp: 90 tablet Rfl: 3   metFORMIN HCl  MG Oral Tablet 24 Hr Take 1 tablet (750 mg total) by mouth daily.  Disp: 30 tablet Rfl: 1   Amitriptyline HCl 10 MG Oral Tab Deepika Navarro HPI  GENITOURINARY:  negative for dysuria and hematuria   SKIN:  negative for  rash  ALLERGIC/IMMUNOLOGIC:  negative for hay fever allergies  ENDOCRINE:  negative for cold intolerance and heat intolerance  MUSCULOSKELETAL:  negative for  joint stiffness an odynophagia. She has increased Protonix to twice daily dosing. Could consider supplemental H2 blocker or Carafate as needed if symptoms persist despite this change. We discussed proceeding with an EGD to evaluate her symptoms further.   Endoscopic proced specifically mentioned the miss rate of upper endoscopy of 5-10% in the best of all circumstances.  It is the patient's responsibility to contact his/her insurance company regarding questions about out-of-pocket cost/benefits and was provided the appropriat

## 2019-09-18 ENCOUNTER — OFFICE VISIT (OUTPATIENT)
Dept: SURGERY | Facility: CLINIC | Age: 54
End: 2019-09-18
Payer: COMMERCIAL

## 2019-09-18 VITALS
SYSTOLIC BLOOD PRESSURE: 138 MMHG | DIASTOLIC BLOOD PRESSURE: 82 MMHG | TEMPERATURE: 98 F | HEART RATE: 80 BPM | RESPIRATION RATE: 18 BRPM

## 2019-09-18 DIAGNOSIS — R30.0 DYSURIA: Primary | ICD-10-CM

## 2019-09-18 DIAGNOSIS — N39.0 RECURRENT UTI: ICD-10-CM

## 2019-09-18 LAB
BILIRUB UR QL: NEGATIVE
COLOR UR: YELLOW
GLUCOSE UR-MCNC: NEGATIVE MG/DL
HGB UR QL STRIP.AUTO: NEGATIVE
KETONES UR-MCNC: NEGATIVE MG/DL
MULTISTIX LOT#: NORMAL NUMERIC
NITRITE UR QL STRIP.AUTO: NEGATIVE
PH UR: 6 [PH] (ref 5–8)
PH, URINE: 6 (ref 4.5–8)
PROT UR-MCNC: NEGATIVE MG/DL
RBC #/AREA URNS AUTO: 4 /HPF
SP GR UR STRIP: 1.01 (ref 1–1.03)
SPECIFIC GRAVITY: 1.01 (ref 1–1.03)
URINE-COLOR: YELLOW
UROBILINOGEN UR STRIP-ACNC: <2
UROBILINOGEN,SEMI-QN: 0.2 MG/DL (ref 0–1.9)
WBC #/AREA URNS AUTO: 73 /HPF

## 2019-09-18 PROCEDURE — 52000 CYSTOURETHROSCOPY: CPT | Performed by: UROLOGY

## 2019-09-18 PROCEDURE — 81002 URINALYSIS NONAUTO W/O SCOPE: CPT | Performed by: UROLOGY

## 2019-09-18 RX ORDER — OMEPRAZOLE 40 MG/1
CAPSULE, DELAYED RELEASE ORAL
COMMUNITY
End: 2021-12-16 | Stop reason: ALTCHOICE

## 2019-09-18 RX ORDER — CIPROFLOXACIN 500 MG/1
500 TABLET, FILM COATED ORAL ONCE
Status: COMPLETED | OUTPATIENT
Start: 2019-09-18 | End: 2019-09-18

## 2019-09-18 RX ADMIN — CIPROFLOXACIN 500 MG: 500 TABLET, FILM COATED ORAL at 16:18:00

## 2019-09-18 NOTE — PROCEDURES
James Lugo  : 1965  Referring Physician: Trevor Morales MD    Patient with recurrent E. Coli cystitis UTI's. Today states that she is having some symptoms including urinary urgency, pelvic pressure and cloudy urine. Mild dysuria.  No hematuri

## 2019-09-19 ENCOUNTER — TELEPHONE (OUTPATIENT)
Dept: GASTROENTEROLOGY | Facility: CLINIC | Age: 54
End: 2019-09-19

## 2019-09-19 ENCOUNTER — OFFICE VISIT (OUTPATIENT)
Dept: GASTROENTEROLOGY | Facility: CLINIC | Age: 54
End: 2019-09-19
Payer: COMMERCIAL

## 2019-09-19 VITALS
WEIGHT: 183 LBS | SYSTOLIC BLOOD PRESSURE: 102 MMHG | HEART RATE: 90 BPM | BODY MASS INDEX: 33.68 KG/M2 | HEIGHT: 62 IN | DIASTOLIC BLOOD PRESSURE: 76 MMHG

## 2019-09-19 DIAGNOSIS — Z86.19 HISTORY OF THRUSH: ICD-10-CM

## 2019-09-19 DIAGNOSIS — R19.8 IRREGULAR BOWEL HABITS: ICD-10-CM

## 2019-09-19 DIAGNOSIS — R12 HEARTBURN: Primary | ICD-10-CM

## 2019-09-19 PROCEDURE — 99214 OFFICE O/P EST MOD 30 MIN: CPT | Performed by: NURSE PRACTITIONER

## 2019-09-19 NOTE — TELEPHONE ENCOUNTER
GI/RN--    This patient is scheduled, see below    I sent a referral to [facility], please contact this patients [facility] to check for a PA.  Thank you    You may close this TE when done :)

## 2019-09-19 NOTE — TELEPHONE ENCOUNTER
Scheduled for:  EGD w/poss Dil and Bx [id number]  Provider Name: Dr. Kota Solorzano  Date:  10/3/19  Location:  [facility]  Sedation:  MAC  Time:   7322 (pt is aware that [facility] will call the day before to confirm arrival time)    Prep:  NPO after midnight  Meds/Allergies Reconc

## 2019-09-19 NOTE — PATIENT INSTRUCTIONS
-Schedule EGD w/ possible biopsy w/ earlier available MD with MAC  Dx: heartburn, hx recurrent candida   -Eligible for NE: No r/t BMI/use of cannabis   -Anti-platelets and anti-coagulants: None  -Diabetes meds: Hold metformin/Victoza the day of procedure

## 2019-09-20 NOTE — TELEPHONE ENCOUNTER
[facility] [phone]    I spoke to Missy Alejandra. PA started Case # [id number].  Clinicals faxed to [phone]

## 2019-09-24 NOTE — PROGRESS NOTES
Called patient and discussed urine culture results from 9/18. She denies any significant symptoms. Although evidence is anecdotal, I recommended that she consider starting D-Mannose given her recurrent E. Coli UTI's.  This medications could potentially shirlene

## 2019-10-03 ENCOUNTER — LAB REQUISITION (OUTPATIENT)
Dept: LAB | Facility: HOSPITAL | Age: 54
End: 2019-10-03
Payer: COMMERCIAL

## 2019-10-03 DIAGNOSIS — Z01.89 ENCOUNTER FOR OTHER SPECIFIED SPECIAL EXAMINATIONS: ICD-10-CM

## 2019-10-03 DIAGNOSIS — R73.09 BLOOD GLUCOSE ABNORMAL: ICD-10-CM

## 2019-10-03 DIAGNOSIS — N39.0 RECURRENT UTI: ICD-10-CM

## 2019-10-03 DIAGNOSIS — K31.9 GASTROPATHY: ICD-10-CM

## 2019-10-03 DIAGNOSIS — I10 ESSENTIAL HYPERTENSION: ICD-10-CM

## 2019-10-03 DIAGNOSIS — R63.5 ABNORMAL WEIGHT GAIN: ICD-10-CM

## 2019-10-03 DIAGNOSIS — E78.5 HYPERLIPIDEMIA, UNSPECIFIED HYPERLIPIDEMIA TYPE: ICD-10-CM

## 2019-10-03 DIAGNOSIS — E83.52 HYPERCALCEMIA: ICD-10-CM

## 2019-10-03 DIAGNOSIS — Z71.2 ENCOUNTER TO DISCUSS TEST RESULTS: ICD-10-CM

## 2019-10-03 DIAGNOSIS — E66.01 CLASS 2 SEVERE OBESITY WITH SERIOUS COMORBIDITY AND BODY MASS INDEX (BMI) OF 35.0 TO 35.9 IN ADULT, UNSPECIFIED OBESITY TYPE (HCC): ICD-10-CM

## 2019-10-03 DIAGNOSIS — R00.2 PALPITATIONS: ICD-10-CM

## 2019-10-03 DIAGNOSIS — M35.1 MIXED CONNECTIVE TISSUE DISEASE (HCC): ICD-10-CM

## 2019-10-03 DIAGNOSIS — B37.0 ORAL THRUSH: ICD-10-CM

## 2019-10-03 DIAGNOSIS — F32.A DEPRESSION, UNSPECIFIED DEPRESSION TYPE: ICD-10-CM

## 2019-10-03 DIAGNOSIS — R73.03 PREDIABETES: ICD-10-CM

## 2019-10-03 DIAGNOSIS — E88.81 METABOLIC SYNDROME: ICD-10-CM

## 2019-10-03 PROCEDURE — 88305 TISSUE EXAM BY PATHOLOGIST: CPT | Performed by: INTERNAL MEDICINE

## 2019-10-07 RX ORDER — VENLAFAXINE HYDROCHLORIDE 75 MG/1
CAPSULE, EXTENDED RELEASE ORAL
Qty: 90 CAPSULE | Refills: 0 | Status: SHIPPED | OUTPATIENT
Start: 2019-10-07 | End: 2021-12-16 | Stop reason: ALTCHOICE

## 2019-10-07 RX ORDER — METFORMIN HYDROCHLORIDE 750 MG/1
TABLET, EXTENDED RELEASE ORAL
Qty: 120 TABLET | Refills: 0 | Status: SHIPPED | OUTPATIENT
Start: 2019-10-07 | End: 2020-06-18

## 2019-10-07 RX ORDER — LIRAGLUTIDE 6 MG/ML
INJECTION SUBCUTANEOUS
Qty: 9 ML | Refills: 0 | Status: SHIPPED | OUTPATIENT
Start: 2019-10-07 | End: 2020-02-19

## 2019-10-08 ENCOUNTER — TELEPHONE (OUTPATIENT)
Dept: GASTROENTEROLOGY | Facility: CLINIC | Age: 54
End: 2019-10-08

## 2019-10-15 NOTE — PATIENT INSTRUCTIONS
Why We Gain Weight When Jayro Roque Stressed—And How Not To  The psychology and biology of stress-related overeating and weight gain   Emotional Eating under Stress  Have you ever found yourself mindlessly eating a tub of ice cream while you brood about your lat Today’s human, who sits on the couch worrying about how to pay the bill or works long hours at the computer to make the deadline, does not work off much energy at all dealing with the stressor!  Unfortunately, we are stuck with a neuroendocrine system that dealing with stress in this way, while 42% reported watching television for more than 2 hours a day to deal with stress.  Being a couch potato also increases the temptation to overeat and is inactive, which means that those extra calories aren’t getting bur more than 40% of us lie awake at night as a result of stress. Research shows that worry is a major cause of insomnia. Our minds are overactive and won’t switch off.  We may also lose sleep because of pulling overnights to cram for exams or writing until the to a yoga class, getting a massage, patting your dog, or making time for friends and family can help to relieve stress without adding on the pounds.  Although you may feel that you don’t have time for leisure activities with looming deadlines, taking time t

## 2019-10-15 NOTE — PROGRESS NOTES
Ruben Holt is a 62year old female.     Chief complaint:weight loss follow up , medication refill, therapeutic drug monitoring, obesity , hyperlipidemia, HTN    HPI:   Enrico Garcia here for follow up on weight loss   Wt Readings from Last 12 Encounters: Disp: 90 capsule, Rfl: 0  Omeprazole 40 MG Oral Capsule Delayed Release, Take twice daily, Disp: , Rfl:   Insulin Pen Needle (PEN NEEDLES) 32G X 4 MM Does not apply Misc, 1 each by Does not apply route daily. , Disp: 90 each, Rfl: 0  Losartan Potassium-HCTZ 2014   • High blood pressure    • High cholesterol    • MCTD (mixed connective tissue disease) (HCC)    • Mood disorder (HCC)    • Other ill-defined conditions(299.89)     Per [employer]:  \"son and girlfriend with 4 kids moved in.\"   • Prediabetes    • Spin UTI              REVIEW OF SYSTEMS:   A comprehensive 10 point review of systems was completed.   Pertinent positives and negatives noted in the the HPI          PHYSICAL EXAM:   /78   Pulse 98   Temp 98 °F (36.7 °C) (Oral)   Resp 18   Ht 62\"   Wt 18 months       Plan:  1. Nutrition: low carb diet   2. Referral RD/nutritionist :no   3. FITTE:  ACSM recommendations (150 -200 minutes/week in active weight loss)  4. Behavior:  Motivational interviewing performed      5.  Discussed strategies to overcome ha

## 2019-10-17 RX ORDER — AMITRIPTYLINE HYDROCHLORIDE 10 MG/1
TABLET, FILM COATED ORAL
Qty: 180 TABLET | Refills: 0 | Status: SHIPPED | OUTPATIENT
Start: 2019-10-17 | End: 2020-01-15

## 2019-10-17 NOTE — TELEPHONE ENCOUNTER
LOV: 4/2/19  Last Refilled:#60, 3rfs 4/2/19      Future Appointments   Date Time Provider Erin Marlene   11/19/2019  8:00 AM MD OCHOA Mariee [address]       Please advise.

## 2019-10-22 ENCOUNTER — TELEPHONE (OUTPATIENT)
Dept: INTERNAL MEDICINE CLINIC | Facility: CLINIC | Age: 54
End: 2019-10-22

## 2019-11-07 ENCOUNTER — APPOINTMENT (OUTPATIENT)
Dept: LAB | Facility: HOSPITAL | Age: 54
End: 2019-11-07
Attending: NURSE PRACTITIONER
Payer: COMMERCIAL

## 2019-11-07 DIAGNOSIS — N39.0 RECURRENT UTI: ICD-10-CM

## 2019-11-07 PROCEDURE — 81001 URINALYSIS AUTO W/SCOPE: CPT

## 2019-11-08 ENCOUNTER — TELEPHONE (OUTPATIENT)
Dept: SURGERY | Facility: CLINIC | Age: 54
End: 2019-11-08

## 2019-11-08 RX ORDER — FLUCONAZOLE 100 MG/1
100 TABLET ORAL DAILY
Qty: 7 TABLET | Refills: 0 | Status: SHIPPED | OUTPATIENT
Start: 2019-11-08 | End: 2019-11-15

## 2019-11-08 RX ORDER — CIPROFLOXACIN 500 MG/1
500 TABLET, FILM COATED ORAL 2 TIMES DAILY
Qty: 14 TABLET | Refills: 0 | Status: SHIPPED | OUTPATIENT
Start: 2019-11-08 | End: 2019-11-15

## 2019-11-08 NOTE — TELEPHONE ENCOUNTER
Patient submitted UA. States she developed suprapubic pain and dysuria 2 days ago. Feels very uncomfortable . No gross hematuria. No fevers. They did not send urine culture, reviewed prior cultures and patient is pansensitive.     We agreed to treat w

## 2019-12-03 RX ORDER — PEN NEEDLE, DIABETIC 30 GX3/16"
1 NEEDLE, DISPOSABLE MISCELLANEOUS DAILY
Qty: 90 EACH | Refills: 0 | Status: SHIPPED | OUTPATIENT
Start: 2019-12-03 | End: 2020-03-02

## 2019-12-10 ENCOUNTER — OFFICE VISIT (OUTPATIENT)
Dept: INTERNAL MEDICINE CLINIC | Facility: CLINIC | Age: 54
End: 2019-12-10
Payer: COMMERCIAL

## 2019-12-10 VITALS
TEMPERATURE: 98 F | DIASTOLIC BLOOD PRESSURE: 80 MMHG | RESPIRATION RATE: 17 BRPM | BODY MASS INDEX: 33.9 KG/M2 | WEIGHT: 184.19 LBS | SYSTOLIC BLOOD PRESSURE: 122 MMHG | OXYGEN SATURATION: 98 % | HEIGHT: 62 IN | HEART RATE: 82 BPM

## 2019-12-10 DIAGNOSIS — E66.01 CLASS 2 SEVERE OBESITY WITH SERIOUS COMORBIDITY AND BODY MASS INDEX (BMI) OF 35.0 TO 35.9 IN ADULT, UNSPECIFIED OBESITY TYPE (HCC): ICD-10-CM

## 2019-12-10 DIAGNOSIS — R39.9 URINARY TRACT INFECTION SYMPTOMS: ICD-10-CM

## 2019-12-10 DIAGNOSIS — R73.03 PREDIABETES: ICD-10-CM

## 2019-12-10 DIAGNOSIS — N39.0 RECURRENT UTI: ICD-10-CM

## 2019-12-10 DIAGNOSIS — E78.5 HYPERLIPIDEMIA, UNSPECIFIED HYPERLIPIDEMIA TYPE: ICD-10-CM

## 2019-12-10 DIAGNOSIS — Z51.81 THERAPEUTIC DRUG MONITORING: Primary | ICD-10-CM

## 2019-12-10 PROCEDURE — 87147 CULTURE TYPE IMMUNOLOGIC: CPT | Performed by: INTERNAL MEDICINE

## 2019-12-10 PROCEDURE — 81003 URINALYSIS AUTO W/O SCOPE: CPT | Performed by: INTERNAL MEDICINE

## 2019-12-10 PROCEDURE — 87086 URINE CULTURE/COLONY COUNT: CPT | Performed by: INTERNAL MEDICINE

## 2019-12-10 PROCEDURE — 99214 OFFICE O/P EST MOD 30 MIN: CPT | Performed by: INTERNAL MEDICINE

## 2019-12-10 RX ORDER — PHENTERMINE HYDROCHLORIDE 15 MG/1
15 CAPSULE ORAL EVERY MORNING
Qty: 30 CAPSULE | Refills: 0 | Status: SHIPPED | OUTPATIENT
Start: 2019-12-10 | End: 2021-01-13

## 2019-12-10 NOTE — PROGRESS NOTES
Kortney Hendrickson is a 62year old female.     Chief complaint:weight loss follow up , medication refill, therapeutic drug monitoring, prediabetes , HTN and recurrent UTI       HPI:   [facility] here for follow up on weight loss   Wt Readings from Last 12 Enco • VICTOZA 18 MG/3ML Subcutaneous Solution Pen-injector INJECT 0.6MG INTO THE SKIN DAILY FOR 7 DAYS, THEN 1.2MG DAILY FOR 7 DAYS THEN 1.8MG DAILY 9 mL 0   • METFORMIN HCL  MG Oral Tablet 24 Hr TAKE 1 TABLET(750 MG) BY MOUTH TWICE DAILY WITH MEALS 12 [employer]:  \"son and girlfriend with 4 kids moved in.\"   • Prediabetes    • Spinal stenosis     Comments:  spondylosis. Management:  surgery in 12/2009.    • Visual impairment      Past Surgical History:   Procedure Laterality Date   • BACK SURGERY     • C /80   Pulse 82   Temp 98.2 °F (36.8 °C) (Oral)   Resp 17   Ht 62\"   Wt 184 lb 3.2 oz (83.6 kg)   LMP 09/06/2013 (Approximate)   SpO2 98%   BMI 33.69 kg/m²   GENERAL: well developed, well nourished,in no apparent distress  SKIN: no rashes,no suspic reviewed  · Advised to count carbs goal carbs is 50 g per day   · Advised to increase protein intake goal is 30 g with each meal of the day   · Discussed the importance of exercise goal is 1 hour 3 times per week   · Discussed importance of weight resistan

## 2019-12-12 RX ORDER — AMOXICILLIN AND CLAVULANATE POTASSIUM 875; 125 MG/1; MG/1
1 TABLET, FILM COATED ORAL 2 TIMES DAILY
Qty: 10 TABLET | Refills: 0 | Status: SHIPPED | OUTPATIENT
Start: 2019-12-12 | End: 2019-12-17

## 2019-12-16 ENCOUNTER — TELEPHONE (OUTPATIENT)
Dept: INTERNAL MEDICINE CLINIC | Facility: CLINIC | Age: 54
End: 2019-12-16

## 2019-12-17 RX ORDER — PHENTERMINE HYDROCHLORIDE 15 MG/1
15 CAPSULE ORAL EVERY MORNING
Qty: 30 CAPSULE | Refills: 0 | Status: SHIPPED | OUTPATIENT
Start: 2019-12-17 | End: 2021-01-13

## 2019-12-18 ENCOUNTER — PATIENT MESSAGE (OUTPATIENT)
Dept: RHEUMATOLOGY | Facility: CLINIC | Age: 54
End: 2019-12-18

## 2019-12-18 NOTE — TELEPHONE ENCOUNTER
From: Susu Bernstein  To: Sangeetha Saucedo MD  Sent: 12/18/2019 3:30 PM CST  Subject: Non-Urgent Medical Question    I have been experiencing more then normal joint pain in my arms hips and knee over the last month.  Can anything be done to ease some

## 2019-12-21 DIAGNOSIS — R92.8 ABNORMAL MAMMOGRAM: Primary | ICD-10-CM

## 2019-12-23 ENCOUNTER — OFFICE VISIT (OUTPATIENT)
Dept: RHEUMATOLOGY | Facility: CLINIC | Age: 54
End: 2019-12-23
Payer: COMMERCIAL

## 2019-12-23 VITALS
BODY MASS INDEX: 33.86 KG/M2 | WEIGHT: 184 LBS | HEART RATE: 84 BPM | DIASTOLIC BLOOD PRESSURE: 90 MMHG | OXYGEN SATURATION: 99 % | RESPIRATION RATE: 16 BRPM | HEIGHT: 62 IN | SYSTOLIC BLOOD PRESSURE: 136 MMHG

## 2019-12-23 DIAGNOSIS — M35.1 MCTD (MIXED CONNECTIVE TISSUE DISEASE) (HCC): ICD-10-CM

## 2019-12-23 DIAGNOSIS — Z51.81 THERAPEUTIC DRUG MONITORING: ICD-10-CM

## 2019-12-23 DIAGNOSIS — M79.7 FIBROMYALGIA: Primary | ICD-10-CM

## 2019-12-23 PROCEDURE — 99214 OFFICE O/P EST MOD 30 MIN: CPT | Performed by: INTERNAL MEDICINE

## 2019-12-23 RX ORDER — CYCLOBENZAPRINE HCL 5 MG
TABLET ORAL
Qty: 60 TABLET | Refills: 3 | Status: SHIPPED | OUTPATIENT
Start: 2019-12-23 | End: 2020-10-26

## 2019-12-23 RX ORDER — NABUMETONE 500 MG/1
500 TABLET, FILM COATED ORAL 2 TIMES DAILY
Qty: 60 TABLET | Refills: 0 | Status: SHIPPED | OUTPATIENT
Start: 2019-12-23 | End: 2020-02-24

## 2019-12-23 NOTE — PROGRESS NOTES
Eliezer Baker is a 62year old female who presents for Patient presents with:  Mixed Connective Tissue Disease: pt c/o arms, knees, hands, hips, and body aches  Fibromyalgia  Medication Follow-Up  .    HPI:     I had the pleasure of seeing Jacque Gallardo MCTD. She's never flet plaquenil helped her. She has no sob. She has been having n/v. That' sbeen ahving a lot. She doesn's tknow if it's her IBS. seh didn't see GI yet. She got labs and us liver.    She has been having loose bowel movement and goto t her hips bfore now that' sbetter. She felt her right knee was bad. Now she' sbetter with laying on it now. No rashes. She has 5/10 apin in her hips, legs and arms . 4/11/2018   She had a bad flare of neck pain 1-2 weeks ago.  She felt advil and sue by dr. Kimberlyn Segundo. She is getting a lot of UTI's. She doesn't have pain much at times and then it can come on suddenly. She had physical therapy for her neck years ago. 4/2/2019  Her hips and knee are bad.  For the most part , she feels 24 Hr TAKE 1 TABLET(750 MG) BY MOUTH TWICE DAILY WITH MEALS 120 tablet 0   • VENLAFAXINE HCL ER 75 MG Oral Capsule SR 24 Hr TAKE 1 CAPSULE(75 MG) BY MOUTH DAILY 90 capsule 0   • Omeprazole 40 MG Oral Capsule Delayed Release Take twice daily     • Insulin P tissue disease) ([facility] [address]    • Mood disorder ([facility]    • Other ill-defined conditions(799.89)     Per [employer]:  \"son and girlfriend with 4 kids moved in.\"   • Prediabetes    • Spinal stenosis     Comments:  spondylosis. Management:  surgery in 12/2009.    • V Raynaud's, hands always cold, no nasal ulcers, no parotid swelling, no neck pain, no jaw pain, no temple pain  Eyes: No visual changes, eye exam - 10 or 11/2016 - was done - with dr. Mary Lobato, has contacts as well, episode of eye swelling left eye - in 10-1 GLUCOSE (URINE DIPSTICK)      Negative mg/dL  Negative   KETONES (URINE DIPSTICK)      Negative mg/dL  Negative   BILIRUBIN      Negative  Negative   OCCULT BLOOD      Negative  Negative   NITRITE, URINE      Negative  Negative   UROBILINOGEN,SEMI-QN Total Bilirubin      0.3 - 1.2 mg/dL  0.7   TOTAL PROTEIN      5.9 - 8.4 g/dL  6.9   Albumin      3.5 - 4.8 g/dL  4.2   Globulin      2.5 - 3.7 g/dL  2.7   A/G Ratio      1.0 - 2.0  1.6   ANION GAP      0 - 18 mmol/L  8   BUN/CREAT Ratio      10.0 - 20. 0 complication. 2. Degenerative arthritis. 3. Limited range of motion but no abnormal motion. 4. IUD in the pelvis    3/17/2016 - c spine xray   1. Straightening of cervical curve.   2. Moderate localized degenerative arthritis which has progressed     sig

## 2019-12-23 NOTE — PATIENT INSTRUCTIONS
You were seen today for fibromyalgia  Try nabumetone 500 mg 1-2 times a day  Cont plaquenel, flexeril and amitritpytine  F/u with  3-4 mos

## 2020-01-08 ENCOUNTER — HOSPITAL ENCOUNTER (OUTPATIENT)
Dept: ULTRASOUND IMAGING | Facility: HOSPITAL | Age: 55
Discharge: HOME | End: 2020-01-08
Attending: INTERNAL MEDICINE
Payer: COMMERCIAL

## 2020-01-08 ENCOUNTER — HOSPITAL ENCOUNTER (OUTPATIENT)
Dept: MAMMOGRAPHY | Facility: HOSPITAL | Age: 55
Discharge: HOME | End: 2020-01-08
Attending: INTERNAL MEDICINE
Payer: COMMERCIAL

## 2020-01-08 DIAGNOSIS — R92.8 ABNORMAL MAMMOGRAM: ICD-10-CM

## 2020-01-08 PROCEDURE — 77062 BREAST TOMOSYNTHESIS BI: CPT | Performed by: INTERNAL MEDICINE

## 2020-01-08 PROCEDURE — 77066 DX MAMMO INCL CAD BI: CPT | Performed by: INTERNAL MEDICINE

## 2020-01-08 PROCEDURE — 76642 ULTRASOUND BREAST LIMITED: CPT | Performed by: INTERNAL MEDICINE

## 2020-01-10 DIAGNOSIS — N60.02 CYST OF LEFT BREAST: Primary | ICD-10-CM

## 2020-01-10 DIAGNOSIS — R92.8 ABNORMAL MAMMOGRAM: ICD-10-CM

## 2020-01-15 RX ORDER — AMITRIPTYLINE HYDROCHLORIDE 10 MG/1
TABLET, FILM COATED ORAL
Qty: 180 TABLET | Refills: 0 | Status: SHIPPED | OUTPATIENT
Start: 2020-01-15 | End: 2021-10-11

## 2020-01-15 NOTE — TELEPHONE ENCOUNTER
LOV: 12/23/19  Last Refilled:#180, 0rfs 10/17/19    Future Appointments   Date Time Provider Erin Marlene   1/22/2020  9:00 AM MD OCHOA Saldivar [address]   2/24/2020  3:30 PM Bhumika Moran MD [address] [place] [address]       Please adv

## 2020-02-14 ENCOUNTER — LAB ENCOUNTER (OUTPATIENT)
Dept: LAB | Facility: HOSPITAL | Age: 55
End: 2020-02-14
Attending: INTERNAL MEDICINE
Payer: COMMERCIAL

## 2020-02-14 DIAGNOSIS — R73.03 PREDIABETES: ICD-10-CM

## 2020-02-14 DIAGNOSIS — R63.5 ABNORMAL WEIGHT GAIN: ICD-10-CM

## 2020-02-14 DIAGNOSIS — N39.0 RECURRENT UTI: ICD-10-CM

## 2020-02-14 DIAGNOSIS — E78.5 HYPERLIPIDEMIA, UNSPECIFIED HYPERLIPIDEMIA TYPE: ICD-10-CM

## 2020-02-14 DIAGNOSIS — I10 ESSENTIAL HYPERTENSION: ICD-10-CM

## 2020-02-14 DIAGNOSIS — E66.01 CLASS 2 SEVERE OBESITY WITH SERIOUS COMORBIDITY AND BODY MASS INDEX (BMI) OF 35.0 TO 35.9 IN ADULT, UNSPECIFIED OBESITY TYPE (HCC): ICD-10-CM

## 2020-02-14 DIAGNOSIS — E88.81 METABOLIC SYNDROME: ICD-10-CM

## 2020-02-14 LAB
BILIRUB UR QL: NEGATIVE
CLARITY UR: CLEAR
COLOR UR: YELLOW
GLUCOSE UR-MCNC: NEGATIVE MG/DL
HGB UR QL STRIP.AUTO: NEGATIVE
KETONES UR-MCNC: NEGATIVE MG/DL
NITRITE UR QL STRIP.AUTO: POSITIVE
PH UR: 5 [PH] (ref 5–8)
PROT UR-MCNC: NEGATIVE MG/DL
RBC #/AREA URNS AUTO: 2 /HPF
SP GR UR STRIP: 1.02 (ref 1–1.03)
UROBILINOGEN UR STRIP-ACNC: <2
WBC #/AREA URNS AUTO: 54 /HPF

## 2020-02-14 PROCEDURE — 87186 SC STD MICRODIL/AGAR DIL: CPT

## 2020-02-14 PROCEDURE — 87086 URINE CULTURE/COLONY COUNT: CPT

## 2020-02-14 PROCEDURE — 87088 URINE BACTERIA CULTURE: CPT

## 2020-02-14 PROCEDURE — 81001 URINALYSIS AUTO W/SCOPE: CPT

## 2020-02-15 ENCOUNTER — LAB ENCOUNTER (OUTPATIENT)
Dept: LAB | Facility: HOSPITAL | Age: 55
End: 2020-02-15
Attending: INTERNAL MEDICINE
Payer: COMMERCIAL

## 2020-02-15 DIAGNOSIS — Z51.81 THERAPEUTIC DRUG MONITORING: ICD-10-CM

## 2020-02-15 DIAGNOSIS — R39.9 URINARY TRACT INFECTION SYMPTOMS: ICD-10-CM

## 2020-02-15 DIAGNOSIS — N39.0 RECURRENT UTI: ICD-10-CM

## 2020-02-15 DIAGNOSIS — E66.01 CLASS 2 SEVERE OBESITY WITH SERIOUS COMORBIDITY AND BODY MASS INDEX (BMI) OF 35.0 TO 35.9 IN ADULT, UNSPECIFIED OBESITY TYPE (HCC): ICD-10-CM

## 2020-02-15 DIAGNOSIS — R73.03 PREDIABETES: ICD-10-CM

## 2020-02-15 DIAGNOSIS — E78.5 HYPERLIPIDEMIA, UNSPECIFIED HYPERLIPIDEMIA TYPE: ICD-10-CM

## 2020-02-15 LAB
ALBUMIN SERPL-MCNC: 4.1 G/DL (ref 3.4–5)
ALBUMIN/GLOB SERPL: 1.1 {RATIO} (ref 1–2)
ALP LIVER SERPL-CCNC: 98 U/L (ref 46–118)
ALT SERPL-CCNC: 36 U/L (ref 13–56)
ANION GAP SERPL CALC-SCNC: 6 MMOL/L (ref 0–18)
AST SERPL-CCNC: 25 U/L (ref 15–37)
BASOPHILS # BLD AUTO: 0.07 X10(3) UL (ref 0–0.2)
BASOPHILS NFR BLD AUTO: 0.9 %
BILIRUB SERPL-MCNC: 0.4 MG/DL (ref 0.1–2)
BUN BLD-MCNC: 19 MG/DL (ref 7–18)
BUN/CREAT SERPL: 21.3 (ref 10–20)
CALCIUM BLD-MCNC: 9.7 MG/DL (ref 8.5–10.1)
CHLORIDE SERPL-SCNC: 105 MMOL/L (ref 98–112)
CHOLEST SMN-MCNC: 222 MG/DL (ref ?–200)
CO2 SERPL-SCNC: 27 MMOL/L (ref 21–32)
CREAT BLD-MCNC: 0.89 MG/DL (ref 0.55–1.02)
DEPRECATED RDW RBC AUTO: 40.2 FL (ref 35.1–46.3)
EOSINOPHIL # BLD AUTO: 0.18 X10(3) UL (ref 0–0.7)
EOSINOPHIL NFR BLD AUTO: 2.4 %
ERYTHROCYTE [DISTWIDTH] IN BLOOD BY AUTOMATED COUNT: 12 % (ref 11–15)
EST. AVERAGE GLUCOSE BLD GHB EST-MCNC: 120 MG/DL (ref 68–126)
GLOBULIN PLAS-MCNC: 3.7 G/DL (ref 2.8–4.4)
GLUCOSE BLD-MCNC: 128 MG/DL (ref 70–99)
HBA1C MFR BLD HPLC: 5.8 % (ref ?–5.7)
HCT VFR BLD AUTO: 37.8 % (ref 35–48)
HDLC SERPL-MCNC: 66 MG/DL (ref 40–59)
HGB BLD-MCNC: 12.6 G/DL (ref 12–16)
IMM GRANULOCYTES # BLD AUTO: 0.03 X10(3) UL (ref 0–1)
IMM GRANULOCYTES NFR BLD: 0.4 %
LDLC SERPL CALC-MCNC: 133 MG/DL (ref ?–100)
LYMPHOCYTES # BLD AUTO: 2.44 X10(3) UL (ref 1–4)
LYMPHOCYTES NFR BLD AUTO: 32.8 %
M PROTEIN MFR SERPL ELPH: 7.8 G/DL (ref 6.4–8.2)
MCH RBC QN AUTO: 30.7 PG (ref 26–34)
MCHC RBC AUTO-ENTMCNC: 33.3 G/DL (ref 31–37)
MCV RBC AUTO: 92 FL (ref 80–100)
MONOCYTES # BLD AUTO: 0.69 X10(3) UL (ref 0.1–1)
MONOCYTES NFR BLD AUTO: 9.3 %
NEUTROPHILS # BLD AUTO: 4.04 X10 (3) UL (ref 1.5–7.7)
NEUTROPHILS # BLD AUTO: 4.04 X10(3) UL (ref 1.5–7.7)
NEUTROPHILS NFR BLD AUTO: 54.2 %
NONHDLC SERPL-MCNC: 156 MG/DL (ref ?–130)
OSMOLALITY SERPL CALC.SUM OF ELEC: 290 MOSM/KG (ref 275–295)
PATIENT FASTING Y/N/NP: YES
PATIENT FASTING Y/N/NP: YES
PLATELET # BLD AUTO: 324 10(3)UL (ref 150–450)
POTASSIUM SERPL-SCNC: 4.1 MMOL/L (ref 3.5–5.1)
RBC # BLD AUTO: 4.11 X10(6)UL (ref 3.8–5.3)
SODIUM SERPL-SCNC: 138 MMOL/L (ref 136–145)
TRIGL SERPL-MCNC: 114 MG/DL (ref 30–149)
VLDLC SERPL CALC-MCNC: 23 MG/DL (ref 0–30)
WBC # BLD AUTO: 7.5 X10(3) UL (ref 4–11)

## 2020-02-15 PROCEDURE — 83036 HEMOGLOBIN GLYCOSYLATED A1C: CPT

## 2020-02-15 PROCEDURE — 80061 LIPID PANEL: CPT

## 2020-02-15 PROCEDURE — 82306 VITAMIN D 25 HYDROXY: CPT

## 2020-02-15 PROCEDURE — 85025 COMPLETE CBC W/AUTO DIFF WBC: CPT

## 2020-02-15 PROCEDURE — 80053 COMPREHEN METABOLIC PANEL: CPT

## 2020-02-15 PROCEDURE — 36415 COLL VENOUS BLD VENIPUNCTURE: CPT

## 2020-02-17 LAB — 25(OH)D3 SERPL-MCNC: 16.6 NG/ML (ref 30–100)

## 2020-02-17 RX ORDER — SULFAMETHOXAZOLE AND TRIMETHOPRIM 800; 160 MG/1; MG/1
1 TABLET ORAL 2 TIMES DAILY
Qty: 14 TABLET | Refills: 0 | Status: SHIPPED | OUTPATIENT
Start: 2020-02-17 | End: 2020-02-24

## 2020-02-18 ENCOUNTER — OFFICE VISIT (OUTPATIENT)
Dept: INTERNAL MEDICINE CLINIC | Facility: CLINIC | Age: 55
End: 2020-02-18
Payer: COMMERCIAL

## 2020-02-18 VITALS
HEIGHT: 62 IN | TEMPERATURE: 98 F | RESPIRATION RATE: 17 BRPM | HEART RATE: 103 BPM | BODY MASS INDEX: 34.56 KG/M2 | DIASTOLIC BLOOD PRESSURE: 90 MMHG | WEIGHT: 187.81 LBS | SYSTOLIC BLOOD PRESSURE: 140 MMHG | OXYGEN SATURATION: 99 %

## 2020-02-18 DIAGNOSIS — M35.1 MIXED CONNECTIVE TISSUE DISEASE (HCC): ICD-10-CM

## 2020-02-18 DIAGNOSIS — Z00.00 ANNUAL PHYSICAL EXAM: Primary | ICD-10-CM

## 2020-02-18 DIAGNOSIS — N39.0 RECURRENT UTI: ICD-10-CM

## 2020-02-18 DIAGNOSIS — K82.4 GALLBLADDER POLYP: ICD-10-CM

## 2020-02-18 DIAGNOSIS — M79.7 FIBROMYALGIA: ICD-10-CM

## 2020-02-18 DIAGNOSIS — K82.4 GALL BLADDER POLYP: ICD-10-CM

## 2020-02-18 DIAGNOSIS — E88.81 METABOLIC SYNDROME: ICD-10-CM

## 2020-02-18 DIAGNOSIS — B37.0 ORAL THRUSH: ICD-10-CM

## 2020-02-18 DIAGNOSIS — E78.5 HYPERLIPIDEMIA, UNSPECIFIED HYPERLIPIDEMIA TYPE: ICD-10-CM

## 2020-02-18 DIAGNOSIS — I10 ESSENTIAL HYPERTENSION: ICD-10-CM

## 2020-02-18 DIAGNOSIS — R73.03 PREDIABETES: ICD-10-CM

## 2020-02-18 DIAGNOSIS — E66.01 CLASS 2 SEVERE OBESITY WITH SERIOUS COMORBIDITY AND BODY MASS INDEX (BMI) OF 35.0 TO 35.9 IN ADULT, UNSPECIFIED OBESITY TYPE (HCC): ICD-10-CM

## 2020-02-18 DIAGNOSIS — K31.9 GASTROPATHY: ICD-10-CM

## 2020-02-18 DIAGNOSIS — Z12.4 SCREENING FOR CERVICAL CANCER: ICD-10-CM

## 2020-02-18 DIAGNOSIS — R73.09 BLOOD GLUCOSE ABNORMAL: ICD-10-CM

## 2020-02-18 DIAGNOSIS — K76.0 NAFLD (NONALCOHOLIC FATTY LIVER DISEASE): ICD-10-CM

## 2020-02-18 PROCEDURE — 87624 HPV HI-RISK TYP POOLED RSLT: CPT | Performed by: INTERNAL MEDICINE

## 2020-02-18 PROCEDURE — 99396 PREV VISIT EST AGE 40-64: CPT | Performed by: INTERNAL MEDICINE

## 2020-02-18 RX ORDER — ERGOCALCIFEROL 1.25 MG/1
50000 CAPSULE ORAL WEEKLY
Qty: 12 CAPSULE | Refills: 1 | Status: SHIPPED | OUTPATIENT
Start: 2020-02-18 | End: 2020-05-06

## 2020-02-18 NOTE — PROGRESS NOTES
Ruben Holt is a 62year old female.     Chief complaint: annual physical exam       HPI:   62year old female with PMh as listed below here for   Annual physical exam       Having UTI   Started antibiotics recently   Some urinary odor   Some suprapub 18 MG/3ML Subcutaneous Solution Pen-injector INJECT 0.6MG INTO THE SKIN DAILY FOR 7 DAYS, THEN 1.2MG DAILY FOR 7 DAYS THEN 1.8MG DAILY 9 mL 0   • METFORMIN HCL  MG Oral Tablet 24 Hr TAKE 1 TABLET(750 MG) BY MOUTH TWICE DAILY WITH MEALS 120 tablet 0 Right     ankle surgery        Social History:  Social History    Tobacco Use      Smoking status: Former Smoker        Packs/day: 0.00        Types: Cigarettes        Quit date: 1989        Years since quittin.1      Smokeless tobacco: Never Use edema  Pelvic exam done : normal to inspection   Pap done   Normal bimanual exam     NEURO: no gross deficits              No orders of the defined types were placed in this encounter.         ASSESSMENT AND PLAN:   1. Gall bladder polyp  Us gall bladder

## 2020-02-19 LAB — HPV I/H RISK 1 DNA SPEC QL NAA+PROBE: NEGATIVE

## 2020-02-19 RX ORDER — LIRAGLUTIDE 6 MG/ML
INJECTION SUBCUTANEOUS
Qty: 9 ML | Refills: 0 | Status: SHIPPED | OUTPATIENT
Start: 2020-02-19 | End: 2020-06-18

## 2020-02-19 RX ORDER — FLUCONAZOLE 100 MG/1
100 TABLET ORAL DAILY
Qty: 11 TABLET | Refills: 0 | Status: SHIPPED | OUTPATIENT
Start: 2020-02-19 | End: 2020-02-29

## 2020-02-20 ENCOUNTER — HOSPITAL ENCOUNTER (EMERGENCY)
Facility: HOSPITAL | Age: 55
Discharge: HOME | End: 2020-02-20
Attending: EMERGENCY MEDICINE
Payer: COMMERCIAL

## 2020-02-20 VITALS
RESPIRATION RATE: 14 BRPM | SYSTOLIC BLOOD PRESSURE: 142 MMHG | BODY MASS INDEX: 33.86 KG/M2 | HEART RATE: 79 BPM | WEIGHT: 184 LBS | HEIGHT: 62 IN | OXYGEN SATURATION: 96 % | DIASTOLIC BLOOD PRESSURE: 94 MMHG | TEMPERATURE: 98 F

## 2020-02-20 DIAGNOSIS — R11.11 VOMITING WITHOUT NAUSEA, INTRACTABILITY OF VOMITING NOT SPECIFIED, UNSPECIFIED VOMITING TYPE: ICD-10-CM

## 2020-02-20 DIAGNOSIS — R03.0 ELEVATED BLOOD PRESSURE READING: Primary | ICD-10-CM

## 2020-02-20 LAB
ANION GAP SERPL CALC-SCNC: 5 MMOL/L (ref 0–18)
BASOPHILS # BLD AUTO: 0.05 X10(3) UL (ref 0–0.2)
BASOPHILS NFR BLD AUTO: 0.7 %
BILIRUB UR QL: NEGATIVE
BUN BLD-MCNC: 19 MG/DL (ref 7–18)
BUN/CREAT SERPL: 17.4 (ref 10–20)
CALCIUM BLD-MCNC: 10.2 MG/DL (ref 8.5–10.1)
CHLORIDE SERPL-SCNC: 102 MMOL/L (ref 98–112)
CLARITY UR: CLEAR
CO2 SERPL-SCNC: 27 MMOL/L (ref 21–32)
COLOR UR: YELLOW
CREAT BLD-MCNC: 1.09 MG/DL (ref 0.55–1.02)
DEPRECATED RDW RBC AUTO: 39.5 FL (ref 35.1–46.3)
EOSINOPHIL # BLD AUTO: 0.15 X10(3) UL (ref 0–0.7)
EOSINOPHIL # BLD: 0.15 10*3/UL
EOSINOPHIL NFR BLD AUTO: 2.1 %
EOSINOPHIL NFR BLD: 2.1 %
ERYTHROCYTE [DISTWIDTH] IN BLOOD BY AUTOMATED COUNT: 11.8 % (ref 11–15)
GLUCOSE BLD-MCNC: 97 MG/DL (ref 70–99)
GLUCOSE UR-MCNC: NEGATIVE MG/DL
HCT VFR BLD AUTO: 37.3 % (ref 35–48)
HCT VFR BLD CALC: 37.3 %
HGB BLD-MCNC: 12.5 G/DL
HGB BLD-MCNC: 12.5 G/DL (ref 12–16)
HGB UR QL STRIP.AUTO: NEGATIVE
IMM GRANULOCYTES # BLD AUTO: 0.04 X10(3) UL (ref 0–1)
IMM GRANULOCYTES NFR BLD: 0.6 %
IMMATURE GRANULOCYTES (OFFPRE25): 0.6
KETONES UR-MCNC: 20 MG/DL
LAST PAP RESULT: NORMAL
LEUKOCYTE ESTERASE UR QL STRIP.AUTO: NEGATIVE
LYMPHOCYTES # BLD AUTO: 2.05 X10(3) UL (ref 1–4)
LYMPHOCYTES # BLD: 2.05 10*3/UL
LYMPHOCYTES NFR BLD AUTO: 28.6 %
LYMPHOCYTES NFR BLD: 28.6 %
MCH RBC QN AUTO: 30.7 PG
MCH RBC QN AUTO: 30.7 PG (ref 26–34)
MCHC RBC AUTO-ENTMCNC: 33.5 G/DL
MCHC RBC AUTO-ENTMCNC: 33.5 G/DL (ref 31–37)
MCV RBC AUTO: 91.6 FL
MCV RBC AUTO: 91.6 FL (ref 80–100)
MONOCYTES # BLD AUTO: 0.63 X10(3) UL (ref 0.1–1)
MONOCYTES # BLD: 0.63 10*3/UL
MONOCYTES NFR BLD AUTO: 8.8 %
MONOCYTES NFR BLD: 8.8 %
NEUTROPHILS # BLD AUTO: 4.26 X10 (3) UL (ref 1.5–7.7)
NEUTROPHILS # BLD AUTO: 4.26 X10(3) UL (ref 1.5–7.7)
NEUTROPHILS # BLD: 4.26 10*3/UL
NEUTROPHILS NFR BLD AUTO: 59.2 %
NEUTROPHILS NFR BLD: 59.2 %
NITRITE UR QL STRIP.AUTO: NEGATIVE
OSMOLALITY SERPL CALC.SUM OF ELEC: 280 MOSM/KG (ref 275–295)
PAP HISTORY (OTHER THAN LAST PAP): NORMAL
PH UR: 5 [PH] (ref 5–8)
PLATELET # BLD AUTO: 327 10(3)UL (ref 150–450)
PLATELET # BLD: 327 K/MCL
POTASSIUM SERPL-SCNC: 4 MMOL/L (ref 3.5–5.1)
PROT UR-MCNC: 30 MG/DL
RBC # BLD AUTO: 4.07 X10(6)UL (ref 3.8–5.3)
RBC # BLD: 4.07 10*6/UL
RBC #/AREA URNS AUTO: 1 /HPF
SODIUM SERPL-SCNC: 134 MMOL/L (ref 136–145)
SP GR UR STRIP: 1.02 (ref 1–1.03)
UROBILINOGEN UR STRIP-ACNC: <2
WBC # BLD AUTO: 7.2 X10(3) UL (ref 4–11)
WBC # BLD: 7.2 K/MCL
WBC #/AREA URNS AUTO: 3 /HPF

## 2020-02-20 PROCEDURE — 93010 ELECTROCARDIOGRAM REPORT: CPT | Performed by: EMERGENCY MEDICINE

## 2020-02-20 PROCEDURE — 85025 COMPLETE CBC W/AUTO DIFF WBC: CPT | Performed by: EMERGENCY MEDICINE

## 2020-02-20 PROCEDURE — 93005 ELECTROCARDIOGRAM TRACING: CPT

## 2020-02-20 PROCEDURE — 80048 BASIC METABOLIC PNL TOTAL CA: CPT | Performed by: EMERGENCY MEDICINE

## 2020-02-20 PROCEDURE — 36415 COLL VENOUS BLD VENIPUNCTURE: CPT

## 2020-02-20 PROCEDURE — 81001 URINALYSIS AUTO W/SCOPE: CPT | Performed by: EMERGENCY MEDICINE

## 2020-02-20 PROCEDURE — 99283 EMERGENCY DEPT VISIT LOW MDM: CPT

## 2020-02-20 RX ORDER — CIPROFLOXACIN 500 MG/1
500 TABLET, FILM COATED ORAL 2 TIMES DAILY
Qty: 6 TABLET | Refills: 0 | Status: SHIPPED | OUTPATIENT
Start: 2020-02-20 | End: 2020-02-23

## 2020-02-21 NOTE — ED INITIAL ASSESSMENT (HPI)
Vomiting last night with elevated blood BP to 180s/90s and nausea continuing thru today with HA and shakiness. Also c/o oral candidiasis since Tuesday, on bactrim for UTI.

## 2020-02-21 NOTE — ED PROVIDER NOTES
Patient Seen in: [facility] AND [facility] Emergency Department      History   Patient presents with:  Vomiting  Hypertension    Stated Complaint: vomiting    HPI    Patient is a 41-year-old female who was seen about 5 days ago and was told she had a UTI she was Used    Alcohol use:  Yes      Alcohol/week: 1.0 standard drinks      Types: 1 Glasses of wine per week      Comment: Occasionally wine    Drug use: Yes      Types: Cannabis      Comment: occ             Review of Systems    Positive for stated complaint: v reviewed.           ED Course     Labs Reviewed   BASIC METABOLIC PANEL (8) - Abnormal; Notable for the following components:       Result Value    Sodium 134 (*)     BUN 19 (*)     Creatinine 1.09 (*)     Calcium, Total 10.2 (*)     GFR, Non- Americ 34[address]  [phone]    Schedule an appointment as soon as possible for a visit in 2 days          Medications Prescribed:  Current Discharge Medication List    START taking these medications    Ciprofloxacin HCl 500 MG Oral Tab  Take 1 tablet (500 mg total

## 2020-02-24 ENCOUNTER — OFFICE VISIT (OUTPATIENT)
Dept: RHEUMATOLOGY | Facility: CLINIC | Age: 55
End: 2020-02-24
Payer: COMMERCIAL

## 2020-02-24 VITALS
HEART RATE: 85 BPM | BODY MASS INDEX: 33.86 KG/M2 | RESPIRATION RATE: 16 BRPM | WEIGHT: 184 LBS | HEIGHT: 62 IN | SYSTOLIC BLOOD PRESSURE: 144 MMHG | DIASTOLIC BLOOD PRESSURE: 83 MMHG

## 2020-02-24 DIAGNOSIS — Z51.81 THERAPEUTIC DRUG MONITORING: ICD-10-CM

## 2020-02-24 DIAGNOSIS — M35.1 MCTD (MIXED CONNECTIVE TISSUE DISEASE) (HCC): Primary | ICD-10-CM

## 2020-02-24 PROCEDURE — 99214 OFFICE O/P EST MOD 30 MIN: CPT | Performed by: INTERNAL MEDICINE

## 2020-02-24 RX ORDER — LOSARTAN POTASSIUM 100 MG/1
100 TABLET ORAL DAILY
Refills: 1 | COMMUNITY
Start: 2019-12-07

## 2020-02-24 RX ORDER — AMLODIPINE BESYLATE 10 MG/1
10 TABLET ORAL DAILY
COMMUNITY
Start: 2020-01-01 | End: 2021-06-02

## 2020-02-24 RX ORDER — NABUMETONE 500 MG/1
500 TABLET, FILM COATED ORAL 2 TIMES DAILY PRN
Qty: 60 TABLET | Refills: 1 | Status: SHIPPED | OUTPATIENT
Start: 2020-02-24 | End: 2021-12-16 | Stop reason: ALTCHOICE

## 2020-02-24 NOTE — PROGRESS NOTES
Denia Noe is a 62year old female who presents for Patient presents with:  Mixed Connective Tissue Disease  Medication Follow-Up  Finger Pain: bilateral hands, onset x2 weeks  .    HPI:     I had the pleasure of seeing Denia Noe on 4/5/2017 lorena hodge helped her. She has no sob. She has been having n/v. That' sbeen ahving a lot. She doesn's tknow if it's her IBS. seh didn't see GI yet. She got labs and us liver.    She has been having loose bowel movement and goto the bathrrom and th that' sbetter. She felt her right knee was bad. Now she' sbetter with laying on it now. No rashes. She has 5/10 apin in her hips, legs and arms . 4/11/2018   She had a bad flare of neck pain 1-2 weeks ago.  She felt advil and tyelnol wasn't help Desirae Laura. She is getting a lot of UTI's. She doesn't have pain much at times and then it can come on suddenly. She had physical therapy for her neck years ago. 4/2/2019  Her hips and knee are bad. For the most part , she feels ok.  But she gets ayah METFORMIN HCL  MG Oral Tablet 24 Hr TAKE 1 TABLET(750 MG) BY MOUTH TWICE DAILY WITH MEALS 120 tablet 0   • VENLAFAXINE HCL ER 75 MG Oral Capsule SR 24 Hr TAKE 1 CAPSULE(75 MG) BY MOUTH DAILY 90 capsule 0   • Omeprazole 40 MG Oral Capsule Delayed Rele mg total) by mouth daily. (Patient not taking: Reported on 2/24/2020 ) 30 tablet 1   • Cyclobenzaprine HCl 5 MG Oral Tab Take 1 tablet (5 mg total) by mouth nightly as needed for Muscle spasms.  (Patient not taking: Reported on 2/24/2020 ) 5 tablet 0   • LO Alcohol use: Yes      Alcohol/week: 1.0 standard drinks      Types: 1 Glasses of wine per week      Comment: Occasionally wine    Drug use: Yes      Types: Cannabis      Comment: occ   35 years ago she quit smoking. Works in [employer].    , 3 boy rashes  CVS: RRR, no murmurs  RS: CTAB, no crackles, no rhonchi  ABD: Soft Non tender, no HSM felt, BS positive  Joint exam:   Mild tender points in shoulders, neck,  and hips, and knees and elbows - right knee and b/l elbow worse 14/18 tender points.  - be TRANSFERASE      7-50 U/L  205 (H)   RAINE SCREEN      Negative  Negative   Anti-Holcomb/RNP Antibody      Negative  Negative   Vitamin D, 25OH, Total        28.3   FERRITIN       ng/mL 62    Anti-Smooth Muscle Antibody      <20 <20    Anti-Mitochondrial eGFR NON-AFR.  AMERICAN      >=60 57 (L)  73   eGFR       >=60 66  84   ALT (SGPT)      13 - 56 U/L   36   AST (SGOT)      15 - 37 U/L   25   ALKALINE PHOSPHATASE      46 - 118 U/L   98   Total Bilirubin      0.1 - 2.0 mg/dL   0.4   TOTAL Straightening of cervical curve. 2. Moderate localized degenerative arthritis which has progressed     significantly since September 20, 2006. \    4/10/2017 - us/ liver  1. Normal size liver.  Mild diffuse heterogeneous increased echogenicity with sonograp without complete relief  Cont. cbd oil - - she takes it prn    - hx of using cymbalta without it helping -   - melxoicam as needed. - declines low dose naltrexone.   -cont. flexeril 5-10mg at night. - tried amitiprtyline in the past - cont.  10mg at ni

## 2020-02-24 NOTE — PATIENT INSTRUCTIONS
1. . hydroxychrouqine 200mg - once ad ay   2. . Cyclobenzaprine 5-10mg at night - to take as needed. 3. .amitriptyline 10 at night  -   4. Return to clinic in 6 months. 5. Cont. Physical therapy  - considier for left knee if pain continues   6.  Eye exa

## 2020-03-02 RX ORDER — VENLAFAXINE HYDROCHLORIDE 150 MG/1
150 CAPSULE, EXTENDED RELEASE ORAL DAILY
Qty: 90 CAPSULE | Refills: 1 | Status: SHIPPED | OUTPATIENT
Start: 2020-03-02 | End: 2020-09-01

## 2020-03-21 ENCOUNTER — PATIENT MESSAGE (OUTPATIENT)
Dept: INTERNAL MEDICINE CLINIC | Facility: CLINIC | Age: 55
End: 2020-03-21

## 2020-03-23 RX ORDER — BLOOD-GLUCOSE METER
KIT MISCELLANEOUS
Qty: 100 STRIP | Refills: 0 | Status: SHIPPED | OUTPATIENT
Start: 2020-03-23 | End: 2021-03-23

## 2020-03-23 NOTE — TELEPHONE ENCOUNTER
From: Sanchez Anthony  To: Samanta Ch MD  Sent: 3/21/2020 9:59 AM CDT  Subject: Referral Request    Good morning, I am out of the lancets for my blood sugar lesley. It is from my doctor that retired. Can I get [place] from you?  I have the freestyle life

## 2020-03-24 ENCOUNTER — PATIENT MESSAGE (OUTPATIENT)
Dept: INTERNAL MEDICINE CLINIC | Facility: CLINIC | Age: 55
End: 2020-03-24

## 2020-03-24 RX ORDER — LANCETS 28 GAUGE
1 EACH MISCELLANEOUS 2 TIMES DAILY
Qty: 1 BOX | Refills: 0 | Status: SHIPPED | OUTPATIENT
Start: 2020-03-24 | End: 2021-12-16 | Stop reason: ALTCHOICE

## 2020-03-24 NOTE — TELEPHONE ENCOUNTER
Eldon Mcelroy, [place] 3/24/2020 9:57 AM CDT      ----- Message -----  From: Maile Loya  Sent: 3/24/2020 9:52 AM CDT  To: Carlos Eduardo Hernández Clinical Staff  Subject: Prescription Question     I am so sorry I now need the needles.  I had to cancel my appointment I

## 2020-03-27 ENCOUNTER — TELEPHONE (OUTPATIENT)
Dept: RHEUMATOLOGY | Facility: CLINIC | Age: 55
End: 2020-03-27

## 2020-03-27 PROCEDURE — 99212 OFFICE O/P EST SF 10 MIN: CPT | Performed by: INTERNAL MEDICINE

## 2020-03-27 RX ORDER — PREDNISONE 10 MG/1
TABLET ORAL
Qty: 21 TABLET | Refills: 0 | Status: SHIPPED | OUTPATIENT
Start: 2020-03-27 | End: 2021-06-02 | Stop reason: ALTCHOICE

## 2020-03-27 NOTE — TELEPHONE ENCOUNTER
Virtual/Telephone Check-In    Armando Iwona verbally consents a Virtual/Telephone Check-In service on 03/27/20. Patient understands and accepts financial responsibility for any deductible, co-insurance and/or co-pays associated with this service.     D legs hurt. She uses massage therapy. sshe feels they are swollen. She has occl left sided neck pain. She ahs some lower back pain - she had hx of lower back sx. In 12/2009.       4/2/2019  Her hips and knee are bad. For the most part , she feels ok.  Bu Oral Tab Take 100 mg by mouth daily. 1   • amLODIPine Besylate 10 MG Oral Tab Take 10 mg by mouth daily. • Nabumetone 500 MG Oral Tab Take 1 tablet (500 mg total) by mouth 2 (two) times daily as needed for Pain. 60 tablet 1   • hydrochlorothiazide 12. MG Oral Tablet Dispersible Take 1 tablet (0.25 mg total) by mouth 2 (two) times daily as needed. 20 tablet 0   • LORazepam 1 MG Oral Tab TK 1 T PO as neeeded     • Nabumetone 750 MG Oral Tab Take 1 tablet (750 mg total) by mouth 2 (two) times daily.  (Patie 1991        Years since quittin.2      Smokeless tobacco: Never Used    Alcohol use:  Yes      Alcohol/week: 1.0 standard drinks      Types: 1 Glasses of wine per week      Comment: Occasionally wine    Drug use: Yes      Types: Cannabis      Comm cysts - meloxicma prn info given  5/2017 -  u/s of right knee - poplitleat area was normal  - if pain increases she can get this. dicofenac gel 1%. - didn't help her. Left knee pain - intermittent - better with PT   6.  Side effecst with meds  N/v with

## 2020-04-05 ENCOUNTER — PATIENT MESSAGE (OUTPATIENT)
Dept: RHEUMATOLOGY | Facility: CLINIC | Age: 55
End: 2020-04-05

## 2020-04-06 NOTE — TELEPHONE ENCOUNTER
From: Romain Del Real  To: Dalton Penny MD  Sent: 4/5/2020 10:24 AM CDT  Subject: Non-Urgent Merry Girishpam Love, I finished the predizone two days ago. Did not help. Anything you can give to help with the pain?  Neck and shoulders sore to

## 2020-04-06 NOTE — TELEPHONE ENCOUNTER
Please see pt's update below. She had a telephonic appt 3/27/2020. Additional questions sent to pt via [employer] message.

## 2020-05-07 RX ORDER — PHENTERMINE HYDROCHLORIDE 15 MG/1
CAPSULE ORAL
Qty: 30 CAPSULE | Refills: 0 | OUTPATIENT
Start: 2020-05-07

## 2020-05-08 ENCOUNTER — PATIENT MESSAGE (OUTPATIENT)
Dept: INTERNAL MEDICINE CLINIC | Facility: CLINIC | Age: 55
End: 2020-05-08

## 2020-05-08 NOTE — TELEPHONE ENCOUNTER
----- Message from Cookie Marsh sent at 5/8/2020  8:48 AM CDT -----  Regarding: Other  Contact: [phone]  I received a message about my my meds. I did not know you were taking appointments.  Can I schedule a phone appointment for my weight loss pl

## 2020-05-12 ENCOUNTER — VIRTUAL PHONE E/M (OUTPATIENT)
Dept: INTERNAL MEDICINE CLINIC | Facility: CLINIC | Age: 55
End: 2020-05-12
Payer: COMMERCIAL

## 2020-05-12 DIAGNOSIS — Z51.81 THERAPEUTIC DRUG MONITORING: ICD-10-CM

## 2020-05-12 DIAGNOSIS — Z71.3 DIETARY COUNSELING AND SURVEILLANCE: ICD-10-CM

## 2020-05-12 DIAGNOSIS — R73.03 PREDIABETES: ICD-10-CM

## 2020-05-12 DIAGNOSIS — I10 ESSENTIAL HYPERTENSION: Primary | ICD-10-CM

## 2020-05-12 DIAGNOSIS — E78.5 HYPERLIPIDEMIA, UNSPECIFIED HYPERLIPIDEMIA TYPE: ICD-10-CM

## 2020-05-12 DIAGNOSIS — E66.01 CLASS 2 SEVERE OBESITY WITH SERIOUS COMORBIDITY AND BODY MASS INDEX (BMI) OF 35.0 TO 35.9 IN ADULT, UNSPECIFIED OBESITY TYPE (HCC): ICD-10-CM

## 2020-05-12 PROCEDURE — 99401 PREV MED CNSL INDIV APPRX 15: CPT | Performed by: INTERNAL MEDICINE

## 2020-05-12 PROCEDURE — 99214 OFFICE O/P EST MOD 30 MIN: CPT | Performed by: INTERNAL MEDICINE

## 2020-05-12 RX ORDER — LOSARTAN POTASSIUM AND HYDROCHLOROTHIAZIDE 25; 100 MG/1; MG/1
1 TABLET ORAL DAILY
Qty: 90 TABLET | Refills: 1 | Status: SHIPPED | OUTPATIENT
Start: 2020-05-12 | End: 2020-12-15

## 2020-05-12 NOTE — PROGRESS NOTES
Virtual Telephone Check-In    Susu Bernstein verbally consents to a Virtual/Telephone Check-In visit on 05/12/20. Patient understands and accepts financial responsibility for any deductible, co-insurance and/or co-pays associated with this service. stress eating   Side effect of medication: no     Lives with  and son       HTn   On hctz and losartan   Has a BP machine at home   Bp has been elevated       Prediabetes on metformin   Taking it only once per day   Self stopped victoza     Current Tablet 24 Hr TAKE 1 TABLET(750 MG) BY MOUTH TWICE DAILY WITH MEALS 120 tablet 0   • VENLAFAXINE HCL ER 75 MG Oral Capsule SR 24 Hr TAKE 1 CAPSULE(75 MG) BY MOUTH DAILY 90 capsule 0   • Omeprazole 40 MG Oral Capsule Delayed Release Take twice daily     • In Surgery (due to spinal stenosis/spondylosis).    • OTHER SURGICAL HISTORY Right     ankle surgery        Social History:  Social History    Tobacco Use      Smoking status: Former Smoker        Packs/day: 0.00        Types: Cigarettes        Quit date: 1/19 hypertension  (primary encounter diagnosis)  Plan: Losartan Potassium-HCTZ 100-25 MG Oral Tab    (E66.01,  Z68.35) Class 2 severe obesity with serious comorbidity and body mass index (BMI) of 35.0 to 35.9 in adult, unspecified obesity type [facility])  Plan: Los Reviewed:  1. Nutrition and the importance of regular protein intake  2. Labs ordered:   no   3. Hidden CHO/carbohydrate sources  4. Alcohol as possible source of hidden/amnesia calories and its effects  5.  Importance of physical activity and reducin performed. Every conscious effort was taken to allow for sufficient and adequate time. This billing was spent on reviewing labs, medications, radiology tests and decision making.   Appropriate medical decision-making and tests are ordered as detailed in

## 2020-05-12 NOTE — PATIENT INSTRUCTIONS
• Working at home can make it tough to keep your diet and nutrition in check. • Tips that can help you keep your weight in control while staying or working from home:    • Schedule a time for your meals: just as you do during your regular office hours.

## 2020-06-18 ENCOUNTER — PATIENT MESSAGE (OUTPATIENT)
Dept: INTERNAL MEDICINE CLINIC | Facility: CLINIC | Age: 55
End: 2020-06-18

## 2020-06-18 DIAGNOSIS — M35.1 MIXED CONNECTIVE TISSUE DISEASE (HCC): ICD-10-CM

## 2020-06-18 DIAGNOSIS — K31.9 GASTROPATHY: ICD-10-CM

## 2020-06-18 DIAGNOSIS — E78.5 HYPERLIPIDEMIA, UNSPECIFIED HYPERLIPIDEMIA TYPE: ICD-10-CM

## 2020-06-18 DIAGNOSIS — E66.01 CLASS 2 SEVERE OBESITY WITH SERIOUS COMORBIDITY AND BODY MASS INDEX (BMI) OF 35.0 TO 35.9 IN ADULT, UNSPECIFIED OBESITY TYPE (HCC): ICD-10-CM

## 2020-06-18 DIAGNOSIS — B37.0 ORAL THRUSH: ICD-10-CM

## 2020-06-18 DIAGNOSIS — R73.09 BLOOD GLUCOSE ABNORMAL: ICD-10-CM

## 2020-06-18 DIAGNOSIS — E88.81 METABOLIC SYNDROME: ICD-10-CM

## 2020-06-18 DIAGNOSIS — R73.03 PREDIABETES: ICD-10-CM

## 2020-06-18 DIAGNOSIS — N39.0 RECURRENT UTI: ICD-10-CM

## 2020-06-18 DIAGNOSIS — R63.5 ABNORMAL WEIGHT GAIN: ICD-10-CM

## 2020-06-18 DIAGNOSIS — I10 ESSENTIAL HYPERTENSION: ICD-10-CM

## 2020-06-18 RX ORDER — METFORMIN HYDROCHLORIDE 750 MG/1
750 TABLET, EXTENDED RELEASE ORAL 2 TIMES DAILY WITH MEALS
Qty: 120 TABLET | Refills: 0 | Status: SHIPPED | OUTPATIENT
Start: 2020-06-18 | End: 2020-10-24

## 2020-06-18 NOTE — TELEPHONE ENCOUNTER
----- Message from Zoraida Marsh sent at 6/18/2020 10:20 AM CDT -----  Regarding: Referral Request  Contact: [phone]  I the following refills victoza and the kareen. Also when should I be seen again?

## 2020-07-07 NOTE — TELEPHONE ENCOUNTER
From: Susu Bernstein  Sent: 7/6/2020 8:12 PM CDT  To: Carlos Eduardo 4 Clinical Staff  Subject: RE: refills    Hi Natali Champagne, I can't find the order on my chart, user error I'm sure can you directly.    ----- Message -----  From: Mehdi Martin: 6/18/20, 11:17 AM

## 2020-07-07 NOTE — TELEPHONE ENCOUNTER
Regarding: RE: refills  Contact: [phone]  ----- Message from Marika Hay, Iam6 Bowdon Alma sent at 7/7/2020  8:18 AM CDT -----       ----- Message from Eliseo Ambrosio to Nancy Soto sent at 7/6/2020  8:12 PM -----   Levy Herndon, I can't find the order o

## 2020-07-15 ENCOUNTER — OFFICE VISIT (OUTPATIENT)
Dept: INTERNAL MEDICINE CLINIC | Facility: CLINIC | Age: 55
End: 2020-07-15
Payer: COMMERCIAL

## 2020-07-15 VITALS
SYSTOLIC BLOOD PRESSURE: 126 MMHG | HEIGHT: 62 IN | DIASTOLIC BLOOD PRESSURE: 80 MMHG | TEMPERATURE: 98 F | BODY MASS INDEX: 35.08 KG/M2 | WEIGHT: 190.63 LBS | HEART RATE: 110 BPM

## 2020-07-15 DIAGNOSIS — I10 ESSENTIAL HYPERTENSION: Primary | ICD-10-CM

## 2020-07-15 DIAGNOSIS — E88.81 METABOLIC SYNDROME: ICD-10-CM

## 2020-07-15 DIAGNOSIS — M25.50 POLYARTHRALGIA: ICD-10-CM

## 2020-07-15 DIAGNOSIS — R73.03 PREDIABETES: ICD-10-CM

## 2020-07-15 DIAGNOSIS — R00.0 TACHYCARDIA: ICD-10-CM

## 2020-07-15 DIAGNOSIS — M35.1 MIXED CONNECTIVE TISSUE DISEASE (HCC): ICD-10-CM

## 2020-07-15 DIAGNOSIS — E66.01 CLASS 2 SEVERE OBESITY WITH SERIOUS COMORBIDITY AND BODY MASS INDEX (BMI) OF 35.0 TO 35.9 IN ADULT, UNSPECIFIED OBESITY TYPE (HCC): ICD-10-CM

## 2020-07-15 LAB
ALBUMIN SERPL-MCNC: 4.4 G/DL
ALBUMIN SERPL-MCNC: 4.4 G/DL (ref 3.4–5)
ALBUMIN/GLOB SERPL: 1 {RATIO}
ALBUMIN/GLOB SERPL: 1 {RATIO} (ref 1–2)
ALP LIVER SERPL-CCNC: 72 U/L (ref 46–118)
ALP SERPL-CCNC: 72 U/L
ALT SERPL-CCNC: 42 U/L (ref 13–56)
ALT SERPL-CCNC: 42 UNITS/L
ANION GAP SERPL CALC-SCNC: 6 MMOL/L
ANION GAP SERPL CALC-SCNC: 6 MMOL/L (ref 0–18)
AST SERPL-CCNC: 17 U/L (ref 15–37)
AST SERPL-CCNC: 17 UNITS/L
BILIRUB SERPL-MCNC: 0.4 MG/DL
BILIRUB SERPL-MCNC: 0.4 MG/DL (ref 0.1–2)
BUN BLD-MCNC: 20 MG/DL (ref 7–18)
BUN SERPL-MCNC: 20 MG/DL
BUN/CREAT SERPL: 23.5
BUN/CREAT SERPL: 23.5 (ref 10–20)
CALCIUM BLD-MCNC: 9.9 MG/DL (ref 8.5–10.1)
CALCIUM SERPL-MCNC: 9.9 MG/DL
CHLORIDE SERPL-SCNC: 101 MMOL/L
CHLORIDE SERPL-SCNC: 101 MMOL/L (ref 98–112)
CHOLEST SERPL-MCNC: 241 MG/DL
CHOLEST SMN-MCNC: 241 MG/DL (ref ?–200)
CO2 SERPL-SCNC: 28 MMOL/L
CO2 SERPL-SCNC: 28 MMOL/L (ref 21–32)
CREAT BLD-MCNC: 0.85 MG/DL (ref 0.55–1.02)
CREAT SERPL-MCNC: 0.85 MG/DL
CRP SERPL-MCNC: <0.29 MG/DL (ref ?–0.3)
ERYTHROCYTE [SEDIMENTATION RATE] IN BLOOD: 12 MM/HR (ref 0–30)
EST. AVERAGE GLUCOSE BLD GHB EST-MCNC: 120 MG/DL (ref 68–126)
GLOBULIN PLAS-MCNC: 4.2 G/DL (ref 2.8–4.4)
GLOBULIN SER-MCNC: 4.2 G/DL
GLUCOSE BLD-MCNC: 97 MG/DL (ref 70–99)
GLUCOSE SERPL-MCNC: 97 MG/DL
HBA1C MFR BLD HPLC: 5.8 % (ref ?–5.7)
HDLC SERPL-MCNC: 68 MG/DL
HDLC SERPL-MCNC: 68 MG/DL (ref 40–59)
LDLC SERPL CALC-MCNC: 150 MG/DL
LDLC SERPL CALC-MCNC: 150 MG/DL (ref ?–100)
LENGTH OF FAST TIME PATIENT: YES H
LENGTH OF FAST TIME PATIENT: YES H
M PROTEIN MFR SERPL ELPH: 8.6 G/DL (ref 6.4–8.2)
NONHDLC SERPL-MCNC: 173 MG/DL
NONHDLC SERPL-MCNC: 173 MG/DL (ref ?–130)
OSMOLALITY SERPL CALC.SUM OF ELEC: 283 MOSM/KG (ref 275–295)
PATIENT FASTING Y/N/NP: YES
PATIENT FASTING Y/N/NP: YES
POTASSIUM SERPL-SCNC: 4.1 MMOL/L
POTASSIUM SERPL-SCNC: 4.1 MMOL/L (ref 3.5–5.1)
PROT SERPL-MCNC: 8.6 G/DL
RHEUMATOID FACT SERPL-ACNC: <10 IU/ML (ref ?–15)
SODIUM SERPL-SCNC: 135 MMOL/L
SODIUM SERPL-SCNC: 135 MMOL/L (ref 136–145)
TRIGL SERPL-MCNC: 117 MG/DL
TRIGL SERPL-MCNC: 117 MG/DL (ref 30–149)
TSI SER-ACNC: 0.83 MIU/ML (ref 0.36–3.74)
VLDLC SERPL CALC-MCNC: 23 MG/DL
VLDLC SERPL CALC-MCNC: 23 MG/DL (ref 0–30)

## 2020-07-15 PROCEDURE — 86200 CCP ANTIBODY: CPT | Performed by: INTERNAL MEDICINE

## 2020-07-15 PROCEDURE — 86140 C-REACTIVE PROTEIN: CPT | Performed by: INTERNAL MEDICINE

## 2020-07-15 PROCEDURE — 84443 ASSAY THYROID STIM HORMONE: CPT | Performed by: INTERNAL MEDICINE

## 2020-07-15 PROCEDURE — 86225 DNA ANTIBODY NATIVE: CPT | Performed by: INTERNAL MEDICINE

## 2020-07-15 PROCEDURE — 3074F SYST BP LT 130 MM HG: CPT | Performed by: INTERNAL MEDICINE

## 2020-07-15 PROCEDURE — 83036 HEMOGLOBIN GLYCOSYLATED A1C: CPT | Performed by: INTERNAL MEDICINE

## 2020-07-15 PROCEDURE — 86039 ANTINUCLEAR ANTIBODIES (ANA): CPT | Performed by: INTERNAL MEDICINE

## 2020-07-15 PROCEDURE — 3008F BODY MASS INDEX DOCD: CPT | Performed by: INTERNAL MEDICINE

## 2020-07-15 PROCEDURE — 86235 NUCLEAR ANTIGEN ANTIBODY: CPT | Performed by: INTERNAL MEDICINE

## 2020-07-15 PROCEDURE — 85652 RBC SED RATE AUTOMATED: CPT | Performed by: INTERNAL MEDICINE

## 2020-07-15 PROCEDURE — 3079F DIAST BP 80-89 MM HG: CPT | Performed by: INTERNAL MEDICINE

## 2020-07-15 PROCEDURE — 99214 OFFICE O/P EST MOD 30 MIN: CPT | Performed by: INTERNAL MEDICINE

## 2020-07-15 PROCEDURE — 86038 ANTINUCLEAR ANTIBODIES: CPT | Performed by: INTERNAL MEDICINE

## 2020-07-15 PROCEDURE — 86431 RHEUMATOID FACTOR QUANT: CPT | Performed by: INTERNAL MEDICINE

## 2020-07-15 PROCEDURE — 80053 COMPREHEN METABOLIC PANEL: CPT | Performed by: INTERNAL MEDICINE

## 2020-07-15 PROCEDURE — 80061 LIPID PANEL: CPT | Performed by: INTERNAL MEDICINE

## 2020-07-15 RX ORDER — ERGOCALCIFEROL 1.25 MG/1
50000 CAPSULE ORAL WEEKLY
Qty: 12 CAPSULE | Refills: 1 | Status: SHIPPED | OUTPATIENT
Start: 2020-07-15 | End: 2020-12-15

## 2020-07-15 NOTE — PROGRESS NOTES
Denia Noe is a 62year old female.     Chief complaint:weight loss follow up , medication refill, therapeutic drug monitoring  Prediabetes, metabolic syndrome and HTN     HPI:   [facility] here for follow up on weight loss   Wt Readings from Last 12 En DAILY FOR 7 DAYS THEN 1.8MG DAILY 9 mL 0   • Losartan Potassium-HCTZ 100-25 MG Oral Tab Take 1 tablet by mouth daily.  90 tablet 1   • PREDNISONE 10 MG Oral Tab Take 6 tabsx 1 day, take 5 tabsx 1 day, take 4 tabsx 1 day,take 3 tabsx 1 day, take 2 tabsx 1 da Cranberry 125 MG Oral Tab Take by mouth. • Lactobacillus (PROBIOTIC ACIDOPHILUS OR) Take by mouth. • clonazePAM 0.25 MG Oral Tablet Dispersible Take 1 tablet (0.25 mg total) by mouth 2 (two) times daily as needed.  20 tablet 0   • LORazepam 1 MG Ora Father    • Stroke Maternal Grandmother         Cerebrovascular accident (Stroke)   • Heart Disease Paternal Grandmother         CAD   • Breast Cancer Maternal Aunt         76s    • Heart Disease Maternal Aunt         CAD   • Heart Disease Maternal Aunt RHEUMATOID ARTHRITIS FACTOR; Future  - CYCLIC CITRULLINATE PEP. IGG; Future  - C-REACTIVE PROTEIN; Future  - RAINE,DIRECT,REFLEX TITER + SPECIFIC ANTIBODIES;  Future  - HEMOGLOBIN A1C  - LIPID PANEL  - COMP METABOLIC PANEL (14)  - TSH W REFLEX TO FREE T4  - S FACTOR  - CYCLIC CITRULLINATE PEP. IGG  - C-REACTIVE PROTEIN  - RAINE,DIRECT,REFLEX TITER + SPECIFIC ANTIBODIES    4. Polyarthralgia  Check ESR , crp, RF and ccp   - ergocalciferol 1.25 MG (90132 UT) Oral Cap;  Take 1 capsule (50,000 Units total) by mouth onc Dispense: 12 capsule; Refill: 1  - HEMOGLOBIN A1C; Future  - LIPID PANEL; Future  - COMP METABOLIC PANEL (14); Future  - TSH W REFLEX TO FREE T4; Future  - CARDIO - INTERNAL  - SED RATE, WESTERGREN (AUTOMATED); Future  - RHEUMATOID ARTHRITIS FACTOR;  Future to do upper body at least since she is with ankle sprain   · Increase water intake     Plan:  1. Nutrition: low carb diet   2. Referral RD/nutritionist : no   3. FITTE:  ACSM recommendations (150 -200 minutes/week in active weight loss)  4.  Behavior:  Jerome

## 2020-07-16 LAB — NUCLEAR IGG TITR SER IF: POSITIVE {TITER}

## 2020-07-17 LAB
CCP IGG SERPL-ACNC: 1.1 U/ML (ref 0–6.9)
DSDNA AB TITR SER: <10 {TITER}

## 2020-07-18 LAB — ANA NUCLEOLAR TITR SER IF: 160 {TITER}

## 2020-07-21 LAB
ENA SM IGG SER QL: NEGATIVE
ENA SM+RNP AB SER QL: NEGATIVE
ENA SS-A AB SER QL IA: NEGATIVE
ENA SS-B AB SER QL IA: NEGATIVE

## 2020-07-31 ENCOUNTER — ANCILLARY PROCEDURE (OUTPATIENT)
Dept: CARDIOLOGY | Age: 55
End: 2020-07-31
Attending: INTERNAL MEDICINE

## 2020-07-31 ENCOUNTER — OFFICE VISIT (OUTPATIENT)
Dept: CARDIOLOGY | Age: 55
End: 2020-07-31

## 2020-07-31 VITALS
WEIGHT: 193 LBS | HEART RATE: 97 BPM | HEIGHT: 61 IN | BODY MASS INDEX: 36.44 KG/M2 | OXYGEN SATURATION: 99 % | DIASTOLIC BLOOD PRESSURE: 72 MMHG | SYSTOLIC BLOOD PRESSURE: 134 MMHG

## 2020-07-31 DIAGNOSIS — R00.0 TACHYCARDIA: ICD-10-CM

## 2020-07-31 DIAGNOSIS — I10 ESSENTIAL HYPERTENSION: ICD-10-CM

## 2020-07-31 DIAGNOSIS — R06.02 SHORTNESS OF BREATH: Primary | ICD-10-CM

## 2020-07-31 DIAGNOSIS — R00.2 PALPITATIONS: ICD-10-CM

## 2020-07-31 PROCEDURE — 93224 XTRNL ECG REC UP TO 48 HRS: CPT | Performed by: INTERNAL MEDICINE

## 2020-07-31 PROCEDURE — 3078F DIAST BP <80 MM HG: CPT | Performed by: INTERNAL MEDICINE

## 2020-07-31 PROCEDURE — 99214 OFFICE O/P EST MOD 30 MIN: CPT | Performed by: INTERNAL MEDICINE

## 2020-07-31 PROCEDURE — 3075F SYST BP GE 130 - 139MM HG: CPT | Performed by: INTERNAL MEDICINE

## 2020-07-31 RX ORDER — METFORMIN HYDROCHLORIDE 750 MG/1
750 TABLET, EXTENDED RELEASE ORAL 2 TIMES DAILY
COMMUNITY
Start: 2020-06-18

## 2020-07-31 RX ORDER — HYDROCHLOROTHIAZIDE 12.5 MG/1
12.5 TABLET ORAL DAILY
COMMUNITY
Start: 2020-05-07 | End: 2020-07-31

## 2020-07-31 RX ORDER — LOSARTAN POTASSIUM AND HYDROCHLOROTHIAZIDE 25; 100 MG/1; MG/1
1 TABLET ORAL DAILY
COMMUNITY

## 2020-07-31 RX ORDER — HYDROXYCHLOROQUINE SULFATE 200 MG/1
200 TABLET, FILM COATED ORAL 2 TIMES DAILY
COMMUNITY

## 2020-07-31 RX ORDER — AMITRIPTYLINE HYDROCHLORIDE 10 MG/1
10 TABLET, FILM COATED ORAL DAILY
COMMUNITY
Start: 2020-05-07

## 2020-07-31 RX ORDER — NABUMETONE 500 MG/1
500 TABLET, FILM COATED ORAL DAILY
COMMUNITY

## 2020-07-31 RX ORDER — RIBOFLAVIN (VITAMIN B2) 100 MG
100 TABLET ORAL DAILY
COMMUNITY

## 2020-07-31 RX ORDER — PANTOPRAZOLE SODIUM 40 MG/1
40 TABLET, DELAYED RELEASE ORAL DAILY
COMMUNITY
Start: 2019-09-22 | End: 2020-07-31

## 2020-07-31 RX ORDER — CRANBERRY FRUIT EXTRACT 200 MG
CAPSULE ORAL
COMMUNITY

## 2020-07-31 RX ORDER — AMLODIPINE BESYLATE 10 MG/1
10 TABLET ORAL DAILY
COMMUNITY
Start: 2020-01-01 | End: 2020-07-31

## 2020-07-31 RX ORDER — VENLAFAXINE HYDROCHLORIDE 150 MG/1
150 CAPSULE, EXTENDED RELEASE ORAL DAILY
COMMUNITY
Start: 2020-05-09

## 2020-07-31 RX ORDER — LOSARTAN POTASSIUM AND HYDROCHLOROTHIAZIDE 12.5; 5 MG/1; MG/1
1 TABLET ORAL DAILY
COMMUNITY
Start: 2016-03-09 | End: 2020-07-31

## 2020-07-31 RX ORDER — CYCLOBENZAPRINE HCL 5 MG
5 TABLET ORAL DAILY
COMMUNITY

## 2020-07-31 SDOH — HEALTH STABILITY: MENTAL HEALTH: HOW MANY STANDARD DRINKS CONTAINING ALCOHOL DO YOU HAVE ON A TYPICAL DAY?: 1 OR 2

## 2020-07-31 SDOH — HEALTH STABILITY: MENTAL HEALTH: HOW OFTEN DO YOU HAVE A DRINK CONTAINING ALCOHOL?: MONTHLY OR LESS

## 2020-07-31 ASSESSMENT — PATIENT HEALTH QUESTIONNAIRE - PHQ9
CLINICAL INTERPRETATION OF PHQ2 SCORE: NO FURTHER SCREENING NEEDED
CLINICAL INTERPRETATION OF PHQ9 SCORE: NO FURTHER SCREENING NEEDED
SUM OF ALL RESPONSES TO PHQ9 QUESTIONS 1 AND 2: 0
SUM OF ALL RESPONSES TO PHQ9 QUESTIONS 1 AND 2: 0
1. LITTLE INTEREST OR PLEASURE IN DOING THINGS: NOT AT ALL
2. FEELING DOWN, DEPRESSED OR HOPELESS: NOT AT ALL

## 2020-08-03 DIAGNOSIS — R00.2 PALPITATIONS: ICD-10-CM

## 2020-08-03 DIAGNOSIS — R00.0 TACHYCARDIA: ICD-10-CM

## 2020-08-03 PROCEDURE — 93000 ELECTROCARDIOGRAM COMPLETE: CPT | Performed by: INTERNAL MEDICINE

## 2020-08-04 ENCOUNTER — TELEPHONE (OUTPATIENT)
Dept: INTERNAL MEDICINE CLINIC | Facility: CLINIC | Age: 55
End: 2020-08-04

## 2020-08-04 NOTE — TELEPHONE ENCOUNTER
Earache started on Saturday: Felt like a pimple-like sore in inner ear. Monday am, ear started to really hurt: Throbbing, muffled hearing, pain to touch, popping with severe pain upon blowing her nose, feels clogged.  Spouse cannot see as much into righ

## 2020-08-04 NOTE — TELEPHONE ENCOUNTER
Patient notified Augmentin sent to pharmacy. Patient mentioned she gets thrush every time she takes Abx and asked what it is called. Informed her the last script was in Feb 2020 for Diflucan.   Patient stated she had the pills in her possesion and will st

## 2020-08-05 ENCOUNTER — OFFICE VISIT (OUTPATIENT)
Dept: INTERNAL MEDICINE CLINIC | Facility: CLINIC | Age: 55
End: 2020-08-05
Payer: COMMERCIAL

## 2020-08-05 VITALS
HEART RATE: 99 BPM | WEIGHT: 192.81 LBS | OXYGEN SATURATION: 98 % | DIASTOLIC BLOOD PRESSURE: 82 MMHG | SYSTOLIC BLOOD PRESSURE: 138 MMHG | HEIGHT: 62 IN | BODY MASS INDEX: 35.48 KG/M2

## 2020-08-05 DIAGNOSIS — E88.81 METABOLIC SYNDROME: ICD-10-CM

## 2020-08-05 DIAGNOSIS — B37.0 ORAL THRUSH: ICD-10-CM

## 2020-08-05 DIAGNOSIS — M35.1 MIXED CONNECTIVE TISSUE DISEASE (HCC): ICD-10-CM

## 2020-08-05 DIAGNOSIS — E66.01 CLASS 2 SEVERE OBESITY WITH SERIOUS COMORBIDITY AND BODY MASS INDEX (BMI) OF 35.0 TO 35.9 IN ADULT, UNSPECIFIED OBESITY TYPE (HCC): ICD-10-CM

## 2020-08-05 DIAGNOSIS — I10 ESSENTIAL HYPERTENSION: ICD-10-CM

## 2020-08-05 DIAGNOSIS — K31.9 GASTROPATHY: ICD-10-CM

## 2020-08-05 DIAGNOSIS — R73.09 BLOOD GLUCOSE ABNORMAL: ICD-10-CM

## 2020-08-05 DIAGNOSIS — H60.501 ACUTE OTITIS EXTERNA OF RIGHT EAR, UNSPECIFIED TYPE: Primary | ICD-10-CM

## 2020-08-05 PROCEDURE — 99213 OFFICE O/P EST LOW 20 MIN: CPT | Performed by: INTERNAL MEDICINE

## 2020-08-05 PROCEDURE — 3075F SYST BP GE 130 - 139MM HG: CPT | Performed by: INTERNAL MEDICINE

## 2020-08-05 PROCEDURE — 3079F DIAST BP 80-89 MM HG: CPT | Performed by: INTERNAL MEDICINE

## 2020-08-05 PROCEDURE — 3008F BODY MASS INDEX DOCD: CPT | Performed by: INTERNAL MEDICINE

## 2020-08-05 RX ORDER — OFLOXACIN 3 MG/ML
10 SOLUTION AURICULAR (OTIC) DAILY
Qty: 1 BOTTLE | Refills: 0 | Status: SHIPPED | OUTPATIENT
Start: 2020-08-05 | End: 2021-01-27

## 2020-08-05 NOTE — PROGRESS NOTES
Kelli Mtz is a 62year old female.     Chief complaint: right ear pain     HPI:   62year old female with PMH as listed below here for right ear pain     Throbbing pain   Yesterday   Pain is 3/10   At night is 8/10   Yesterday took 3 advil   Feels a EVERY NIGHT AT BEDTIME AS NEEDED 180 tablet 0   • cyclobenzaprine 5 MG Oral Tab 5-10mg at night 60 tablet 3   • Phentermine HCl 15 MG Oral Cap Take 1 capsule (15 mg total) by mouth every morning.  (Patient not taking: Reported on 12/23/2019 ) 30 capsule 0 spondylosis. Management:  surgery in 12/2009.    • Visual impairment      Past Surgical History:   Procedure Laterality Date   • BACK SURGERY     • COLONOSCOPY  2014   • ESOPHAGOGASTRODUODENOSCOPY (EGD) N/A 1/18/2018    Performed by Lalitha Sanders 192 lb 12.8 oz (87.5 kg)   LMP 09/06/2013 (Approximate)   SpO2 98%   BMI 35.26 kg/m²   GENERAL: well developed, well nourished,in no apparent distress  SKIN: no rashes,no suspicious lesions  HEENT: atraumatic, normocephalic,ears: right ear with inflammed s

## 2020-08-07 ENCOUNTER — TELEPHONE (OUTPATIENT)
Dept: CARDIOLOGY | Age: 55
End: 2020-08-07

## 2020-08-14 RX ORDER — METOPROLOL SUCCINATE 25 MG/1
25 TABLET, EXTENDED RELEASE ORAL DAILY
Qty: 30 TABLET | Refills: 5 | Status: SHIPPED | OUTPATIENT
Start: 2020-08-14 | End: 2021-02-01 | Stop reason: ALTCHOICE

## 2020-09-01 RX ORDER — VENLAFAXINE HYDROCHLORIDE 150 MG/1
CAPSULE, EXTENDED RELEASE ORAL
Qty: 90 CAPSULE | Refills: 1 | Status: SHIPPED | OUTPATIENT
Start: 2020-09-01 | End: 2021-04-10

## 2020-09-02 RX ORDER — HYDROXYCHLOROQUINE SULFATE 200 MG/1
TABLET, FILM COATED ORAL
Qty: 90 TABLET | Refills: 1 | Status: SHIPPED | OUTPATIENT
Start: 2020-09-02 | End: 2021-01-19

## 2020-09-02 NOTE — TELEPHONE ENCOUNTER
Patient had eye exam 5/2020. Requested report from [facility] be faxed to [phone]. Patient is asking you to look at her RAINE results from 7/2020 as she would like to know if she should increase her Plaquenil to 2 tabs daily.  I explained to her

## 2020-09-02 NOTE — TELEPHONE ENCOUNTER
Requested Prescriptions     Pending Prescriptions Disp Refills   • Hydroxychloroquine Sulfate 200 MG Oral Tab 90 tablet 3     Sig: TAKE 1 TABLET(200 MG) BY MOUTH  DAILY     Last eye exam: 4/17/19    LF:6/25/19 #90 TAB W/ 3 RF   LOV: 2/24/20  Future Appoint

## 2020-09-03 ENCOUNTER — MED REC SCAN ONLY (OUTPATIENT)
Dept: RHEUMATOLOGY | Facility: CLINIC | Age: 55
End: 2020-09-03

## 2020-10-24 ENCOUNTER — PATIENT MESSAGE (OUTPATIENT)
Dept: INTERNAL MEDICINE CLINIC | Facility: CLINIC | Age: 55
End: 2020-10-24

## 2020-10-24 DIAGNOSIS — E88.81 METABOLIC SYNDROME: ICD-10-CM

## 2020-10-24 DIAGNOSIS — E66.01 CLASS 2 SEVERE OBESITY WITH SERIOUS COMORBIDITY AND BODY MASS INDEX (BMI) OF 35.0 TO 35.9 IN ADULT, UNSPECIFIED OBESITY TYPE (HCC): ICD-10-CM

## 2020-10-24 DIAGNOSIS — R73.03 PREDIABETES: ICD-10-CM

## 2020-10-24 DIAGNOSIS — I10 ESSENTIAL HYPERTENSION: ICD-10-CM

## 2020-10-24 DIAGNOSIS — R63.5 ABNORMAL WEIGHT GAIN: ICD-10-CM

## 2020-10-24 DIAGNOSIS — E78.5 HYPERLIPIDEMIA, UNSPECIFIED HYPERLIPIDEMIA TYPE: ICD-10-CM

## 2020-10-24 DIAGNOSIS — N39.0 RECURRENT UTI: ICD-10-CM

## 2020-10-24 DIAGNOSIS — R39.9 UTI SYMPTOMS: Primary | ICD-10-CM

## 2020-10-24 RX ORDER — METFORMIN HYDROCHLORIDE 750 MG/1
TABLET, EXTENDED RELEASE ORAL
Qty: 120 TABLET | Refills: 0 | Status: SHIPPED | OUTPATIENT
Start: 2020-10-24 | End: 2021-03-12

## 2020-10-24 NOTE — TELEPHONE ENCOUNTER
LOV: 2/24/20  Future Appointments   Date Time Provider Erin Rosario   1/13/2021  9:00 AM MD OCHOA Hartmann [address]     Labs:     Component      Latest Ref Rng & Units 7/15/2020   BUN      7 - 18 mg/dL 20 (H)   CREATININE      0.55

## 2020-10-26 RX ORDER — CYCLOBENZAPRINE HCL 5 MG
TABLET ORAL
Qty: 60 TABLET | Refills: 3 | Status: SHIPPED | OUTPATIENT
Start: 2020-10-26 | End: 2021-01-19

## 2020-10-28 ENCOUNTER — LAB ENCOUNTER (OUTPATIENT)
Dept: LAB | Facility: HOSPITAL | Age: 55
End: 2020-10-28
Attending: INTERNAL MEDICINE
Payer: COMMERCIAL

## 2020-10-28 DIAGNOSIS — R39.9 UTI SYMPTOMS: ICD-10-CM

## 2020-10-28 PROCEDURE — 87186 SC STD MICRODIL/AGAR DIL: CPT

## 2020-10-28 PROCEDURE — 87086 URINE CULTURE/COLONY COUNT: CPT

## 2020-10-28 PROCEDURE — 87088 URINE BACTERIA CULTURE: CPT

## 2020-10-28 PROCEDURE — 81001 URINALYSIS AUTO W/SCOPE: CPT

## 2020-10-28 NOTE — TELEPHONE ENCOUNTER
Junie Hyde 10/26/2020 8:29 AM CDT      ----- Message -----  From: Eliezer Baker  Sent: 10/24/2020 3:46 PM CDT  To: Carlos Eduardo Hernández Clinical Staff  Subject: Other     I believe I have a bladder infection . I have also had a cold.  Can I go to [facility]

## 2020-10-29 RX ORDER — SULFAMETHOXAZOLE AND TRIMETHOPRIM 800; 160 MG/1; MG/1
1 TABLET ORAL 2 TIMES DAILY
Qty: 14 TABLET | Refills: 0 | Status: SHIPPED | OUTPATIENT
Start: 2020-10-29 | End: 2020-11-05

## 2020-11-03 DIAGNOSIS — N39.0 RECURRENT UTI: ICD-10-CM

## 2020-11-03 DIAGNOSIS — B37.0 ORAL CANDIDIASIS: Primary | ICD-10-CM

## 2020-11-03 RX ORDER — ITRACONAZOLE 10 MG/ML
100 SOLUTION ORAL 2 TIMES DAILY
Qty: 280 ML | Refills: 0 | Status: SHIPPED | OUTPATIENT
Start: 2020-11-03 | End: 2020-11-17

## 2020-11-18 ENCOUNTER — LAB ENCOUNTER (OUTPATIENT)
Dept: LAB | Facility: HOSPITAL | Age: 55
End: 2020-11-18
Attending: INTERNAL MEDICINE
Payer: COMMERCIAL

## 2020-11-18 ENCOUNTER — TELEPHONE (OUTPATIENT)
Dept: INTERNAL MEDICINE CLINIC | Facility: CLINIC | Age: 55
End: 2020-11-18

## 2020-11-18 DIAGNOSIS — N39.0 URINARY TRACT INFECTION WITHOUT HEMATURIA, SITE UNSPECIFIED: Primary | ICD-10-CM

## 2020-11-18 DIAGNOSIS — N39.0 URINARY TRACT INFECTION WITHOUT HEMATURIA, SITE UNSPECIFIED: ICD-10-CM

## 2020-11-18 LAB
ALBUMIN SERPL-MCNC: 4.4 G/DL
ALBUMIN/GLOB SERPL: 1.2 {RATIO}
ALP SERPL-CCNC: 59 U/L
ALT SERPL-CCNC: 33 UNITS/L
ANION GAP SERPL CALC-SCNC: 4 MMOL/L
AST SERPL-CCNC: 18 UNITS/L
BILIRUB SERPL-MCNC: 0.3 MG/DL
BUN SERPL-MCNC: 17 MG/DL
BUN/CREAT SERPL: 17
CALCIUM SERPL-MCNC: 10.3 MG/DL
CHLORIDE SERPL-SCNC: 101 MMOL/L
CO2 SERPL-SCNC: 32 MMOL/L
CREAT SERPL-MCNC: 1 MG/DL
GLOBULIN SER-MCNC: 3.7 G/DL
GLUCOSE SERPL-MCNC: 93 MG/DL
HCT VFR BLD CALC: 37 %
HGB BLD-MCNC: 12.4 G/DL
PLATELET # BLD: 319 K/MCL
POTASSIUM SERPL-SCNC: 4.1 MMOL/L
PROT SERPL-MCNC: 8.1 G/DL
RBC # BLD: 3.96 10*6/UL
SODIUM SERPL-SCNC: 137 MMOL/L
WBC # BLD: 8.2 K/MCL

## 2020-11-18 PROCEDURE — 85025 COMPLETE CBC W/AUTO DIFF WBC: CPT

## 2020-11-18 PROCEDURE — 36415 COLL VENOUS BLD VENIPUNCTURE: CPT

## 2020-11-18 PROCEDURE — 80053 COMPREHEN METABOLIC PANEL: CPT

## 2020-11-18 PROCEDURE — 81001 URINALYSIS AUTO W/SCOPE: CPT

## 2020-11-18 PROCEDURE — 87086 URINE CULTURE/COLONY COUNT: CPT

## 2020-11-18 NOTE — TELEPHONE ENCOUNTER
Dawit Postal with [facility],  office called,  would like [facility] to call her cell to discuss visit:      [address]

## 2020-11-20 ENCOUNTER — PATIENT MESSAGE (OUTPATIENT)
Dept: INTERNAL MEDICINE CLINIC | Facility: CLINIC | Age: 55
End: 2020-11-20

## 2020-11-23 NOTE — TELEPHONE ENCOUNTER
Jacinto Hawley 11/21/2020 8:01 AM CST      ----- Message -----  From: Kimberly Perkins  Sent: 11/20/2020 2:27 PM CST  To: Carlos Eduardo Hernández Clinical Staff  Subject: Prescription Question     Dr Yuly Granger office called and told me to contract you about estrogen cream?

## 2020-12-15 DIAGNOSIS — E88.81 METABOLIC SYNDROME: ICD-10-CM

## 2020-12-15 DIAGNOSIS — E66.01 CLASS 2 SEVERE OBESITY WITH SERIOUS COMORBIDITY AND BODY MASS INDEX (BMI) OF 35.0 TO 35.9 IN ADULT, UNSPECIFIED OBESITY TYPE (HCC): ICD-10-CM

## 2020-12-15 DIAGNOSIS — R00.0 TACHYCARDIA: ICD-10-CM

## 2020-12-15 DIAGNOSIS — R73.03 PREDIABETES: ICD-10-CM

## 2020-12-15 DIAGNOSIS — M25.50 POLYARTHRALGIA: ICD-10-CM

## 2020-12-15 DIAGNOSIS — Z71.3 DIETARY COUNSELING AND SURVEILLANCE: ICD-10-CM

## 2020-12-15 DIAGNOSIS — M35.1 MIXED CONNECTIVE TISSUE DISEASE (HCC): ICD-10-CM

## 2020-12-15 DIAGNOSIS — E78.5 HYPERLIPIDEMIA, UNSPECIFIED HYPERLIPIDEMIA TYPE: ICD-10-CM

## 2020-12-15 DIAGNOSIS — I10 ESSENTIAL HYPERTENSION: ICD-10-CM

## 2020-12-15 DIAGNOSIS — Z51.81 THERAPEUTIC DRUG MONITORING: ICD-10-CM

## 2020-12-15 RX ORDER — LOSARTAN POTASSIUM AND HYDROCHLOROTHIAZIDE 25; 100 MG/1; MG/1
1 TABLET ORAL DAILY
Qty: 90 TABLET | Refills: 1 | Status: SHIPPED | OUTPATIENT
Start: 2020-12-15 | End: 2021-08-09

## 2020-12-15 RX ORDER — ERGOCALCIFEROL 1.25 MG/1
CAPSULE ORAL
Qty: 12 CAPSULE | Refills: 1 | Status: SHIPPED | OUTPATIENT
Start: 2020-12-15 | End: 2021-12-16 | Stop reason: ALTCHOICE

## 2020-12-28 RX ORDER — METHENAMINE HIPPURATE 1000 MG/1
TABLET ORAL
Qty: 60 TABLET | Refills: 0 | Status: SHIPPED | OUTPATIENT
Start: 2020-12-28 | End: 2021-09-25 | Stop reason: ALTCHOICE

## 2021-01-10 ENCOUNTER — PATIENT MESSAGE (OUTPATIENT)
Dept: RHEUMATOLOGY | Facility: CLINIC | Age: 56
End: 2021-01-10

## 2021-01-11 NOTE — TELEPHONE ENCOUNTER
From: Sean Au  To: Niecy Sosa MD  Sent: 1/10/2021 8:25 PM CST  Subject: Other    I can't remember when I am supposed to be seen again. can you please let me k ow if is was supposed to be one year or six months please.

## 2021-01-11 NOTE — TELEPHONE ENCOUNTER
LOV: 2/24/20    Summary:     1. . hydroxychrouqine 200mg - once ad ay   2. . Cyclobenzaprine 5-10mg at night - to take as needed. 3. .amitriptyline 10 at night  -   4. Return to clinic in 6 months. 5.  Eye exam once a year -   6.  check mixed connective

## 2021-01-13 ENCOUNTER — OFFICE VISIT (OUTPATIENT)
Dept: INTERNAL MEDICINE CLINIC | Facility: CLINIC | Age: 56
End: 2021-01-13
Payer: COMMERCIAL

## 2021-01-13 VITALS
OXYGEN SATURATION: 97 % | DIASTOLIC BLOOD PRESSURE: 70 MMHG | BODY MASS INDEX: 37.36 KG/M2 | SYSTOLIC BLOOD PRESSURE: 138 MMHG | HEIGHT: 62 IN | WEIGHT: 203 LBS | HEART RATE: 113 BPM

## 2021-01-13 DIAGNOSIS — K76.0 NAFLD (NONALCOHOLIC FATTY LIVER DISEASE): ICD-10-CM

## 2021-01-13 DIAGNOSIS — N93.0 POSTCOITAL BLEEDING: ICD-10-CM

## 2021-01-13 DIAGNOSIS — M35.1 MIXED CONNECTIVE TISSUE DISEASE (HCC): ICD-10-CM

## 2021-01-13 DIAGNOSIS — Z00.00 ANNUAL PHYSICAL EXAM: Primary | ICD-10-CM

## 2021-01-13 DIAGNOSIS — K82.4 GALL BLADDER POLYP: ICD-10-CM

## 2021-01-13 DIAGNOSIS — E66.9 OBESITY, UNSPECIFIED CLASSIFICATION, UNSPECIFIED OBESITY TYPE, UNSPECIFIED WHETHER SERIOUS COMORBIDITY PRESENT: ICD-10-CM

## 2021-01-13 DIAGNOSIS — R53.83 FATIGUE, UNSPECIFIED TYPE: ICD-10-CM

## 2021-01-13 DIAGNOSIS — Z12.39 ENCOUNTER FOR SCREENING FOR MALIGNANT NEOPLASM OF BREAST, UNSPECIFIED SCREENING MODALITY: ICD-10-CM

## 2021-01-13 DIAGNOSIS — R73.03 PREDIABETES: ICD-10-CM

## 2021-01-13 DIAGNOSIS — I10 ESSENTIAL HYPERTENSION: ICD-10-CM

## 2021-01-13 DIAGNOSIS — E78.5 HYPERLIPIDEMIA, UNSPECIFIED HYPERLIPIDEMIA TYPE: ICD-10-CM

## 2021-01-13 DIAGNOSIS — R06.83 SNORING: ICD-10-CM

## 2021-01-13 PROCEDURE — 3078F DIAST BP <80 MM HG: CPT | Performed by: INTERNAL MEDICINE

## 2021-01-13 PROCEDURE — 90750 HZV VACC RECOMBINANT IM: CPT | Performed by: INTERNAL MEDICINE

## 2021-01-13 PROCEDURE — 90471 IMMUNIZATION ADMIN: CPT | Performed by: INTERNAL MEDICINE

## 2021-01-13 PROCEDURE — 3008F BODY MASS INDEX DOCD: CPT | Performed by: INTERNAL MEDICINE

## 2021-01-13 PROCEDURE — 99396 PREV VISIT EST AGE 40-64: CPT | Performed by: INTERNAL MEDICINE

## 2021-01-13 PROCEDURE — 3075F SYST BP GE 130 - 139MM HG: CPT | Performed by: INTERNAL MEDICINE

## 2021-01-13 RX ORDER — SEMAGLUTIDE 1.34 MG/ML
0.25 INJECTION, SOLUTION SUBCUTANEOUS
Qty: 1 PEN | Refills: 0 | Status: SHIPPED | OUTPATIENT
Start: 2021-01-13 | End: 2021-02-10

## 2021-01-13 RX ORDER — PEN NEEDLE, DIABETIC 30 GX3/16"
1 NEEDLE, DISPOSABLE MISCELLANEOUS
Qty: 30 EACH | Refills: 0 | Status: SHIPPED | OUTPATIENT
Start: 2021-01-13 | End: 2021-12-16 | Stop reason: ALTCHOICE

## 2021-01-13 NOTE — PROGRESS NOTES
Mary Judd is a 60year old female.     Chief complaint: annual physical exam       HPI:     Mary Judd is a 60year old female who presents for annual physical exam     Vaginal issues   Started 3 months   Bleeding once only with sex 2 days ag take 2 tabsx 1 day, take 1 tabsx 1 day, then off 21 tablet 0   • FreeStyle Lancets Does not apply Misc 1 lancet by Finger stick route 2 (two) times daily. Use as directed.  1 Box 0   • Glucose Blood (FREESTYLE LITE TEST) In Vitro Strip Test  strip 0 • Abnormal vaginal bleeding     polypectomy   • Fibromyalgia    • Gastropathy 1/18/2018   • Hemorrhoids 2014   • High blood pressure    • High cholesterol    • MCTD (mixed connective tissue disease) (HCC)    • Mood disorder ([facility]    • Other ill-defined c Fibromyalgia     Mixed connective tissue disease ([address]     Prediabetes     Allergic rhinitis     Recurrent UTI     Body mass index (bmi) 34.0-34.9, adult     Dietary counseling and surveillance      REVIEW OF SYSTEMS:   A comprehensive 10 point review of s PANEL (14); Future    2. Gall bladder polyp  Us liver follow up   - JOSHUA SCREENING BILAT (CPT=77067); Future  - Semaglutide,0.25 or 0.5MG/DOS, (OZEMPIC, 0.25 OR 0.5 MG/DOSE,) 2 MG/1.5ML Subcutaneous Solution Pen-injector;  Inject 0.25 mg into the skin every into the skin every 7 days for 28 days. Dispense: 1 pen; Refill: 0  - Insulin Pen Needle (PEN NEEDLES) 32G X 4 MM Does not apply Misc; 1 each by Does not apply route every 7 days. Dispense: 30 each; Refill: 0  - US LIVER (CPT=76705);  Future  - PULMONARY INTERNAL  - OBG - INTERNAL  - ZOSTER VACC RECOMBINANT IM NJX  - HEMOGLOBIN A1C; Future  - FERRITIN; Future  - VITAMIN B12; Future  - VITAMIN D, 25-HYDROXY; Future  - TSH W REFLEX TO FREE T4; Future  - LIPID PANEL; Future  - IRON AND TIBC;  Future  - CBC WIT 25-HYDROXY; Future  - TSH W REFLEX TO FREE T4; Future  - LIPID PANEL; Future  - IRON AND TIBC; Future  - CBC WITH DIFFERENTIAL WITH PLATELET; Future  - COMP METABOLIC PANEL (14); Future    9.  Annual physical exam  Diet and exercise   Mammogram in March U

## 2021-01-13 NOTE — PATIENT INSTRUCTIONS
[address]    Welcome to [facility]. We are excited that you are committed to improving your health and have invited our practice to be part of your journey.  Our approach to the medical management of weight loss is similar to that of other of water per day, add fiber ( benefiber) to the water to increase fullness, overcome constipation    · Eat slowly    · Do not drink your calories ( no regular pop, juice, high calorie coffee drinks, limit alcohol) Also stay away from artificially sweetened

## 2021-01-15 ENCOUNTER — OFFICE VISIT (OUTPATIENT)
Dept: FAMILY MEDICINE CLINIC | Facility: CLINIC | Age: 56
End: 2021-01-15
Payer: COMMERCIAL

## 2021-01-15 DIAGNOSIS — Z02.9 ADMINISTRATIVE ENCOUNTER: Primary | ICD-10-CM

## 2021-01-16 ENCOUNTER — HOSPITAL ENCOUNTER (OUTPATIENT)
Age: 56
Discharge: HOME | End: 2021-01-16
Attending: PHYSICIAN ASSISTANT
Payer: COMMERCIAL

## 2021-01-16 VITALS
TEMPERATURE: 97 F | OXYGEN SATURATION: 99 % | HEART RATE: 92 BPM | RESPIRATION RATE: 18 BRPM | SYSTOLIC BLOOD PRESSURE: 147 MMHG | DIASTOLIC BLOOD PRESSURE: 82 MMHG

## 2021-01-16 DIAGNOSIS — Z20.822 ENCOUNTER FOR LABORATORY TESTING FOR COVID-19 VIRUS: ICD-10-CM

## 2021-01-16 DIAGNOSIS — R09.81 NASAL CONGESTION: Primary | ICD-10-CM

## 2021-01-16 LAB — SARS-COV-2 RNA RESP QL NAA+PROBE: NOT DETECTED

## 2021-01-16 PROCEDURE — 99213 OFFICE O/P EST LOW 20 MIN: CPT

## 2021-01-16 NOTE — ED PROVIDER NOTES
Patient Seen in: [facility]    History   Patient presents with:  Testing: Entered by patient    Stated Complaint: Covid-19 Test    HPI    Ruben Holt is a 60year old female who presents to immediate care requesting testing for COVID-19 route every 7 days.    METHENAMINE HIPPURATE 1 g Oral Tab,  TAKE 1 TABLET BY MOUTH TWICE DAILY   ERGOCALCIFEROL 1.25 MG (15764 UT) Oral Cap,  TAKE 1 CAPSULE BY MOUTH 1 TIME A WEEK FOR 12 DOSES   LOSARTAN POTASSIUM-HCTZ 100-25 MG Oral Tab,  TAKE 1 TABLET BY LORazepam 1 MG Oral Tab,  TK 1 T PO as neeeded   cyclobenzaprine 5 MG Oral Tab,  TAKE 1 OR 2 TABLETS BY MOUTH AT NIGHT   amLODIPine Besylate 10 MG Oral Tab,  Take 10 mg by mouth daily.    Pantoprazole Sodium 40 MG Oral Tab EC,     Nabumetone 750 MG Oral Tab provider. Constitutional: The patient is cooperative. Appears well-developed and well-nourished. No acute distress. Psychological: Alert, No abnormalities of mood, affect. Head: Normocephalic/atraumatic. Nontender.   Eyes: Pupils are equal round reac hours was provided. Disposition and Plan     Clinical Impression:  Nasal congestion  (primary encounter diagnosis)  Encounter for laboratory testing for COVID-19 virus    Disposition:  Discharge    Follow-up:  Linda Hernández MD  [address]

## 2021-01-16 NOTE — ED INITIAL ASSESSMENT (HPI)
PATIENT ARRIVED AMBULATORY TO ROOM FOR COVID TESTING. PATIENT STATES SHE WAS EXPOSED AT WORK 4 DAYS AGO. +FATIGUE AND SLIGHT NASAL CONGESTION.  NO FEVERS

## 2021-01-16 NOTE — PROGRESS NOTES
Pt. Reports covid exposure. Last contact with covid + person on 1/12/21. Received shingles vaccine Wednesday and attributes HA and fatigue to the vaccine.  Discussed options for testing, possible false negative if tested too early after exposure but still a

## 2021-01-18 ENCOUNTER — PATIENT MESSAGE (OUTPATIENT)
Dept: RHEUMATOLOGY | Facility: CLINIC | Age: 56
End: 2021-01-18

## 2021-01-19 ENCOUNTER — TELEMEDICINE (OUTPATIENT)
Dept: RHEUMATOLOGY | Facility: CLINIC | Age: 56
End: 2021-01-19
Payer: COMMERCIAL

## 2021-01-19 DIAGNOSIS — M79.7 FIBROMYALGIA: ICD-10-CM

## 2021-01-19 DIAGNOSIS — M35.1 MCTD (MIXED CONNECTIVE TISSUE DISEASE) (HCC): Primary | ICD-10-CM

## 2021-01-19 DIAGNOSIS — Z51.81 THERAPEUTIC DRUG MONITORING: ICD-10-CM

## 2021-01-19 PROCEDURE — 99214 OFFICE O/P EST MOD 30 MIN: CPT | Performed by: INTERNAL MEDICINE

## 2021-01-19 RX ORDER — CYCLOBENZAPRINE HCL 5 MG
5 TABLET ORAL NIGHTLY
Qty: 90 TABLET | Refills: 1 | Status: SHIPPED | OUTPATIENT
Start: 2021-01-19 | End: 2021-07-16

## 2021-01-19 RX ORDER — HYDROXYCHLOROQUINE SULFATE 200 MG/1
200 TABLET, FILM COATED ORAL 2 TIMES DAILY
Qty: 180 TABLET | Refills: 1 | Status: SHIPPED | OUTPATIENT
Start: 2021-01-19 | End: 2021-01-21

## 2021-01-19 NOTE — PROGRESS NOTES
Ruben Holt is a 60year old female who presents for No chief complaint on file. This visit is conducted using Telemedicine with live, interactive video and audio.     Patient has been referred to the [facility] website at [email or web address] to rev for this visit based on complexity of care and/or time spent for the visit. HPI:     I had the pleasure of seeing Carmine Hackett on 4/5/2017 for evaluation. Referred here by Dr. Bari Woo and Dr. Chris Granado.      She is a pleasant 46year old who has recnetl doesn's tknow if it's her IBS. seh didn't see GI yet. She got labs and us liver. She has been having loose bowel movement and goto the bathrrom and then she throws up. - did this easter Sunday. Then has been fine . X 1 days.  She gets this every 1-2 wee She has 5/10 apin in her hips, legs and arms . 4/11/2018   She had a bad flare of neck pain 1-2 weeks ago. She felt advil and tyelnol wasn't helping. She's been having improved right arm funciton after physical therapy.    She still has hpain in h on suddenly. She had physical therapy for her neck years ago. 4/2/2019  Her hips and knee are bad. For the most part , she feels ok. But she gets bouts of pain. Her arms are much better.  She is still going to physical therapy for pressure points and from Last 2 Encounters:  01/13/21 : 203 lb (92.1 kg)  08/05/20 : 192 lb 12.8 oz (87.5 kg)    There is no height or weight on file to calculate BMI.       Current Outpatient Medications   Medication Sig Dispense Refill   • Semaglutide,0.25 or 0.5MG/DOS, (OZE Take 1 tablet (12.5 mg total) by mouth daily. Take with Losartan.  90 tablet 3   • Amitriptyline HCl 10 MG Oral Tab TAKE 1 OR 2 TABLETS BY MOUTH EVERY NIGHT AT BEDTIME AS NEEDED 180 tablet 0   • Pantoprazole Sodium 40 MG Oral Tab EC   5   • VENLAFAXINE HCL 12/2009    Surgery (due to spinal stenosis/spondylosis).    • OTHER SURGICAL HISTORY Right     ankle surgery      Family History   Problem Relation Age of Onset   • Heart Disease Father         CAD, (cause of death) age 52   • Diabetes Father    • Stroke Ma irritable bowel - lately she has n/v   Colonoscopy - 2 years ago - in [place] - dr. Marquis Hoffman 1/30/2014 - normal -   : no dysuria, hx of UTIs - 3 childner, had several hosptilizations as a child for urinary tract issues, 1 this last year, no hx of miscarriag  U/L  68   Total Bilirubin      0.3-1.2 mg/dL  0.9   TOTAL PROTEIN      5.9-8.4 g/dL  7.8   Albumin      3.5-4.8 g/dL  4.6   Bilirubin, Direct      0.0-0.2 mg/dL  0.1   HEPATITIS A VIRUS AB SCR (S)      Nonreactive Nonreactive    HBSAG SCREEN      No 145 mmol/L 137    Potassium      3.5 - 5.1 mmol/L 4.1    Chloride      98 - 112 mmol/L 101    Carbon Dioxide, Total      21.0 - 32.0 mmol/L 32.0    ANION GAP      0 - 18 mmol/L 4    BUN      7 - 18 mg/dL 17    CREATININE      0.55 - 1.02 mg/dL 1.00    BUN/ 1/10/2014 - ct abd/pelvis  1. Stable findings from January 8, 2012. 2. No acute disease. 3. Normal appendix. 4. 1.57 cm splenule. 5. Small umbilical hernia. 6. IUD in place. 7. Postop changes lumbar spine.   8. Small amount of air in the urinary get sleep exam -   - 5/2018 - baseline echo and pfts   - got work up from dr. Ramos Head and the were not showing the RNP or RAINE - genreal labs normal -   - repeat prednisone burst 10mg -helped but she doesn't like to take it b/c it makes her angry -   If [facility] -   6.  Consider metoprolol as causing fatigue -   7.  nabuemtone 500mg once ad ay to twice a day as needed. - cont.  cbd oil is ok -       Hien Rojas MD  1/19/2021   4:46 PM  - Reviewed [place]- [place]  through Epic         Please note that t

## 2021-01-19 NOTE — TELEPHONE ENCOUNTER
From: Sukhwinder Trinidad  To: Lupe Lentz MD  Sent: 1/18/2021 7:09 PM CST  Subject: Other    I had made an appointment for tomorrow at 2:45 however I see nothing in my chart I am unsure what has happened. I did not get a confirmation call either.  So

## 2021-01-19 NOTE — PATIENT INSTRUCTIONS
1. Cont. hydroxychrouqine 200mg - once ad ay - can try increasing this to twice a day   2. . Cyclobenzaprine 5-10mg at night - to take as needed. 3. .amitriptyline 10 at night  -   4. Return to clinic in 6 months. 5.  Eye exam once a year -   6.   Consi

## 2021-01-19 NOTE — TELEPHONE ENCOUNTER
Patient contacted and accepted doximity visit for today at 4:30pm with Dr. Rick Elliott.       Future Appointments   Date Time Provider Erin Rosario   1/19/2021  4:30 PM Kaleb Ray MD 2014 [facility]

## 2021-01-21 ENCOUNTER — TELEPHONE (OUTPATIENT)
Dept: RHEUMATOLOGY | Facility: CLINIC | Age: 56
End: 2021-01-21

## 2021-01-21 RX ORDER — HYDROXYCHLOROQUINE SULFATE 200 MG/1
200 TABLET, FILM COATED ORAL 2 TIMES DAILY
Qty: 180 TABLET | Refills: 1 | Status: SHIPPED | OUTPATIENT
Start: 2021-01-21 | End: 2021-09-15

## 2021-01-21 NOTE — TELEPHONE ENCOUNTER
Ann Dozier from [facility] is requesting clarification for medication instructions. Please advise. Hydroxychloroquine Sulfate 200 MG Oral Tab 180 tablet 1 1/19/2021    Sig:   Take 1 tablet (200 mg total) by mouth 2 (two) times daily.  TAKE 1 TABLET(200 MG) B

## 2021-01-21 NOTE — TELEPHONE ENCOUNTER
Script corrected and resent to pharmacy. Summary:     1. Cont. hydroxychrouqine 200mg - once ad ay - can try increasing this to twice a day   2. . Cyclobenzaprine 5-10mg at night - to take as needed. 3. .ok to get covid vaccine -   4.  Return to clinic

## 2021-01-27 ENCOUNTER — PATIENT MESSAGE (OUTPATIENT)
Dept: INTERNAL MEDICINE CLINIC | Facility: CLINIC | Age: 56
End: 2021-01-27

## 2021-01-27 DIAGNOSIS — H60.501 ACUTE OTITIS EXTERNA OF RIGHT EAR, UNSPECIFIED TYPE: ICD-10-CM

## 2021-01-27 RX ORDER — OFLOXACIN 3 MG/ML
10 SOLUTION AURICULAR (OTIC) DAILY
Qty: 1 BOTTLE | Refills: 0 | Status: SHIPPED | OUTPATIENT
Start: 2021-01-27 | End: 2021-02-03

## 2021-01-29 ENCOUNTER — TELEPHONE (OUTPATIENT)
Dept: INTERNAL MEDICINE CLINIC | Facility: CLINIC | Age: 56
End: 2021-01-29

## 2021-01-29 ENCOUNTER — HOSPITAL ENCOUNTER (OUTPATIENT)
Age: 56
Discharge: HOME | End: 2021-01-29
Payer: COMMERCIAL

## 2021-01-29 VITALS
RESPIRATION RATE: 18 BRPM | HEART RATE: 104 BPM | BODY MASS INDEX: 37.38 KG/M2 | HEIGHT: 61 IN | TEMPERATURE: 97 F | DIASTOLIC BLOOD PRESSURE: 96 MMHG | WEIGHT: 198 LBS | SYSTOLIC BLOOD PRESSURE: 131 MMHG | OXYGEN SATURATION: 100 %

## 2021-01-29 DIAGNOSIS — H60.502 ACUTE OTITIS EXTERNA OF LEFT EAR, UNSPECIFIED TYPE: Primary | ICD-10-CM

## 2021-01-29 PROCEDURE — 99213 OFFICE O/P EST LOW 20 MIN: CPT | Performed by: PHYSICIAN ASSISTANT

## 2021-01-29 RX ORDER — CIPROFLOXACIN 500 MG/1
500 TABLET, FILM COATED ORAL 2 TIMES DAILY
Qty: 20 TABLET | Refills: 0 | Status: SHIPPED | OUTPATIENT
Start: 2021-01-29 | End: 2021-02-08

## 2021-01-29 RX ORDER — HYDROCODONE BITARTRATE AND ACETAMINOPHEN 5; 325 MG/1; MG/1
1-2 TABLET ORAL EVERY 6 HOURS PRN
Qty: 10 TABLET | Refills: 0 | Status: SHIPPED | OUTPATIENT
Start: 2021-01-29 | End: 2021-02-05

## 2021-01-29 NOTE — TELEPHONE ENCOUNTER
LVM that she needs to either go to [facility] or the ER. If pain is  that severe AND meds NOT helping, the ear definately needs to be examined. It may not even be her ear that is causing all the trouble, but something else pressing on the ear.

## 2021-01-29 NOTE — ED PROVIDER NOTES
Patient Seen in: [facility]      History   Patient presents with:  Ear Problem Pain    Stated Complaint: Ear Pain    HPI/Subjective:   HPI    76-year-old female presenting for evaluation of left ear pain.   Patient reports symptoms started 2 d above.    Physical Exam     ED Triage Vitals [01/29/21 1104]   BP (!) 131/96   Pulse 104   Resp 18   Temp 97 °F (36.1 °C)   Temp src Temporal   SpO2 100 %   O2 Device None (Room air)       Current:BP (!) 131/96   Pulse 104   Temp 97 °F (36.1 °C) (Temporal) with p.o. Cipro. ENT follow-up within 1 to 2 days. Norco as needed for pain and close return precautions given.   Patient verbalizes understanding and is in agreement with plan           MDM                               Disposition and Plan     Clinical

## 2021-01-29 NOTE — TELEPHONE ENCOUNTER
Patient called as she was given medication for her ear, and it isn't working. The side of her face is now completely swollen. She said she's in a lot of pain and can't hear out of her ear. And understands why babies cry due to the pain.

## 2021-01-29 NOTE — ED INITIAL ASSESSMENT (HPI)
Left ear pain for 2 days. Pt called her doctor and was prescribed ofloxacin. Pt states worsening earache today. No fever.

## 2021-02-01 ENCOUNTER — OFFICE VISIT (OUTPATIENT)
Dept: CARDIOLOGY | Age: 56
End: 2021-02-01

## 2021-02-01 ENCOUNTER — OFFICE VISIT (OUTPATIENT)
Dept: OTOLARYNGOLOGY | Facility: CLINIC | Age: 56
End: 2021-02-01
Payer: COMMERCIAL

## 2021-02-01 VITALS
BODY MASS INDEX: 38.14 KG/M2 | SYSTOLIC BLOOD PRESSURE: 130 MMHG | OXYGEN SATURATION: 99 % | DIASTOLIC BLOOD PRESSURE: 80 MMHG | WEIGHT: 202 LBS | HEART RATE: 97 BPM | HEIGHT: 61 IN

## 2021-02-01 VITALS
DIASTOLIC BLOOD PRESSURE: 80 MMHG | BODY MASS INDEX: 37.65 KG/M2 | HEIGHT: 61.5 IN | TEMPERATURE: 97 F | WEIGHT: 202 LBS | SYSTOLIC BLOOD PRESSURE: 138 MMHG

## 2021-02-01 DIAGNOSIS — I10 ESSENTIAL HYPERTENSION: ICD-10-CM

## 2021-02-01 DIAGNOSIS — R00.0 TACHYCARDIA: ICD-10-CM

## 2021-02-01 DIAGNOSIS — R06.02 SHORTNESS OF BREATH: Primary | ICD-10-CM

## 2021-02-01 DIAGNOSIS — H60.392 OTHER INFECTIVE ACUTE OTITIS EXTERNA OF LEFT EAR: Primary | ICD-10-CM

## 2021-02-01 DIAGNOSIS — R00.2 PALPITATIONS: ICD-10-CM

## 2021-02-01 PROCEDURE — 3075F SYST BP GE 130 - 139MM HG: CPT | Performed by: INTERNAL MEDICINE

## 2021-02-01 PROCEDURE — 99203 OFFICE O/P NEW LOW 30 MIN: CPT | Performed by: OTOLARYNGOLOGY

## 2021-02-01 PROCEDURE — 3008F BODY MASS INDEX DOCD: CPT | Performed by: OTOLARYNGOLOGY

## 2021-02-01 PROCEDURE — 99214 OFFICE O/P EST MOD 30 MIN: CPT | Performed by: INTERNAL MEDICINE

## 2021-02-01 PROCEDURE — 3079F DIAST BP 80-89 MM HG: CPT | Performed by: INTERNAL MEDICINE

## 2021-02-01 PROCEDURE — 3079F DIAST BP 80-89 MM HG: CPT | Performed by: OTOLARYNGOLOGY

## 2021-02-01 PROCEDURE — 3075F SYST BP GE 130 - 139MM HG: CPT | Performed by: OTOLARYNGOLOGY

## 2021-02-01 RX ORDER — SEMAGLUTIDE 1.34 MG/ML
INJECTION, SOLUTION SUBCUTANEOUS
COMMUNITY
Start: 2021-01-13

## 2021-02-01 RX ORDER — CLONAZEPAM 0.25 MG/1
0.25 TABLET, ORALLY DISINTEGRATING ORAL 2 TIMES DAILY PRN
COMMUNITY
Start: 2018-12-26

## 2021-02-01 RX ORDER — NITROFURANTOIN 25; 75 MG/1; MG/1
CAPSULE ORAL
COMMUNITY

## 2021-02-01 RX ORDER — CIPROFLOXACIN 500 MG/1
TABLET, FILM COATED ORAL
COMMUNITY
Start: 2021-01-29

## 2021-02-01 RX ORDER — ITRACONAZOLE 10 MG/ML
SOLUTION ORAL
COMMUNITY
Start: 2020-11-05

## 2021-02-01 RX ORDER — METHENAMINE HIPPURATE 1000 MG/1
1 TABLET ORAL 2 TIMES DAILY
COMMUNITY
Start: 2020-12-28

## 2021-02-01 RX ORDER — HYDROCODONE BITARTRATE AND ACETAMINOPHEN 5; 325 MG/1; MG/1
1-2 TABLET ORAL PRN
COMMUNITY
Start: 2021-01-29 | End: 2021-02-05

## 2021-02-01 RX ORDER — BETAMETHASONE DIPROPIONATE 0.5 MG/G
OINTMENT TOPICAL
Qty: 1 TUBE | Refills: 0 | Status: SHIPPED | OUTPATIENT
Start: 2021-02-01 | End: 2021-12-16 | Stop reason: ALTCHOICE

## 2021-02-01 RX ORDER — OFLOXACIN 3 MG/ML
10 SOLUTION AURICULAR (OTIC)
COMMUNITY
Start: 2021-01-27 | End: 2021-02-03

## 2021-02-01 RX ORDER — ERGOCALCIFEROL 1.25 MG/1
CAPSULE ORAL
COMMUNITY
Start: 2020-12-15

## 2021-02-01 SDOH — HEALTH STABILITY: MENTAL HEALTH: HOW MANY STANDARD DRINKS CONTAINING ALCOHOL DO YOU HAVE ON A TYPICAL DAY?: 1 OR 2

## 2021-02-01 SDOH — HEALTH STABILITY: MENTAL HEALTH: HOW OFTEN DO YOU HAVE A DRINK CONTAINING ALCOHOL?: MONTHLY OR LESS

## 2021-02-01 ASSESSMENT — PATIENT HEALTH QUESTIONNAIRE - PHQ9
SUM OF ALL RESPONSES TO PHQ9 QUESTIONS 1 AND 2: 0
CLINICAL INTERPRETATION OF PHQ9 SCORE: NO FURTHER SCREENING NEEDED
1. LITTLE INTEREST OR PLEASURE IN DOING THINGS: NOT AT ALL
SUM OF ALL RESPONSES TO PHQ9 QUESTIONS 1 AND 2: 0
2. FEELING DOWN, DEPRESSED OR HOPELESS: NOT AT ALL
CLINICAL INTERPRETATION OF PHQ2 SCORE: NO FURTHER SCREENING NEEDED

## 2021-02-01 NOTE — PROGRESS NOTES
Eliezer Baker is a 60year old female.   Patient presents with:  Ear Problem: Patient here for f/u from immediate care 3 days ago       [facility]    She presents with a history of 1 previous episode of otitis externa which occurred on t No      Family History   Problem Relation Age of Onset   • Heart Disease Father         CAD, (cause of death) age 52   • Diabetes Father    • Stroke Maternal Grandmother         Cerebrovascular accident (Stroke)   • Heart Disease Paternal Grandmother easy bruising.            PHYSICAL EXAM    /80 (BP Location: Right arm, Patient Position: Sitting, Cuff Size: large)   Temp 97.2 °F (36.2 °C) (Tympanic)   Ht 5' 1.5\" (1.562 m)   Wt 202 lb (91.6 kg)   LMP 09/06/2013 (Approximate)   BMI 37.55 kg/m² 1 tablet (200 mg total) by mouth 2 (two) times daily. , Disp: 180 tablet, Rfl: 1  •  cyclobenzaprine 5 MG Oral Tab, Take 1 tablet (5 mg total) by mouth nightly.  TAKE 1 OR 2 TABLETS BY MOUTH AT NIGHT, Disp: 90 tablet, Rfl: 1  •  Semaglutide,0.25 or 0.5MG/DOS hydrochlorothiazide 12.5 MG Oral Tab, Take 1 tablet (12.5 mg total) by mouth daily.  Take with Losartan., Disp: 90 tablet, Rfl: 3  •  Amitriptyline HCl 10 MG Oral Tab, TAKE 1 OR 2 TABLETS BY MOUTH EVERY NIGHT AT BEDTIME AS NEEDED, Disp: 180 tablet, Rfl: 0 be patent and the tympanic membrane seems to be relatively normal.  Strict water precautions were discussed and understood for now. This note was prepared using [employer] voice recognition dictation software. As a result errors may occur.  When

## 2021-02-02 ENCOUNTER — OFFICE VISIT (OUTPATIENT)
Dept: OTOLARYNGOLOGY | Facility: CLINIC | Age: 56
End: 2021-02-02
Payer: COMMERCIAL

## 2021-02-02 ENCOUNTER — OFFICE VISIT (OUTPATIENT)
Dept: AUDIOLOGY | Facility: CLINIC | Age: 56
End: 2021-02-02
Payer: COMMERCIAL

## 2021-02-02 VITALS
TEMPERATURE: 98 F | DIASTOLIC BLOOD PRESSURE: 90 MMHG | HEIGHT: 61.5 IN | BODY MASS INDEX: 37.65 KG/M2 | WEIGHT: 202 LBS | SYSTOLIC BLOOD PRESSURE: 135 MMHG

## 2021-02-02 DIAGNOSIS — H91.92 HEARING LOSS OF LEFT EAR, UNSPECIFIED HEARING LOSS TYPE: Primary | ICD-10-CM

## 2021-02-02 DIAGNOSIS — H69.92 EUSTACHIAN TUBE DISORDER, LEFT: Primary | ICD-10-CM

## 2021-02-02 DIAGNOSIS — H60.392 OTHER INFECTIVE ACUTE OTITIS EXTERNA OF LEFT EAR: ICD-10-CM

## 2021-02-02 PROCEDURE — 3080F DIAST BP >= 90 MM HG: CPT | Performed by: OTOLARYNGOLOGY

## 2021-02-02 PROCEDURE — 92567 TYMPANOMETRY: CPT | Performed by: AUDIOLOGIST

## 2021-02-02 PROCEDURE — 3075F SYST BP GE 130 - 139MM HG: CPT | Performed by: OTOLARYNGOLOGY

## 2021-02-02 PROCEDURE — 99213 OFFICE O/P EST LOW 20 MIN: CPT | Performed by: OTOLARYNGOLOGY

## 2021-02-02 PROCEDURE — 92557 COMPREHENSIVE HEARING TEST: CPT | Performed by: AUDIOLOGIST

## 2021-02-02 PROCEDURE — 3008F BODY MASS INDEX DOCD: CPT | Performed by: OTOLARYNGOLOGY

## 2021-02-02 RX ORDER — CELECOXIB 200 MG/1
200 CAPSULE ORAL DAILY PRN
Qty: 30 CAPSULE | Refills: 0 | Status: SHIPPED | OUTPATIENT
Start: 2021-02-02 | End: 2021-03-04

## 2021-02-02 RX ORDER — CYCLOBENZAPRINE HCL 5 MG
5 TABLET ORAL NIGHTLY
Qty: 30 TABLET | Refills: 1 | Status: SHIPPED | OUTPATIENT
Start: 2021-02-02 | End: 2021-07-16

## 2021-02-02 NOTE — PROGRESS NOTES
Pallavi Armenta is a 60year old female.   Patient presents with:  Hearing Check: Patient here for a hearing test, f/u from yesterday's visit       HISTORY OF PRESENT ILLNESS  She presents with a history of 1 previous episode of otitis externa which occur tobacco: Never Used    Substance and Sexual Activity      Alcohol use:  Yes        Alcohol/week: 1.0 standard drinks        Types: 1 Glasses of wine per week        Comment: Occasionally wine      Drug use: Yes        Types: Cannabis        Comment: occ weight loss. ENMT Negative Drooling. Eyes Negative Blurred vision and vision changes. Respiratory Negative Dyspnea and wheezing. Cardio Negative Chest pain, irregular heartbeat/palpitations and syncope. GI Negative Abdominal pain and diarrhea. Septum -Normal  Turbinates - Right: Normal, Left: Normal.       Current Outpatient Medications:   •  cyclobenzaprine 5 MG Oral Tab, Take 1 tablet (5 mg total) by mouth nightly., Disp: 30 tablet, Rfl: 1  •  celecoxib 200 MG Oral Cap, Take 1 capsule (200 mg Tablet 24 Hr, TAKE 1 TABLET(750 MG) BY MOUTH TWICE DAILY WITH MEALS, Disp: 120 tablet, Rfl: 0  •  VENLAFAXINE HCL  MG Oral Capsule SR 24 Hr, TAKE 1 CAPSULE(150 MG) BY MOUTH DAILY, Disp: 90 capsule, Rfl: 1  •  PREDNISONE 10 MG Oral Tab, Take 6 tabsx 1 needed. , Disp: 20 tablet, Rfl: 0  •  LORazepam 1 MG Oral Tab, TK 1 T PO as neeeded, Disp: , Rfl:   •  Nabumetone 750 MG Oral Tab, Take 1 tablet (750 mg total) by mouth 2 (two) times daily. , Disp: 60 tablet, Rfl: 0  •  FREESTYLE LANCETS Does not apply Misc,

## 2021-02-03 NOTE — PROGRESS NOTES
AUDIOGRAM     Madelyn Mancilla was referred for testing by [facility] for left sided ear fullness   9/14/1965  [id number]      Otoscopic inspection: right ear no cerumen; left ear no cerumen.        Tests/Procedures  Patient was tested via  standard

## 2021-02-09 ENCOUNTER — TELEPHONE (OUTPATIENT)
Dept: OTOLARYNGOLOGY | Facility: CLINIC | Age: 56
End: 2021-02-09

## 2021-02-09 ENCOUNTER — PATIENT MESSAGE (OUTPATIENT)
Dept: OTOLARYNGOLOGY | Facility: CLINIC | Age: 56
End: 2021-02-09

## 2021-02-09 NOTE — TELEPHONE ENCOUNTER
From: MarkKOTURA Car  To: Jani Woo. Julee Hutchinson MD  Sent: 2/9/2021 8:00 AM CST  Subject: Other    I have an appointment this morning but I am having car trouble. I called and left a message with the service that I need to reschedule.  Sorry for the late notice

## 2021-02-09 NOTE — TELEPHONE ENCOUNTER
LMTCB- per , if pt is doing well no need to follow up, if pt is still having symptoms, per  pt will need to be seen and would not be able to do a virtual visit as pt will have to be re examined.

## 2021-02-09 NOTE — TELEPHONE ENCOUNTER
Pt had an 8 appt with Dr Danya Ayoub send this message thru [address] [place] [address] [address]      2/9/21 8:00 AM  I have an appointment this morning but I am having car trouble. I called and left a message with the service that I need to reschedule.  Sorry for the late notice it was domingo

## 2021-02-17 ENCOUNTER — ORDER TRANSCRIPTION (OUTPATIENT)
Dept: SLEEP CENTER | Age: 56
End: 2021-02-17

## 2021-02-17 DIAGNOSIS — G47.33 OBSTRUCTIVE SLEEP APNEA (ADULT) (PEDIATRIC): Primary | ICD-10-CM

## 2021-02-23 ENCOUNTER — LAB ENCOUNTER (OUTPATIENT)
Dept: LAB | Facility: HOSPITAL | Age: 56
End: 2021-02-23
Attending: INTERNAL MEDICINE
Payer: COMMERCIAL

## 2021-02-23 ENCOUNTER — HOSPITAL ENCOUNTER (OUTPATIENT)
Dept: ULTRASOUND IMAGING | Facility: HOSPITAL | Age: 56
Discharge: HOME | End: 2021-02-23
Attending: INTERNAL MEDICINE
Payer: COMMERCIAL

## 2021-02-23 DIAGNOSIS — K82.4 GALL BLADDER POLYP: ICD-10-CM

## 2021-02-23 DIAGNOSIS — I10 ESSENTIAL HYPERTENSION: ICD-10-CM

## 2021-02-23 DIAGNOSIS — Z00.00 ANNUAL PHYSICAL EXAM: ICD-10-CM

## 2021-02-23 DIAGNOSIS — K76.0 NAFLD (NONALCOHOLIC FATTY LIVER DISEASE): ICD-10-CM

## 2021-02-23 DIAGNOSIS — E78.5 HYPERLIPIDEMIA, UNSPECIFIED HYPERLIPIDEMIA TYPE: ICD-10-CM

## 2021-02-23 DIAGNOSIS — Z12.39 ENCOUNTER FOR SCREENING FOR MALIGNANT NEOPLASM OF BREAST, UNSPECIFIED SCREENING MODALITY: ICD-10-CM

## 2021-02-23 DIAGNOSIS — E66.9 OBESITY, UNSPECIFIED CLASSIFICATION, UNSPECIFIED OBESITY TYPE, UNSPECIFIED WHETHER SERIOUS COMORBIDITY PRESENT: ICD-10-CM

## 2021-02-23 DIAGNOSIS — R06.83 SNORING: ICD-10-CM

## 2021-02-23 DIAGNOSIS — R73.03 PREDIABETES: ICD-10-CM

## 2021-02-23 DIAGNOSIS — M79.7 FIBROMYALGIA: ICD-10-CM

## 2021-02-23 DIAGNOSIS — N93.0 POSTCOITAL BLEEDING: ICD-10-CM

## 2021-02-23 DIAGNOSIS — R53.83 FATIGUE, UNSPECIFIED TYPE: ICD-10-CM

## 2021-02-23 DIAGNOSIS — M35.1 MCTD (MIXED CONNECTIVE TISSUE DISEASE) (HCC): ICD-10-CM

## 2021-02-23 DIAGNOSIS — M35.1 MIXED CONNECTIVE TISSUE DISEASE (HCC): ICD-10-CM

## 2021-02-23 LAB
ALBUMIN SERPL-MCNC: 3.9 G/DL (ref 3.4–5)
ALBUMIN/GLOB SERPL: 1.1 {RATIO} (ref 1–2)
ALP LIVER SERPL-CCNC: 64 U/L
ALT SERPL-CCNC: 41 U/L
ANION GAP SERPL CALC-SCNC: 4 MMOL/L (ref 0–18)
AST SERPL-CCNC: 20 U/L (ref 15–37)
BASOPHILS # BLD AUTO: 0.05 X10(3) UL (ref 0–0.2)
BASOPHILS NFR BLD AUTO: 0.8 %
BILIRUB SERPL-MCNC: 0.4 MG/DL (ref 0.1–2)
BUN BLD-MCNC: 15 MG/DL (ref 7–18)
BUN/CREAT SERPL: 17.9 (ref 10–20)
CALCIUM BLD-MCNC: 9.4 MG/DL (ref 8.5–10.1)
CHLORIDE SERPL-SCNC: 104 MMOL/L (ref 98–112)
CHOLEST SMN-MCNC: 225 MG/DL (ref ?–200)
CO2 SERPL-SCNC: 29 MMOL/L (ref 21–32)
CREAT BLD-MCNC: 0.84 MG/DL
CRP SERPL-MCNC: <0.29 MG/DL (ref ?–0.3)
DEPRECATED HBV CORE AB SER IA-ACNC: 50.2 NG/ML
DEPRECATED RDW RBC AUTO: 39.4 FL (ref 35.1–46.3)
EOSINOPHIL # BLD AUTO: 0.13 X10(3) UL (ref 0–0.7)
EOSINOPHIL NFR BLD AUTO: 2.1 %
ERYTHROCYTE [DISTWIDTH] IN BLOOD BY AUTOMATED COUNT: 11.7 % (ref 11–15)
ERYTHROCYTE [SEDIMENTATION RATE] IN BLOOD: 12 MM/HR
EST. AVERAGE GLUCOSE BLD GHB EST-MCNC: 128 MG/DL (ref 68–126)
GLOBULIN PLAS-MCNC: 3.5 G/DL (ref 2.8–4.4)
GLUCOSE BLD-MCNC: 110 MG/DL (ref 70–99)
HBA1C MFR BLD HPLC: 6.1 % (ref ?–5.7)
HCT VFR BLD AUTO: 35 %
HDLC SERPL-MCNC: 61 MG/DL (ref 40–59)
HGB BLD-MCNC: 11.5 G/DL
IMM GRANULOCYTES # BLD AUTO: 0.03 X10(3) UL (ref 0–1)
IMM GRANULOCYTES NFR BLD: 0.5 %
IRON SATURATION: 14 %
IRON SERPL-MCNC: 69 UG/DL
LDLC SERPL CALC-MCNC: 140 MG/DL (ref ?–100)
LYMPHOCYTES # BLD AUTO: 1.98 X10(3) UL (ref 1–4)
LYMPHOCYTES NFR BLD AUTO: 32.1 %
M PROTEIN MFR SERPL ELPH: 7.4 G/DL (ref 6.4–8.2)
MCH RBC QN AUTO: 30.6 PG (ref 26–34)
MCHC RBC AUTO-ENTMCNC: 32.9 G/DL (ref 31–37)
MCV RBC AUTO: 93.1 FL
MONOCYTES # BLD AUTO: 0.51 X10(3) UL (ref 0.1–1)
MONOCYTES NFR BLD AUTO: 8.3 %
NEUTROPHILS # BLD AUTO: 3.46 X10 (3) UL (ref 1.5–7.7)
NEUTROPHILS # BLD AUTO: 3.46 X10(3) UL (ref 1.5–7.7)
NEUTROPHILS NFR BLD AUTO: 56.2 %
NONHDLC SERPL-MCNC: 164 MG/DL (ref ?–130)
OSMOLALITY SERPL CALC.SUM OF ELEC: 285 MOSM/KG (ref 275–295)
PATIENT FASTING Y/N/NP: YES
PATIENT FASTING Y/N/NP: YES
PLATELET # BLD AUTO: 308 10(3)UL (ref 150–450)
POTASSIUM SERPL-SCNC: 4.4 MMOL/L (ref 3.5–5.1)
RBC # BLD AUTO: 3.76 X10(6)UL
SODIUM SERPL-SCNC: 137 MMOL/L (ref 136–145)
TOTAL IRON BINDING CAPACITY: 498 UG/DL (ref 240–450)
TRANSFERRIN SERPL-MCNC: 334 MG/DL (ref 200–360)
TRIGL SERPL-MCNC: 119 MG/DL (ref 30–149)
TSI SER-ACNC: 1.1 MIU/ML (ref 0.36–3.74)
VIT B12 SERPL-MCNC: 542 PG/ML (ref 193–986)
VLDLC SERPL CALC-MCNC: 24 MG/DL (ref 0–30)
WBC # BLD AUTO: 6.2 X10(3) UL (ref 4–11)

## 2021-02-23 PROCEDURE — 86235 NUCLEAR ANTIGEN ANTIBODY: CPT

## 2021-02-23 PROCEDURE — 84466 ASSAY OF TRANSFERRIN: CPT

## 2021-02-23 PROCEDURE — 36415 COLL VENOUS BLD VENIPUNCTURE: CPT

## 2021-02-23 PROCEDURE — 82306 VITAMIN D 25 HYDROXY: CPT

## 2021-02-23 PROCEDURE — 82607 VITAMIN B-12: CPT

## 2021-02-23 PROCEDURE — 85652 RBC SED RATE AUTOMATED: CPT

## 2021-02-23 PROCEDURE — 85025 COMPLETE CBC W/AUTO DIFF WBC: CPT

## 2021-02-23 PROCEDURE — 76705 ECHO EXAM OF ABDOMEN: CPT | Performed by: INTERNAL MEDICINE

## 2021-02-23 PROCEDURE — 83036 HEMOGLOBIN GLYCOSYLATED A1C: CPT

## 2021-02-23 PROCEDURE — 86140 C-REACTIVE PROTEIN: CPT

## 2021-02-23 PROCEDURE — 83540 ASSAY OF IRON: CPT

## 2021-02-23 PROCEDURE — 82728 ASSAY OF FERRITIN: CPT

## 2021-02-23 PROCEDURE — 80061 LIPID PANEL: CPT

## 2021-02-23 PROCEDURE — 84443 ASSAY THYROID STIM HORMONE: CPT

## 2021-02-23 PROCEDURE — 80053 COMPREHEN METABOLIC PANEL: CPT

## 2021-02-24 LAB — 25(OH)D3 SERPL-MCNC: 30.9 NG/ML (ref 30–100)

## 2021-03-02 LAB — ENA SM+RNP AB SER QL: NEGATIVE

## 2021-03-09 DIAGNOSIS — Z23 NEED FOR VACCINATION: ICD-10-CM

## 2021-03-10 ENCOUNTER — IMMUNIZATION (OUTPATIENT)
Dept: LAB | Age: 56
End: 2021-03-10
Attending: HOSPITALIST
Payer: COMMERCIAL

## 2021-03-10 DIAGNOSIS — Z23 NEED FOR VACCINATION: Primary | ICD-10-CM

## 2021-03-10 PROCEDURE — 0001A SARSCOV2 VAC 30MCG/0.3ML IM: CPT

## 2021-03-12 DIAGNOSIS — R73.03 PREDIABETES: ICD-10-CM

## 2021-03-12 DIAGNOSIS — E78.5 HYPERLIPIDEMIA, UNSPECIFIED HYPERLIPIDEMIA TYPE: ICD-10-CM

## 2021-03-12 DIAGNOSIS — I10 ESSENTIAL HYPERTENSION: ICD-10-CM

## 2021-03-12 DIAGNOSIS — E88.81 METABOLIC SYNDROME: ICD-10-CM

## 2021-03-12 DIAGNOSIS — R63.5 ABNORMAL WEIGHT GAIN: ICD-10-CM

## 2021-03-12 DIAGNOSIS — E66.01 CLASS 2 SEVERE OBESITY WITH SERIOUS COMORBIDITY AND BODY MASS INDEX (BMI) OF 35.0 TO 35.9 IN ADULT, UNSPECIFIED OBESITY TYPE (HCC): ICD-10-CM

## 2021-03-12 DIAGNOSIS — N39.0 RECURRENT UTI: ICD-10-CM

## 2021-03-12 RX ORDER — METFORMIN HYDROCHLORIDE 750 MG/1
TABLET, EXTENDED RELEASE ORAL
Qty: 120 TABLET | Refills: 0 | Status: SHIPPED | OUTPATIENT
Start: 2021-03-12 | End: 2021-09-10

## 2021-03-14 ENCOUNTER — HOSPITAL ENCOUNTER (OUTPATIENT)
Age: 56
Discharge: HOME | End: 2021-03-14
Attending: PHYSICIAN ASSISTANT
Payer: COMMERCIAL

## 2021-03-14 VITALS
HEART RATE: 108 BPM | DIASTOLIC BLOOD PRESSURE: 93 MMHG | OXYGEN SATURATION: 98 % | RESPIRATION RATE: 18 BRPM | SYSTOLIC BLOOD PRESSURE: 134 MMHG | TEMPERATURE: 98 F

## 2021-03-14 DIAGNOSIS — Z20.822 ENCOUNTER FOR LABORATORY TESTING FOR COVID-19 VIRUS: ICD-10-CM

## 2021-03-14 DIAGNOSIS — R03.0 ELEVATED BLOOD PRESSURE READING: ICD-10-CM

## 2021-03-14 DIAGNOSIS — R51.9 ACUTE NONINTRACTABLE HEADACHE, UNSPECIFIED HEADACHE TYPE: Primary | ICD-10-CM

## 2021-03-14 LAB — SARS-COV-2 RNA RESP QL NAA+PROBE: NOT DETECTED

## 2021-03-14 PROCEDURE — 99213 OFFICE O/P EST LOW 20 MIN: CPT | Performed by: PHYSICIAN ASSISTANT

## 2021-03-14 PROCEDURE — 99212 OFFICE O/P EST SF 10 MIN: CPT | Performed by: PHYSICIAN ASSISTANT

## 2021-03-14 NOTE — ED PROVIDER NOTES
Patient Seen in: [facility]    History   Patient presents with:  Testing    Stated Complaint: covid test    HPI    Connor Melgoza is a 60year old female who presents with chief complaint of generalized headache. Onset 2 days ago.   Tatianna % External Ointment,  Apply by topical route every day to the affected area(s); do not exceed 45 grams per week. Hydroxychloroquine Sulfate 200 MG Oral Tab,  Take 1 tablet (200 mg total) by mouth 2 (two) times daily.    cyclobenzaprine 5 MG Oral Tab,  Zeenat Mejia tabsx 1 day, take 1 tabsx 1 day, then off   Pantoprazole Sodium 40 MG Oral Tab EC,     Omeprazole 40 MG Oral Capsule Delayed Release,  Take twice daily   Insulin Pen Needle (PEN NEEDLES) 32G X 4 MM Does not apply Misc,  1 each by Does not apply route daily on room air as interpreted by this provider. Constitutional: The patient is cooperative. Appears well-developed and well-nourished. No acute distress. Psychological: Alert, No abnormalities of mood, affect. Head: Normocephalic/atraumatic. Nontender. emergent conditions may arise and to go to the ER for new, worsening or any persistent conditions. I've explained the importance of following up with their doctor as instructed. The patient verbalized understanding of the discharge instructions and plan.

## 2021-03-14 NOTE — ED INITIAL ASSESSMENT (HPI)
PATIENT ARRIVED AMBULATORY TO ROOM FOR COVID TESTING. PATIENT STATES HER SON, WHO LIVES AT HOME, TESTED POSITIVE FOR COVID 5 DAYS AGO. PATIENT C/O A SLIGHT HEADACHE.

## 2021-03-24 ENCOUNTER — HOSPITAL ENCOUNTER (OUTPATIENT)
Age: 56
Discharge: HOME | End: 2021-03-24
Attending: PHYSICIAN ASSISTANT
Payer: COMMERCIAL

## 2021-03-24 VITALS
RESPIRATION RATE: 20 BRPM | DIASTOLIC BLOOD PRESSURE: 87 MMHG | TEMPERATURE: 98 F | HEART RATE: 100 BPM | SYSTOLIC BLOOD PRESSURE: 143 MMHG | OXYGEN SATURATION: 99 %

## 2021-03-24 DIAGNOSIS — Z20.822 ENCOUNTER FOR LABORATORY TESTING FOR COVID-19 VIRUS: Primary | ICD-10-CM

## 2021-03-24 DIAGNOSIS — R03.0 ELEVATED BLOOD PRESSURE READING: ICD-10-CM

## 2021-03-24 LAB — SARS-COV-2 RNA RESP QL NAA+PROBE: NOT DETECTED

## 2021-03-24 PROCEDURE — 99212 OFFICE O/P EST SF 10 MIN: CPT

## 2021-03-24 NOTE — ED INITIAL ASSESSMENT (HPI)
Patient with covid exposure. Her son dx with covid on 3/4 and her  was dx on 3/21 but had symptoms since 3/14. Patient currently asymptomatic needs negative covid test prior to returning to work.

## 2021-03-24 NOTE — ED PROVIDER NOTES
Patient Seen in: [facility]    History   Patient presents with:  Testing    Stated Complaint: Covid test    HPI    Alexey Osorio is a 60year old female who presents to immediate care requesting testing for COVID-19.   Patient states she w Hydroxychloroquine Sulfate 200 MG Oral Tab,  Take 1 tablet (200 mg total) by mouth 2 (two) times daily. cyclobenzaprine 5 MG Oral Tab,  Take 1 tablet (5 mg total) by mouth nightly.  TAKE 1 OR 2 TABLETS BY MOUTH AT NIGHT   Insulin Pen Needle (PEN NEEDLES) Oral Tab,  TK 1 T PO as neeeded   Nabumetone 750 MG Oral Tab,  Take 1 tablet (750 mg total) by mouth 2 (two) times daily.    FREESTYLE LANCETS Does not apply Misc,         Family History   Problem Relation Age of Onset   • Heart Disease Father         CAD, Nontender. Eyes: Pupils are equal round reactive to light. Conjunctiva are within normal limits. ENT: Pharynx noninjected. Tonsils within normal size limits bilaterally. No tonsillar exudates. TMs within normal limits bilaterally.   Mucous membranes m Education regarding lifestyle modifications and the need for appropriate follow-up with their PCP to have their blood pressure re-checked within 24-48 hours was provided.     Disposition and Plan     Clinical Impression:  Encounter for laboratory testing fo

## 2021-03-30 ENCOUNTER — TELEPHONE (OUTPATIENT)
Dept: INTERNAL MEDICINE CLINIC | Facility: CLINIC | Age: 56
End: 2021-03-30

## 2021-03-30 ENCOUNTER — HOSPITAL ENCOUNTER (OUTPATIENT)
Age: 56
Discharge: HOME | End: 2021-03-30
Payer: COMMERCIAL

## 2021-03-30 VITALS
OXYGEN SATURATION: 99 % | HEART RATE: 80 BPM | TEMPERATURE: 98 F | RESPIRATION RATE: 18 BRPM | SYSTOLIC BLOOD PRESSURE: 117 MMHG | DIASTOLIC BLOOD PRESSURE: 63 MMHG

## 2021-03-30 DIAGNOSIS — U07.1 COVID-19 VIRUS INFECTION: Primary | ICD-10-CM

## 2021-03-30 DIAGNOSIS — N39.0 URINARY TRACT INFECTION WITHOUT HEMATURIA, SITE UNSPECIFIED: ICD-10-CM

## 2021-03-30 DIAGNOSIS — M54.50 LEFT LOW BACK PAIN, UNSPECIFIED CHRONICITY, UNSPECIFIED WHETHER SCIATICA PRESENT: ICD-10-CM

## 2021-03-30 LAB
BILIRUB UR QL STRIP: NEGATIVE
CLARITY UR: CLEAR
COLOR UR: YELLOW
GLUCOSE UR STRIP-MCNC: NEGATIVE MG/DL
HGB UR QL STRIP: NEGATIVE
KETONES UR STRIP-MCNC: NEGATIVE MG/DL
NITRITE UR QL STRIP: NEGATIVE
PH UR STRIP: 5.5 [PH]
PROT UR STRIP-MCNC: NEGATIVE MG/DL
SARS-COV-2 RNA RESP QL NAA+PROBE: DETECTED
SP GR UR STRIP: >=1.03
UROBILINOGEN UR STRIP-ACNC: <2 MG/DL

## 2021-03-30 PROCEDURE — 99214 OFFICE O/P EST MOD 30 MIN: CPT | Performed by: NURSE PRACTITIONER

## 2021-03-30 PROCEDURE — 87186 SC STD MICRODIL/AGAR DIL: CPT | Performed by: NURSE PRACTITIONER

## 2021-03-30 PROCEDURE — 87086 URINE CULTURE/COLONY COUNT: CPT | Performed by: NURSE PRACTITIONER

## 2021-03-30 PROCEDURE — 99215 OFFICE O/P EST HI 40 MIN: CPT | Performed by: NURSE PRACTITIONER

## 2021-03-30 PROCEDURE — 81002 URINALYSIS NONAUTO W/O SCOPE: CPT

## 2021-03-30 PROCEDURE — 87088 URINE BACTERIA CULTURE: CPT | Performed by: NURSE PRACTITIONER

## 2021-03-30 RX ORDER — CEPHALEXIN 500 MG/1
500 CAPSULE ORAL 2 TIMES DAILY
Qty: 14 CAPSULE | Refills: 0 | Status: SHIPPED | OUTPATIENT
Start: 2021-03-30 | End: 2021-04-06

## 2021-03-30 RX ORDER — FLUCONAZOLE 150 MG/1
150 TABLET ORAL ONCE
Qty: 1 TABLET | Refills: 1 | Status: SHIPPED | OUTPATIENT
Start: 2021-03-30 | End: 2021-03-30

## 2021-03-30 NOTE — ED QUICK NOTES
BAM INFUSION COMPLETED. NO ADVERSE REACTION. IV LINE FLUSHED WITH 20CC'S OF 0.9NS. WILL CONTINUE TO MONITOR. VITAL SIGNS STABLE.

## 2021-03-30 NOTE — TELEPHONE ENCOUNTER
Pt has some covid symptoms but is scheduled for her second vaccine tomorrow, needs help deciding what to do.

## 2021-03-30 NOTE — TELEPHONE ENCOUNTER
Spoke with Covid hotline to get guidance on what to tell the patient. Response:  Patient should not get the second vaccine as long as she is not feeling well (as with any other flu vaccine guideline).    Patient should get covid tested (first vaccine will

## 2021-03-30 NOTE — ED INITIAL ASSESSMENT (HPI)
PATIENT ARRIVED AMBULATORY TO ROOM WITH MULTIPLE COMPLAINTS. PATIENT STATES SHE HAS BEEN HAVING URINARY SYMPTOMS X2 DAYS. PATIENT STATES \"I GO BUT NOT A LOT COMES OUT\" NO HEMATURIA. +LOWER BACK PAIN. NO ABDOMINAL PAIN. +CHILLS. SLIGHT NASAL CONGESTION.  Rhoda Santillan

## 2021-03-30 NOTE — TELEPHONE ENCOUNTER
Had Covid #1 vaccine on 3/10. That same day her son tested positive for Covid. Lives in same home. Both she and spouse were tested on 3/14 = negative. Spouse then became ill and tested positive a week later (3/24). Patient was negative.   Patient has ha

## 2021-03-30 NOTE — ED PROVIDER NOTES
Patient Seen in: [facility]      History   Patient presents with:  Testing    Stated Complaint: covid test, pain in lower back, uti    HPI/Subjective:   HPI    This is a 49-year-old female presenting for Covid testing and urinary symptoms.   P Occasionally wine    Drug use: Yes      Types: Cannabis      Comment: occ             Review of Systems    Positive for stated complaint: covid test, pain in lower back, uti  Other systems are as noted in HPI. Constitutional and vital signs reviewed. Result Value    Leukocyte esterase urine Trace (*)     All other components within normal limits   RAPID SARS-COV-2 BY PCR - Abnormal; Notable for the following components:    Rapid SARS-CoV-2 by PCR Detected (*)     All other components within normal advised that they should continue to self-isolate and use infection control measures (e.g., wear mask, isolate, social distance, avoid sharing personal items, clean and disinfect “high touch” surfaces, and frequent handwashing) according to CDC guidelines.

## 2021-03-31 ENCOUNTER — PATIENT OUTREACH (OUTPATIENT)
Dept: CASE MANAGEMENT | Age: 56
End: 2021-03-31

## 2021-03-31 ENCOUNTER — TELEPHONE (OUTPATIENT)
Dept: CASE MANAGEMENT | Age: 56
End: 2021-03-31

## 2021-03-31 NOTE — PROGRESS NOTES
Home Monitoring Condition Update    Covid19+ test date: 3/30/2021      Consent Verification:  Assessment Completed With: Patient  HIPAA Verified?   Yes    COVID-19 HOME MONITORING 3/31/2021   Temperature (No Data)   SPO2 92   Pulse taken from Pulse Oximet AB treatment in the ED, are you experiencing any of the following: fever, weakness, difficulty breathing, hives, joint pain, rashes, itching, tongue or lip swelling or wheezing?  No  If yes, we will contact your doctor/on call provider or if you are experie

## 2021-03-31 NOTE — TELEPHONE ENCOUNTER
Pt completed Day 1 Home Monitoring today. (See outreach encounter for symptoms). Please continue to follow with pt daily for 6 more days or until PCP clears pt. Pt was not scheduled for VV as no available appointment in the next few days. Please advise.

## 2021-04-06 ENCOUNTER — VIRTUAL PHONE E/M (OUTPATIENT)
Dept: INTERNAL MEDICINE CLINIC | Facility: CLINIC | Age: 56
End: 2021-04-06
Payer: COMMERCIAL

## 2021-04-06 DIAGNOSIS — U07.1 COVID-19 VIRUS INFECTION: Primary | ICD-10-CM

## 2021-04-06 DIAGNOSIS — Z71.85 VACCINE COUNSELING: ICD-10-CM

## 2021-04-06 PROCEDURE — 99213 OFFICE O/P EST LOW 20 MIN: CPT | Performed by: INTERNAL MEDICINE

## 2021-04-06 NOTE — PROGRESS NOTES
Virtual Telephone Check-In    Scott Raj verbally consents to a Virtual/Telephone Check-In visit on 04/06/21. Patient has been referred to the [facility] website at [email or web address] to review the yearly Consent to Treat document.     Patient under Does not apply Misc 1 each by Does not apply route every 7 days.  30 each 0   • METHENAMINE HIPPURATE 1 g Oral Tab TAKE 1 TABLET BY MOUTH TWICE DAILY 60 tablet 0   • ERGOCALCIFEROL 1.25 MG (48227 UT) Oral Cap TAKE 1 CAPSULE BY MOUTH 1 TIME A WEEK FOR 12 DOS (two) times daily.  60 tablet 0   • FREESTYLE LANCETS Does not apply Misc   3     Patient Active Problem List:     NAFLD (nonalcoholic fatty liver disease)     Gallbladder polyp     Gastropathy     Routine health maintenance     Essential hypertension     I OF SYSTEMS:    A comprehensive 10 point review of systems was completed. Pertinent positives and negatives noted in the the HPI.      EXAM was conducted through phone/ audio communication     GENERAL: Speaking full sentence   NEURO: AxOx3  PSYCH:normal moo and general treatment was explained to the patient.

## 2021-04-10 RX ORDER — VENLAFAXINE HYDROCHLORIDE 150 MG/1
CAPSULE, EXTENDED RELEASE ORAL
Qty: 90 CAPSULE | Refills: 1 | Status: SHIPPED | OUTPATIENT
Start: 2021-04-10 | End: 2021-11-01

## 2021-04-11 ENCOUNTER — HOSPITAL ENCOUNTER (EMERGENCY)
Facility: HOSPITAL | Age: 56
Discharge: HOME | End: 2021-04-11
Payer: COMMERCIAL

## 2021-04-11 ENCOUNTER — PATIENT MESSAGE (OUTPATIENT)
Dept: OTOLARYNGOLOGY | Facility: CLINIC | Age: 56
End: 2021-04-11

## 2021-04-11 VITALS
TEMPERATURE: 98 F | RESPIRATION RATE: 20 BRPM | HEART RATE: 92 BPM | SYSTOLIC BLOOD PRESSURE: 121 MMHG | OXYGEN SATURATION: 97 % | BODY MASS INDEX: 37 KG/M2 | DIASTOLIC BLOOD PRESSURE: 78 MMHG | HEIGHT: 61 IN | WEIGHT: 196 LBS

## 2021-04-11 DIAGNOSIS — H60.391 OTHER INFECTIVE ACUTE OTITIS EXTERNA OF RIGHT EAR: Primary | ICD-10-CM

## 2021-04-11 PROCEDURE — 99283 EMERGENCY DEPT VISIT LOW MDM: CPT

## 2021-04-11 RX ORDER — CIPROFLOXACIN AND DEXAMETHASONE 3; 1 MG/ML; MG/ML
4 SUSPENSION/ DROPS AURICULAR (OTIC) 2 TIMES DAILY
Qty: 1 BOTTLE | Refills: 0 | Status: SHIPPED | OUTPATIENT
Start: 2021-04-11 | End: 2021-04-21

## 2021-04-11 RX ORDER — HYDROCODONE BITARTRATE AND ACETAMINOPHEN 5; 325 MG/1; MG/1
1-2 TABLET ORAL EVERY 6 HOURS PRN
Qty: 10 TABLET | Refills: 0 | Status: SHIPPED | OUTPATIENT
Start: 2021-04-11 | End: 2021-04-18

## 2021-04-11 RX ORDER — CIPROFLOXACIN AND DEXAMETHASONE 3; 1 MG/ML; MG/ML
4 SUSPENSION/ DROPS AURICULAR (OTIC) ONCE
Status: COMPLETED | OUTPATIENT
Start: 2021-04-11 | End: 2021-04-11

## 2021-04-11 RX ORDER — HYDROCODONE BITARTRATE AND ACETAMINOPHEN 5; 325 MG/1; MG/1
2 TABLET ORAL ONCE
Status: COMPLETED | OUTPATIENT
Start: 2021-04-11 | End: 2021-04-11

## 2021-04-11 NOTE — ED PROVIDER NOTES
Patient Seen in: [facility] AND [facility] Emergency Department      History   Patient presents with:  Ear Problem Pain    Stated Complaint: Right ear pain    HPI/Subjective:   54yo/f hx of HLD, HTN, Pre-DM, Fibromyalgia reports to the ED with complaints of righ negative. Positive for stated complaint: Right ear pain  Other systems are as noted in HPI. Constitutional and vital signs reviewed. All other systems reviewed and negative except as noted above.     Physical Exam     ED Triage Vitals [04/11/21 1 Ear Wick Placed  4 Drops of Ciprodex instilled  Tolerated well              MDM      54yo/f w hx and exam as stated complaints of right ear pain    Non toxic, well appearing  No trismus or mastoid tenderness  No fevers  No trauma  No concern for foreig

## 2021-04-11 NOTE — ED QUICK NOTES
Pt rcvd in room a/ox3, pt is day#15 of covid + is not exhibiting and /s of acrtive covid-like symptoms pt only c/o right ear pain , Practioner to examine

## 2021-04-12 NOTE — TELEPHONE ENCOUNTER
From: Mary Judd  To: Trisha Quesada. Christ Seymour MD  Sent: 4/11/2021 11:59 AM CDT  Subject: Non-Urgent Medical Question    I just am getting over Covid, Sunday the 11th is my last day. I woke up at 5:30 with some pretty bad pain in my right ear.  I believe I am

## 2021-04-15 ENCOUNTER — OFFICE VISIT (OUTPATIENT)
Dept: OTOLARYNGOLOGY | Facility: CLINIC | Age: 56
End: 2021-04-15
Payer: COMMERCIAL

## 2021-04-15 VITALS
SYSTOLIC BLOOD PRESSURE: 125 MMHG | HEIGHT: 61 IN | TEMPERATURE: 99 F | BODY MASS INDEX: 37 KG/M2 | WEIGHT: 196 LBS | DIASTOLIC BLOOD PRESSURE: 76 MMHG

## 2021-04-15 DIAGNOSIS — H92.01 RIGHT EAR PAIN: Primary | ICD-10-CM

## 2021-04-15 PROCEDURE — 3008F BODY MASS INDEX DOCD: CPT | Performed by: OTOLARYNGOLOGY

## 2021-04-15 PROCEDURE — 3078F DIAST BP <80 MM HG: CPT | Performed by: OTOLARYNGOLOGY

## 2021-04-15 PROCEDURE — 3074F SYST BP LT 130 MM HG: CPT | Performed by: OTOLARYNGOLOGY

## 2021-04-15 PROCEDURE — 99213 OFFICE O/P EST LOW 20 MIN: CPT | Performed by: OTOLARYNGOLOGY

## 2021-04-16 NOTE — PROGRESS NOTES
Missy Duran is a 60year old female.   Patient presents with:  Ear Problem: c/o right ear pain for started sunday, currently using ear gtts      HISTORY OF PRESENT ILLNESS  She presents with a history of 1 previous episode of otitis externa which occu her ear when it is acutely swollen. No other new signs, symptoms or complaints. She did have Covid positivity on March 30 of 2021.       Social History    Socioeconomic History      Marital status:       Spouse name: Not on file      Number of chil spondylosis. Management:  surgery in 12/2009.    • Visual impairment        Past Surgical History:   Procedure Laterality Date   • BACK SURGERY     • COLONOSCOPY  2014   • ESOPHAGOGASTRODUODENOSCOPY (EGD) N/A 1/18/2018    Performed by Radha Marie Normal.   Ears Normal Inspection - Right: Normal, Left: Normal. Canal - Right: Normal, Left: Normal. TM - Right: Normal, Left: Normal.  Moist debris cleaned from the left ear canal using suction microscopy   Skin Normal Inspection - Normal.        Lymph Pedro Pals NIGHT, Disp: 90 tablet, Rfl: 1  •  Insulin Pen Needle (PEN NEEDLES) 32G X 4 MM Does not apply Misc, 1 each by Does not apply route every 7 days. , Disp: 30 each, Rfl: 0  •  METHENAMINE HIPPURATE 1 g Oral Tab, TAKE 1 TABLET BY MOUTH TWICE DAILY, Disp: 60 tab Capsule Delayed Release, Take twice daily, Disp: , Rfl:   •  Insulin Pen Needle (PEN NEEDLES) 32G X 4 MM Does not apply Misc, 1 each by Does not apply route daily. , Disp: 90 each, Rfl: 0  •  Phenazopyridine HCl 200 MG Oral Tab, Take 1 tablet (200 mg total)

## 2021-05-12 ENCOUNTER — LAB ENCOUNTER (OUTPATIENT)
Dept: LAB | Facility: HOSPITAL | Age: 56
End: 2021-05-12
Attending: NURSE PRACTITIONER
Payer: COMMERCIAL

## 2021-05-12 ENCOUNTER — OFFICE VISIT (OUTPATIENT)
Dept: GASTROENTEROLOGY | Facility: CLINIC | Age: 56
End: 2021-05-12
Payer: COMMERCIAL

## 2021-05-12 VITALS
HEIGHT: 61 IN | DIASTOLIC BLOOD PRESSURE: 81 MMHG | BODY MASS INDEX: 38.14 KG/M2 | SYSTOLIC BLOOD PRESSURE: 117 MMHG | WEIGHT: 202 LBS | HEART RATE: 123 BPM

## 2021-05-12 DIAGNOSIS — K76.0 HEPATIC STEATOSIS: ICD-10-CM

## 2021-05-12 DIAGNOSIS — D50.9 IRON DEFICIENCY ANEMIA, UNSPECIFIED IRON DEFICIENCY ANEMIA TYPE: Primary | ICD-10-CM

## 2021-05-12 DIAGNOSIS — D50.9 IRON DEFICIENCY ANEMIA, UNSPECIFIED IRON DEFICIENCY ANEMIA TYPE: ICD-10-CM

## 2021-05-12 PROCEDURE — 85025 COMPLETE CBC W/AUTO DIFF WBC: CPT

## 2021-05-12 PROCEDURE — 99214 OFFICE O/P EST MOD 30 MIN: CPT | Performed by: NURSE PRACTITIONER

## 2021-05-12 PROCEDURE — 36415 COLL VENOUS BLD VENIPUNCTURE: CPT

## 2021-05-12 PROCEDURE — 82728 ASSAY OF FERRITIN: CPT

## 2021-05-12 PROCEDURE — 84466 ASSAY OF TRANSFERRIN: CPT

## 2021-05-12 PROCEDURE — 3079F DIAST BP 80-89 MM HG: CPT | Performed by: NURSE PRACTITIONER

## 2021-05-12 PROCEDURE — 3074F SYST BP LT 130 MM HG: CPT | Performed by: NURSE PRACTITIONER

## 2021-05-12 PROCEDURE — 83540 ASSAY OF IRON: CPT

## 2021-05-12 PROCEDURE — 3008F BODY MASS INDEX DOCD: CPT | Performed by: NURSE PRACTITIONER

## 2021-05-12 RX ORDER — SEMAGLUTIDE 1.34 MG/ML
INJECTION, SOLUTION SUBCUTANEOUS
COMMUNITY
End: 2021-12-16 | Stop reason: ALTCHOICE

## 2021-06-02 ENCOUNTER — OFFICE VISIT (OUTPATIENT)
Dept: DERMATOLOGY CLINIC | Facility: CLINIC | Age: 56
End: 2021-06-02
Payer: COMMERCIAL

## 2021-06-02 DIAGNOSIS — D22.9 MULTIPLE NEVI: ICD-10-CM

## 2021-06-02 DIAGNOSIS — D23.9 BENIGN NEOPLASM OF SKIN, UNSPECIFIED LOCATION: ICD-10-CM

## 2021-06-02 DIAGNOSIS — D23.10 SYRINGOMA OF EYELID, UNSPECIFIED LATERALITY: Primary | ICD-10-CM

## 2021-06-02 DIAGNOSIS — L30.9 DERMATITIS: ICD-10-CM

## 2021-06-02 DIAGNOSIS — L82.1 SEBORRHEIC KERATOSES: ICD-10-CM

## 2021-06-02 PROCEDURE — 99203 OFFICE O/P NEW LOW 30 MIN: CPT | Performed by: DERMATOLOGY

## 2021-06-02 RX ORDER — CLOBETASOL PROPIONATE 0.5 MG/G
1 CREAM TOPICAL 2 TIMES DAILY
Qty: 60 G | Refills: 3 | Status: SHIPPED | OUTPATIENT
Start: 2021-06-02 | End: 2021-12-16 | Stop reason: ALTCHOICE

## 2021-06-03 ENCOUNTER — TELEPHONE (OUTPATIENT)
Dept: DERMATOLOGY CLINIC | Facility: CLINIC | Age: 56
End: 2021-06-03

## 2021-06-03 RX ORDER — BETAMETHASONE DIPROPIONATE 0.5 MG/G
1 CREAM TOPICAL 2 TIMES DAILY
Qty: 50 G | Refills: 3 | Status: SHIPPED | OUTPATIENT
Start: 2021-06-03

## 2021-06-03 NOTE — TELEPHONE ENCOUNTER
Fax from [address] [place] [address] started for Clobetasol Propionate 0.05 % External Cream    KEY: [id number]

## 2021-06-03 NOTE — TELEPHONE ENCOUNTER
LOV 6/2/21 - Please disregard other telephone encounter regarding PA for this medication. Please see alternatives listed for this pt. Ok to change to one of the alternatives listed below? Thank you.

## 2021-06-03 NOTE — TELEPHONE ENCOUNTER
Clobetasol Propionate 0.05 % External Cream    Not approved by the pharmacy benefit . Patient needs to complete 2 alternatives for 30 days each before clobetasol is approved.     Suggested topical corticosteriods to use:  Betamethasone  Halobe

## 2021-06-09 ENCOUNTER — TELEPHONE (OUTPATIENT)
Dept: RHEUMATOLOGY | Facility: CLINIC | Age: 56
End: 2021-06-09

## 2021-06-09 NOTE — TELEPHONE ENCOUNTER
Attempted to start PA on Cover My Meds: To initiate an authorization request for this medication, please contact Nancy CHAMPION at [phone].     Use [facility] reference for [facility] and treatment to send with PA request.

## 2021-06-09 NOTE — TELEPHONE ENCOUNTER
Fax received at [address] [email or web address]    [phone]    Current Outpatient Medications   Medication Sig Dispense Refill   •       •       •       •       •       •       •       •       • Hydroxychloroquine Sulfate 200 MG Oral Tab Take 1 t

## 2021-06-10 NOTE — TELEPHONE ENCOUNTER
Contacted Nancy. PA review started. They will fax us forms for completion within the next 24 hours. Will watch for forms.

## 2021-06-13 NOTE — PROGRESS NOTES
Latonia Car is a 60year old female. HPI:     CC:  Patient presents with:  Lesion: LOV 8/12/2016 Patient present with raised flesh color lesions on bilateral eyes . Patient c/o having lesions for 3 year and denies any changes in color shapes or size. used    Alcohol use:  Yes      Alcohol/week: 1.0 standard drinks      Types: 1 Glasses of wine per week      Comment: Occasionally wine    Drug use: Yes      Types: Cannabis      Comment: occ       Current Outpatient Medications   Medication Sig Dispense Re Cranberry 125 MG Oral Tab Take by mouth. • Lactobacillus (PROBIOTIC ACIDOPHILUS OR) Take by mouth.      • LORazepam 1 MG Oral Tab TK 1 T PO as neeeded     • FREESTYLE LANCETS Does not apply Misc   3   • Betamethasone Dipropionate Aug 0.05 % External Cre Surgical History:   Procedure Laterality Date   • BACK SURGERY     • COLONOSCOPY  2014   • HYSTEROSCOPY,DIAGNOSTIC  08/2006    polypectomy   • OTHER SURGICAL HISTORY  12/2009    Surgery (due to spinal stenosis/spondylosis).    • OTHER SURGICAL HISTORY Right Living Expenses:   Food Insecurity:       Worried About [address] in the Last Year:       Ran Out of Food in the Last Year:   Transportation Needs:       Lack of Transportation (Medical):       Lack of Transportation (Non-Medical):   Physical Act previously not sure the name. Feels clobetasol had worked the best in the past.    Patient presents with concerns above. Patient has been in their usual state of health. History, medications, allergies reviewed as noted.       ROS:  Denies any other sy these only. Oculoplastics follow-up Dr. Addie Johnson if patient desires more aggressive management. Reassurance given. Multiple waxy tan papules along the jawline, lentigines keratoses. Reassurance. Over-the-counter retinol.     Erythematous scaling patches a

## 2021-06-14 NOTE — TELEPHONE ENCOUNTER
Received fax from [facility]   Hydroxychloroquine sulfate 200 mg tablet APPROVED    Valid through end of the current term of our agreement with your plan sponsor. Changes in your medical condition, or 06/10/2022. Whichever is earlier.      Patient informe

## 2021-07-07 ENCOUNTER — IMMUNIZATION (OUTPATIENT)
Dept: LAB | Facility: HOSPITAL | Age: 56
End: 2021-07-07
Attending: EMERGENCY MEDICINE
Payer: COMMERCIAL

## 2021-07-07 DIAGNOSIS — Z23 NEED FOR VACCINATION: Primary | ICD-10-CM

## 2021-07-07 PROCEDURE — 0002A SARSCOV2 VAC 30MCG/0.3ML IM: CPT

## 2021-07-16 ENCOUNTER — OFFICE VISIT (OUTPATIENT)
Dept: RHEUMATOLOGY | Facility: CLINIC | Age: 56
End: 2021-07-16
Payer: COMMERCIAL

## 2021-07-16 ENCOUNTER — LAB ENCOUNTER (OUTPATIENT)
Dept: LAB | Facility: HOSPITAL | Age: 56
End: 2021-07-16
Attending: INTERNAL MEDICINE
Payer: COMMERCIAL

## 2021-07-16 VITALS
WEIGHT: 206 LBS | BODY MASS INDEX: 37.91 KG/M2 | SYSTOLIC BLOOD PRESSURE: 153 MMHG | HEIGHT: 62 IN | DIASTOLIC BLOOD PRESSURE: 97 MMHG | HEART RATE: 86 BPM

## 2021-07-16 DIAGNOSIS — M35.1 MCTD (MIXED CONNECTIVE TISSUE DISEASE) (HCC): ICD-10-CM

## 2021-07-16 DIAGNOSIS — M79.7 FIBROMYALGIA: ICD-10-CM

## 2021-07-16 DIAGNOSIS — M35.1 MCTD (MIXED CONNECTIVE TISSUE DISEASE) (HCC): Primary | ICD-10-CM

## 2021-07-16 DIAGNOSIS — Z51.81 THERAPEUTIC DRUG MONITORING: ICD-10-CM

## 2021-07-16 LAB
CK SERPL-CCNC: 59 U/L
CRP SERPL-MCNC: <0.29 MG/DL (ref ?–0.3)
ERYTHROCYTE [SEDIMENTATION RATE] IN BLOOD: 20 MM/HR

## 2021-07-16 PROCEDURE — 82550 ASSAY OF CK (CPK): CPT

## 2021-07-16 PROCEDURE — 3077F SYST BP >= 140 MM HG: CPT | Performed by: INTERNAL MEDICINE

## 2021-07-16 PROCEDURE — 3080F DIAST BP >= 90 MM HG: CPT | Performed by: INTERNAL MEDICINE

## 2021-07-16 PROCEDURE — 3008F BODY MASS INDEX DOCD: CPT | Performed by: INTERNAL MEDICINE

## 2021-07-16 PROCEDURE — 86140 C-REACTIVE PROTEIN: CPT

## 2021-07-16 PROCEDURE — 82085 ASSAY OF ALDOLASE: CPT

## 2021-07-16 PROCEDURE — 85652 RBC SED RATE AUTOMATED: CPT

## 2021-07-16 PROCEDURE — 36415 COLL VENOUS BLD VENIPUNCTURE: CPT

## 2021-07-16 PROCEDURE — 99214 OFFICE O/P EST MOD 30 MIN: CPT | Performed by: INTERNAL MEDICINE

## 2021-07-16 RX ORDER — LIDOCAINE 50 MG/G
2 PATCH TOPICAL EVERY 24 HOURS
Qty: 60 PATCH | Refills: 0 | Status: SHIPPED | OUTPATIENT
Start: 2021-07-16

## 2021-07-16 RX ORDER — CYCLOBENZAPRINE HCL 5 MG
5 TABLET ORAL NIGHTLY
Qty: 90 TABLET | Refills: 1 | Status: SHIPPED | OUTPATIENT
Start: 2021-07-16 | End: 2021-12-16 | Stop reason: ALTCHOICE

## 2021-07-16 RX ORDER — PREGABALIN 50 MG/1
50 CAPSULE ORAL 2 TIMES DAILY
Qty: 60 CAPSULE | Refills: 1 | Status: SHIPPED | OUTPATIENT
Start: 2021-07-16 | End: 2021-12-16 | Stop reason: ALTCHOICE

## 2021-07-16 NOTE — PATIENT INSTRUCTIONS
1. Cont. hydroxychrouqine 200mg - once ad ay - can try increasing this to twice a day   2. . Cyclobenzaprine 5-10mg at night - to take as needed. 3.  Reviewed notes - can try lyrica - pregabalin 50mg 1-2 tablets at night -   4.  Return to clinic in  6 wee

## 2021-07-16 NOTE — PROGRESS NOTES
Alexey Osorio is a 60year old female who presents for Patient presents with: Follow - Up: severe arm pain      HPI:     I had the pleasure of seeing Alexey Osorio on 4/5/2017 for evaluation. Referred here by Dr. Hugo Cornell and Dr. Quinton Neal.      She is a That' sbeen ahving a lot. She doesn's tknow if it's her IBS. seh didn't see GI yet. She got labs and us liver. She has been having loose bowel movement and goto the bathrrom and then she throws up. - did this easter Sunday. Then has been fine .  X 1 day laying on it now. No rashes. She has 5/10 apin in her hips, legs and arms . 4/11/2018   She had a bad flare of neck pain 1-2 weeks ago. She felt advil and tyelnol wasn't helping.    She's been having improved right arm funciton after physical ther at times and then it can come on suddenly. She had physical therapy for her neck years ago. 4/2/2019  Her hips and knee are bad. For the most part , she feels ok. But she gets bouts of pain. Her arms are much better.  She is still going to physical th she used it. 7/16/2021  She feels the last 2 weeks the pain has been unbearable in her elbows /arms and her hips . Her arms are the most pain. She bought pain patches and not helping. She has about 8/10 pain   She takes edibles during the night. times daily. 60 tablet 0   • Betamethasone Dipropionate Aug 0.05 % External Cream Apply 1 Application topically 2 (two) times daily.  Apply to affected areas as needed bid 50 g 3   • Clobetasol Propionate 0.05 % External Cream Apply 1 Application topically each 0   • Lactobacillus (PROBIOTIC ACIDOPHILUS OR) Take by mouth.      • LORazepam 1 MG Oral Tab TK 1 T PO as neeeded (Patient not taking: Reported on 7/16/2021)     • FREESTYLE LANCETS Does not apply Misc   3      Past Medical History:   Diagnosis Date Cannabis      Comment: occ   35 years ago she quit smoking. Works in [employer].    , 3 boys, youngest will live at home -      REVIEW OF SYSTEMS:   Review Of Systems:  Fatigue  Constitutional:No fever, no change in weight or appetitie  Derm: No ra bursitis -   Lower back is not as tneder. No synvoitis seen  Good cap refill   Good grasp.    No edema    Component      Latest Ref Rng 4/10/2017 4/5/2017   URINE-COLOR      Yellow  Yellow   APPEARANCE      Clear  Clear   SPECIFIC GRAVITY      1.002-1.0 Hemoglobin      12.0 - 16.0 g/dL 12.4    Hematocrit      35.0 - 48.0 % 37.0    MCV      80.0 - 100.0 fL 93.4    MCH      26.0 - 34.0 pg 31.3    MCHC      31.0 - 37.0 g/dL 33.5    RDW-SD      35.1 - 46.3 fL 40.5    RDW      11.0 - 15.0 % 11.9    Platelet Blood Urine      Negative Negative    PH Urine      5.0 - 8.0 6.0    Protein Urine      Negative mg/dL Negative    Urobilinogen Urine      <2.0 <2.0    Nitrite Urine      Negative Negative    Leukocyte Esterase Urine      Negative Small (A)    SQUAM EPI dilatation. 3. Small gallbladder wall polyp; gallbladder otherwise unremarkable. 5/4/2018 - 2decho  Study Conclusions  1. Left ventricle: The cavity size was normal. Wall thickness was     increased in a pattern of mild LVH.  Systolic function was     n past - cont. 10mg at night. 3. DIEHL - Elevated GGT - is going to see Dr. Jasso Goods end of the month. Intermittent vomitting - heart burn can make worse - omeprazole does help a little bit. Likely lfrom savella. Take omepraozoel   4.  Right knee pain - s

## 2021-07-17 LAB — ALDOLASE, SERUM: 3.4 U/L

## 2021-07-19 ENCOUNTER — TELEPHONE (OUTPATIENT)
Dept: RHEUMATOLOGY | Facility: CLINIC | Age: 56
End: 2021-07-19

## 2021-07-19 NOTE — TELEPHONE ENCOUNTER
Received a fax from [facility] for PA for Lidocaine path. Called the number on the PA  [phone]  Spent 35 minutes and completed PA over the phone  Fax number verified  Outcome will be faxed within 72 hours.

## 2021-07-23 NOTE — TELEPHONE ENCOUNTER
Lidocaine patch 5% was denied. Reason was \"not approved by the U.S. Food and Drug Administration for the condition for which it was prescribed\". Please advise.

## 2021-08-02 NOTE — TELEPHONE ENCOUNTER
Left detailed message on lidocaine patch denial and provided office number to call us with any questions.

## 2021-08-09 DIAGNOSIS — E78.5 HYPERLIPIDEMIA, UNSPECIFIED HYPERLIPIDEMIA TYPE: ICD-10-CM

## 2021-08-09 DIAGNOSIS — Z51.81 THERAPEUTIC DRUG MONITORING: ICD-10-CM

## 2021-08-09 DIAGNOSIS — Z71.3 DIETARY COUNSELING AND SURVEILLANCE: ICD-10-CM

## 2021-08-09 DIAGNOSIS — R73.03 PREDIABETES: ICD-10-CM

## 2021-08-09 DIAGNOSIS — I10 ESSENTIAL HYPERTENSION: ICD-10-CM

## 2021-08-09 DIAGNOSIS — E66.01 CLASS 2 SEVERE OBESITY WITH SERIOUS COMORBIDITY AND BODY MASS INDEX (BMI) OF 35.0 TO 35.9 IN ADULT, UNSPECIFIED OBESITY TYPE (HCC): ICD-10-CM

## 2021-08-09 RX ORDER — LOSARTAN POTASSIUM AND HYDROCHLOROTHIAZIDE 25; 100 MG/1; MG/1
1 TABLET ORAL DAILY
Qty: 90 TABLET | Refills: 1 | Status: SHIPPED | OUTPATIENT
Start: 2021-08-09 | End: 2021-11-07

## 2021-08-09 NOTE — TELEPHONE ENCOUNTER
Requested Prescriptions     Pending Prescriptions Disp Refills   • LOSARTAN POTASSIUM-HCTZ 100-25 MG Oral Tab [Pharmacy Med Name: LOSARTAN/HCTZ 100/25MG TABLETS] 90 tablet 1     Sig: TAKE 1 TABLET BY MOUTH DAILY     Last office visit: 4-6-21  Medication la

## 2021-08-27 ENCOUNTER — OFFICE VISIT (OUTPATIENT)
Dept: OTOLARYNGOLOGY | Facility: CLINIC | Age: 56
End: 2021-08-27
Payer: COMMERCIAL

## 2021-08-27 VITALS — TEMPERATURE: 99 F

## 2021-08-27 DIAGNOSIS — H60.392 OTHER INFECTIVE ACUTE OTITIS EXTERNA OF LEFT EAR: Primary | ICD-10-CM

## 2021-08-27 PROCEDURE — 99213 OFFICE O/P EST LOW 20 MIN: CPT | Performed by: OTOLARYNGOLOGY

## 2021-08-27 RX ORDER — FLUCONAZOLE 100 MG/1
100 TABLET ORAL DAILY
Qty: 3 TABLET | Refills: 0 | Status: SHIPPED | OUTPATIENT
Start: 2021-08-27 | End: 2021-12-16 | Stop reason: ALTCHOICE

## 2021-08-27 RX ORDER — OFLOXACIN 3 MG/ML
3 SOLUTION AURICULAR (OTIC) 3 TIMES DAILY
Qty: 10 ML | Refills: 0 | Status: SHIPPED | OUTPATIENT
Start: 2021-08-27 | End: 2021-12-16 | Stop reason: ALTCHOICE

## 2021-08-27 RX ORDER — CIPROFLOXACIN 500 MG/1
500 TABLET, FILM COATED ORAL EVERY 12 HOURS
Qty: 14 TABLET | Refills: 0 | Status: SHIPPED | OUTPATIENT
Start: 2021-08-27 | End: 2021-10-11

## 2021-08-27 NOTE — PROGRESS NOTES
Kortney Hendrickson is a 60year old female.   Patient presents with:  Ear Problem: Pain,swelling of left ear      HISTORY OF PRESENT ILLNESS  She presents with a history of 1 previous episode of otitis externa which occurred on the right sometime within the No other new signs, symptoms or complaints. She did have Covid positivity on March 30 of 2021.     8/27/21 did well up until 2 days ago when she started having left-sided ear pain and narrowing of the canal.  Muffling of sound slightly. No drainage.   [place] Gastropathy 1/18/2018   • Hemorrhoids 2014   • High blood pressure    • High cholesterol    • MCTD (mixed connective tissue disease) (HCC)    • Mood disorder (HCC)    • Other ill-defined conditions(799.89)     Per [employer]:  \"son and girlfriend with 4 kids Normal.        Nasopharynx Normal External nose - Normal. Lips/teeth/gums - Normal. Tonsils - Normal. Oropharynx - Normal.   Ears Normal Inspection - Right: Normal, Left: Normal. Canal - Right: Normal, Left: Swollen ear canal TM - Right: Normal, Left: Norm each by Does not apply route daily. , Disp: 90 each, Rfl: 0  •  Phenazopyridine HCl 200 MG Oral Tab, Take 1 tablet (200 mg total) by mouth 3 (three) times daily as needed for Pain., Disp: 15 tablet, Rfl: 0  •  Cranberry 125 MG Oral Tab, Take by mouth., Disp 0  •  Omeprazole 40 MG Oral Capsule Delayed Release, Take twice daily  (Patient not taking: Reported on 6/2/2021 ), Disp: , Rfl:   •  Lactobacillus (PROBIOTIC ACIDOPHILUS OR), Take by mouth., Disp: , Rfl:   •  Nabumetone 750 MG Oral Tab, Take 1 tablet (750

## 2021-09-09 DIAGNOSIS — E66.01 CLASS 2 SEVERE OBESITY WITH SERIOUS COMORBIDITY AND BODY MASS INDEX (BMI) OF 35.0 TO 35.9 IN ADULT, UNSPECIFIED OBESITY TYPE (HCC): ICD-10-CM

## 2021-09-09 DIAGNOSIS — N39.0 RECURRENT UTI: ICD-10-CM

## 2021-09-09 DIAGNOSIS — I10 ESSENTIAL HYPERTENSION: ICD-10-CM

## 2021-09-09 DIAGNOSIS — E88.81 METABOLIC SYNDROME: ICD-10-CM

## 2021-09-09 DIAGNOSIS — R63.5 ABNORMAL WEIGHT GAIN: ICD-10-CM

## 2021-09-09 DIAGNOSIS — R73.03 PREDIABETES: ICD-10-CM

## 2021-09-09 DIAGNOSIS — E78.5 HYPERLIPIDEMIA, UNSPECIFIED HYPERLIPIDEMIA TYPE: ICD-10-CM

## 2021-09-09 NOTE — TELEPHONE ENCOUNTER
Diabetic Medication Protocol [id number]/2021 10:26 AM   HgBA1C procedure resulted in past 6 months Protocol Details    Last HgBA1C < 7.5     Appointment in past 6 or next 3 months     Microalbumin procedure in past 12 months or taking ACE/ARB      Last OV

## 2021-09-10 RX ORDER — METFORMIN HYDROCHLORIDE 750 MG/1
TABLET, EXTENDED RELEASE ORAL
Qty: 120 TABLET | Refills: 0 | Status: SHIPPED | OUTPATIENT
Start: 2021-09-10 | End: 2021-12-16

## 2021-09-15 RX ORDER — HYDROXYCHLOROQUINE SULFATE 200 MG/1
TABLET, FILM COATED ORAL
Qty: 180 TABLET | Refills: 1 | Status: SHIPPED | OUTPATIENT
Start: 2021-09-15

## 2021-09-15 NOTE — TELEPHONE ENCOUNTER
LOV: 7/16/21  Last Refilled:#180, 1rf 1/21/21    No future appointments. Summary:     1. Cont. hydroxychrouqine 200mg - once ad ay - can try increasing this to twice a day   2. . Cyclobenzaprine 5-10mg at night - to take as needed.    3.  Reviewed no

## 2021-09-18 ENCOUNTER — MED REC SCAN ONLY (OUTPATIENT)
Dept: RHEUMATOLOGY | Facility: CLINIC | Age: 56
End: 2021-09-18

## 2021-09-25 ENCOUNTER — OFFICE VISIT (OUTPATIENT)
Dept: OTOLARYNGOLOGY | Facility: CLINIC | Age: 56
End: 2021-09-25
Payer: COMMERCIAL

## 2021-09-25 VITALS — TEMPERATURE: 98 F | HEIGHT: 62 IN | WEIGHT: 206 LBS | BODY MASS INDEX: 37.91 KG/M2

## 2021-09-25 DIAGNOSIS — H92.02 LEFT EAR PAIN: Primary | ICD-10-CM

## 2021-09-25 PROCEDURE — 3008F BODY MASS INDEX DOCD: CPT | Performed by: OTOLARYNGOLOGY

## 2021-09-25 PROCEDURE — 99213 OFFICE O/P EST LOW 20 MIN: CPT | Performed by: OTOLARYNGOLOGY

## 2021-09-25 RX ORDER — CYCLOBENZAPRINE HCL 5 MG
5 TABLET ORAL NIGHTLY
Qty: 30 TABLET | Refills: 1 | Status: SHIPPED | OUTPATIENT
Start: 2021-09-25 | End: 2021-12-16 | Stop reason: ALTCHOICE

## 2021-09-25 RX ORDER — CELECOXIB 200 MG/1
200 CAPSULE ORAL DAILY PRN
Qty: 30 CAPSULE | Refills: 0 | Status: SHIPPED | OUTPATIENT
Start: 2021-09-25 | End: 2021-11-02

## 2021-09-25 NOTE — PROGRESS NOTES
Mary Judd is a 60year old female.   Patient presents with:  Ear Problem: per pt still having left ear pain, per pt comes and goes, shooting pain       HISTORY OF PRESENT ILLNESS  She presents with a history of 1 previous episode of otitis externa w primarily seen her ear when it is acutely swollen.  No other new signs, symptoms or complaints.  She did have Covid positivity on March 30 of 2021.     8/27/21 did well up until 2 days ago when she started having left-sided ear pain and narrowing of the ca Maternal Aunt         70s    • Heart Disease Maternal Aunt         CAD   • Heart Disease Maternal Aunt    • Other (osteoarthritis) Mother    • Other (psoriasis) Sister        Past Medical History:   Diagnosis Date   • Abnormal vaginal bleeding     polypect Parotid gland - Normal. Thyroid gland - Normal.   Eyes Normal Conjunctiva - Right: Normal, Left: Normal. Pupil - Right: Normal, Left: Normal. Fundus - Right: Normal, Left: Normal.   Neurological Normal Memory - Normal. Cranial nerves - Cranial nerves II th (500 mg total) by mouth 2 (two) times daily as needed for Pain., Disp: 60 tablet, Rfl: 1  •  Insulin Pen Needle (PEN NEEDLES) 32G X 4 MM Does not apply Misc, 1 each by Does not apply route daily. , Disp: 90 each, Rfl: 0  •  Phenazopyridine HCl 200 MG Oral T taking: Reported on 6/2/2021 ), Disp: 1 Tube, Rfl: 0  •  Insulin Pen Needle (PEN NEEDLES) 32G X 4 MM Does not apply Misc, 1 each by Does not apply route every 7 days. , Disp: 30 each, Rfl: 0  •  ERGOCALCIFEROL 1.25 MG (04827 UT) Oral Cap, TAKE 1 CAPSULE BY made to correct errors during dictation discrepancies may still exist    Diogo Alcantara MD    9/25/2021    8:45 AM

## 2021-10-11 ENCOUNTER — HOSPITAL ENCOUNTER (OUTPATIENT)
Dept: GENERAL RADIOLOGY | Facility: HOSPITAL | Age: 56
Discharge: HOME | End: 2021-10-11
Attending: INTERNAL MEDICINE
Payer: COMMERCIAL

## 2021-10-11 ENCOUNTER — OFFICE VISIT (OUTPATIENT)
Dept: RHEUMATOLOGY | Facility: CLINIC | Age: 56
End: 2021-10-11
Payer: COMMERCIAL

## 2021-10-11 ENCOUNTER — LAB ENCOUNTER (OUTPATIENT)
Dept: LAB | Facility: HOSPITAL | Age: 56
End: 2021-10-11
Attending: INTERNAL MEDICINE
Payer: COMMERCIAL

## 2021-10-11 VITALS — DIASTOLIC BLOOD PRESSURE: 87 MMHG | HEART RATE: 93 BPM | SYSTOLIC BLOOD PRESSURE: 154 MMHG

## 2021-10-11 DIAGNOSIS — Z51.81 THERAPEUTIC DRUG MONITORING: ICD-10-CM

## 2021-10-11 DIAGNOSIS — R30.0 DYSURIA: ICD-10-CM

## 2021-10-11 DIAGNOSIS — M79.7 FIBROMYALGIA: ICD-10-CM

## 2021-10-11 DIAGNOSIS — M54.50 ACUTE BILATERAL LOW BACK PAIN WITHOUT SCIATICA: Primary | ICD-10-CM

## 2021-10-11 DIAGNOSIS — M54.50 ACUTE BILATERAL LOW BACK PAIN WITHOUT SCIATICA: ICD-10-CM

## 2021-10-11 DIAGNOSIS — M35.1 MCTD (MIXED CONNECTIVE TISSUE DISEASE) (HCC): ICD-10-CM

## 2021-10-11 PROCEDURE — 99214 OFFICE O/P EST MOD 30 MIN: CPT | Performed by: INTERNAL MEDICINE

## 2021-10-11 PROCEDURE — 87086 URINE CULTURE/COLONY COUNT: CPT

## 2021-10-11 PROCEDURE — 3077F SYST BP >= 140 MM HG: CPT | Performed by: INTERNAL MEDICINE

## 2021-10-11 PROCEDURE — 96372 THER/PROPH/DIAG INJ SC/IM: CPT | Performed by: INTERNAL MEDICINE

## 2021-10-11 PROCEDURE — 81001 URINALYSIS AUTO W/SCOPE: CPT

## 2021-10-11 PROCEDURE — 3079F DIAST BP 80-89 MM HG: CPT | Performed by: INTERNAL MEDICINE

## 2021-10-11 PROCEDURE — 72100 X-RAY EXAM L-S SPINE 2/3 VWS: CPT | Performed by: INTERNAL MEDICINE

## 2021-10-11 RX ORDER — METHYLPREDNISOLONE 4 MG/1
TABLET ORAL
Qty: 1 EACH | Refills: 0 | Status: SHIPPED | OUTPATIENT
Start: 2021-10-11 | End: 2021-12-16 | Stop reason: ALTCHOICE

## 2021-10-11 RX ORDER — KETOROLAC TROMETHAMINE 30 MG/ML
30 INJECTION, SOLUTION INTRAMUSCULAR; INTRAVENOUS ONCE
Status: COMPLETED | OUTPATIENT
Start: 2021-10-11 | End: 2021-10-11

## 2021-10-11 RX ORDER — TRAMADOL HYDROCHLORIDE 50 MG/1
50 TABLET ORAL EVERY 8 HOURS PRN
Qty: 30 TABLET | Refills: 0 | Status: SHIPPED | OUTPATIENT
Start: 2021-10-11

## 2021-10-11 RX ORDER — CYCLOBENZAPRINE HCL 10 MG
10 TABLET ORAL 3 TIMES DAILY PRN
Qty: 30 TABLET | Refills: 0 | Status: SHIPPED | OUTPATIENT
Start: 2021-10-11 | End: 2021-12-20

## 2021-10-11 RX ORDER — CIPROFLOXACIN 500 MG/1
500 TABLET, FILM COATED ORAL 2 TIMES DAILY
Qty: 10 TABLET | Refills: 0 | Status: SHIPPED | OUTPATIENT
Start: 2021-10-11 | End: 2021-12-16 | Stop reason: ALTCHOICE

## 2021-10-11 RX ADMIN — KETOROLAC TROMETHAMINE 30 MG: 30 INJECTION, SOLUTION INTRAMUSCULAR; INTRAVENOUS at 15:45:00

## 2021-10-11 NOTE — PROGRESS NOTES
Patient receive today a KETOROLAC TROMETHAMINE 30 MG/ML injection given on Left Deltoid. Patient tolerated well with no complications.

## 2021-10-11 NOTE — PATIENT INSTRUCTIONS
1. Cont. hydroxychrouqine 200mg - once ad ay -   2. .increase  Cyclobenzaprine to 10 - 20mg or 10mg every 8 hours a sneeded for back flare  3. tordal shot x 1   4. Medrol andressa for back pain   - wait until you finsih antibiotics   5.   Eye exam once a year -

## 2021-10-11 NOTE — PROGRESS NOTES
Sanchez Anthony is a 60year old female who presents for Patient presents with: Follow - Up  Hip Pain  Back Pain  Shoulder Pain      HPI:     I had the pleasure of seeing Sanchez Anthony on 4/5/2017 for evaluation.  Referred here by Dr. Luly Bellamy and Dr. Willy Coronel has been having n/v. That' sbeen ahving a lot. She doesn's tknow if it's her IBS. seh didn't see GI yet. She got labs and us liver. She has been having loose bowel movement and goto the bathrrom and then she throws up. - did this easter Sunday.  Then ha Now she' sbetter with laying on it now. No rashes. She has 5/10 apin in her hips, legs and arms . 4/11/2018   She had a bad flare of neck pain 1-2 weeks ago. She felt advil and tyelnol wasn't helping.    She's been having improved right arm funcit doesn't have pain much at times and then it can come on suddenly. She had physical therapy for her neck years ago. 4/2/2019  Her hips and knee are bad. For the most part , she feels ok. But she gets bouts of pain. Her arms are much better.  She is sti it's was a few weeks ago she used it. 7/16/2021  She feels the last 2 weeks the pain has been unbearable in her elbows /arms and her hips . Her arms are the most pain. She bought pain patches and not helping.    She has about 8/10 pain   She takes jessica METFORMIN HCL  MG Oral Tablet 24 Hr TAKE 1 TABLET(750 MG) BY MOUTH TWICE DAILY WITH MEALS 120 tablet 0   • LOSARTAN POTASSIUM-HCTZ 100-25 MG Oral Tab TAKE 1 TABLET BY MOUTH DAILY 90 tablet 1   • pregabalin 50 MG Oral Cap Take 1 capsule (50 mg total) 150 MG Oral Capsule SR 24 Hr TAKE 1 CAPSULE(150 MG) BY MOUTH DAILY 90 capsule 1   • Betamethasone Dipropionate Aug 0.05 % External Ointment Apply by topical route every day to the affected area(s); do not exceed 45 grams per week.  (Patient not taking: Repo Management:  surgery in 12/2009.    • Visual impairment       Past Surgical History:   Procedure Laterality Date   • BACK SURGERY     • COLONOSCOPY  2014   • HYSTEROSCOPY,DIAGNOSTIC  08/2006    polypectomy   • OTHER SURGICAL HISTORY  12/2009    Surgery (due disease, had echo long time ago - thought she had skipped beat - not recently - on [facility] -   RS: No SOB, no Cough, No Pleurtic pain,   GI: ocla vomiiting, no abominal pain, no hx of ulcer, no gastritis, no heartburn, no dyshpagia, Negative  Moderate (A)   ASCORBIC ACID      Negative mg/dL  Negative   SQUAM EPI CELLS UR        Few   WBC      0-5 /HPF  4   RBC      0-3 /HPF  1   BACTERIA      None Seen /HPF  Few (A)   AST (SGOT)      15-41 U/L  24   ALT (SGPT)      14-54 U/L  28   ALK Granulocyte Absolute      0.00 - 1.00 x10(3) uL 0.05    Neutrophils %      % 55.2    Lymphocytes %      % 33.3    Monocytes %      % 8.7    Eosinophils %      % 1.5    Basophils %      % 0.7    Immature Granulocyte %      % 0.6    Glucose      70 - 99 mg/d Negative   SED RATE      0 - 30 mm/Hr  12   RHEUMATOID FACTOR      <15 IU/mL  <10   C-Citrullinated Peptide IgG AB      0.0 - 6.9 U/mL  1.1   C-REACTIVE PROTEIN      <0.30 mg/dL  <0.29   RAINE SCREEN WITH REFLEX (S)      Negative  Positive (A)   Anti Double with:  Follow - Up  Hip Pain  Back Pain  Shoulder Pain      1.   MCTD  -   Increasing fatigue -   Repeat RAINE is positive 1:160   - cont.   hydroxychlrqouien 200mg one day -  Can increase to twice a day to see if that helps with fatigue -   She had a flare u gabapentin helped her in the past - no side effects -   6. Eye exam - dr. Leonard Cardenas - yearly  - 11/2020 -   7. Depression - on venlafaxine -   8. palpaitions - f/u cards, cont. Metoprolol   9. covid vaccine - 2 weeks ago - waiting 90 days after covid   10.  Lisa Pump

## 2021-10-12 ENCOUNTER — TELEPHONE (OUTPATIENT)
Dept: RHEUMATOLOGY | Facility: CLINIC | Age: 56
End: 2021-10-12

## 2021-10-12 NOTE — TELEPHONE ENCOUNTER
PA submitted and approved via [employer]    Effective Date  10/12/2021  Expiration Date  04/10/2022    #: [id number]    Informed pharmacy. They stated it was already processed by her second insurance on file. dora  Immediate Brief Procedure Note    Patient: Max Mendes    Pre-op Dx:  CMP, Bradycardia 70% RV pacing    Post-op Dx:  Same     Procedure: Left  BIV ICD upgrade    Surgeon:   Dr. Maldonado    Assistants: David Keating MD     Anesthesia Staff: No anesthesia staff entered.    Anesthesia Type:  Moderate sedation     Findings:  none    Estimated Blood Loss:  20 cc     Complications:  None     Specimens Removed:  none

## 2021-10-12 NOTE — TELEPHONE ENCOUNTER
Current Outpatient Medications   Medication Sig Dispense Refill   • traMADol 50 MG Oral Tab Take 1 tablet (50 mg total) by mouth every 8 (eight) hours as needed for Pain. 30 tablet 0       Go.PedidosYa / PedidosJÃ¡/login    Key:  HARRISON

## 2021-11-01 RX ORDER — VENLAFAXINE HYDROCHLORIDE 150 MG/1
CAPSULE, EXTENDED RELEASE ORAL
Qty: 90 CAPSULE | Refills: 1 | Status: SHIPPED | OUTPATIENT
Start: 2021-11-01

## 2021-11-02 RX ORDER — CELECOXIB 200 MG/1
CAPSULE ORAL
Qty: 30 CAPSULE | Refills: 0 | Status: SHIPPED | OUTPATIENT
Start: 2021-11-02 | End: 2021-12-16 | Stop reason: ALTCHOICE

## 2021-11-02 NOTE — TELEPHONE ENCOUNTER
This refill request is being sent to the provider for the following reason:  []Patient has not had an appointment within the past 12 months but has made an appointment on: ___  []Medication is not within protocol  [x]Patient did not complete follow up vandana

## 2021-12-01 ENCOUNTER — HOSPITAL ENCOUNTER (EMERGENCY)
Facility: HOSPITAL | Age: 56
Discharge: HOME | End: 2021-12-01
Attending: EMERGENCY MEDICINE
Payer: COMMERCIAL

## 2021-12-01 ENCOUNTER — APPOINTMENT (OUTPATIENT)
Dept: CT IMAGING | Facility: HOSPITAL | Age: 56
End: 2021-12-01
Attending: EMERGENCY MEDICINE
Payer: COMMERCIAL

## 2021-12-01 ENCOUNTER — HOSPITAL ENCOUNTER (OUTPATIENT)
Age: 56
Discharge: ANOTHER HOSPITAL | End: 2021-12-01
Payer: COMMERCIAL

## 2021-12-01 VITALS
OXYGEN SATURATION: 99 % | HEART RATE: 90 BPM | BODY MASS INDEX: 37.73 KG/M2 | RESPIRATION RATE: 16 BRPM | TEMPERATURE: 97 F | HEIGHT: 62 IN | DIASTOLIC BLOOD PRESSURE: 81 MMHG | SYSTOLIC BLOOD PRESSURE: 153 MMHG | WEIGHT: 205 LBS

## 2021-12-01 VITALS
TEMPERATURE: 98 F | WEIGHT: 205 LBS | BODY MASS INDEX: 37.73 KG/M2 | OXYGEN SATURATION: 99 % | SYSTOLIC BLOOD PRESSURE: 138 MMHG | DIASTOLIC BLOOD PRESSURE: 84 MMHG | HEART RATE: 78 BPM | RESPIRATION RATE: 16 BRPM | HEIGHT: 62 IN

## 2021-12-01 DIAGNOSIS — R10.9 RIGHT FLANK PAIN: ICD-10-CM

## 2021-12-01 DIAGNOSIS — R10.821 RIGHT UPPER QUADRANT ABDOMINAL TENDERNESS WITH REBOUND TENDERNESS: Primary | ICD-10-CM

## 2021-12-01 DIAGNOSIS — R10.9 ABDOMINAL PAIN, ACUTE: Primary | ICD-10-CM

## 2021-12-01 PROCEDURE — 85025 COMPLETE CBC W/AUTO DIFF WBC: CPT

## 2021-12-01 PROCEDURE — 83690 ASSAY OF LIPASE: CPT | Performed by: EMERGENCY MEDICINE

## 2021-12-01 PROCEDURE — 96361 HYDRATE IV INFUSION ADD-ON: CPT

## 2021-12-01 PROCEDURE — 85025 COMPLETE CBC W/AUTO DIFF WBC: CPT | Performed by: EMERGENCY MEDICINE

## 2021-12-01 PROCEDURE — 99215 OFFICE O/P EST HI 40 MIN: CPT | Performed by: NURSE PRACTITIONER

## 2021-12-01 PROCEDURE — 80053 COMPREHEN METABOLIC PANEL: CPT | Performed by: EMERGENCY MEDICINE

## 2021-12-01 PROCEDURE — 96374 THER/PROPH/DIAG INJ IV PUSH: CPT

## 2021-12-01 PROCEDURE — 81003 URINALYSIS AUTO W/O SCOPE: CPT | Performed by: EMERGENCY MEDICINE

## 2021-12-01 PROCEDURE — 99284 EMERGENCY DEPT VISIT MOD MDM: CPT

## 2021-12-01 PROCEDURE — 81002 URINALYSIS NONAUTO W/O SCOPE: CPT | Performed by: NURSE PRACTITIONER

## 2021-12-01 PROCEDURE — 80053 COMPREHEN METABOLIC PANEL: CPT

## 2021-12-01 PROCEDURE — 74177 CT ABD & PELVIS W/CONTRAST: CPT | Performed by: EMERGENCY MEDICINE

## 2021-12-01 RX ORDER — ONDANSETRON 4 MG/1
4 TABLET, ORALLY DISINTEGRATING ORAL EVERY 4 HOURS PRN
Qty: 10 TABLET | Refills: 0 | Status: SHIPPED | OUTPATIENT
Start: 2021-12-01 | End: 2021-12-08

## 2021-12-01 RX ORDER — ONDANSETRON 2 MG/ML
4 INJECTION INTRAMUSCULAR; INTRAVENOUS ONCE
Status: COMPLETED | OUTPATIENT
Start: 2021-12-01 | End: 2021-12-01

## 2021-12-01 NOTE — ED PROVIDER NOTES
Patient Seen in: [facility] AND [facility] Emergency Department      History   Patient presents with:  Abdominal Pain    Stated Complaint: vommitng      Subjective:   HPI    10-year-old female presents for evaluation for nausea vomiting and abdominal pain.   Sushila sounds. Pulmonary:      Effort: Pulmonary effort is normal. No respiratory distress. Breath sounds: Normal breath sounds and air entry. Abdominal:      General: Abdomen is flat. Bowel sounds are normal.      Palpations: Abdomen is soft.       Tende ORAL (ER)  COMPARISON: [facility] [address] (PMQ=[id number]), 8/19/2019, 4:17 PM.   LIVER (CPT=[id number]), 1/08/2019, 7:13 AM.  [facility] [place], [place] LIVER (KLI=[id number]), 2/23/2021, 7:30 AM.  [facility], VASCULATURE:   No aneurysm is detected. There is mild calcific atherosclerosis. RETROPERITONEUM: No mass or lymphadenopathy is apparent. BONES:   There is stable mild anterolisthesis of L5 on S1.   There are stable postoperative changes from a remote lami 12/1/2021  2:20 PM    START taking these medications    ondansetron 4 MG Oral Tablet Dispersible  Take 1 tablet (4 mg total) by mouth every 4 (four) hours as needed for Nausea., Normal, Disp-10 tablet, R-0

## 2021-12-01 NOTE — ED PROVIDER NOTES
Patient Seen in: [facility] [place]    History   Patient presents with:  Abdomen/Flank Pain    Stated Complaint: vomitting/low back pain    HPI    Chief complaint: Back pain    History of present illness:   The patient complains of back pain, that began methylPREDNISolone 4 MG Oral Tablet Therapy Pack,  Take as directed on package. cyclobenzaprine 10 MG Oral Tab,  Take 1 tablet (10 mg total) by mouth 3 (three) times daily as needed for Muscle spasms.    ciprofloxacin 500 MG Oral Tab,  Take 1 tablet (500 each by Does not apply route every 7 days. ERGOCALCIFEROL 1.25 MG (25838 UT) Oral Cap,  TAKE 1 CAPSULE BY MOUTH 1 TIME A WEEK FOR 12 DOSES   Estrogens, Conjugated 0.625 MG/GM Vaginal Cream,  Place 0.5 g vaginally twice a week.   Patient not taking: No sig Packs/day: 0.00        Types: Cigarettes        Quit date: 1989        Years since quittin.8      Smokeless tobacco: Never Used    Vaping Use      Vaping Use: Never used    Alcohol use:  Yes      Alcohol/week: 1.0 standard drink      Types: 1 Glas full range of motion in bilateral lower extremities, no edema, 2+ distal pulses         ED Course     Labs Reviewed   EMH POCT URINALYSIS DIPSTICK - Abnormal; Notable for the following components:       Result Value    Leukocyte esterase urine Small (*)

## 2021-12-01 NOTE — ED INITIAL ASSESSMENT (HPI)
SENT FROM  FOR EVALUATION OF RIGHT FLANK PAINS AND VOMITING X 3 LAST NIGHT.  PATIENT STATES WHEN THEY PUSHED ON HER RUQ TODAY, SHE HAD PAIN

## 2021-12-01 NOTE — Clinical Note
Transferred to [facility] Emergency Department for a higher level of care.    911 offered and declined, pt will drive to ER

## 2021-12-16 ENCOUNTER — TELEPHONE (OUTPATIENT)
Dept: INTERNAL MEDICINE CLINIC | Facility: CLINIC | Age: 56
End: 2021-12-16

## 2021-12-16 ENCOUNTER — TELEPHONE (OUTPATIENT)
Dept: GASTROENTEROLOGY | Facility: CLINIC | Age: 56
End: 2021-12-16

## 2021-12-16 ENCOUNTER — OFFICE VISIT (OUTPATIENT)
Dept: INTERNAL MEDICINE CLINIC | Facility: CLINIC | Age: 56
End: 2021-12-16
Payer: COMMERCIAL

## 2021-12-16 VITALS
OXYGEN SATURATION: 99 % | DIASTOLIC BLOOD PRESSURE: 88 MMHG | BODY MASS INDEX: 36.8 KG/M2 | WEIGHT: 200 LBS | SYSTOLIC BLOOD PRESSURE: 126 MMHG | HEART RATE: 89 BPM | HEIGHT: 62 IN

## 2021-12-16 DIAGNOSIS — R11.2 NAUSEA AND VOMITING, INTRACTABILITY OF VOMITING NOT SPECIFIED, UNSPECIFIED VOMITING TYPE: Primary | ICD-10-CM

## 2021-12-16 DIAGNOSIS — R51.9 NONINTRACTABLE HEADACHE, UNSPECIFIED CHRONICITY PATTERN, UNSPECIFIED HEADACHE TYPE: ICD-10-CM

## 2021-12-16 PROBLEM — K82.4 GALL BLADDER POLYP: Status: RESOLVED | Noted: 2021-01-13 | Resolved: 2021-12-16

## 2021-12-16 PROCEDURE — 3074F SYST BP LT 130 MM HG: CPT | Performed by: FAMILY MEDICINE

## 2021-12-16 PROCEDURE — 99214 OFFICE O/P EST MOD 30 MIN: CPT | Performed by: FAMILY MEDICINE

## 2021-12-16 PROCEDURE — 3008F BODY MASS INDEX DOCD: CPT | Performed by: FAMILY MEDICINE

## 2021-12-16 PROCEDURE — 3079F DIAST BP 80-89 MM HG: CPT | Performed by: FAMILY MEDICINE

## 2021-12-16 RX ORDER — METFORMIN HYDROCHLORIDE 750 MG/1
750 TABLET, EXTENDED RELEASE ORAL
Qty: 120 TABLET | Refills: 0 | COMMUNITY
Start: 2021-12-16 | End: 2022-01-31

## 2021-12-16 RX ORDER — ONDANSETRON 4 MG/1
4 TABLET, FILM COATED ORAL EVERY 8 HOURS PRN
Qty: 20 TABLET | Refills: 0 | Status: SHIPPED | OUTPATIENT
Start: 2021-12-16

## 2021-12-16 NOTE — TELEPHONE ENCOUNTER
Patient was sick one day last week  Thought she possibly had a UTI and went to the [facility]mediate [facility]. They then sent her to the ER as they were concerned about her liver and gallbladder.     ER doctor did a CT scan as the doctor thought it could be her append

## 2021-12-16 NOTE — PROGRESS NOTES
Ameya Hickey NOTE    HPI  James Lugo is a 60year old female presenting for     #N/V  #HA  -seen in ED 12/1/21  -CBC Hgb 11.9  -CMP normal, UA normal, lipase normal  -CT abd pelvis 1. No acute intra-abdominal process.  2. Stable mild hepati no SOB  CV: No chest pain, no palpitations  GI: No abd pain, no N/V/D  MSK: No edema  SKIN: No new rashes  NEURO: No numbness, no tingling, no headaches    HEALTH MAINTENANCE  Health Maintenance Topics with due status: Overdue       Topic Date Due    Diego tablet 0   • Cranberry 125 MG Oral Tab Take by mouth. • Lactobacillus (PROBIOTIC ACIDOPHILUS OR) Take by mouth.      • FREESTYLE LANCETS Does not apply Misc   3       ACTIVE PROBLEMS  Patient Active Problem List:     NAFLD (nonalcoholic fatty liver dise Yes        Types: Cannabis        Comment: occ      Sexual activity: Yes        Partners: Male    Other Topics      Concerns:         Service: Not Asked        Blood Transfusions: Not Asked        Caffeine Concern: Yes          (Coffee, Soda) 2 cup (psoriasis) Sister          PHYSICAL EXAM   12/16/21  1256   BP: 126/88   Pulse: 89   SpO2: 99%   Weight: 200 lb (90.7 kg)   Height: 5' 2\" (1.575 m)      Body mass index is 36.58 kg/m².     GENERAL: NAD  HEENT: Moist mucous membranes, no tonsillar swelling vomiting - Primary     Nausea and vomiting of unclear etiology. Denies any recent changes in medication or any recent changes in dosage of any medication. Has had an unremarkable work-up in the emergency department including labs and CT abdomen.   Zofran minutes    My total time spent caring for the patient on the day of the encounter: 38 minutes

## 2021-12-16 NOTE — ASSESSMENT & PLAN NOTE
Headaches that started after history of nausea and vomiting. Does have some minor phonophobia. Unclear if this is tension related. Does not quite seem like a migraine headache. Could also be related to dehydration.   Cranial nerve examination in the leoncio

## 2021-12-16 NOTE — TELEPHONE ENCOUNTER
Symptoms: Nausea/vomiting with severe headache. Prior to emesis gets a sudden wave of nausea and while vomiting has sweats. Two nights ago unable to even sip water without immediately vomiting. Vomiting about 2 -3 times daily now.    Patient had similar s

## 2021-12-16 NOTE — TELEPHONE ENCOUNTER
CSS-Please see recent referral message and assist in scheduling patient for an office visit with Tammie. Patient is calling to schedule an office visit, as advised by Dr. Bradly Smith at office visit appointment today.  Okay to utilize 1-5-22 res 24 apt at 3:3

## 2021-12-16 NOTE — ASSESSMENT & PLAN NOTE
Nausea and vomiting of unclear etiology. Denies any recent changes in medication or any recent changes in dosage of any medication. Has had an unremarkable work-up in the emergency department including labs and CT abdomen.   Zofran is helping, I will refi

## 2021-12-16 NOTE — PATIENT INSTRUCTIONS
PATIENT INSTRUCTIONS    • Thank you for seeing me today, it was a pleasure taking care of you. • Please check out at the  and schedule a follow up appointment. Return in about 2 weeks (around 12/30/2021) for follow up. • Try to stay hydrated.

## 2021-12-17 NOTE — TELEPHONE ENCOUNTER
Monday January 31, 2022  1:00 PM  Follow Up Visit with Roe Schmid, [address], [address] [facility] ([address]) [address] [place] [id number]  [phone]     Patient scheduled as seen above and on wa

## 2021-12-20 RX ORDER — CYCLOBENZAPRINE HCL 10 MG
10 TABLET ORAL 3 TIMES DAILY PRN
Qty: 30 TABLET | Refills: 1 | Status: SHIPPED | OUTPATIENT
Start: 2021-12-20 | End: 2022-01-29

## 2021-12-20 NOTE — TELEPHONE ENCOUNTER
Last filled: 10/11/21 #30 tab with 0 refills  LOV:  10/11/21  Future Appointments   Date Time Provider Erin Rosario   12/30/2021  2:00 PM Urvashi Morel MD [facility] [address]   1/31/2022  1:00 PM TONYA Reich [facility] Ange SANTIAGO

## 2021-12-30 ENCOUNTER — OFFICE VISIT (OUTPATIENT)
Dept: INTERNAL MEDICINE CLINIC | Facility: CLINIC | Age: 56
End: 2021-12-30
Payer: COMMERCIAL

## 2021-12-30 VITALS
HEIGHT: 62 IN | WEIGHT: 211.38 LBS | DIASTOLIC BLOOD PRESSURE: 80 MMHG | BODY MASS INDEX: 38.9 KG/M2 | HEART RATE: 91 BPM | OXYGEN SATURATION: 99 % | SYSTOLIC BLOOD PRESSURE: 142 MMHG

## 2021-12-30 DIAGNOSIS — R11.2 NAUSEA AND VOMITING, UNSPECIFIED VOMITING TYPE: ICD-10-CM

## 2021-12-30 DIAGNOSIS — K59.00 CONSTIPATION, UNSPECIFIED CONSTIPATION TYPE: ICD-10-CM

## 2021-12-30 DIAGNOSIS — R51.9 NONINTRACTABLE HEADACHE, UNSPECIFIED CHRONICITY PATTERN, UNSPECIFIED HEADACHE TYPE: Primary | ICD-10-CM

## 2021-12-30 DIAGNOSIS — E66.9 OBESITY (BMI 30-39.9): ICD-10-CM

## 2021-12-30 PROCEDURE — 3077F SYST BP >= 140 MM HG: CPT | Performed by: FAMILY MEDICINE

## 2021-12-30 PROCEDURE — 3008F BODY MASS INDEX DOCD: CPT | Performed by: FAMILY MEDICINE

## 2021-12-30 PROCEDURE — 99213 OFFICE O/P EST LOW 20 MIN: CPT | Performed by: FAMILY MEDICINE

## 2021-12-30 PROCEDURE — 3079F DIAST BP 80-89 MM HG: CPT | Performed by: FAMILY MEDICINE

## 2021-12-30 NOTE — ASSESSMENT & PLAN NOTE
Nausea and vomiting also improved at this time. It is possible that she might of had some sort of infection that she has now started to recover from. She has appointment with GI coming up as well–keep this appointment and follow-up with him.

## 2021-12-30 NOTE — PATIENT INSTRUCTIONS
PATIENT INSTRUCTIONS    • Thank you for seeing me today, it was a pleasure taking care of you. • Please check out at the  and schedule a follow up appointment. Return if symptoms worsen or fail to improve.   • Continue to use your headache journ

## 2021-12-30 NOTE — ASSESSMENT & PLAN NOTE
Exercise and diet advised. Can restart using kike such as my fitness pal or weight watchers.   With exercise, I advised that she start slow with low intensity exercises so as not to injure herself

## 2021-12-30 NOTE — ASSESSMENT & PLAN NOTE
Headaches are improved at this time. Unclear etiology for the headaches–possibly stress related, possibly malnourishment, possibly viral infection. Continue stay well-hydrated. Continue headache journal.  Continue to find ways to reduce stress.   Can hol Trauma Surgery Transplant Surgery

## 2021-12-30 NOTE — PROGRESS NOTES
Liban Navarro NOTE    HPI  Mickey Vallejo is a 60year old female presenting for     #N/V  #HA  12/16/21  -seen in ED 12/1/21  -CBC Hgb 11.9  -CMP normal, UA normal, lipase normal  -CT abd pelvis 1. No acute intra-abdominal process.  2. Stable m away - just lays down  -tries not to take advil  -wonders if this is a bowel situation - constipation  -nausea improved - last on Sunday  -zofran was helpful for nausea the first week          ROS  GENERAL: No fever/chills, no recent weight loss  HEENT: No 2 (two) times a day. • losartan 100 MG Oral Tab Take 100 mg by mouth daily. 1   • Insulin Pen Needle (PEN NEEDLES) 32G X 4 MM Does not apply Misc 1 each by Does not apply route daily.  90 each 0   • Phenazopyridine HCl 200 MG Oral Tab Take 1 tablet (2 Quit date: 1989        Years since quittin.9      Smokeless tobacco: Never Used    Vaping Use      Vaping Use: Never used    Substance and Sexual Activity      Alcohol use:  Yes        Alcohol/week: 1.0 standard drink        Types: 1 Glasses of w age 50   • Diabetes Father    • Stroke Maternal Grandmother         Cerebrovascular accident (Stroke)   • Heart Disease Paternal Grandmother         CAD   • Breast Cancer Maternal Aunt         76s    • Heart Disease Maternal Aunt         CAD   • Heart Dise Results   Component Value Date    CHOLEST 225 (H) 02/23/2021    TRIG 119 02/23/2021    HDL 61 (H) 02/23/2021     (H) 02/23/2021    VLDL 24 02/23/2021    NONHDLC 164 (H) 02/23/2021        DIAGNOSTICS      ASSESSMENT/PLAN  Problem List Items Addressed

## 2021-12-30 NOTE — ASSESSMENT & PLAN NOTE
She now states that she is intermittently constipated. Advise staying well-hydrated, increasing fiber intake, exercise. Can use Metamucil or Citrucel. If severe, can also try MiraLAX as needed.   Follow up as needed

## 2022-01-12 NOTE — PROGRESS NOTES
[facility] Follow-up Visit    Sushila continue omeprazole 20 mg daily     January 2014 colonoscopy performed by Dr. Sudeep Garza with IV twilight related to colon cancer screening, findings: Normal colonoscopy to the terminal ileum, internal hemorrhoids, 10-year recall     Social Hx:  + Former smoker Alcohol use:  Yes      Alcohol/week: 1.0 standard drink      Types: 1 Glasses of wine per week      Comment: Occasionally wine    Drug use: Yes      Types: Cannabis      Comment: occ       Medications (Active prior to today's visit):  Current Outpatient Med UNKNOWN    ROS:   CONSTITUTIONAL:  negative for fevers, chills  EYES:  negative for change in vision  RESPIRATORY:  negative for  shortness of breath  CARDIOVASCULAR:  negative for  chest pain  GASTROINTESTINAL:  see HPI  GENITOURINARY:  negative for dysur history of episodic nausea/vomiting and dyspepsia. She has a prior history of this in 2019 with similar presentation. s/s responded well to PPIs. I would recommend a baseline H. pylori test and if negative, starting pantoprazole 40 mg daily x3 months.  If s

## 2022-01-22 ENCOUNTER — IMMUNIZATION (OUTPATIENT)
Dept: LAB | Facility: HOSPITAL | Age: 57
End: 2022-01-22
Attending: EMERGENCY MEDICINE
Payer: COMMERCIAL

## 2022-01-22 DIAGNOSIS — Z23 NEED FOR VACCINATION: Primary | ICD-10-CM

## 2022-01-22 PROCEDURE — 0003A SARSCOV2 VAC 30MCG/0.3ML IM: CPT | Performed by: NURSE PRACTITIONER

## 2022-01-26 ENCOUNTER — OFFICE VISIT (OUTPATIENT)
Dept: GASTROENTEROLOGY | Facility: CLINIC | Age: 57
End: 2022-01-26
Payer: COMMERCIAL

## 2022-01-26 VITALS
HEIGHT: 62 IN | WEIGHT: 211 LBS | DIASTOLIC BLOOD PRESSURE: 93 MMHG | HEART RATE: 90 BPM | BODY MASS INDEX: 38.83 KG/M2 | SYSTOLIC BLOOD PRESSURE: 155 MMHG

## 2022-01-26 DIAGNOSIS — K59.00 CONSTIPATION, UNSPECIFIED CONSTIPATION TYPE: ICD-10-CM

## 2022-01-26 DIAGNOSIS — R10.13 DYSPEPSIA: ICD-10-CM

## 2022-01-26 DIAGNOSIS — R11.2 NAUSEA AND VOMITING, UNSPECIFIED VOMITING TYPE: Primary | ICD-10-CM

## 2022-01-26 PROCEDURE — 3080F DIAST BP >= 90 MM HG: CPT | Performed by: NURSE PRACTITIONER

## 2022-01-26 PROCEDURE — 3077F SYST BP >= 140 MM HG: CPT | Performed by: NURSE PRACTITIONER

## 2022-01-26 PROCEDURE — 99214 OFFICE O/P EST MOD 30 MIN: CPT | Performed by: NURSE PRACTITIONER

## 2022-01-26 PROCEDURE — 3008F BODY MASS INDEX DOCD: CPT | Performed by: NURSE PRACTITIONER

## 2022-01-26 RX ORDER — LOSARTAN POTASSIUM AND HYDROCHLOROTHIAZIDE 25; 100 MG/1; MG/1
1 TABLET ORAL DAILY
COMMUNITY
Start: 2021-12-05

## 2022-01-29 RX ORDER — CYCLOBENZAPRINE HCL 10 MG
TABLET ORAL
Qty: 30 TABLET | Refills: 1 | Status: SHIPPED | OUTPATIENT
Start: 2022-01-29

## 2022-01-29 NOTE — TELEPHONE ENCOUNTER
Requested Prescriptions     Pending Prescriptions Disp Refills   • CYCLOBENZAPRINE 10 MG Oral Tab [Pharmacy Med Name: CYCLOBENZAPRINE 10MG TABLETS] 30 tablet 1     Sig: TAKE 1 TABLET(10 MG) BY MOUTH THREE TIMES DAILY AS NEEDED FOR MUSCLE SPASMS     LF: 12/ mg/dL 0.3   PROTEIN, TOTAL      6.4 - 8.2 g/dL 7.8   Albumin      3.4 - 5.0 g/dL 4.0   Globulin      2.8 - 4.4 g/dL 3.8   A/G Ratio      1.0 - 2.0 1.1       Summary:     1. Cont. hydroxychrouqine 200mg - once ad ay -   2.  .increase  Cyclobenzaprine to 10 -

## 2022-01-31 RX ORDER — METFORMIN HYDROCHLORIDE 750 MG/1
TABLET, EXTENDED RELEASE ORAL
Qty: 120 TABLET | Refills: 0 | Status: SHIPPED | OUTPATIENT
Start: 2022-01-31

## 2022-01-31 NOTE — TELEPHONE ENCOUNTER
Requested Prescriptions     Pending Prescriptions Disp Refills   • METFORMIN HCL  MG Oral Tablet 24 Hr [Pharmacy Med Name: METFORMIN ER 750MG 24HR TABS] 120 tablet 0     Sig: TAKE 1 TABLET(750 MG) BY MOUTH TWICE DAILY WITH MEALS     Last office visit

## 2022-02-18 ENCOUNTER — TELEPHONE (OUTPATIENT)
Dept: INTERNAL MEDICINE CLINIC | Facility: CLINIC | Age: 57
End: 2022-02-18

## 2022-02-18 NOTE — TELEPHONE ENCOUNTER
LVM:  Please call office to make appointment with PCP. There are a few appts open for Monday at this time. Also, please do the HPylori test asap as this tests for ulcers which could cause some of the symptoms she is experiencing.

## 2022-02-18 NOTE — TELEPHONE ENCOUNTER
----- Message from Chi Marsh sent at 2/17/2022  7:01 PM CST -----  Regarding: Not feeling right  Was hoping to hear from someone by now. not sure if I should be concerned.

## 2022-02-21 ENCOUNTER — OFFICE VISIT (OUTPATIENT)
Dept: INTERNAL MEDICINE CLINIC | Facility: CLINIC | Age: 57
End: 2022-02-21
Payer: COMMERCIAL

## 2022-02-21 ENCOUNTER — PATIENT MESSAGE (OUTPATIENT)
Dept: INTERNAL MEDICINE CLINIC | Facility: CLINIC | Age: 57
End: 2022-02-21

## 2022-02-21 VITALS
OXYGEN SATURATION: 98 % | SYSTOLIC BLOOD PRESSURE: 134 MMHG | WEIGHT: 204 LBS | DIASTOLIC BLOOD PRESSURE: 88 MMHG | BODY MASS INDEX: 37.54 KG/M2 | HEIGHT: 62 IN | HEART RATE: 84 BPM

## 2022-02-21 DIAGNOSIS — K76.0 NAFLD (NONALCOHOLIC FATTY LIVER DISEASE): ICD-10-CM

## 2022-02-21 DIAGNOSIS — R82.90 FOUL SMELLING URINE: ICD-10-CM

## 2022-02-21 DIAGNOSIS — E78.5 HYPERLIPIDEMIA, UNSPECIFIED HYPERLIPIDEMIA TYPE: ICD-10-CM

## 2022-02-21 DIAGNOSIS — J02.9 SORE THROAT: ICD-10-CM

## 2022-02-21 DIAGNOSIS — I10 ESSENTIAL HYPERTENSION: ICD-10-CM

## 2022-02-21 DIAGNOSIS — R82.90 ABNORMAL URINE: ICD-10-CM

## 2022-02-21 DIAGNOSIS — R06.02 SOB (SHORTNESS OF BREATH) ON EXERTION: ICD-10-CM

## 2022-02-21 DIAGNOSIS — Z12.31 ENCOUNTER FOR SCREENING MAMMOGRAM FOR MALIGNANT NEOPLASM OF BREAST: ICD-10-CM

## 2022-02-21 DIAGNOSIS — R73.03 PREDIABETES: Primary | ICD-10-CM

## 2022-02-21 PROCEDURE — 99214 OFFICE O/P EST MOD 30 MIN: CPT | Performed by: INTERNAL MEDICINE

## 2022-02-21 PROCEDURE — 3075F SYST BP GE 130 - 139MM HG: CPT | Performed by: INTERNAL MEDICINE

## 2022-02-21 PROCEDURE — 3008F BODY MASS INDEX DOCD: CPT | Performed by: INTERNAL MEDICINE

## 2022-02-21 PROCEDURE — 3079F DIAST BP 80-89 MM HG: CPT | Performed by: INTERNAL MEDICINE

## 2022-02-21 RX ORDER — LANCETS 28 GAUGE
EACH MISCELLANEOUS
Qty: 100 EACH | Refills: 3 | Status: SHIPPED | OUTPATIENT
Start: 2022-02-21

## 2022-02-21 RX ORDER — PEN NEEDLE, DIABETIC 30 GX3/16"
1 NEEDLE, DISPOSABLE MISCELLANEOUS DAILY
Qty: 90 EACH | Refills: 0 | Status: SHIPPED | OUTPATIENT
Start: 2022-02-21

## 2022-02-21 NOTE — TELEPHONE ENCOUNTER
From: Miriam Brown  To: Su De La Cruz MD  Sent: 2/21/2022 10:07 AM CST  Subject: Visit today    I forgot to ask for an updated prescription on my needles and teasing strips to test my sugar today. Can it be sent to pharmacy?

## 2022-02-22 ENCOUNTER — NURSE ONLY (OUTPATIENT)
Dept: LAB | Age: 57
End: 2022-02-22
Attending: INTERNAL MEDICINE
Payer: COMMERCIAL

## 2022-02-22 DIAGNOSIS — I10 ESSENTIAL HYPERTENSION: ICD-10-CM

## 2022-02-22 DIAGNOSIS — R73.03 PREDIABETES: ICD-10-CM

## 2022-02-22 DIAGNOSIS — K76.0 NAFLD (NONALCOHOLIC FATTY LIVER DISEASE): ICD-10-CM

## 2022-02-22 DIAGNOSIS — Z12.31 ENCOUNTER FOR SCREENING MAMMOGRAM FOR MALIGNANT NEOPLASM OF BREAST: ICD-10-CM

## 2022-02-22 DIAGNOSIS — E78.5 HYPERLIPIDEMIA, UNSPECIFIED HYPERLIPIDEMIA TYPE: ICD-10-CM

## 2022-02-22 DIAGNOSIS — R82.90 ABNORMAL URINE: ICD-10-CM

## 2022-02-22 LAB
% SATURATION: 23 % (CALC) (ref 16–45)
ABSOLUTE BASOPHILS: 68 CELLS/UL (ref 0–200)
ABSOLUTE EOSINOPHILS: 99 CELLS/UL (ref 15–500)
ABSOLUTE LYMPHOCYTES: 2153 CELLS/UL (ref 850–3900)
ABSOLUTE MONOCYTES: 463 CELLS/UL (ref 200–950)
ABSOLUTE NEUTROPHILS: 2418 CELLS/UL (ref 1500–7800)
BASOPHILS: 1.3 %
CHOL/HDLC RATIO: 3.8 (CALC)
CHOLESTEROL, TOTAL: 225 MG/DL
CORTISOL, TOTAL: 15.1 MCG/DL
EOSINOPHILS: 1.9 %
FOLATE, SERUM: 16 NG/ML
HDL CHOLESTEROL: 59 MG/DL
HEMATOCRIT: 37.9 % (ref 35–45)
HEMOGLOBIN A1C: 6.8 % OF TOTAL HGB
HEMOGLOBIN: 12.6 G/DL (ref 11.7–15.5)
IRON BINDING CAPACITY: 429 MCG/DL (CALC) (ref 250–450)
IRON, TOTAL: 98 MCG/DL (ref 45–160)
LDL-CHOLESTEROL: 138 MG/DL (CALC)
LYMPHOCYTES: 41.4 %
MCH: 30.1 PG (ref 27–33)
MCHC: 33.2 G/DL (ref 32–36)
MCV: 90.7 FL (ref 80–100)
MONOCYTES: 8.9 %
MPV: 10.7 FL (ref 7.5–12.5)
NEUTROPHILS: 46.5 %
PLATELET COUNT: 361 THOUSAND/UL (ref 140–400)
RDW: 12.2 % (ref 11–15)
RED BLOOD CELL COUNT: 4.18 MILLION/UL (ref 3.8–5.1)
TRIGLYCERIDES: 152 MG/DL
TSH W/REFLEX TO FT4: 1.87 MIU/L (ref 0.4–4.5)
WHITE BLOOD CELL COUNT: 5.2 THOUSAND/UL (ref 3.8–10.8)

## 2022-02-23 LAB — SARS-COV-2 RNA RESP QL NAA+PROBE: NOT DETECTED

## 2022-02-24 RX ORDER — PEN NEEDLE, DIABETIC 30 GX3/16"
1 NEEDLE, DISPOSABLE MISCELLANEOUS
Qty: 30 EACH | Refills: 0 | Status: SHIPPED | OUTPATIENT
Start: 2022-02-24

## 2022-02-24 RX ORDER — ATORVASTATIN CALCIUM 20 MG/1
20 TABLET, FILM COATED ORAL NIGHTLY
Qty: 90 TABLET | Refills: 1 | Status: SHIPPED | OUTPATIENT
Start: 2022-02-24 | End: 2022-05-25

## 2022-02-24 RX ORDER — SEMAGLUTIDE 1.34 MG/ML
0.25 INJECTION, SOLUTION SUBCUTANEOUS
Qty: 4 ML | Refills: 0 | Status: SHIPPED | OUTPATIENT
Start: 2022-02-24 | End: 2022-03-24

## 2022-02-24 RX ORDER — CEPHALEXIN 500 MG/1
500 CAPSULE ORAL 3 TIMES DAILY
Qty: 21 CAPSULE | Refills: 0 | Status: SHIPPED | OUTPATIENT
Start: 2022-02-24 | End: 2022-03-03

## 2022-02-25 ENCOUNTER — TELEPHONE (OUTPATIENT)
Dept: GASTROENTEROLOGY | Facility: CLINIC | Age: 57
End: 2022-02-25

## 2022-02-25 RX ORDER — SULFAMETHOXAZOLE AND TRIMETHOPRIM 800; 160 MG/1; MG/1
1 TABLET ORAL 2 TIMES DAILY
Qty: 10 TABLET | Refills: 0 | Status: SHIPPED | OUTPATIENT
Start: 2022-02-25 | End: 2022-03-02

## 2022-03-03 ENCOUNTER — TELEPHONE (OUTPATIENT)
Dept: INTERNAL MEDICINE CLINIC | Facility: CLINIC | Age: 57
End: 2022-03-03

## 2022-03-03 NOTE — TELEPHONE ENCOUNTER
LVM:  Trying to follow-up on a prescription that pharmacy has been calling office for. Diabetic medication. Please call with update.

## 2022-03-07 ENCOUNTER — LAB ENCOUNTER (OUTPATIENT)
Dept: LAB | Facility: HOSPITAL | Age: 57
End: 2022-03-07
Attending: INTERNAL MEDICINE
Payer: COMMERCIAL

## 2022-03-07 ENCOUNTER — HOSPITAL ENCOUNTER (OUTPATIENT)
Dept: MAMMOGRAPHY | Facility: HOSPITAL | Age: 57
Discharge: HOME | End: 2022-03-07
Attending: INTERNAL MEDICINE
Payer: COMMERCIAL

## 2022-03-07 DIAGNOSIS — Z12.31 ENCOUNTER FOR SCREENING MAMMOGRAM FOR MALIGNANT NEOPLASM OF BREAST: ICD-10-CM

## 2022-03-07 DIAGNOSIS — K76.0 NAFLD (NONALCOHOLIC FATTY LIVER DISEASE): ICD-10-CM

## 2022-03-07 DIAGNOSIS — R73.03 PREDIABETES: ICD-10-CM

## 2022-03-07 DIAGNOSIS — E78.5 HYPERLIPIDEMIA, UNSPECIFIED HYPERLIPIDEMIA TYPE: ICD-10-CM

## 2022-03-07 DIAGNOSIS — I10 ESSENTIAL HYPERTENSION: ICD-10-CM

## 2022-03-07 PROCEDURE — 93005 ELECTROCARDIOGRAM TRACING: CPT

## 2022-03-07 PROCEDURE — 93010 ELECTROCARDIOGRAM REPORT: CPT | Performed by: INTERNAL MEDICINE

## 2022-03-07 PROCEDURE — 77067 SCR MAMMO BI INCL CAD: CPT | Performed by: INTERNAL MEDICINE

## 2022-03-07 PROCEDURE — 77063 BREAST TOMOSYNTHESIS BI: CPT | Performed by: INTERNAL MEDICINE

## 2022-03-08 ENCOUNTER — HOSPITAL ENCOUNTER (OUTPATIENT)
Dept: ENDOCRINOLOGY | Facility: HOSPITAL | Age: 57
Discharge: HOME | End: 2022-03-08
Attending: INTERNAL MEDICINE
Payer: COMMERCIAL

## 2022-03-08 VITALS — WEIGHT: 204.13 LBS | BODY MASS INDEX: 37 KG/M2

## 2022-03-08 DIAGNOSIS — E11.9 DIABETES MELLITUS, NEW ONSET (HCC): Primary | ICD-10-CM

## 2022-03-10 NOTE — TELEPHONE ENCOUNTER
Left ear completely clogged. Inside of ear closed. Same as it was with the right ear a few months ago. Does she need to be seen or can you prescribe same thing she had last time? Also left a mychart message regarding same. None known

## 2022-03-12 PROCEDURE — 87338 HPYLORI STOOL AG IA: CPT | Performed by: NURSE PRACTITIONER

## 2022-03-14 ENCOUNTER — LAB ENCOUNTER (OUTPATIENT)
Dept: LAB | Facility: HOSPITAL | Age: 57
End: 2022-03-14
Attending: NURSE PRACTITIONER
Payer: COMMERCIAL

## 2022-03-16 ENCOUNTER — APPOINTMENT (OUTPATIENT)
Dept: ENDOCRINOLOGY | Facility: HOSPITAL | Age: 57
End: 2022-03-16
Attending: INTERNAL MEDICINE
Payer: COMMERCIAL

## 2022-03-16 LAB — HELICOBACTER PYLORI AG, FECAL: NEGATIVE

## 2022-03-23 ENCOUNTER — APPOINTMENT (OUTPATIENT)
Dept: ENDOCRINOLOGY | Facility: HOSPITAL | Age: 57
End: 2022-03-23
Attending: INTERNAL MEDICINE
Payer: COMMERCIAL

## 2022-04-08 ENCOUNTER — PATIENT MESSAGE (OUTPATIENT)
Dept: INTERNAL MEDICINE CLINIC | Facility: CLINIC | Age: 57
End: 2022-04-08

## 2022-04-09 NOTE — TELEPHONE ENCOUNTER
From: Camila Mederos  To: Sobia Marsh MD  Sent: 4/8/2022 5:23 PM CDT  Subject: New patient     Hi Doc, just wondering if you are taking new patients? My sons gf is living with us she moved here from [place] and needs a doctor.  If you can let me know that would be awesome

## 2022-04-12 ENCOUNTER — TELEPHONE (OUTPATIENT)
Dept: ENDOCRINOLOGY | Facility: HOSPITAL | Age: 57
End: 2022-04-12

## 2022-04-12 NOTE — TELEPHONE ENCOUNTER
Your patient Jeanmarie Goodson (9/14/1965) was recently seen at the [address] [facility] for Self-Management Training classes. She is requesting individual appointments due to schedule. Due to insurance requirements, we are requesting a new order. If you are in agreement with this plan, please sign the pended  order within [facility] for Diabetes Self Management Training Classes -individual.    Thank you for your assistance. Please call with any questions.     Monika Chau RN BSN [facility]  [phone]

## 2022-04-13 ENCOUNTER — APPOINTMENT (OUTPATIENT)
Dept: ENDOCRINOLOGY | Facility: HOSPITAL | Age: 57
End: 2022-04-13
Attending: INTERNAL MEDICINE
Payer: COMMERCIAL

## 2022-04-20 ENCOUNTER — HOSPITAL ENCOUNTER (OUTPATIENT)
Dept: ENDOCRINOLOGY | Facility: HOSPITAL | Age: 57
Discharge: HOME | End: 2022-04-20
Attending: INTERNAL MEDICINE
Payer: COMMERCIAL

## 2022-04-20 ENCOUNTER — APPOINTMENT (OUTPATIENT)
Dept: ENDOCRINOLOGY | Facility: HOSPITAL | Age: 57
End: 2022-04-20
Attending: INTERNAL MEDICINE
Payer: COMMERCIAL

## 2022-04-20 VITALS — BODY MASS INDEX: 37 KG/M2 | WEIGHT: 201 LBS

## 2022-04-20 DIAGNOSIS — E11.9 NEWLY DIAGNOSED DIABETES (HCC): Primary | ICD-10-CM

## 2022-04-20 NOTE — PROGRESS NOTES
Babak Logan  [id number] attended Step 2 & 3 Individual Diabetes Education Class:    Date: 4/20/2022  Start time: 3:45 pm End time: 4:45 pm    Wt: Wt Readings from Last 1 Encounters:  04/20/22 : 201 lb    Weight change since start of program: -3 lbs      Assessment: Neda Bello has been consuming 2-3 meals per day. She denies issues with Ozempic. Instructed her to take Metformin medication with breakfast meal. She did experienced lower glucose level of 73 mg/dl in the morning however unable to determine cause. Overall her fasting blood sugar values range around 100 mg/dL. Patient demonstrates self-monitoring and dietary tracking. Blood Glucose: Controlled: Stable     Healthy Eating:  Reviewed Basic Diet Guidelines as foundation of diabetes meal planning. Reinforced the balanced plate method. Taught nutrition basics defining carbohydrate, protein, and fat. Taught label reading, including an option to count carbohydrate grams or servings. Provided patient with goals for carbohydrate grams/choices for meals and snacks. Discussed sugar substitutes, ETOH and effect on blood glucose. Rao's helpful for smart phones, as well as websites for examining carbohydrate levels provided. Reviewed and reinforced macronutrient's, carbohydrate counting and meal planning. Being Active:  Discussed exercise benefits and precautions including its effect on blood glucose levels. Monitoring:  Benefits and options for glucose monitoring, target BG goals, HgbA1C values. Emphasized importance and benefit of dietary tracking. Problem Solving: Prevention, detection and treatment of acute complications:  Discussed signs, symptoms and treatment of hypoglycemia and hyperglycemia. Discussed impact of different food items and portion sizes on blood glucose levels. Medication:  Instructed on classes of Diabetes medications available and additional cardiovascular and renal benefits.     Behavior Change:  Goals set for nutrition and monitoring of blood glucose. Discussed how habits are formed, identifying cues and triggers. Discussed identifying barriers to action. Discussed how dietary tracking benefits weight loss. Reviewed and updated individual goals and action plan set by patient. Recommendations:  Implement healthy eating habits with portion control and following Balanced Plate Method. Follow individual meal plan recommended by Educator (Anita Campbell). Monitor blood glucose as directed. Attend remaining sessions. Continue to implement individual goals. Written materials provided for all areas covered. Patient verbalized understanding and has no further questions at this time.   Darshan Cardona RN

## 2022-04-24 RX ORDER — VENLAFAXINE HYDROCHLORIDE 150 MG/1
CAPSULE, EXTENDED RELEASE ORAL
Qty: 90 CAPSULE | Refills: 1 | Status: SHIPPED | OUTPATIENT
Start: 2022-04-24

## 2022-04-25 ENCOUNTER — OFFICE VISIT (OUTPATIENT)
Dept: INTERNAL MEDICINE CLINIC | Facility: CLINIC | Age: 57
End: 2022-04-25
Payer: COMMERCIAL

## 2022-04-25 VITALS
HEART RATE: 78 BPM | BODY MASS INDEX: 37.06 KG/M2 | WEIGHT: 201.38 LBS | HEIGHT: 62 IN | SYSTOLIC BLOOD PRESSURE: 112 MMHG | DIASTOLIC BLOOD PRESSURE: 70 MMHG | OXYGEN SATURATION: 98 %

## 2022-04-25 DIAGNOSIS — K82.4 GALL BLADDER POLYP: ICD-10-CM

## 2022-04-25 DIAGNOSIS — R06.02 SOB (SHORTNESS OF BREATH) ON EXERTION: ICD-10-CM

## 2022-04-25 DIAGNOSIS — E78.5 HYPERLIPIDEMIA, UNSPECIFIED HYPERLIPIDEMIA TYPE: ICD-10-CM

## 2022-04-25 DIAGNOSIS — K82.4 GALLBLADDER POLYP: ICD-10-CM

## 2022-04-25 DIAGNOSIS — M35.1 MIXED CONNECTIVE TISSUE DISEASE (HCC): ICD-10-CM

## 2022-04-25 DIAGNOSIS — M79.7 PRIMARY FIBROMYALGIA SYNDROME: ICD-10-CM

## 2022-04-25 DIAGNOSIS — R42 DIZZINESS: ICD-10-CM

## 2022-04-25 DIAGNOSIS — E11.9 TYPE 2 DIABETES MELLITUS WITHOUT COMPLICATION, UNSPECIFIED WHETHER LONG TERM INSULIN USE (HCC): ICD-10-CM

## 2022-04-25 DIAGNOSIS — M47.817 LUMBOSACRAL SPONDYLOSIS WITHOUT MYELOPATHY: ICD-10-CM

## 2022-04-25 DIAGNOSIS — Z01.00 DIABETIC EYE EXAM (HCC): ICD-10-CM

## 2022-04-25 DIAGNOSIS — E11.9 DIABETIC EYE EXAM (HCC): ICD-10-CM

## 2022-04-25 DIAGNOSIS — N39.0 RECURRENT UTI: ICD-10-CM

## 2022-04-25 DIAGNOSIS — I10 ESSENTIAL HYPERTENSION: ICD-10-CM

## 2022-04-25 DIAGNOSIS — Z00.00 ANNUAL PHYSICAL EXAM: Primary | ICD-10-CM

## 2022-04-25 DIAGNOSIS — K76.0 NAFLD (NONALCOHOLIC FATTY LIVER DISEASE): ICD-10-CM

## 2022-04-25 PROBLEM — R00.2 PALPITATIONS: Status: ACTIVE | Noted: 2019-02-18

## 2022-04-25 PROBLEM — R00.0 TACHYCARDIA: Status: ACTIVE | Noted: 2019-02-18

## 2022-04-25 PROBLEM — R10.9 ABDOMINAL PAIN: Status: ACTIVE | Noted: 2017-11-16

## 2022-04-25 PROBLEM — M35.9 DISORDER OF CONNECTIVE TISSUE (HCC): Status: ACTIVE | Noted: 2017-01-11

## 2022-04-25 PROBLEM — N95.1 MENOPAUSAL SYMPTOM: Status: ACTIVE | Noted: 2017-01-11

## 2022-04-25 PROBLEM — R06.83 SNORING: Status: ACTIVE | Noted: 2017-11-16

## 2022-04-25 PROCEDURE — 90750 HZV VACC RECOMBINANT IM: CPT | Performed by: INTERNAL MEDICINE

## 2022-04-25 PROCEDURE — 90471 IMMUNIZATION ADMIN: CPT | Performed by: INTERNAL MEDICINE

## 2022-04-25 PROCEDURE — 3074F SYST BP LT 130 MM HG: CPT | Performed by: INTERNAL MEDICINE

## 2022-04-25 PROCEDURE — 99396 PREV VISIT EST AGE 40-64: CPT | Performed by: INTERNAL MEDICINE

## 2022-04-25 PROCEDURE — 3008F BODY MASS INDEX DOCD: CPT | Performed by: INTERNAL MEDICINE

## 2022-04-25 PROCEDURE — 3078F DIAST BP <80 MM HG: CPT | Performed by: INTERNAL MEDICINE

## 2022-04-25 RX ORDER — MECLIZINE HYDROCHLORIDE 25 MG/1
25 TABLET ORAL 3 TIMES DAILY PRN
Qty: 20 TABLET | Refills: 0 | Status: SHIPPED | OUTPATIENT
Start: 2022-04-25

## 2022-04-25 RX ORDER — SEMAGLUTIDE 1.34 MG/ML
0.25 INJECTION, SOLUTION SUBCUTANEOUS
COMMUNITY
Start: 2022-03-25

## 2022-04-26 PROBLEM — Z12.31 ENCOUNTER FOR SCREENING MAMMOGRAM FOR MALIGNANT NEOPLASM OF BREAST: Status: RESOLVED | Noted: 2022-02-21 | Resolved: 2022-04-26

## 2022-04-26 PROBLEM — Z00.00 ANNUAL PHYSICAL EXAM: Status: ACTIVE | Noted: 2022-04-26

## 2022-04-26 PROBLEM — R42 DIZZINESS: Status: ACTIVE | Noted: 2022-04-26

## 2022-04-26 PROBLEM — R10.9 ABDOMINAL PAIN: Status: RESOLVED | Noted: 2017-11-16 | Resolved: 2022-04-26

## 2022-04-26 PROBLEM — R73.03 PREDIABETES: Status: RESOLVED | Noted: 2018-05-30 | Resolved: 2022-04-26

## 2022-04-26 PROBLEM — R51.9 NONINTRACTABLE HEADACHE: Status: RESOLVED | Noted: 2021-12-16 | Resolved: 2022-04-26

## 2022-04-26 PROBLEM — N95.1 MENOPAUSAL SYMPTOM: Status: RESOLVED | Noted: 2017-01-11 | Resolved: 2022-04-26

## 2022-04-26 PROBLEM — K82.4 GALL BLADDER POLYP: Status: ACTIVE | Noted: 2022-04-26

## 2022-04-26 LAB
ABSOLUTE BASOPHILS: 61 CELLS/UL (ref 0–200)
ABSOLUTE EOSINOPHILS: 128 CELLS/UL (ref 15–500)
ABSOLUTE LYMPHOCYTES: 2172 CELLS/UL (ref 850–3900)
ABSOLUTE MONOCYTES: 543 CELLS/UL (ref 200–950)
ABSOLUTE NEUTROPHILS: 3196 CELLS/UL (ref 1500–7800)
ALBUMIN/GLOBULIN RATIO: 1.8 (CALC) (ref 1–2.5)
ALBUMIN: 4.6 G/DL (ref 3.6–5.1)
ALKALINE PHOSPHATASE: 70 U/L (ref 37–153)
ALT: 12 U/L (ref 6–29)
AST: 13 U/L (ref 10–35)
BASOPHILS: 1 %
BILIRUBIN, TOTAL: 0.4 MG/DL (ref 0.2–1.2)
BUN: 13 MG/DL (ref 7–25)
CALCIUM: 10 MG/DL (ref 8.6–10.4)
CARBON DIOXIDE: 29 MMOL/L (ref 20–32)
CHLORIDE: 102 MMOL/L (ref 98–110)
CHOL/HDLC RATIO: 3 (CALC)
CHOLESTEROL, TOTAL: 149 MG/DL
CREATININE: 0.72 MG/DL (ref 0.5–1.05)
EGFR IF AFRICN AM: 107 ML/MIN/1.73M2
EGFR IF NONAFRICN AM: 92 ML/MIN/1.73M2
EOSINOPHILS: 2.1 %
GLOBULIN: 2.5 G/DL (CALC) (ref 1.9–3.7)
GLUCOSE: 119 MG/DL (ref 65–99)
HDL CHOLESTEROL: 50 MG/DL
HEMATOCRIT: 34.3 % (ref 35–45)
HEMOGLOBIN A1C: 6.5 % OF TOTAL HGB
HEMOGLOBIN: 11.8 G/DL (ref 11.7–15.5)
LDL-CHOLESTEROL: 78 MG/DL (CALC)
LYMPHOCYTES: 35.6 %
MCH: 31.1 PG (ref 27–33)
MCHC: 34.4 G/DL (ref 32–36)
MCV: 90.3 FL (ref 80–100)
MONOCYTES: 8.9 %
MPV: 10.5 FL (ref 7.5–12.5)
NEUTROPHILS: 52.4 %
NON-HDL CHOLESTEROL: 99 MG/DL (CALC)
PLATELET COUNT: 344 THOUSAND/UL (ref 140–400)
POTASSIUM: 4.4 MMOL/L (ref 3.5–5.3)
PROTEIN, TOTAL: 7.1 G/DL (ref 6.1–8.1)
RDW: 11.9 % (ref 11–15)
RED BLOOD CELL COUNT: 3.8 MILLION/UL (ref 3.8–5.1)
SODIUM: 137 MMOL/L (ref 135–146)
TRIGLYCERIDES: 120 MG/DL
WHITE BLOOD CELL COUNT: 6.1 THOUSAND/UL (ref 3.8–10.8)

## 2022-04-27 ENCOUNTER — APPOINTMENT (OUTPATIENT)
Dept: ENDOCRINOLOGY | Facility: HOSPITAL | Age: 57
End: 2022-04-27
Attending: INTERNAL MEDICINE
Payer: COMMERCIAL

## 2022-05-04 ENCOUNTER — ORDER TRANSCRIPTION (OUTPATIENT)
Dept: ADMINISTRATIVE | Facility: HOSPITAL | Age: 57
End: 2022-05-04

## 2022-05-09 RX ORDER — LOSARTAN POTASSIUM AND HYDROCHLOROTHIAZIDE 25; 100 MG/1; MG/1
1 TABLET ORAL DAILY
Qty: 90 TABLET | Refills: 0 | Status: SHIPPED | OUTPATIENT
Start: 2022-05-09

## 2022-05-09 RX ORDER — METFORMIN HYDROCHLORIDE 750 MG/1
TABLET, EXTENDED RELEASE ORAL
Qty: 120 TABLET | Refills: 0 | Status: SHIPPED | OUTPATIENT
Start: 2022-05-09

## 2022-05-25 ENCOUNTER — APPOINTMENT (OUTPATIENT)
Dept: ENDOCRINOLOGY | Facility: HOSPITAL | Age: 57
End: 2022-05-25
Attending: INTERNAL MEDICINE
Payer: COMMERCIAL

## 2022-05-26 RX ORDER — CYCLOBENZAPRINE HCL 10 MG
TABLET ORAL
Qty: 30 TABLET | Refills: 1 | OUTPATIENT
Start: 2022-05-26

## 2022-05-26 RX ORDER — CYCLOBENZAPRINE HCL 10 MG
10 TABLET ORAL 3 TIMES DAILY PRN
Qty: 30 TABLET | Refills: 3 | Status: SHIPPED | OUTPATIENT
Start: 2022-05-26

## 2022-05-26 NOTE — TELEPHONE ENCOUNTER
Last filled: 1/29/22 #30 tab with 1 refill   LOV: 10/11/21  Future Appointments   Date Time Provider Erin Rosario   6/13/2022  7:30 AM Wilfredo [address] [place] [address]

## 2022-06-16 ENCOUNTER — HOSPITAL ENCOUNTER (OUTPATIENT)
Age: 57
Discharge: HOME | End: 2022-06-16
Payer: COMMERCIAL

## 2022-06-16 VITALS
BODY MASS INDEX: 36.62 KG/M2 | TEMPERATURE: 98 F | RESPIRATION RATE: 20 BRPM | SYSTOLIC BLOOD PRESSURE: 131 MMHG | WEIGHT: 199 LBS | HEART RATE: 98 BPM | HEIGHT: 62 IN | OXYGEN SATURATION: 97 % | DIASTOLIC BLOOD PRESSURE: 93 MMHG

## 2022-06-16 DIAGNOSIS — Z20.822 ENCOUNTER FOR LABORATORY TESTING FOR COVID-19 VIRUS: ICD-10-CM

## 2022-06-16 DIAGNOSIS — U07.1 COVID-19 VIRUS INFECTION: Primary | ICD-10-CM

## 2022-06-16 LAB — SARS-COV-2 RNA RESP QL NAA+PROBE: DETECTED

## 2022-06-16 PROCEDURE — U0002 COVID-19 LAB TEST NON-CDC: HCPCS | Performed by: NURSE PRACTITIONER

## 2022-06-16 PROCEDURE — 99214 OFFICE O/P EST MOD 30 MIN: CPT | Performed by: NURSE PRACTITIONER

## 2022-06-16 PROCEDURE — M0222 INTRAVENOUS INJECTION, BEBTELOVIMAB, INCLUDES INJECTION AND POST ADMINISTRATIVE MONITORING: HCPCS | Performed by: NURSE PRACTITIONER

## 2022-06-16 RX ORDER — BEBTELOVIMAB 87.5 MG/ML
175 INJECTION, SOLUTION INTRAVENOUS ONCE
Status: COMPLETED | OUTPATIENT
Start: 2022-06-16 | End: 2022-06-16

## 2022-06-17 ENCOUNTER — PATIENT MESSAGE (OUTPATIENT)
Dept: INTERNAL MEDICINE CLINIC | Facility: CLINIC | Age: 57
End: 2022-06-17

## 2022-06-17 NOTE — TELEPHONE ENCOUNTER
From: Nakia Hicks  To: Renita Hilario MD  Sent: 6/17/2022 10:32 AM CDT  Subject: Immediate care visit    Sarah Vasquez, I was at immediate care yesterday and tested positive for Covid. In my follow up notes it says to see you in a week. You have nothing open until August. Just want to know what to do. thank you

## 2022-06-20 NOTE — TELEPHONE ENCOUNTER
Feels so much better after the infusion. Next day congestion gone, tires only in the afternoon, otherwise states she doesn't feel sick at all. On her 5 days of strict masking/distancing. Directed patient to call if any questions or concerns.

## 2022-06-28 RX ORDER — LOSARTAN POTASSIUM AND HYDROCHLOROTHIAZIDE 25; 100 MG/1; MG/1
1 TABLET ORAL DAILY
Qty: 90 TABLET | Refills: 0 | Status: SHIPPED | OUTPATIENT
Start: 2022-06-28

## 2022-06-29 ENCOUNTER — TELEPHONE (OUTPATIENT)
Dept: INTERNAL MEDICINE CLINIC | Facility: CLINIC | Age: 57
End: 2022-06-29

## 2022-06-29 NOTE — TELEPHONE ENCOUNTER
Bad Headache, blood pressures have been showing elevated:    Last night: 170/94    Night before: 169/90    She is under a bit of stress from work. No other symptoms. Patient also on Metorpolol 25mg BID from Dr. Damon Marino. Started 2 years ago. Unable to locate visit notes. Informed patient that if she has not seen cardiologist for a long time, he will most likely not prescribe anything unless you are seen. Will see if PCP has any suggestions other than going to [facility] will not accept a patient with that high of a BP).

## 2022-06-29 NOTE — TELEPHONE ENCOUNTER
Patient informed of the following:    Note from Dr. Joneen Castleman:  Elicia Barr I looked at it.  Can you please tell her that we discussed recommend that she take metoprolol 50 mg in am and 25 in pm. if blood pressure continue to be elevated can take 50 mg bid and go to the ER

## 2022-07-14 RX ORDER — LANCETS 28 GAUGE
EACH MISCELLANEOUS
Qty: 100 EACH | Refills: 3 | Status: SHIPPED | OUTPATIENT
Start: 2022-07-14

## 2022-07-21 ENCOUNTER — PATIENT MESSAGE (OUTPATIENT)
Dept: INTERNAL MEDICINE CLINIC | Facility: CLINIC | Age: 57
End: 2022-07-21

## 2022-07-22 RX ORDER — METOPROLOL TARTRATE 50 MG/1
50 TABLET, FILM COATED ORAL 2 TIMES DAILY
Qty: 180 TABLET | Refills: 1 | Status: SHIPPED | OUTPATIENT
Start: 2022-07-22 | End: 2022-10-20

## 2022-08-04 ENCOUNTER — OFFICE VISIT (OUTPATIENT)
Dept: RHEUMATOLOGY | Facility: CLINIC | Age: 57
End: 2022-08-04
Payer: COMMERCIAL

## 2022-08-04 VITALS
HEART RATE: 73 BPM | WEIGHT: 203 LBS | SYSTOLIC BLOOD PRESSURE: 124 MMHG | HEIGHT: 62 IN | BODY MASS INDEX: 37.36 KG/M2 | RESPIRATION RATE: 16 BRPM | DIASTOLIC BLOOD PRESSURE: 80 MMHG

## 2022-08-04 DIAGNOSIS — M35.1 MCTD (MIXED CONNECTIVE TISSUE DISEASE) (HCC): Primary | ICD-10-CM

## 2022-08-04 DIAGNOSIS — E55.9 VITAMIN D DEFICIENCY: ICD-10-CM

## 2022-08-04 DIAGNOSIS — M54.9 UPPER BACK PAIN: ICD-10-CM

## 2022-08-04 DIAGNOSIS — M54.2 CERVICAL SPINE PAIN: ICD-10-CM

## 2022-08-04 PROCEDURE — 99214 OFFICE O/P EST MOD 30 MIN: CPT | Performed by: INTERNAL MEDICINE

## 2022-08-04 PROCEDURE — 3074F SYST BP LT 130 MM HG: CPT | Performed by: INTERNAL MEDICINE

## 2022-08-04 PROCEDURE — 3008F BODY MASS INDEX DOCD: CPT | Performed by: INTERNAL MEDICINE

## 2022-08-04 PROCEDURE — 3079F DIAST BP 80-89 MM HG: CPT | Performed by: INTERNAL MEDICINE

## 2022-08-04 RX ORDER — CYCLOBENZAPRINE HCL 10 MG
TABLET ORAL
Qty: 60 TABLET | Refills: 5 | Status: SHIPPED | OUTPATIENT
Start: 2022-08-04

## 2022-08-04 RX ORDER — NABUMETONE 750 MG/1
750 TABLET, FILM COATED ORAL 2 TIMES DAILY
Qty: 60 TABLET | Refills: 5 | Status: SHIPPED | OUTPATIENT
Start: 2022-08-04

## 2022-08-04 RX ORDER — HYDROXYCHLOROQUINE SULFATE 200 MG/1
200 TABLET, FILM COATED ORAL NIGHTLY
Qty: 90 TABLET | Refills: 3 | Status: SHIPPED | OUTPATIENT
Start: 2022-08-04

## 2022-08-04 NOTE — PATIENT INSTRUCTIONS
1. Cont. hydroxychrouqine 200mg - once ad ay -   2. .increase  Cyclobenzaprine to 10 - 20mg or 10mg every 8 hours as needed  3. chiropracter   4. Physical therapy for upper back and neck   5. Eye exam once a year -   9. Try nabuemtone 750mg twice a day as needed. Again   8. Check labs in 3 months.    9.Return to clinic in 3--6 months.               b

## 2022-09-15 RX ORDER — CYCLOBENZAPRINE HCL 10 MG
TABLET ORAL
Qty: 180 TABLET | Refills: 0 | Status: SHIPPED | OUTPATIENT
Start: 2022-09-15

## 2022-09-15 RX ORDER — HYDROXYCHLOROQUINE SULFATE 200 MG/1
200 TABLET, FILM COATED ORAL NIGHTLY
Qty: 90 TABLET | Refills: 1 | Status: SHIPPED | OUTPATIENT
Start: 2022-09-15

## 2022-09-15 NOTE — TELEPHONE ENCOUNTER
LOV: 8/4/22 eye exam 9/17/2021  Future Appointments   Date Time Provider Erin Rosario   1/4/2023  8:40 AM Amy Mccoy MD 2014 [facility]     [facility]    Component      Latest Ref Rng & Units 4/25/2022   WBC      3.8 - 10.8 Thousand/uL 6.1   RBC      3.80 - 5.10 Million/uL 3.80   Hemoglobin      11.7 - 15.5 g/dL 11.8   Hematocrit      35.0 - 45.0 % 34.3 (L)   MCV      80.0 - 100.0 fL 90.3   MCH      27.0 - 33.0 pg 31.1   MCHC      32.0 - 36.0 g/dL 34.4   RDW      11.0 - 15.0 % 11.9   Platelet Count      860 - 400 Thousand/uL 344   MPV      7.5 - 12.5 fL 10.5   Neutrophils Absolute      1,500 - 7,800 cells/uL 3,196   Lymphocytes Absolute      850 - 3,900 cells/uL 2,172   Monocytes Absolute      200 - 950 cells/uL 543   Eosinophils Absolute      15 - 500 cells/uL 128   Basophils Absolute      0 - 200 cells/uL 61   Neutrophils %      % 52.4   Lymphocytes %      % 35.6   Monocytes %      % 8.9   Eosinophils %      % 2.1   Basophils %      % 1.0   Glucose      65 - 99 mg/dL 119 (H)   BUN      7 - 25 mg/dL 13   CREATININE      0.50 - 1.05 mg/dL 0.72   eGFR NON-AFR.  AMERICAN      > OR = 60 mL/min/1.73m2 92   eGFR AFRICAN AMERICAN      > OR = 60 mL/min/1.73m2 107   BUN/CREATININE RATIO      6 - 22 (calc) NOT APPLICABLE   Sodium      135 - 146 mmol/L 137   Potassium      3.5 - 5.3 mmol/L 4.4   Chloride      98 - 110 mmol/L 102   Carbon Dioxide, Total      20 - 32 mmol/L 29   CALCIUM      8.6 - 10.4 mg/dL 10.0   PROTEIN, TOTAL      6.1 - 8.1 g/dL 7.1   Albumin      3.6 - 5.1 g/dL 4.6   Globulin      1.9 - 3.7 g/dL (calc) 2.5   A/G Ratio      1.0 - 2.5 (calc) 1.8   Total Bilirubin      0.2 - 1.2 mg/dL 0.4   Alkaline Phosphatase      37 - 153 U/L 70   AST (SGOT)      10 - 35 U/L 13   ALT (SGPT)      6 - 29 U/L 12

## 2022-09-27 ENCOUNTER — HOSPITAL ENCOUNTER (OUTPATIENT)
Age: 57
Discharge: HOME | End: 2022-09-27

## 2022-09-27 VITALS
OXYGEN SATURATION: 96 % | DIASTOLIC BLOOD PRESSURE: 81 MMHG | WEIGHT: 198 LBS | HEART RATE: 85 BPM | TEMPERATURE: 98 F | SYSTOLIC BLOOD PRESSURE: 149 MMHG | RESPIRATION RATE: 18 BRPM | BODY MASS INDEX: 36.44 KG/M2 | HEIGHT: 62 IN

## 2022-09-27 DIAGNOSIS — H60.501 ACUTE OTITIS EXTERNA OF RIGHT EAR, UNSPECIFIED TYPE: Primary | ICD-10-CM

## 2022-09-27 PROCEDURE — 99213 OFFICE O/P EST LOW 20 MIN: CPT

## 2022-09-27 NOTE — ED INITIAL ASSESSMENT (HPI)
Pt presents to the IC with c/o right ear pain and bleeding. Pt notes she intermittently has her ear \"close\" and feels this is what is happening now. No fevers.

## 2022-10-19 ENCOUNTER — HOSPITAL ENCOUNTER (OUTPATIENT)
Age: 57
Discharge: HOME | End: 2022-10-19
Payer: COMMERCIAL

## 2022-10-19 VITALS
BODY MASS INDEX: 37.76 KG/M2 | WEIGHT: 200 LBS | DIASTOLIC BLOOD PRESSURE: 77 MMHG | HEIGHT: 61 IN | HEART RATE: 85 BPM | OXYGEN SATURATION: 100 % | TEMPERATURE: 98 F | SYSTOLIC BLOOD PRESSURE: 133 MMHG | RESPIRATION RATE: 18 BRPM

## 2022-10-19 DIAGNOSIS — H60.502 ACUTE OTITIS EXTERNA OF LEFT EAR, UNSPECIFIED TYPE: Primary | ICD-10-CM

## 2022-10-19 PROCEDURE — 99213 OFFICE O/P EST LOW 20 MIN: CPT

## 2022-10-19 RX ORDER — CIPROFLOXACIN AND DEXAMETHASONE 3; 1 MG/ML; MG/ML
4 SUSPENSION/ DROPS AURICULAR (OTIC) 2 TIMES DAILY
Qty: 7.5 ML | Refills: 0 | Status: SHIPPED | OUTPATIENT
Start: 2022-10-19

## 2022-10-20 ENCOUNTER — TELEPHONE (OUTPATIENT)
Dept: OTOLARYNGOLOGY | Facility: CLINIC | Age: 57
End: 2022-10-20

## 2022-10-20 NOTE — TELEPHONE ENCOUNTER
Pt states was seen in ER states ear completely closed has a wick in it states she needs medication for the pain until appt 10/27 please advise

## 2022-10-22 ENCOUNTER — OFFICE VISIT (OUTPATIENT)
Dept: OTOLARYNGOLOGY | Facility: CLINIC | Age: 57
End: 2022-10-22
Payer: COMMERCIAL

## 2022-10-22 DIAGNOSIS — H60.392 OTHER INFECTIVE ACUTE OTITIS EXTERNA OF LEFT EAR: Primary | ICD-10-CM

## 2022-10-22 PROCEDURE — 99213 OFFICE O/P EST LOW 20 MIN: CPT | Performed by: OTOLARYNGOLOGY

## 2022-10-22 RX ORDER — CEPHALEXIN 500 MG/1
500 CAPSULE ORAL EVERY 8 HOURS
Qty: 21 CAPSULE | Refills: 0 | Status: SHIPPED | OUTPATIENT
Start: 2022-10-22

## 2022-10-24 RX ORDER — LOSARTAN POTASSIUM AND HYDROCHLOROTHIAZIDE 25; 100 MG/1; MG/1
1 TABLET ORAL DAILY
Qty: 90 TABLET | Refills: 0 | Status: SHIPPED | OUTPATIENT
Start: 2022-10-24

## 2022-10-24 RX ORDER — ATORVASTATIN CALCIUM 20 MG/1
TABLET, FILM COATED ORAL
Qty: 90 TABLET | Refills: 1 | Status: SHIPPED | OUTPATIENT
Start: 2022-10-24

## 2022-11-08 ENCOUNTER — PATIENT MESSAGE (OUTPATIENT)
Dept: RHEUMATOLOGY | Facility: CLINIC | Age: 57
End: 2022-11-08

## 2022-11-08 ENCOUNTER — PATIENT MESSAGE (OUTPATIENT)
Dept: INTERNAL MEDICINE CLINIC | Facility: CLINIC | Age: 57
End: 2022-11-08

## 2022-11-08 DIAGNOSIS — Z00.00 ANNUAL PHYSICAL EXAM: ICD-10-CM

## 2022-11-08 DIAGNOSIS — M35.1 MIXED CONNECTIVE TISSUE DISEASE (HCC): ICD-10-CM

## 2022-11-08 DIAGNOSIS — I10 ESSENTIAL HYPERTENSION: ICD-10-CM

## 2022-11-08 DIAGNOSIS — E11.9 DIABETIC EYE EXAM (HCC): ICD-10-CM

## 2022-11-08 DIAGNOSIS — E78.5 HYPERLIPIDEMIA, UNSPECIFIED HYPERLIPIDEMIA TYPE: ICD-10-CM

## 2022-11-08 DIAGNOSIS — K82.4 GALL BLADDER POLYP: ICD-10-CM

## 2022-11-08 DIAGNOSIS — N39.0 RECURRENT UTI: ICD-10-CM

## 2022-11-08 DIAGNOSIS — Z01.00 DIABETIC EYE EXAM (HCC): ICD-10-CM

## 2022-11-08 DIAGNOSIS — M79.7 PRIMARY FIBROMYALGIA SYNDROME: ICD-10-CM

## 2022-11-08 DIAGNOSIS — K82.4 GALLBLADDER POLYP: ICD-10-CM

## 2022-11-08 DIAGNOSIS — R42 DIZZINESS: ICD-10-CM

## 2022-11-08 DIAGNOSIS — M47.817 LUMBOSACRAL SPONDYLOSIS WITHOUT MYELOPATHY: ICD-10-CM

## 2022-11-08 DIAGNOSIS — K76.0 NAFLD (NONALCOHOLIC FATTY LIVER DISEASE): ICD-10-CM

## 2022-11-08 NOTE — TELEPHONE ENCOUNTER
From: Jeronimo Door  To: Sandeep Guaman MD  Sent: 11/8/2022 7:11 AM CST  Subject: Blood test    I called my insurance and I do not need to go to where it is listed in my file for a blood test. I would like to go to the [place] [facility] as I usually do if you could schedule that I can go get it done ASAP thank you

## 2022-11-09 RX ORDER — VENLAFAXINE HYDROCHLORIDE 150 MG/1
150 CAPSULE, EXTENDED RELEASE ORAL DAILY
Qty: 90 CAPSULE | Refills: 0 | Status: SHIPPED | OUTPATIENT
Start: 2022-11-09

## 2022-11-09 RX ORDER — METFORMIN HYDROCHLORIDE 750 MG/1
750 TABLET, EXTENDED RELEASE ORAL 2 TIMES DAILY WITH MEALS
Qty: 180 TABLET | Refills: 0 | Status: SHIPPED | OUTPATIENT
Start: 2022-11-09

## 2022-11-09 RX ORDER — METOPROLOL TARTRATE 50 MG/1
50 TABLET, FILM COATED ORAL 2 TIMES DAILY
COMMUNITY
End: 2022-11-09

## 2022-11-09 RX ORDER — LOSARTAN POTASSIUM AND HYDROCHLOROTHIAZIDE 25; 100 MG/1; MG/1
1 TABLET ORAL DAILY
Qty: 90 TABLET | Refills: 0 | Status: SHIPPED | OUTPATIENT
Start: 2022-11-09

## 2022-11-09 RX ORDER — ATORVASTATIN CALCIUM 20 MG/1
20 TABLET, FILM COATED ORAL NIGHTLY
Qty: 90 TABLET | Refills: 0 | Status: SHIPPED | OUTPATIENT
Start: 2022-11-09

## 2022-11-09 RX ORDER — METOPROLOL TARTRATE 50 MG/1
50 TABLET, FILM COATED ORAL 2 TIMES DAILY
Qty: 180 TABLET | Refills: 0 | Status: SHIPPED | OUTPATIENT
Start: 2022-11-09

## 2022-11-16 ENCOUNTER — HOSPITAL ENCOUNTER (OUTPATIENT)
Dept: CV DIAGNOSTICS | Facility: HOSPITAL | Age: 57
Discharge: HOME | End: 2022-11-16
Attending: INTERNAL MEDICINE
Payer: COMMERCIAL

## 2022-11-16 DIAGNOSIS — R06.02 SOB (SHORTNESS OF BREATH) ON EXERTION: ICD-10-CM

## 2022-11-16 DIAGNOSIS — E11.69 TYPE 2 DIABETES MELLITUS WITH OTHER SPECIFIED COMPLICATION, WITHOUT LONG-TERM CURRENT USE OF INSULIN (HCC): ICD-10-CM

## 2022-11-16 LAB
% OF MAX PREDICTED HR: 100 %
MAX DIASTOLIC BP: 62 MMHG
MAX HEART RATE: 148 BPM
MAX PREDICTED HEART RATE: 161 BPM
MAX SYSTOLIC BP: 182 MMHG
MAX WORK LOAD: 85

## 2022-11-16 PROCEDURE — 93018 CV STRESS TEST I&R ONLY: CPT | Performed by: INTERNAL MEDICINE

## 2022-11-16 PROCEDURE — 93016 CV STRESS TEST SUPVJ ONLY: CPT | Performed by: INTERNAL MEDICINE

## 2022-11-16 PROCEDURE — 93017 CV STRESS TEST TRACING ONLY: CPT | Performed by: INTERNAL MEDICINE

## 2022-11-22 ENCOUNTER — PATIENT MESSAGE (OUTPATIENT)
Dept: INTERNAL MEDICINE CLINIC | Facility: CLINIC | Age: 57
End: 2022-11-22

## 2022-11-22 DIAGNOSIS — R39.9 UTI SYMPTOMS: Primary | ICD-10-CM

## 2022-11-23 ENCOUNTER — HOSPITAL ENCOUNTER (OUTPATIENT)
Dept: ULTRASOUND IMAGING | Facility: HOSPITAL | Age: 57
Discharge: HOME | End: 2022-11-23
Attending: INTERNAL MEDICINE
Payer: COMMERCIAL

## 2022-11-23 ENCOUNTER — LAB ENCOUNTER (OUTPATIENT)
Dept: LAB | Facility: HOSPITAL | Age: 57
End: 2022-11-23
Attending: INTERNAL MEDICINE
Payer: COMMERCIAL

## 2022-11-23 DIAGNOSIS — E11.9 DIABETIC EYE EXAM (HCC): ICD-10-CM

## 2022-11-23 DIAGNOSIS — K82.4 GALLBLADDER POLYP: ICD-10-CM

## 2022-11-23 DIAGNOSIS — N39.0 RECURRENT UTI: ICD-10-CM

## 2022-11-23 DIAGNOSIS — M79.7 PRIMARY FIBROMYALGIA SYNDROME: ICD-10-CM

## 2022-11-23 DIAGNOSIS — M35.1 MIXED CONNECTIVE TISSUE DISEASE (HCC): ICD-10-CM

## 2022-11-23 DIAGNOSIS — R92.8 ABNORMAL MAMMOGRAM: Primary | ICD-10-CM

## 2022-11-23 DIAGNOSIS — K82.4 GALL BLADDER POLYP: ICD-10-CM

## 2022-11-23 DIAGNOSIS — I10 ESSENTIAL HYPERTENSION: ICD-10-CM

## 2022-11-23 DIAGNOSIS — Z01.00 DIABETIC EYE EXAM (HCC): ICD-10-CM

## 2022-11-23 DIAGNOSIS — R42 DIZZINESS: ICD-10-CM

## 2022-11-23 DIAGNOSIS — K76.0 NAFLD (NONALCOHOLIC FATTY LIVER DISEASE): ICD-10-CM

## 2022-11-23 DIAGNOSIS — M47.817 LUMBOSACRAL SPONDYLOSIS WITHOUT MYELOPATHY: ICD-10-CM

## 2022-11-23 DIAGNOSIS — Z00.00 ANNUAL PHYSICAL EXAM: ICD-10-CM

## 2022-11-23 DIAGNOSIS — E78.5 HYPERLIPIDEMIA, UNSPECIFIED HYPERLIPIDEMIA TYPE: ICD-10-CM

## 2022-11-23 LAB
ALBUMIN SERPL-MCNC: 4 G/DL (ref 3.4–5)
ALBUMIN/GLOB SERPL: 1.1 {RATIO} (ref 1–2)
ALP LIVER SERPL-CCNC: 91 U/L
ALT SERPL-CCNC: 27 U/L
ANION GAP SERPL CALC-SCNC: 4 MMOL/L (ref 0–18)
AST SERPL-CCNC: 11 U/L (ref 15–37)
BILIRUB SERPL-MCNC: 0.5 MG/DL (ref 0.1–2)
BUN BLD-MCNC: 19 MG/DL (ref 7–18)
BUN/CREAT SERPL: 22.6 (ref 10–20)
CALCIUM BLD-MCNC: 10 MG/DL (ref 8.5–10.1)
CHLORIDE SERPL-SCNC: 103 MMOL/L (ref 98–112)
CO2 SERPL-SCNC: 29 MMOL/L (ref 21–32)
CREAT BLD-MCNC: 0.84 MG/DL
CRP SERPL-MCNC: <0.29 MG/DL (ref ?–0.3)
DEPRECATED RDW RBC AUTO: 38 FL (ref 35.1–46.3)
ERYTHROCYTE [DISTWIDTH] IN BLOOD BY AUTOMATED COUNT: 11.6 % (ref 11–15)
ERYTHROCYTE [SEDIMENTATION RATE] IN BLOOD: 32 MM/HR
FASTING STATUS PATIENT QL REPORTED: NO
GFR SERPLBLD BASED ON 1.73 SQ M-ARVRAT: 80 ML/MIN/1.73M2 (ref 60–?)
GLOBULIN PLAS-MCNC: 3.6 G/DL (ref 2.8–4.4)
GLUCOSE BLD-MCNC: 136 MG/DL (ref 70–99)
HCT VFR BLD AUTO: 37.2 %
HGB BLD-MCNC: 12.5 G/DL
MCH RBC QN AUTO: 30.3 PG (ref 26–34)
MCHC RBC AUTO-ENTMCNC: 33.6 G/DL (ref 31–37)
MCV RBC AUTO: 90.1 FL
OSMOLALITY SERPL CALC.SUM OF ELEC: 286 MOSM/KG (ref 275–295)
PLATELET # BLD AUTO: 289 10(3)UL (ref 150–450)
POTASSIUM SERPL-SCNC: 3.9 MMOL/L (ref 3.5–5.1)
PROT SERPL-MCNC: 7.6 G/DL (ref 6.4–8.2)
RBC # BLD AUTO: 4.13 X10(6)UL
SODIUM SERPL-SCNC: 136 MMOL/L (ref 136–145)
VIT D+METAB SERPL-MCNC: 16.9 NG/ML (ref 30–100)
WBC # BLD AUTO: 5.6 X10(3) UL (ref 4–11)

## 2022-11-23 PROCEDURE — 85027 COMPLETE CBC AUTOMATED: CPT | Performed by: INTERNAL MEDICINE

## 2022-11-23 PROCEDURE — 86140 C-REACTIVE PROTEIN: CPT | Performed by: INTERNAL MEDICINE

## 2022-11-23 PROCEDURE — 80053 COMPREHEN METABOLIC PANEL: CPT | Performed by: INTERNAL MEDICINE

## 2022-11-23 PROCEDURE — 36415 COLL VENOUS BLD VENIPUNCTURE: CPT | Performed by: INTERNAL MEDICINE

## 2022-11-23 PROCEDURE — 82306 VITAMIN D 25 HYDROXY: CPT | Performed by: INTERNAL MEDICINE

## 2022-11-23 PROCEDURE — 85652 RBC SED RATE AUTOMATED: CPT | Performed by: INTERNAL MEDICINE

## 2022-11-23 PROCEDURE — 76705 ECHO EXAM OF ABDOMEN: CPT | Performed by: INTERNAL MEDICINE

## 2022-11-23 RX ORDER — SULFAMETHOXAZOLE AND TRIMETHOPRIM 800; 160 MG/1; MG/1
1 TABLET ORAL 2 TIMES DAILY
Qty: 10 TABLET | Refills: 0 | Status: SHIPPED | OUTPATIENT
Start: 2022-11-23 | End: 2022-11-28

## 2022-11-23 NOTE — TELEPHONE ENCOUNTER
Amy Hollingsworth, [facility] 11/23/2022 10:08 AM CST      ----- Message -----  From: Delores Benson  Sent: 11/22/2022 5:41 PM CST  To: Carlos Eduardo Hernández Clinical Staff  Subject: Bladder infection     It might be late but thought I would ask, I am going for the bladder ultra sound and was hoping you can call in a urine test. I think I might have an infection or the start of one and I could get it done while I was there.

## 2022-12-13 RX ORDER — VENLAFAXINE HYDROCHLORIDE 150 MG/1
CAPSULE, EXTENDED RELEASE ORAL
Qty: 90 CAPSULE | Refills: 0 | Status: SHIPPED | OUTPATIENT
Start: 2022-12-13

## 2022-12-20 ENCOUNTER — TELEPHONE (OUTPATIENT)
Dept: INTERNAL MEDICINE CLINIC | Facility: CLINIC | Age: 57
End: 2022-12-20

## 2022-12-20 ENCOUNTER — PATIENT MESSAGE (OUTPATIENT)
Dept: INTERNAL MEDICINE CLINIC | Facility: CLINIC | Age: 57
End: 2022-12-20

## 2022-12-20 NOTE — TELEPHONE ENCOUNTER
Patient called as she just isn't feeling well. She has the shakes, sweats and vomiting. She hasn't been able to eat or drink for the last 2 days, so she is vomiting up much. And bad diarrhea. Asking for a nurse to call.

## 2022-12-20 NOTE — TELEPHONE ENCOUNTER
Returned call to patient calling re: chills, fever, N/V & diarrhea  x 2 days. Unable to reach patient. Left vmail message to return call to triage nurse at office or  Also the option to be seen in [facility] or [facility] today if patient is unable to keep fluids down w/o vomiting and diarrhea persists despite trial of OTC meds, Immodium for diarrhea and gatorade or oral electrolyte replenishment (\"Liquid IV\" other brannds available OTC @ pharmacy. Unable to assess impact of S/S on diabetes- glucose levels or whether patient has taken metformin or ozempic. Will await patient's return call.

## 2022-12-20 NOTE — TELEPHONE ENCOUNTER
Patient messaged that she is now available for return return call (left pt vmail earlier). Returned call to patient who states nausea and diarrhea  Sunday eve and Monday which progressed to \"dry heaves\" this am.  Was unable to eat or drink much yesterday. Afebrile but and felt chills and \"jittery\" as though she was hypoglycemic after taking her morning Metformin 750 mg dose yesterday. Pt states that although Rx list shows BID Metformin dosing schedule, Dr Donnis Alpers instructed her to take it only 1x/ day now. This am Blood sugar was 140 prior to Metformin dose. This afternoon has been able to eat some saltine crackers and is drinking water w/o emesis, nausea is less, diarrhea has stopped. She will continue hydrating as much as possible and advance to a BRAT diet as tolerated. Signs of dehydration reviewed w/ patient, advised trying oral electrolyte supplement drinks available at pharmacy given lower sugar content than gatorade. Patient will monitor her blood sugar more closely next few days and call the office or proceed to Immediate care if feeling dehydrated, symptoms worsen.

## 2023-01-04 ENCOUNTER — OFFICE VISIT (OUTPATIENT)
Dept: RHEUMATOLOGY | Facility: CLINIC | Age: 58
End: 2023-01-04
Payer: COMMERCIAL

## 2023-01-04 VITALS
DIASTOLIC BLOOD PRESSURE: 79 MMHG | BODY MASS INDEX: 38.71 KG/M2 | HEIGHT: 61 IN | HEART RATE: 76 BPM | RESPIRATION RATE: 16 BRPM | SYSTOLIC BLOOD PRESSURE: 130 MMHG | WEIGHT: 205 LBS

## 2023-01-04 DIAGNOSIS — G56.22 NEUROPATHY OF LEFT ULNAR NERVE AT WRIST: ICD-10-CM

## 2023-01-04 DIAGNOSIS — M35.1 MCTD (MIXED CONNECTIVE TISSUE DISEASE) (HCC): Primary | ICD-10-CM

## 2023-01-04 DIAGNOSIS — M79.7 FIBROMYALGIA: ICD-10-CM

## 2023-01-04 DIAGNOSIS — Z51.81 THERAPEUTIC DRUG MONITORING: ICD-10-CM

## 2023-01-04 PROCEDURE — 3078F DIAST BP <80 MM HG: CPT | Performed by: INTERNAL MEDICINE

## 2023-01-04 PROCEDURE — 99214 OFFICE O/P EST MOD 30 MIN: CPT | Performed by: INTERNAL MEDICINE

## 2023-01-04 PROCEDURE — 3008F BODY MASS INDEX DOCD: CPT | Performed by: INTERNAL MEDICINE

## 2023-01-04 PROCEDURE — 3075F SYST BP GE 130 - 139MM HG: CPT | Performed by: INTERNAL MEDICINE

## 2023-01-04 RX ORDER — ERGOCALCIFEROL 1.25 MG/1
50000 CAPSULE ORAL WEEKLY
Qty: 12 CAPSULE | Refills: 0 | Status: SHIPPED | OUTPATIENT
Start: 2023-01-04 | End: 2023-04-04

## 2023-01-04 RX ORDER — HYDROXYCHLOROQUINE SULFATE 200 MG/1
200 TABLET, FILM COATED ORAL NIGHTLY
Qty: 90 TABLET | Refills: 1 | Status: SHIPPED | OUTPATIENT
Start: 2023-01-04

## 2023-01-04 NOTE — PATIENT INSTRUCTIONS
1. Cont. hydroxychrouqine 200mg - once ad ay -   2. Cont. Cyclobenzaprine to 10 every 8 hours as needed  3. chiropracter   4. Try voltaren gel 1 % topical for left arm   5. Eye exam once a year -   9. Ok to try nabuemtone 750mg twice a day as needed for left arm pain   8. Check labs in 6 months. 9.Return to clinic in 6 months.

## 2023-02-11 ENCOUNTER — PATIENT MESSAGE (OUTPATIENT)
Dept: INTERNAL MEDICINE CLINIC | Facility: CLINIC | Age: 58
End: 2023-02-11

## 2023-02-14 RX ORDER — BLOOD-GLUCOSE METER
KIT MISCELLANEOUS
Qty: 100 STRIP | Refills: 0 | Status: SHIPPED | OUTPATIENT
Start: 2023-02-14 | End: 2024-02-14

## 2023-03-03 DIAGNOSIS — I10 ESSENTIAL HYPERTENSION: ICD-10-CM

## 2023-03-03 DIAGNOSIS — N39.0 RECURRENT UTI: ICD-10-CM

## 2023-03-03 DIAGNOSIS — M35.1 MIXED CONNECTIVE TISSUE DISEASE (HCC): ICD-10-CM

## 2023-03-03 DIAGNOSIS — Z01.00 DIABETIC EYE EXAM (HCC): ICD-10-CM

## 2023-03-03 DIAGNOSIS — E78.5 HYPERLIPIDEMIA, UNSPECIFIED HYPERLIPIDEMIA TYPE: ICD-10-CM

## 2023-03-03 DIAGNOSIS — Z00.00 ANNUAL PHYSICAL EXAM: ICD-10-CM

## 2023-03-03 DIAGNOSIS — R42 DIZZINESS: ICD-10-CM

## 2023-03-03 DIAGNOSIS — K82.4 GALLBLADDER POLYP: ICD-10-CM

## 2023-03-03 DIAGNOSIS — K82.4 GALL BLADDER POLYP: ICD-10-CM

## 2023-03-03 DIAGNOSIS — E11.9 DIABETIC EYE EXAM (HCC): ICD-10-CM

## 2023-03-03 DIAGNOSIS — M47.817 LUMBOSACRAL SPONDYLOSIS WITHOUT MYELOPATHY: ICD-10-CM

## 2023-03-03 DIAGNOSIS — K76.0 NAFLD (NONALCOHOLIC FATTY LIVER DISEASE): ICD-10-CM

## 2023-03-03 DIAGNOSIS — M79.7 PRIMARY FIBROMYALGIA SYNDROME: ICD-10-CM

## 2023-03-03 RX ORDER — SEMAGLUTIDE 1.34 MG/ML
0.25 INJECTION, SOLUTION SUBCUTANEOUS
Qty: 1 EACH | Refills: 0 | Status: SHIPPED | OUTPATIENT
Start: 2023-03-03

## 2023-03-28 NOTE — ADDENDUM NOTE
Addended by: Chance Michelle on: 4/28/2022 10:31 AM     Modules accepted: Orders Called pt to follow up on overdue results for laboratory tests. Pt is agreeable to completing test/s.   .

## 2023-04-03 ENCOUNTER — HOSPITAL ENCOUNTER (OUTPATIENT)
Dept: GENERAL RADIOLOGY | Facility: HOSPITAL | Age: 58
Discharge: HOME | End: 2023-04-03
Attending: ORTHOPAEDIC SURGERY
Payer: COMMERCIAL

## 2023-04-03 ENCOUNTER — OFFICE VISIT (OUTPATIENT)
Dept: ORTHOPEDICS CLINIC | Facility: CLINIC | Age: 58
End: 2023-04-03

## 2023-04-03 VITALS
HEIGHT: 61 IN | DIASTOLIC BLOOD PRESSURE: 90 MMHG | HEART RATE: 81 BPM | WEIGHT: 197 LBS | SYSTOLIC BLOOD PRESSURE: 143 MMHG | BODY MASS INDEX: 37.19 KG/M2

## 2023-04-03 DIAGNOSIS — R52 PAIN: ICD-10-CM

## 2023-04-03 DIAGNOSIS — M17.11 PRIMARY OSTEOARTHRITIS OF RIGHT KNEE: Primary | ICD-10-CM

## 2023-04-03 DIAGNOSIS — E66.01 CLASS 2 SEVERE OBESITY DUE TO EXCESS CALORIES WITH SERIOUS COMORBIDITY AND BODY MASS INDEX (BMI) OF 37.0 TO 37.9 IN ADULT (HCC): ICD-10-CM

## 2023-04-03 PROCEDURE — 73562 X-RAY EXAM OF KNEE 3: CPT | Performed by: ORTHOPAEDIC SURGERY

## 2023-04-03 RX ORDER — TRIAMCINOLONE ACETONIDE 40 MG/ML
40 INJECTION, SUSPENSION INTRA-ARTICULAR; INTRAMUSCULAR ONCE
Status: COMPLETED | OUTPATIENT
Start: 2023-04-03 | End: 2023-04-03

## 2023-04-03 RX ADMIN — TRIAMCINOLONE ACETONIDE 40 MG: 40 INJECTION, SUSPENSION INTRA-ARTICULAR; INTRAMUSCULAR at 11:00:00

## 2023-04-03 NOTE — PROGRESS NOTES
Per verbal order from Dr. Shera Litten, PA, draw up 5ml of 0.5% Marcaine and 1ml of Kenalog 40 for cortisone injection to right knee. Mindy Recio RN  Patient provided education handout for cortisone injection.

## 2023-04-18 ENCOUNTER — OFFICE VISIT (OUTPATIENT)
Dept: INTERNAL MEDICINE CLINIC | Facility: CLINIC | Age: 58
End: 2023-04-18
Payer: COMMERCIAL

## 2023-04-18 VITALS
SYSTOLIC BLOOD PRESSURE: 120 MMHG | HEIGHT: 61 IN | WEIGHT: 198.81 LBS | BODY MASS INDEX: 37.54 KG/M2 | HEART RATE: 88 BPM | OXYGEN SATURATION: 99 % | DIASTOLIC BLOOD PRESSURE: 76 MMHG

## 2023-04-18 DIAGNOSIS — I10 ESSENTIAL HYPERTENSION: ICD-10-CM

## 2023-04-18 DIAGNOSIS — Z12.31 ENCOUNTER FOR SCREENING MAMMOGRAM FOR MALIGNANT NEOPLASM OF BREAST: ICD-10-CM

## 2023-04-18 DIAGNOSIS — R23.3 PETECHIAE: ICD-10-CM

## 2023-04-18 DIAGNOSIS — E11.9 TYPE 2 DIABETES MELLITUS WITHOUT COMPLICATION, UNSPECIFIED WHETHER LONG TERM INSULIN USE (HCC): Primary | ICD-10-CM

## 2023-04-18 DIAGNOSIS — R21 SKIN RASH: ICD-10-CM

## 2023-04-18 DIAGNOSIS — E66.01 CLASS 2 SEVERE OBESITY DUE TO EXCESS CALORIES WITH SERIOUS COMORBIDITY AND BODY MASS INDEX (BMI) OF 37.0 TO 37.9 IN ADULT (HCC): ICD-10-CM

## 2023-04-18 DIAGNOSIS — K76.0 NAFLD (NONALCOHOLIC FATTY LIVER DISEASE): ICD-10-CM

## 2023-04-18 DIAGNOSIS — R00.2 PALPITATIONS: ICD-10-CM

## 2023-04-18 DIAGNOSIS — E78.5 HYPERLIPIDEMIA, UNSPECIFIED HYPERLIPIDEMIA TYPE: ICD-10-CM

## 2023-04-18 PROBLEM — E66.812 CLASS 2 SEVERE OBESITY DUE TO EXCESS CALORIES WITH SERIOUS COMORBIDITY AND BODY MASS INDEX (BMI) OF 37.0 TO 37.9 IN ADULT (HCC): Status: ACTIVE | Noted: 2023-04-18

## 2023-04-18 LAB
ALBUMIN SERPL-MCNC: 4 G/DL (ref 3.4–5)
ALBUMIN/GLOB SERPL: 1.1 {RATIO} (ref 1–2)
ALP LIVER SERPL-CCNC: 68 U/L
ALT SERPL-CCNC: 34 U/L
ANION GAP SERPL CALC-SCNC: 4 MMOL/L (ref 0–18)
AST SERPL-CCNC: 14 U/L (ref 15–37)
BASOPHILS # BLD AUTO: 0.09 X10(3) UL (ref 0–0.2)
BASOPHILS NFR BLD AUTO: 1.2 %
BILIRUB SERPL-MCNC: 0.5 MG/DL (ref 0.1–2)
BUN BLD-MCNC: 13 MG/DL (ref 7–18)
BUN/CREAT SERPL: 15.5 (ref 10–20)
CALCIUM BLD-MCNC: 9.8 MG/DL (ref 8.5–10.1)
CHLORIDE SERPL-SCNC: 103 MMOL/L (ref 98–112)
CHOLEST SERPL-MCNC: 148 MG/DL (ref ?–200)
CO2 SERPL-SCNC: 28 MMOL/L (ref 21–32)
CREAT BLD-MCNC: 0.84 MG/DL
CREAT UR-SCNC: 42.1 MG/DL
DEPRECATED RDW RBC AUTO: 39.1 FL (ref 35.1–46.3)
EOSINOPHIL # BLD AUTO: 0.16 X10(3) UL (ref 0–0.7)
EOSINOPHIL NFR BLD AUTO: 2.2 %
ERYTHROCYTE [DISTWIDTH] IN BLOOD BY AUTOMATED COUNT: 11.6 % (ref 11–15)
EST. AVERAGE GLUCOSE BLD GHB EST-MCNC: 151 MG/DL (ref 68–126)
FASTING PATIENT LIPID ANSWER: YES
FASTING STATUS PATIENT QL REPORTED: YES
GFR SERPLBLD BASED ON 1.73 SQ M-ARVRAT: 80 ML/MIN/1.73M2 (ref 60–?)
GLOBULIN PLAS-MCNC: 3.7 G/DL (ref 2.8–4.4)
GLUCOSE BLD-MCNC: 124 MG/DL (ref 70–99)
HBA1C MFR BLD: 6.9 % (ref ?–5.7)
HCT VFR BLD AUTO: 37.9 %
HDLC SERPL-MCNC: 55 MG/DL (ref 40–59)
HGB BLD-MCNC: 12.4 G/DL
IMM GRANULOCYTES # BLD AUTO: 0.03 X10(3) UL (ref 0–1)
IMM GRANULOCYTES NFR BLD: 0.4 %
LDLC SERPL CALC-MCNC: 71 MG/DL (ref ?–100)
LYMPHOCYTES # BLD AUTO: 2.51 X10(3) UL (ref 1–4)
LYMPHOCYTES NFR BLD AUTO: 34.4 %
MCH RBC QN AUTO: 30.2 PG (ref 26–34)
MCHC RBC AUTO-ENTMCNC: 32.7 G/DL (ref 31–37)
MCV RBC AUTO: 92.2 FL
MICROALBUMIN UR-MCNC: 0.52 MG/DL
MICROALBUMIN/CREAT 24H UR-RTO: 12.4 UG/MG (ref ?–30)
MONOCYTES # BLD AUTO: 0.47 X10(3) UL (ref 0.1–1)
MONOCYTES NFR BLD AUTO: 6.4 %
NEUTROPHILS # BLD AUTO: 4.04 X10 (3) UL (ref 1.5–7.7)
NEUTROPHILS # BLD AUTO: 4.04 X10(3) UL (ref 1.5–7.7)
NEUTROPHILS NFR BLD AUTO: 55.4 %
NONHDLC SERPL-MCNC: 93 MG/DL (ref ?–130)
OSMOLALITY SERPL CALC.SUM OF ELEC: 282 MOSM/KG (ref 275–295)
PLATELET # BLD AUTO: 322 10(3)UL (ref 150–450)
POTASSIUM SERPL-SCNC: 4.2 MMOL/L (ref 3.5–5.1)
PROT SERPL-MCNC: 7.7 G/DL (ref 6.4–8.2)
RBC # BLD AUTO: 4.11 X10(6)UL
SODIUM SERPL-SCNC: 135 MMOL/L (ref 136–145)
TRIGL SERPL-MCNC: 124 MG/DL (ref 30–149)
TSI SER-ACNC: 1.04 MIU/ML (ref 0.36–3.74)
VLDLC SERPL CALC-MCNC: 19 MG/DL (ref 0–30)
WBC # BLD AUTO: 7.3 X10(3) UL (ref 4–11)

## 2023-04-18 PROCEDURE — 83036 HEMOGLOBIN GLYCOSYLATED A1C: CPT | Performed by: INTERNAL MEDICINE

## 2023-04-18 PROCEDURE — 3008F BODY MASS INDEX DOCD: CPT | Performed by: INTERNAL MEDICINE

## 2023-04-18 PROCEDURE — 84443 ASSAY THYROID STIM HORMONE: CPT | Performed by: INTERNAL MEDICINE

## 2023-04-18 PROCEDURE — 85610 PROTHROMBIN TIME: CPT | Performed by: INTERNAL MEDICINE

## 2023-04-18 PROCEDURE — 3074F SYST BP LT 130 MM HG: CPT | Performed by: INTERNAL MEDICINE

## 2023-04-18 PROCEDURE — 99214 OFFICE O/P EST MOD 30 MIN: CPT | Performed by: INTERNAL MEDICINE

## 2023-04-18 PROCEDURE — 3044F HG A1C LEVEL LT 7.0%: CPT | Performed by: INTERNAL MEDICINE

## 2023-04-18 PROCEDURE — 85025 COMPLETE CBC W/AUTO DIFF WBC: CPT | Performed by: INTERNAL MEDICINE

## 2023-04-18 PROCEDURE — 85730 THROMBOPLASTIN TIME PARTIAL: CPT | Performed by: INTERNAL MEDICINE

## 2023-04-18 PROCEDURE — 82043 UR ALBUMIN QUANTITATIVE: CPT | Performed by: INTERNAL MEDICINE

## 2023-04-18 PROCEDURE — 80053 COMPREHEN METABOLIC PANEL: CPT | Performed by: INTERNAL MEDICINE

## 2023-04-18 PROCEDURE — 3078F DIAST BP <80 MM HG: CPT | Performed by: INTERNAL MEDICINE

## 2023-04-18 PROCEDURE — 82570 ASSAY OF URINE CREATININE: CPT | Performed by: INTERNAL MEDICINE

## 2023-04-18 PROCEDURE — 80061 LIPID PANEL: CPT | Performed by: INTERNAL MEDICINE

## 2023-04-18 RX ORDER — VENLAFAXINE HYDROCHLORIDE 150 MG/1
150 CAPSULE, EXTENDED RELEASE ORAL DAILY
Qty: 10 CAPSULE | Refills: 0 | Status: SHIPPED | OUTPATIENT
Start: 2023-04-18

## 2023-04-18 RX ORDER — SEMAGLUTIDE 1.34 MG/ML
0.5 INJECTION, SOLUTION SUBCUTANEOUS
Qty: 6 ML | Refills: 0 | Status: SHIPPED | OUTPATIENT
Start: 2023-04-18 | End: 2023-04-18

## 2023-04-18 RX ORDER — SEMAGLUTIDE 1.34 MG/ML
0.5 INJECTION, SOLUTION SUBCUTANEOUS
Qty: 3 ML | Refills: 0 | Status: SHIPPED | OUTPATIENT
Start: 2023-04-18

## 2023-04-18 RX ORDER — VENLAFAXINE HYDROCHLORIDE 150 MG/1
150 CAPSULE, EXTENDED RELEASE ORAL DAILY
Qty: 90 CAPSULE | Refills: 1 | Status: SHIPPED | OUTPATIENT
Start: 2023-04-18 | End: 2023-07-17

## 2023-04-18 RX ORDER — SEMAGLUTIDE 1.34 MG/ML
0.25 INJECTION, SOLUTION SUBCUTANEOUS
Qty: 3 ML | Refills: 0 | Status: SHIPPED | OUTPATIENT
Start: 2023-04-18 | End: 2023-04-18 | Stop reason: CLARIF

## 2023-04-18 NOTE — PROGRESS NOTES
[facility], SHE  [facility], [facility]

## 2023-04-19 ENCOUNTER — HOSPITAL ENCOUNTER (OUTPATIENT)
Dept: CT IMAGING | Facility: HOSPITAL | Age: 58
Discharge: HOME | End: 2023-04-19
Attending: INTERNAL MEDICINE

## 2023-04-19 VITALS — WEIGHT: 198 LBS | BODY MASS INDEX: 37.38 KG/M2 | HEIGHT: 61 IN

## 2023-04-19 DIAGNOSIS — Z13.6 SCREENING FOR CARDIOVASCULAR CONDITION: ICD-10-CM

## 2023-04-19 LAB
APTT PPP: 35.6 SECONDS (ref 23.3–35.6)
INR BLD: 1.04 (ref 0.85–1.16)
PROTHROMBIN TIME: 13.5 SECONDS (ref 11.6–14.8)

## 2023-05-10 RX ORDER — PANTOPRAZOLE SODIUM 20 MG/1
20 TABLET, DELAYED RELEASE ORAL
Qty: 90 TABLET | Refills: 0 | Status: SHIPPED | OUTPATIENT
Start: 2023-05-10 | End: 2023-08-08

## 2023-05-24 ENCOUNTER — TELEPHONE (OUTPATIENT)
Dept: OPHTHALMOLOGY | Facility: CLINIC | Age: 58
End: 2023-05-24

## 2023-05-25 NOTE — TELEPHONE ENCOUNTER
Patient returning call, please call at [phone], please call around 10:30  currently at an appointment,thanks.

## 2023-05-25 NOTE — TELEPHONE ENCOUNTER
Pt normally wears CL and lost her glasses. Pt needs a DM and Plaquenil EE per Dr. Melinda Crawford. Consult. Apt booked on 6/13/23 with VERA.

## 2023-06-02 RX ORDER — VENLAFAXINE HYDROCHLORIDE 150 MG/1
CAPSULE, EXTENDED RELEASE ORAL
Qty: 90 CAPSULE | Refills: 0 | Status: SHIPPED | OUTPATIENT
Start: 2023-06-02

## 2023-06-02 RX ORDER — LOSARTAN POTASSIUM AND HYDROCHLOROTHIAZIDE 25; 100 MG/1; MG/1
TABLET ORAL
Qty: 90 TABLET | Refills: 0 | Status: SHIPPED | OUTPATIENT
Start: 2023-06-02

## 2023-06-02 RX ORDER — METFORMIN HYDROCHLORIDE 750 MG/1
TABLET, EXTENDED RELEASE ORAL
Qty: 180 TABLET | Refills: 0 | Status: SHIPPED | OUTPATIENT
Start: 2023-06-02

## 2023-06-02 RX ORDER — HYDROXYCHLOROQUINE SULFATE 200 MG/1
TABLET, FILM COATED ORAL
Qty: 90 TABLET | Refills: 1 | Status: SHIPPED | OUTPATIENT
Start: 2023-06-02

## 2023-06-02 RX ORDER — METOPROLOL TARTRATE 50 MG/1
TABLET, FILM COATED ORAL
Qty: 180 TABLET | Refills: 0 | Status: SHIPPED | OUTPATIENT
Start: 2023-06-02

## 2023-06-02 RX ORDER — ATORVASTATIN CALCIUM 20 MG/1
TABLET, FILM COATED ORAL
Qty: 90 TABLET | Refills: 0 | Status: SHIPPED | OUTPATIENT
Start: 2023-06-02

## 2023-06-05 ENCOUNTER — HOSPITAL ENCOUNTER (OUTPATIENT)
Dept: MAMMOGRAPHY | Facility: HOSPITAL | Age: 58
Discharge: HOME | End: 2023-06-05
Attending: INTERNAL MEDICINE
Payer: COMMERCIAL

## 2023-06-05 DIAGNOSIS — E78.5 HYPERLIPIDEMIA, UNSPECIFIED HYPERLIPIDEMIA TYPE: ICD-10-CM

## 2023-06-05 DIAGNOSIS — R21 SKIN RASH: ICD-10-CM

## 2023-06-05 DIAGNOSIS — I10 ESSENTIAL HYPERTENSION: ICD-10-CM

## 2023-06-05 DIAGNOSIS — E11.9 TYPE 2 DIABETES MELLITUS WITHOUT COMPLICATION, UNSPECIFIED WHETHER LONG TERM INSULIN USE (HCC): ICD-10-CM

## 2023-06-05 DIAGNOSIS — R23.3 PETECHIAE: ICD-10-CM

## 2023-06-05 DIAGNOSIS — R00.2 PALPITATIONS: ICD-10-CM

## 2023-06-05 DIAGNOSIS — Z12.31 ENCOUNTER FOR SCREENING MAMMOGRAM FOR MALIGNANT NEOPLASM OF BREAST: ICD-10-CM

## 2023-06-05 DIAGNOSIS — K76.0 NAFLD (NONALCOHOLIC FATTY LIVER DISEASE): ICD-10-CM

## 2023-06-05 DIAGNOSIS — E66.01 CLASS 2 SEVERE OBESITY DUE TO EXCESS CALORIES WITH SERIOUS COMORBIDITY AND BODY MASS INDEX (BMI) OF 37.0 TO 37.9 IN ADULT (HCC): ICD-10-CM

## 2023-06-05 PROCEDURE — 77063 BREAST TOMOSYNTHESIS BI: CPT | Performed by: INTERNAL MEDICINE

## 2023-06-05 PROCEDURE — 77067 SCR MAMMO BI INCL CAD: CPT | Performed by: INTERNAL MEDICINE

## 2023-06-07 ENCOUNTER — OFFICE VISIT (OUTPATIENT)
Dept: INTERNAL MEDICINE CLINIC | Facility: CLINIC | Age: 58
End: 2023-06-07
Payer: COMMERCIAL

## 2023-06-07 VITALS
HEART RATE: 79 BPM | BODY MASS INDEX: 37.04 KG/M2 | SYSTOLIC BLOOD PRESSURE: 118 MMHG | WEIGHT: 196.19 LBS | DIASTOLIC BLOOD PRESSURE: 88 MMHG | HEIGHT: 61 IN | OXYGEN SATURATION: 99 %

## 2023-06-07 DIAGNOSIS — R13.10 DYSPHAGIA, UNSPECIFIED TYPE: ICD-10-CM

## 2023-06-07 DIAGNOSIS — R41.3 MEMORY DEFICIT: ICD-10-CM

## 2023-06-07 DIAGNOSIS — E11.9 TYPE 2 DIABETES MELLITUS WITHOUT COMPLICATION, UNSPECIFIED WHETHER LONG TERM INSULIN USE (HCC): Primary | ICD-10-CM

## 2023-06-07 DIAGNOSIS — E66.9 OBESITY (BMI 30-39.9): ICD-10-CM

## 2023-06-07 DIAGNOSIS — K76.0 NAFLD (NONALCOHOLIC FATTY LIVER DISEASE): ICD-10-CM

## 2023-06-07 PROCEDURE — 99214 OFFICE O/P EST MOD 30 MIN: CPT | Performed by: INTERNAL MEDICINE

## 2023-06-07 PROCEDURE — 3079F DIAST BP 80-89 MM HG: CPT | Performed by: INTERNAL MEDICINE

## 2023-06-07 PROCEDURE — 3008F BODY MASS INDEX DOCD: CPT | Performed by: INTERNAL MEDICINE

## 2023-06-07 PROCEDURE — 3074F SYST BP LT 130 MM HG: CPT | Performed by: INTERNAL MEDICINE

## 2023-06-08 ENCOUNTER — OFFICE VISIT (OUTPATIENT)
Dept: OTOLARYNGOLOGY | Facility: CLINIC | Age: 58
End: 2023-06-08

## 2023-06-08 DIAGNOSIS — H60.392 OTHER INFECTIVE ACUTE OTITIS EXTERNA OF LEFT EAR: Primary | ICD-10-CM

## 2023-06-08 PROCEDURE — 99213 OFFICE O/P EST LOW 20 MIN: CPT | Performed by: OTOLARYNGOLOGY

## 2023-06-08 RX ORDER — CIPROFLOXACIN 500 MG/1
500 TABLET, FILM COATED ORAL EVERY 12 HOURS
Qty: 14 TABLET | Refills: 0 | Status: SHIPPED | OUTPATIENT
Start: 2023-06-08

## 2023-06-19 RX ORDER — TRAMADOL HYDROCHLORIDE 50 MG/1
50 TABLET ORAL EVERY 8 HOURS PRN
Qty: 30 TABLET | Refills: 5 | Status: SHIPPED | OUTPATIENT
Start: 2023-06-19

## 2023-06-19 NOTE — TELEPHONE ENCOUNTER
Medication Quantity Refills Start End   traMADol 50 MG Oral Tab 30 tablet 0 10/11/2021      LOV: 1/4/23  Future Appointments   Date Time Provider Erin Rosario   7/5/2023  8:40 AM Alejandrina Renyoso MD 2014 [facility] [address]   8/3/2023 10:40 AM MD OCHOA Steele [address]   8/15/2023  9:40 AM MD OCHOA Steele [address]   11/8/2023  8:00 AM MD OCHOA Steele [address]      Summary:     1. Cont. hydroxychrouqine 200mg - once ad ay -   2. Cont. Cyclobenzaprine to 10 every 8 hours as needed  3. chiropracter   4. Call if left arm is not better -   5. Eye exam once a year -   9. Ok to try nabuemtone 750mg twice a day as needed for left arm pain   8. Check labs in 6 months. 9.Return to clinic in 6 months. - cont.  cbd oil is ok -   Allison Sebastian MD  1/4/2023   8:59 AM  - [place]ed [place]- [place]  through Epic

## 2023-07-01 ENCOUNTER — PATIENT MESSAGE (OUTPATIENT)
Dept: INTERNAL MEDICINE CLINIC | Facility: CLINIC | Age: 58
End: 2023-07-01

## 2023-07-05 ENCOUNTER — LAB ENCOUNTER (OUTPATIENT)
Dept: LAB | Facility: HOSPITAL | Age: 58
End: 2023-07-05
Attending: INTERNAL MEDICINE
Payer: COMMERCIAL

## 2023-07-05 ENCOUNTER — HOSPITAL ENCOUNTER (OUTPATIENT)
Dept: GENERAL RADIOLOGY | Facility: HOSPITAL | Age: 58
Discharge: HOME | End: 2023-07-05
Attending: INTERNAL MEDICINE
Payer: COMMERCIAL

## 2023-07-05 ENCOUNTER — OFFICE VISIT (OUTPATIENT)
Dept: RHEUMATOLOGY | Facility: CLINIC | Age: 58
End: 2023-07-05

## 2023-07-05 VITALS
HEIGHT: 61 IN | RESPIRATION RATE: 16 BRPM | SYSTOLIC BLOOD PRESSURE: 103 MMHG | WEIGHT: 190.19 LBS | HEART RATE: 90 BPM | DIASTOLIC BLOOD PRESSURE: 72 MMHG | BODY MASS INDEX: 35.91 KG/M2

## 2023-07-05 DIAGNOSIS — M35.1 MCTD (MIXED CONNECTIVE TISSUE DISEASE) (HCC): Primary | ICD-10-CM

## 2023-07-05 DIAGNOSIS — M54.12 CERVICAL RADICULOPATHY: ICD-10-CM

## 2023-07-05 DIAGNOSIS — Z51.81 THERAPEUTIC DRUG MONITORING: ICD-10-CM

## 2023-07-05 PROCEDURE — 3008F BODY MASS INDEX DOCD: CPT | Performed by: INTERNAL MEDICINE

## 2023-07-05 PROCEDURE — 99214 OFFICE O/P EST MOD 30 MIN: CPT | Performed by: INTERNAL MEDICINE

## 2023-07-05 PROCEDURE — 3078F DIAST BP <80 MM HG: CPT | Performed by: INTERNAL MEDICINE

## 2023-07-05 PROCEDURE — 3074F SYST BP LT 130 MM HG: CPT | Performed by: INTERNAL MEDICINE

## 2023-07-05 PROCEDURE — 72052 X-RAY EXAM NECK SPINE 6/>VWS: CPT | Performed by: INTERNAL MEDICINE

## 2023-07-05 RX ORDER — CYCLOBENZAPRINE HCL 10 MG
TABLET ORAL
Qty: 180 TABLET | Refills: 0 | Status: SHIPPED | OUTPATIENT
Start: 2023-07-05

## 2023-07-05 RX ORDER — METHYLPREDNISOLONE 4 MG/1
TABLET ORAL
Qty: 1 EACH | Refills: 0 | Status: SHIPPED | OUTPATIENT
Start: 2023-07-05

## 2023-07-05 NOTE — PATIENT INSTRUCTIONS
1. Cont. hydroxychrouqine 200mg - once ad ay -   2. Cont. Cyclobenzaprine to 10 every 8 hours as needed  3. Try physical therapy - for neck pain   4. Tramadol 50mg as needed. 5.  Try nabumetone 750mg twice a day as needed. 6 . Medrol andressa   7. Check labs in 4-6months. 8. Return to clinic in 4=6 months. 9. Eye exam once a year -   8.  Check c spine xray

## 2023-07-25 ENCOUNTER — TELEPHONE (OUTPATIENT)
Facility: CLINIC | Age: 58
End: 2023-07-25

## 2023-07-25 NOTE — TELEPHONE ENCOUNTER
Called patient, name/ verified. Offered available appointment. Patient confirmed and accepted appointment. Date, time, office location and contact number verified. Instructed patient to arrive 15 minutes before appt time and bring insurance card. Patient verbalized understanding. Your Appointments        2023  2:30 PM  [facility] [address] [place] [address],[address], [facility] [address] [facility] [address] [phone]     No further action required, TE closed.

## 2023-08-09 ENCOUNTER — OFFICE VISIT (OUTPATIENT)
Dept: INTERNAL MEDICINE CLINIC | Facility: CLINIC | Age: 58
End: 2023-08-09
Payer: COMMERCIAL

## 2023-08-09 VITALS
BODY MASS INDEX: 34.55 KG/M2 | OXYGEN SATURATION: 96 % | WEIGHT: 183 LBS | HEIGHT: 61 IN | TEMPERATURE: 98 F | DIASTOLIC BLOOD PRESSURE: 70 MMHG | SYSTOLIC BLOOD PRESSURE: 120 MMHG | HEART RATE: 63 BPM

## 2023-08-09 DIAGNOSIS — E66.9 OBESITY (BMI 30-39.9): ICD-10-CM

## 2023-08-09 DIAGNOSIS — R06.83 SNORING: ICD-10-CM

## 2023-08-09 DIAGNOSIS — K76.0 NAFLD (NONALCOHOLIC FATTY LIVER DISEASE): ICD-10-CM

## 2023-08-09 DIAGNOSIS — E11.9 TYPE 2 DIABETES MELLITUS WITHOUT COMPLICATION, UNSPECIFIED WHETHER LONG TERM INSULIN USE (HCC): Primary | ICD-10-CM

## 2023-08-09 DIAGNOSIS — R13.10 DYSPHAGIA, UNSPECIFIED TYPE: ICD-10-CM

## 2023-08-09 DIAGNOSIS — M35.1 MCTD (MIXED CONNECTIVE TISSUE DISEASE) (HCC): ICD-10-CM

## 2023-08-09 DIAGNOSIS — I10 ESSENTIAL HYPERTENSION: ICD-10-CM

## 2023-08-09 DIAGNOSIS — R53.83 OTHER FATIGUE: ICD-10-CM

## 2023-08-09 PROCEDURE — 3074F SYST BP LT 130 MM HG: CPT | Performed by: INTERNAL MEDICINE

## 2023-08-09 PROCEDURE — 99214 OFFICE O/P EST MOD 30 MIN: CPT | Performed by: INTERNAL MEDICINE

## 2023-08-09 PROCEDURE — 3078F DIAST BP <80 MM HG: CPT | Performed by: INTERNAL MEDICINE

## 2023-08-09 PROCEDURE — 3008F BODY MASS INDEX DOCD: CPT | Performed by: INTERNAL MEDICINE

## 2023-08-14 ENCOUNTER — TELEPHONE (OUTPATIENT)
Dept: GASTROENTEROLOGY | Facility: CLINIC | Age: 58
End: 2023-08-14

## 2023-08-14 NOTE — TELEPHONE ENCOUNTER
----- Message from Andree Barragan MD sent at 8/9/2023  5:49 PM CDT -----  Good evening John Ansari has an appt with you in October. Is there any way you can see her sooner that this she has worsening dysphagia ? Thank you so much!    Remy

## 2023-08-14 NOTE — TELEPHONE ENCOUNTER
Nursing:    She is a referral from Dr. Domi Ta. Please contact her and offer he an appt this week in [facility], or Monday 8/21 at [facility].     Thank you,  Db Edgar

## 2023-08-15 NOTE — TELEPHONE ENCOUNTER
RN called and spoke to pt. Pt scheduled for clinic appt with CHIDIN on 8/22/23. Date, time, and location verified with pt. Pt verbalized understanding.     Your Appointments      Tuesday August 22, 2023  2:00 PM  [facility] [address] [place] [address],[address], [facility]) [address] [place] [address],[address]  [phone]

## 2023-08-18 ENCOUNTER — TELEPHONE (OUTPATIENT)
Dept: PHYSICAL THERAPY | Facility: HOSPITAL | Age: 58
End: 2023-08-18

## 2023-08-19 ENCOUNTER — LAB ENCOUNTER (OUTPATIENT)
Dept: LAB | Facility: HOSPITAL | Age: 58
End: 2023-08-19
Attending: INTERNAL MEDICINE
Payer: COMMERCIAL

## 2023-08-19 DIAGNOSIS — I10 ESSENTIAL HYPERTENSION: ICD-10-CM

## 2023-08-19 DIAGNOSIS — E66.9 OBESITY (BMI 30-39.9): ICD-10-CM

## 2023-08-19 DIAGNOSIS — R53.83 OTHER FATIGUE: ICD-10-CM

## 2023-08-19 DIAGNOSIS — E11.9 TYPE 2 DIABETES MELLITUS WITHOUT COMPLICATION, UNSPECIFIED WHETHER LONG TERM INSULIN USE (HCC): ICD-10-CM

## 2023-08-19 DIAGNOSIS — R06.83 SNORING: ICD-10-CM

## 2023-08-19 DIAGNOSIS — R13.10 DYSPHAGIA, UNSPECIFIED TYPE: ICD-10-CM

## 2023-08-19 DIAGNOSIS — M35.1 MCTD (MIXED CONNECTIVE TISSUE DISEASE) (HCC): ICD-10-CM

## 2023-08-19 DIAGNOSIS — K76.0 NAFLD (NONALCOHOLIC FATTY LIVER DISEASE): ICD-10-CM

## 2023-08-19 LAB
ALBUMIN SERPL-MCNC: 3.9 G/DL (ref 3.4–5)
ALBUMIN/GLOB SERPL: 1.1 {RATIO} (ref 1–2)
ALP LIVER SERPL-CCNC: 78 U/L
ALT SERPL-CCNC: 32 U/L
ANION GAP SERPL CALC-SCNC: 6 MMOL/L (ref 0–18)
AST SERPL-CCNC: 17 U/L (ref 15–37)
BASOPHILS # BLD AUTO: 0.06 X10(3) UL (ref 0–0.2)
BASOPHILS NFR BLD AUTO: 1 %
BILIRUB SERPL-MCNC: 0.5 MG/DL (ref 0.1–2)
BUN BLD-MCNC: 10 MG/DL (ref 7–18)
BUN/CREAT SERPL: 12.3 (ref 10–20)
CALCIUM BLD-MCNC: 10 MG/DL (ref 8.5–10.1)
CHLORIDE SERPL-SCNC: 104 MMOL/L (ref 98–112)
CHOLEST SERPL-MCNC: 127 MG/DL (ref ?–200)
CO2 SERPL-SCNC: 28 MMOL/L (ref 21–32)
CREAT BLD-MCNC: 0.81 MG/DL
DEPRECATED HBV CORE AB SER IA-ACNC: 40.2 NG/ML
DEPRECATED RDW RBC AUTO: 38.7 FL (ref 35.1–46.3)
EGFRCR SERPLBLD CKD-EPI 2021: 84 ML/MIN/1.73M2 (ref 60–?)
EOSINOPHIL # BLD AUTO: 0.09 X10(3) UL (ref 0–0.7)
EOSINOPHIL NFR BLD AUTO: 1.5 %
ERYTHROCYTE [DISTWIDTH] IN BLOOD BY AUTOMATED COUNT: 11.9 % (ref 11–15)
EST. AVERAGE GLUCOSE BLD GHB EST-MCNC: 131 MG/DL (ref 68–126)
FASTING PATIENT LIPID ANSWER: YES
FASTING STATUS PATIENT QL REPORTED: YES
FOLATE SERPL-MCNC: 14.6 NG/ML (ref 8.7–?)
GLOBULIN PLAS-MCNC: 3.5 G/DL (ref 2.8–4.4)
GLUCOSE BLD-MCNC: 114 MG/DL (ref 70–99)
HBA1C MFR BLD: 6.2 % (ref ?–5.7)
HCT VFR BLD AUTO: 35 %
HDLC SERPL-MCNC: 57 MG/DL (ref 40–59)
HGB BLD-MCNC: 11.6 G/DL
IMM GRANULOCYTES # BLD AUTO: 0.03 X10(3) UL (ref 0–1)
IMM GRANULOCYTES NFR BLD: 0.5 %
IRON SATN MFR SERPL: 10 %
IRON SERPL-MCNC: 50 UG/DL
LDLC SERPL CALC-MCNC: 54 MG/DL (ref ?–100)
LYMPHOCYTES # BLD AUTO: 1.96 X10(3) UL (ref 1–4)
LYMPHOCYTES NFR BLD AUTO: 33.6 %
MCH RBC QN AUTO: 29.9 PG (ref 26–34)
MCHC RBC AUTO-ENTMCNC: 33.1 G/DL (ref 31–37)
MCV RBC AUTO: 90.2 FL
MONOCYTES # BLD AUTO: 0.5 X10(3) UL (ref 0.1–1)
MONOCYTES NFR BLD AUTO: 8.6 %
NEUTROPHILS # BLD AUTO: 3.2 X10 (3) UL (ref 1.5–7.7)
NEUTROPHILS # BLD AUTO: 3.2 X10(3) UL (ref 1.5–7.7)
NEUTROPHILS NFR BLD AUTO: 54.8 %
NONHDLC SERPL-MCNC: 70 MG/DL (ref ?–130)
OSMOLALITY SERPL CALC.SUM OF ELEC: 286 MOSM/KG (ref 275–295)
PLATELET # BLD AUTO: 329 10(3)UL (ref 150–450)
POTASSIUM SERPL-SCNC: 4.3 MMOL/L (ref 3.5–5.1)
PROT SERPL-MCNC: 7.4 G/DL (ref 6.4–8.2)
RBC # BLD AUTO: 3.88 X10(6)UL
SODIUM SERPL-SCNC: 138 MMOL/L (ref 136–145)
TIBC SERPL-MCNC: 495 UG/DL (ref 240–450)
TRANSFERRIN SERPL-MCNC: 332 MG/DL (ref 200–360)
TRIGL SERPL-MCNC: 82 MG/DL (ref 30–149)
TSI SER-ACNC: 1.45 MIU/ML (ref 0.36–3.74)
VIT B12 SERPL-MCNC: 449 PG/ML (ref 193–986)
VIT D+METAB SERPL-MCNC: 27 NG/ML (ref 30–100)
VLDLC SERPL CALC-MCNC: 12 MG/DL (ref 0–30)
WBC # BLD AUTO: 5.8 X10(3) UL (ref 4–11)

## 2023-08-19 PROCEDURE — 84443 ASSAY THYROID STIM HORMONE: CPT

## 2023-08-19 PROCEDURE — 82306 VITAMIN D 25 HYDROXY: CPT

## 2023-08-19 PROCEDURE — 36415 COLL VENOUS BLD VENIPUNCTURE: CPT

## 2023-08-19 PROCEDURE — 85025 COMPLETE CBC W/AUTO DIFF WBC: CPT

## 2023-08-19 PROCEDURE — 80053 COMPREHEN METABOLIC PANEL: CPT

## 2023-08-19 PROCEDURE — 83540 ASSAY OF IRON: CPT

## 2023-08-19 PROCEDURE — 84466 ASSAY OF TRANSFERRIN: CPT

## 2023-08-19 PROCEDURE — 83036 HEMOGLOBIN GLYCOSYLATED A1C: CPT

## 2023-08-19 PROCEDURE — 82746 ASSAY OF FOLIC ACID SERUM: CPT

## 2023-08-19 PROCEDURE — 82607 VITAMIN B-12: CPT

## 2023-08-19 PROCEDURE — 80061 LIPID PANEL: CPT

## 2023-08-19 PROCEDURE — 82728 ASSAY OF FERRITIN: CPT

## 2023-08-21 RX ORDER — FERROUS SULFATE TAB EC 324 MG (65 MG FE EQUIVALENT) 324 (65 FE) MG
1 TABLET DELAYED RESPONSE ORAL 2 TIMES DAILY
Qty: 60 TABLET | Refills: 2 | Status: SHIPPED | OUTPATIENT
Start: 2023-08-21 | End: 2023-09-20

## 2023-08-21 NOTE — H&P
[facility], [facility] - Gastroenterology                                                                                                               Reason for consult: eval    Requesting physician or provider: Gillermina Litten, MD    Patient presents with:  Swallowing Problem  Reflux  Anemia      HPI:   Elliot Ibanez is a 59year old year-old female with history of fibromyalgia, htn, high cholesterol, pre-dm:    she is here today for evaluation dysphagia    Known to [facility], [facility], Dr. Kevin Vance, Dr. Sandhya Monreal  Last seen 2022  H/o gerd and anemia    Here today for dysphagia with solids. She denies issues with liquids. Symptoms x last 4 mos. start at sternal notch and has odynophagia in chest.  Has had vomiting 2 x with issues. Has chronic nausea, gerd. Takes pantoprazole. She says only notices reflux first few days after ozempic dose and then improves and so doesn't take pantoprazole every day. Dose of ozempic recently increased and had worsening of reflux. Then, dysphagia started. She has been trying to lose weight. she moves her bowels daily and without recent change. she denies straining and/or incomplete evacuation. she denies brbpr and/or melena. Labs 8/19/23-  Hgb 11.6 stable from trend  Ferritin normal  Folate normal  Tibc elevated, % sat low  Tsh normal  Vit d 27  B12 normal  Lfts normal    Pertinent Surgical Hx:  No abdominal surgery  - See additional surgical hx below  - No known issues with sedation.  - No known history of sleep apnea. Pertinent Family Hx:  - No family hx of esophageal, gastric or colon cancer  - No family history of IBD. Prior endoscopies:  October 2019 EGD performed by Dr. Sandhya Monreal with MAC related to GERD/atypical chest pain, findings:  Moderate acute/chronic reflux changes at the GE junction, random biopsies unremarkable     January 2018 EGD performed by Dr. Kevin Vance with IV twilight related to epigastric abdominal pain, nausea and vomiting, findings: Gastropathy, biopsies negative for H. pylori/malignancy/dysplasia, continue omeprazole 20 mg daily     January 2014 colonoscopy performed by Dr. Andrzej Cota with IV twilight related to colon cancer screening, findings: Normal colonoscopy to the terminal ileum, internal hemorrhoids, 10-year recall     Social Hx:  + Former smoker  + Occasional EtOH  + occasional cannabis  - LMP: Postmenopausal  - Occupation:   - Lives with spouse  - NSAIDs/ASA use: PRN    Wt Readings from Last 6 Encounters:  08/22/23 : 183 lb (83 kg)  08/09/23 : 183 lb (83 kg)  07/05/23 : 190 lb 3.2 oz (86.3 kg)  06/07/23 : 196 lb 3.2 oz (89 kg)  04/19/23 : 198 lb (89.8 kg)  04/18/23 : 198 lb 12.8 oz (90.2 kg)       History, Medications, Allergies, ROS:      Past Medical History:   Diagnosis Date    Abnormal vaginal bleeding     polypectomy    Fibromyalgia     Gastropathy 1/18/2018    Hemorrhoids 2014    High blood pressure     High cholesterol     MCTD (mixed connective tissue disease) (HCC)     Mood disorder (HCC)     Other ill-defined conditions(799.89)     Per [employer]:  \"son and girlfriend with 4 kids moved in. \"    Prediabetes     Spinal stenosis     Comments:  spondylosis. Management:  surgery in 12/2009. Visual impairment       Past Surgical History:   Procedure Laterality Date    BACK SURGERY      COLONOSCOPY  2014    HYSTEROSCOPY,DIAGNOSTIC  08/2006    polypectomy    OTHER SURGICAL HISTORY  12/2009    Surgery (due to spinal stenosis/spondylosis).     OTHER SURGICAL HISTORY Right     ankle surgery      Family Hx:   Family History   Problem Relation Age of Onset    Heart Disease Father         CAD, (cause of death) age 52    Diabetes Father     Stroke Maternal Grandmother         Cerebrovascular accident (Stroke)    Heart Disease Paternal Grandmother         CAD    Breast Cancer Maternal Aunt         76s     Heart Disease Maternal Aunt         CAD    Heart Disease Maternal Aunt     Other (osteoarthritis) Mother     Other (psoriasis) Sister Social History:   Social History     Socioeconomic History    Marital status:    Tobacco Use    Smoking status: Former     Packs/day: 0.00     Types: Cigarettes     Quit date: 1989     Years since quittin.6    Smokeless tobacco: Never   Vaping Use    Vaping Use: Never used   Substance and Sexual Activity    Alcohol use: Yes     Alcohol/week: 1.0 standard drink of alcohol     Types: 1 Glasses of wine per week     Comment: Occasionally wine    Drug use: Yes     Types: Cannabis     Comment: occ    Sexual activity: Yes     Partners: Male   Other Topics Concern    Caffeine Concern Yes     Comment: (Coffee, Soda) 2 cups daily    Pt has a pacemaker No    Pt has a defibrillator No    Reaction to local anesthetic No        Medications (Active prior to today's visit):  Current Outpatient Medications   Medication Sig Dispense Refill    cyclobenzaprine 10 MG Oral Tab Take 10-20mg at night as needed 180 tablet 0    METFORMIN  MG Oral Tablet 24 Hr TAKE 1 TABLET TWICE DAILY  WITH MEALS 180 tablet 0    HYDROXYCHLOROQUINE 200 MG Oral Tab TAKE 1 TABLET NIGHTLY 90 tablet 1    LOSARTAN-HYDROCHLOROTHIAZIDE 100-25 MG Oral Tab TAKE 1 TABLET DAILY 90 tablet 0    ATORVASTATIN 20 MG Oral Tab TAKE 1 TABLET NIGHTLY 90 tablet 0    venlafaxine  MG Oral Capsule SR 24 Hr Take 1 capsule (150 mg total) by mouth daily. 10 capsule 0    semaglutide (OZEMPIC, 0.25 OR 0.5 MG/DOSE,) 2 MG/1.5ML Subcutaneous Solution Pen-injector Inject 0.5 mg into the skin every 7 days. 3 mL 0    OZEMPIC, 0.25 OR 0.5 MG/DOSE, 2 MG/1.5ML Subcutaneous Solution Pen-injector Inject 0.25 mg into the skin every 7 days. (Patient taking differently: Inject 0.5 mg into the skin every 7 days. ) 1 each 0    Glucose Blood (FREESTYLE LITE TEST) In Vitro Strip Test  strip 0    FREESTYLE LANCETS Does not apply Misc TEST DAILY AS DIRECTED 100 each 3    Insulin Pen Needle (PEN NEEDLES) 32G X 4 MM Does not apply Misc 1 each every 7 days.  30 each 0 Insulin Pen Needle (PEN NEEDLES) 32G X 4 MM Does not apply Misc 1 each daily. 90 each 0    lidocaine 5 % External Patch Place 2 patches onto the skin daily. 60 patch 0    Betamethasone Dipropionate Aug 0.05 % External Cream Apply 1 Application topically 2 (two) times daily. Apply to affected areas as needed bid 50 g 3    Ferrous Sulfate 324 (65 Fe) MG Oral Tab EC Take 1 tablet by mouth in the morning and 1 tablet before bedtime. (Patient not taking: Reported on 8/22/2023) 60 tablet 2    methylPREDNISolone 4 MG Oral Tablet Therapy Pack Take as directed on package. (Patient not taking: Reported on 8/22/2023) 1 each 0    semaglutide 8 MG/3ML Subcutaneous Solution Pen-injector Inject 2 mg into the skin once a week. (Patient not taking: Reported on 8/22/2023) 9 mL 0    traMADol 50 MG Oral Tab Take 1 tablet (50 mg total) by mouth every 8 (eight) hours as needed for Pain. (Patient not taking: Reported on 8/22/2023) 30 tablet 5    ciprofloxacin 500 MG Oral Tab Take 1 tablet (500 mg total) by mouth every 12 (twelve) hours. (Patient not taking: Reported on 8/22/2023) 14 tablet 0    VENLAFAXINE  MG Oral Capsule SR 24 Hr TAKE 1 CAPSULE DAILY (Patient not taking: Reported on 8/22/2023) 90 capsule 0    METOPROLOL TARTRATE 50 MG Oral Tab TAKE 1 TABLET TWICE A DAY (Patient not taking: Reported on 8/22/2023) 180 tablet 0    semaglutide 4 MG/3ML Subcutaneous Solution Pen-injector Inject 1 mg into the skin once a week. (Patient not taking: Reported on 8/22/2023) 3 mL 0    cephalexin 500 MG Oral Cap Take 1 capsule (500 mg total) by mouth every 8 (eight) hours. (Patient not taking: Reported on 4/3/2023) 21 capsule 0    ciprofloxacin-dexamethasone 0.3-0.1 % Otic Suspension Place 4 drops into the left ear 2 (two) times daily. (Patient not taking: Reported on 4/3/2023) 7.5 mL 0    nabumetone 750 MG Oral Tab Take 1 tablet (750 mg total) by mouth 2 (two) times daily.  (Patient not taking: Reported on 8/22/2023) 60 tablet 5    meclizine 25 MG Oral Tab Take 1 tablet (25 mg total) by mouth 3 (three) times daily as needed for Dizziness or Nausea. (Patient not taking: Reported on 8/22/2023) 20 tablet 0    ondansetron (ZOFRAN) 4 mg tablet Take 1 tablet (4 mg total) by mouth every 8 (eight) hours as needed for Nausea. (Patient not taking: Reported on 4/3/2023) 20 tablet 0    losartan 100 MG Oral Tab Take 1 tablet (100 mg total) by mouth daily. (Patient not taking: Reported on 8/22/2023)  1    Phenazopyridine HCl 200 MG Oral Tab Take 1 tablet (200 mg total) by mouth 3 (three) times daily as needed for Pain. (Patient not taking: Reported on 4/3/2023) 15 tablet 0    Lactobacillus (PROBIOTIC ACIDOPHILUS OR) Take by mouth. (Patient not taking: Reported on 8/22/2023)         Allergies:    No Known Allergies      UNKNOWN    ROS:   CONSTITUTIONAL: negative for fevers, chills, sweats and weight loss  EYES Negative for red eyes, yellow eyes, changes in vision  HEENT: positive for dysphagia and negative for hoarseness  RESPIRATORY: Negative for cough and shortness of breath  CARDIOVASCULAR: Negative for chest pain, palpitations  GASTROINTESTINAL: See HPI  GENITOURINARY: Negative for dysuria and frequency  MUSCULOSKELETAL: Negative for arthralgias and myalgias  NEUROLOGICAL: Negative for dizziness and headaches  BEHAVIOR/PSYCH: Negative for anxiety and poor appetite    PHYSICAL EXAM:   Blood pressure 110/69, pulse 100, height 5' 1.5\" (1.562 m), weight 183 lb (83 kg), last menstrual period 09/06/2013.     GEN: WD/WN, NAD  HEENT: Supple symmetrical, trachea midline  CV: RRR, the extremities are warm and well perfused   LUNGS: No increased work of breathing  ABDOMEN: No scars, normal bowel sounds, soft, non-tender, non-distended no rebound or guarding, no masses, no hepatomegaly  MSK: No redness, no warmth, no swelling of joints  SKIN: No jaundice, no erythema, no rashes  HEMATOLOGIC: No bleeding, no bruising  NEURO: Alert and interactive, normal gait    Labs/Imaging/Procedures:     Patient's pertinent labs and imaging were reviewed and discussed with patient today. .  ASSESSMENT/PLAN:   Jose Alfredo Bridges is a 59year old year-old female with history of fibromyalgia, htn, high cholesterol, pre-dm:        #dysphagia  #gerd  Chronic gerd historically improved with PPI. Last EGD 2019 w/ Dr. Pallavi Izaguirre. Results c/w moderate acute/chronic reflux changes at the GE junction, random biopsies unremarkable. She has had worsening of her reflux complaints which correlates with dose increase of ozempic. Dysphagia onset has follows issues. No issues with with solids. Plan as below. #hepatic steatosis  #gb polyp  Findings noted on ultrasound 2022. Polyp decreased in size from comparison and no further work-up recommended. Mild hepatic steatosis negative for hepatobiliary dilation. Lfts normal 8/19/23. Recommendations below. #crc screening  No fhx gi malignancy. Anemia has been chronic. She denies brbpr, melena. Due for c-scope for crc screening 1/2024. Will get cards clearance prior to procedure    -consider reducing ozempic dose with pcp  -pantoprazole 40 mg twice daily x 4 weeks and then reduce dose to once daily  -avoid fatty/greasy food  -healthy bmi  -monitor cholesterol, blood sugar, liver function testing for need for further work-up  -Avoid hard solids  -extra care with chewing, swallowing  -esophagram, video swallow  -er if condition decline    1. Schedule colonoscopy/egd with MAC w/ Dr. Massimo Campbell or Dr. Jonathan Ptaton or Dr. Pallavi Izaguirre w/ possible dil [Diagnosis: crc screening, gerd, dysphagia ]    2.  bowel prep from pharmacy ([facility] clenpiq )    3. Hold metformin day before and am of procedure  Hold ozempic, iron supplement 7 days prior to procedure    4. Read all bowel prep instructions carefully    5. AVOID seeds, nuts, popcorn, raw fruits and vegetables (cooked is okay) for 2-3 days before procedure    6.   You MAY need to go for COVID testing 72 hours before procedure. The testing team will call you a few days before your procedure to discuss with you if testing is required. If you are asked to go for COVID testing and do not completed the test, the procedure cannot be performed. 7. If you start any NEW medication after your visit today, please notify us. Certain medications will need to be held before the procedure, or the procedure cannot be performed.     >>>Please note: if you were prescribed Suprep for the bowel prep and it is too expensive or not covered by insurance, it is okay to substitute Trilyte (or any similar generic prep). This can be done by notifying the pharmacy or calling our office. Orders This Visit:  No orders of the defined types were placed in this encounter. Meds This Visit:  Requested Prescriptions      No prescriptions requested or ordered in this encounter       Imaging & Referrals:  None    ENDOSCOPIC RISK BENEFIT DISCUSSION: I described the procedure in great detail with the patient. I discussed the risks and benefits, including but not limited to: bleeding, perforation, infection, anesthesia complications, and even death. Patient will be NPO after midnight and will have a person physically present at time of pick-up to drive patient home. Patient verbalized understanding and agrees to proceed with procedure as planned. Teresa Mccoy. Carlos Martin, APRN   8/22/2023        This note was partially prepared using [email or web address] [facility] [employer] voice recognition dictation software. As a result, errors may occur. When identified, these errors have been corrected.  While every attempt is made to correct errors during dictation, discrepancies may still exist.

## 2023-08-22 ENCOUNTER — TELEPHONE (OUTPATIENT)
Dept: GASTROENTEROLOGY | Facility: CLINIC | Age: 58
End: 2023-08-22

## 2023-08-22 ENCOUNTER — TELEPHONE (OUTPATIENT)
Facility: CLINIC | Age: 58
End: 2023-08-22

## 2023-08-22 ENCOUNTER — OFFICE VISIT (OUTPATIENT)
Facility: CLINIC | Age: 58
End: 2023-08-22

## 2023-08-22 VITALS
HEIGHT: 61.5 IN | WEIGHT: 183 LBS | HEART RATE: 100 BPM | BODY MASS INDEX: 34.11 KG/M2 | SYSTOLIC BLOOD PRESSURE: 110 MMHG | DIASTOLIC BLOOD PRESSURE: 69 MMHG

## 2023-08-22 DIAGNOSIS — K21.9 GASTROESOPHAGEAL REFLUX DISEASE, UNSPECIFIED WHETHER ESOPHAGITIS PRESENT: ICD-10-CM

## 2023-08-22 DIAGNOSIS — K82.4 GALLBLADDER POLYP: ICD-10-CM

## 2023-08-22 DIAGNOSIS — R13.10 DYSPHAGIA, UNSPECIFIED TYPE: ICD-10-CM

## 2023-08-22 DIAGNOSIS — K76.0 HEPATIC STEATOSIS: ICD-10-CM

## 2023-08-22 DIAGNOSIS — Z12.11 COLON CANCER SCREENING: ICD-10-CM

## 2023-08-22 DIAGNOSIS — R13.19 ESOPHAGEAL DYSPHAGIA: Primary | ICD-10-CM

## 2023-08-22 DIAGNOSIS — Z12.11 SCREENING FOR COLON CANCER: Primary | ICD-10-CM

## 2023-08-22 PROCEDURE — 3074F SYST BP LT 130 MM HG: CPT | Performed by: NURSE PRACTITIONER

## 2023-08-22 PROCEDURE — 99215 OFFICE O/P EST HI 40 MIN: CPT | Performed by: NURSE PRACTITIONER

## 2023-08-22 PROCEDURE — 3008F BODY MASS INDEX DOCD: CPT | Performed by: NURSE PRACTITIONER

## 2023-08-22 PROCEDURE — 3078F DIAST BP <80 MM HG: CPT | Performed by: NURSE PRACTITIONER

## 2023-08-22 RX ORDER — PANTOPRAZOLE SODIUM 40 MG/1
40 TABLET, DELAYED RELEASE ORAL
Qty: 60 TABLET | Refills: 0 | Status: SHIPPED | OUTPATIENT
Start: 2023-08-22 | End: 2023-09-21

## 2023-08-22 RX ORDER — SODIUM PICOSULFATE, MAGNESIUM OXIDE, AND ANHYDROUS CITRIC ACID 10; 3.5; 12 MG/160ML; G/160ML; G/160ML
1 LIQUID ORAL AS DIRECTED
Qty: 320 ML | Refills: 0 | Status: SHIPPED | OUTPATIENT
Start: 2023-08-22 | End: 2023-08-24

## 2023-08-22 NOTE — TELEPHONE ENCOUNTER
Scheduled for:  Colonoscopy [id number] [phone] with Possible DIL. Provider Name:  Dr Viola May  Date:  1/16/2024  Location:  [facility]  Sedation:  MAC  Time:  8:15 am. (pt is aware to arrive at 7:15 am)  Prep:  Split Clenpiq  Meds/Allergies Reconciled?:  Desmond Klein NP, Reviewed   Diagnosis with codes:    CRC screening Z12.11  GERD K21.9  Dysphagia R13.10  Was patient informed to call insurance with codes (Y/N):  Yes   Referral sent?:  Referral was sent at the time of electronic surgical scheduling. [facility] or [id number] notified?:  I sent an electronic request to Endo Scheduling and received a confirmation today. Medication Orders:  Pt is aware to NOT take iron pills, herbal meds and diet supplements for 7 days before exam.    Hold metformin day before and am of procedure   Hold ozempic, iron supplement 7 days prior to procedure  Also to NOT take any form of alcohol, recreational drugs and any forms of ED meds 24 hours before exam.   Misc Orders:  N/A   Further instructions given by staff:  I discussed the prep instructions with the patient which she verbally understood. I provided patient with prep instruction's sheet in office. Patient was informed about the new cancellation policy for his/her procedure. Patient was also given a copy of the cancellation policy at the time of the appointment and verbalized understanding.

## 2023-08-23 ENCOUNTER — TELEPHONE (OUTPATIENT)
Facility: CLINIC | Age: 58
End: 2023-08-23

## 2023-08-23 ENCOUNTER — OFFICE VISIT (OUTPATIENT)
Dept: PHYSICAL THERAPY | Facility: HOSPITAL | Age: 58
End: 2023-08-23
Attending: INTERNAL MEDICINE
Payer: COMMERCIAL

## 2023-08-23 DIAGNOSIS — M54.12 CERVICAL RADICULOPATHY: Primary | ICD-10-CM

## 2023-08-23 PROCEDURE — 97162 PT EVAL MOD COMPLEX 30 MIN: CPT

## 2023-08-23 PROCEDURE — 97110 THERAPEUTIC EXERCISES: CPT

## 2023-08-23 NOTE — TELEPHONE ENCOUNTER
Current Outpatient Medications   Medication Sig Dispense Refill    pantoprazole 40 MG Oral Tab EC Take 1 tablet (40 mg total) by mouth 2 (two) times daily before meals. 60 tablet 0       Plan does not cover medication.  Please call plan to initiate PA

## 2023-08-24 ENCOUNTER — TELEPHONE (OUTPATIENT)
Facility: CLINIC | Age: 58
End: 2023-08-24

## 2023-08-24 RX ORDER — LOSARTAN POTASSIUM AND HYDROCHLOROTHIAZIDE 25; 100 MG/1; MG/1
1 TABLET ORAL DAILY
Qty: 90 TABLET | Refills: 0 | Status: SHIPPED | OUTPATIENT
Start: 2023-08-24

## 2023-08-24 RX ORDER — METOPROLOL TARTRATE 50 MG/1
50 TABLET, FILM COATED ORAL 2 TIMES DAILY
Qty: 180 TABLET | Refills: 0 | Status: SHIPPED | OUTPATIENT
Start: 2023-08-24

## 2023-08-24 RX ORDER — METFORMIN HYDROCHLORIDE 750 MG/1
750 TABLET, EXTENDED RELEASE ORAL 2 TIMES DAILY WITH MEALS
Qty: 180 TABLET | Refills: 0 | Status: SHIPPED | OUTPATIENT
Start: 2023-08-24

## 2023-08-24 RX ORDER — ATORVASTATIN CALCIUM 20 MG/1
20 TABLET, FILM COATED ORAL NIGHTLY
Qty: 90 TABLET | Refills: 0 | Status: SHIPPED | OUTPATIENT
Start: 2023-08-24

## 2023-08-24 NOTE — TELEPHONE ENCOUNTER
quantity limit override for pantoprazole    Medication PA Requested: Pantoprazole 40mg BIDx1 month the reduce to 1 tablet daily after that  Pt Insurance/Number to Contact: she has both [employer] labor fund and [employer] Used: see below  [employer] ID# and Group: she has 2 plans  Dx: GERD with reflux changes at [employer] and Dysphagia  Medications Tried Previously with Date and SIG: pantoprazole 40mg daily, omeprazole 20mg    Cards Off PA form with notation of need quantity limit exception and   Lov 8/22/2023,  EGD 1/18/2018,  Egd 10/3/52156 & path.     Awaiting determination

## 2023-08-24 NOTE — TELEPHONE ENCOUNTER
PPD Team    Can you please assist with quantity limit override for pantoprazole    Medication PA Requested: Pantoprazole 40mg BIDx1 month the reduce to 1 tablet daily after that  Pt Insurance/Number to Contact: she has both [employer] labor fund and [employer] Used: see below  [employer] ID# and Group: she has 2 plans  Dx: GERD with reflux changes at [employer] junction and Dysphagia  Medications Tried Previously with Date and SIG: pantoprazole 40mg daily, omeprazole 20mg      Per pharmacy, it still will only go through for 1 tablet per day.   Thank you

## 2023-08-24 NOTE — TELEPHONE ENCOUNTER
Request for cardiac clearance faxed to Dr. Valdemar Essex at [phone] with return confirmation. Phone [phone]    Patient is scheduled for a colonoscopy and EGD on 01/16/2024 with Dr. Garcia Or. Awaiting orders.

## 2023-08-24 NOTE — TELEPHONE ENCOUNTER
Current Outpatient Medications   Medication Sig Dispense Refill    Sod Picosulfate-Mag Ox-Cit Acd (CLENPIQ) 10-3.5-12 MG-GM -GM/160ML Oral Solution Take 1 each by mouth As Directed for 2 days. 320 mL 0     Medication is on backorder. Please consider alternative.

## 2023-08-25 ENCOUNTER — OFFICE VISIT (OUTPATIENT)
Dept: PHYSICAL THERAPY | Facility: HOSPITAL | Age: 58
End: 2023-08-25
Attending: INTERNAL MEDICINE
Payer: COMMERCIAL

## 2023-08-25 PROCEDURE — 97112 NEUROMUSCULAR REEDUCATION: CPT

## 2023-08-25 PROCEDURE — 97140 MANUAL THERAPY 1/> REGIONS: CPT

## 2023-08-25 PROCEDURE — 97110 THERAPEUTIC EXERCISES: CPT

## 2023-08-25 RX ORDER — SODIUM PICOSULFATE, MAGNESIUM OXIDE, AND ANHYDROUS CITRIC ACID 12; 3.5; 1 G/175ML; G/175ML; MG/175ML
350 LIQUID ORAL AS DIRECTED
Qty: 350 ML | Refills: 0 | Status: SHIPPED | OUTPATIENT
Start: 2023-08-25

## 2023-08-25 NOTE — TELEPHONE ENCOUNTER
RN called and spoke to [facility], informed them that GI office received fax stating that exception form was sent for pantoprazole 40mg BID x 4 weeks but that no PA is needed for this medication. Ruben states they ran the med and it still shows as not covered in their system. RN called [employer] at ph #[phone]. RN spoke to Toby Hartley with [employer] and was informed that pantoprazole 40 mg BID x 4 weeks is approved. Tami called [facility] and verified that pantoprazole BID is approved for approximate cost of $10 for pt. RN LM informing pt that pantoprazole BID x 4 weeks is covered. GI office number provided for any questions.

## 2023-08-25 NOTE — TELEPHONE ENCOUNTER
RN sent order for new volume clenpiq (175 ml) as 160 ml is no longer being made. Pharmacy states med is not in stock but they can order it.

## 2023-08-25 NOTE — PROGRESS NOTES
Diagnosis:   Cervical radiculopathy (M54.12)      Referring Provider: Drexel Boas  Date of Evaluation:    8/22/2023    Precautions:   Fibromyalgia, MCTD, hx of lumbar fusion Next MD visit:   none scheduled  Date of Surgery: n/a   Insurance Primary/Secondary: [employer] / [facility]     # Auth Visits: no auth, 8 per POC           Subjective: Was a little sore after evaluation but did not linger. No N/T today so far. Is going this afternoon for a massage. Pain: 0/10      Objective:     ULTT (8/25/2023):   Ulnar N  R -; L -   Median N  R - ; L -      Cervical AROM: (* denotes performed with pain)  Flexion: 55  Extension: 45  Sidebending: R 30; L 43  Rotation: R 80; L 73     Accessory motion: hypomobility with sx reproduction at C5-T1, central P-A and L UPA. Hypomobility and tenderness along central upper thoracic spine  Palpation: TTP along thoracic spine, soft tissue restriction at B upper trapezius, levator scapulae, and rhomboid     Strength: (* denotes performed with pain)  UE/Scapular   Shoulder Flex: R 5/5, L 4*/5  Shoulder ABD (C5): R 5/5, L 5/5  Biceps (C6): R 5/5, L 5/5  Wrist ext (C6): R 5/5, L 4-/5  Triceps (C7): R 5/5, L 5/5  Wrist Flex (C7): R 5/5, L 5/5  Digit Flex (C8): R 5/5, L 5/5  Thumb Ext (C8): R 5/5, L 5/5  Interossei (T1): R 5/5, L 5/5     Rhomboids: R 4/5, L 4-*/5  Mid trap: R 4/5; L 4-/5  Lats: R 4/5, L 4/5  Low trap: R 4-/5; L 4-*/5      Strength: R 52 lbs; L 53 lbs      Flexibility:   UE/Scapular   Upper Trap: R mod restriction; L mod restriction   Levator Scap: R mod restriction ; L mod restriction       Special tests:   Modified Vertebral Artery: R -, L -  Cervical Spine Upper Quadrant Testing (Ext-ROT-SB): NT  Cervical Spine Lower Quadrant Testing (Ext-SB-ROT): NT  Cervical Distraction: + (sx relief)        Assessment: Upper limb tension testing of Median and Ulnar nerve negative bilaterally when assessed in session; no sx reproduction with evgeny cervical joint mobilizations.  HEP given in session to address bilateral soft tissue restrictions, pt able to perform with good form and no questions. Goals:  (to be met in 8  visits)   Pt will improve cervical AROM flexion by 5 degrees to improve tolerance for looking down to tie shoes   Pt will improve cervical AROM extension by 10 degrees to improve tolerance for putting dishes into overhead cabinets   Pt will improve cervical AROM rotation to >82 degrees to improve tolerance for turning head to check blind spot while driving  Pt will have improved thoracic PA mobility to WNL to improve cervical ROM as well as promote upright posturing and decreased pain with prolonged computer work throughout the day. Pt will improve postural strength (mid/low trap) to 4+/5 to promote improved upright posturing and decreased pain with carrying bags of groceries. Pt will be independent and compliant with comprehensive HEP to maintain progress achieved in PT    Plan: Patient will be seen for 1-2 x/week or a total of 8 visits over a 90 day period. Treatment will include: Manual Therapy, Neuromuscular Re-education, Self-Care Home Management, Therapeutic Activities, Therapeutic Exercise, and Home Exercise Program instruction  Date: 8/25/2023  TX#: 2/12 Date:                 TX#: 3/ Date:                 TX#: 4/ Date:                 TX#: 5/ Date:    Tx#: 6/   MT:   Ulnar/Median N assessment  Suboccipital distraction, supine  Gr3 C5-C7 Central P-A, L UPA (no sx reprod)        TE:    Supine horiz abduction GTB 2x12  Seated upper trapezius stretch, R/L 3x20s   HEP instruction        NR:   Supine chin tuck, 25% for sx management 3x5   Self Ulnar N glide, end range x10                HEP: ulnar N glide, Upper trapezius stretch     Charges: 1 Manual, 1 TE, 1 NR       Total Timed Treatment: 15' Manual, 15' TE, 12' NR min  Total Treatment Time: 42 min

## 2023-08-28 ENCOUNTER — OFFICE VISIT (OUTPATIENT)
Dept: PHYSICAL THERAPY | Facility: HOSPITAL | Age: 58
End: 2023-08-28
Attending: INTERNAL MEDICINE
Payer: COMMERCIAL

## 2023-08-28 PROCEDURE — 97140 MANUAL THERAPY 1/> REGIONS: CPT

## 2023-08-28 PROCEDURE — 97110 THERAPEUTIC EXERCISES: CPT

## 2023-08-28 PROCEDURE — 97112 NEUROMUSCULAR REEDUCATION: CPT

## 2023-08-28 NOTE — PROGRESS NOTES
Diagnosis:   Cervical radiculopathy (M54.12)      Referring Provider: Ladarius Mendoza  Date of Evaluation:    8/22/2023    Precautions:   Fibromyalgia, MCTD, hx of lumbar fusion Next MD visit:   none scheduled  Date of Surgery: n/a   Insurance Primary/Secondary: [employer] / Mehdi Carpio PPO     # Auth Visits: no auth, 8 per POC           Subjective: Had a massage on Friday, states afterwards pain significantly increased at L shoulder blade, stayed through the whole weekend but is now starting to get a little better. Tried HEP yesterday and felt like it was a little helpful but still painful. Pain: 6/10      Objective:     ULTT (8/25/2023):   Ulnar N  R -; L -   Median N  R - ; L -      Cervical AROM: (* denotes performed with pain)  Flexion: 55  Extension: 45  Sidebending: R 30; L 43  Rotation: R 80; L 73     Accessory motion: hypomobility with sx reproduction at C5-T1, central P-A and L UPA.  Hypomobility and tenderness along central upper thoracic spine  Palpation: TTP along thoracic spine, soft tissue restriction at B upper trapezius, levator scapulae, and rhomboid     Strength: (* denotes performed with pain)  UE/Scapular   Shoulder Flex: R 5/5, L 4*/5  Shoulder ABD (C5): R 5/5, L 5/5  Biceps (C6): R 5/5, L 5/5  Wrist ext (C6): R 5/5, L 4-/5  Triceps (C7): R 5/5, L 5/5  Wrist Flex (C7): R 5/5, L 5/5  Digit Flex (C8): R 5/5, L 5/5  Thumb Ext (C8): R 5/5, L 5/5  Interossei (T1): R 5/5, L 5/5     Rhomboids: R 4/5, L 4-*/5  Mid trap: R 4/5; L 4-/5  Lats: R 4/5, L 4/5  Low trap: R 4-/5; L 4-*/5      Strength: R 52 lbs; L 53 lbs      Flexibility:   UE/Scapular   Upper Trap: R mod restriction; L mod restriction   Levator Scap: R mod restriction ; L mod restriction       Special tests:   Modified Vertebral Artery: R -, L -  Cervical Spine Upper Quadrant Testing (Ext-ROT-SB): NT  Cervical Spine Lower Quadrant Testing (Ext-SB-ROT): NT  Cervical Distraction: + (sx relief)        Assessment: Within session L rotation improvement noted after manual therapy, pt reporting sx improvement as well. Focused on scapular mobility and postural retraining with good response. Goals:  (to be met in 8  visits)   Pt will improve cervical AROM flexion by 5 degrees to improve tolerance for looking down to tie shoes   Pt will improve cervical AROM extension by 10 degrees to improve tolerance for putting dishes into overhead cabinets   Pt will improve cervical AROM rotation to >82 degrees to improve tolerance for turning head to check blind spot while driving  Pt will have improved thoracic PA mobility to WNL to improve cervical ROM as well as promote upright posturing and decreased pain with prolonged computer work throughout the day. Pt will improve postural strength (mid/low trap) to 4+/5 to promote improved upright posturing and decreased pain with carrying bags of groceries. Pt will be independent and compliant with comprehensive HEP to maintain progress achieved in PT    Plan: Patient will be seen for 1-2 x/week or a total of 8 visits over a 90 day period. Treatment will include: Manual Therapy, Neuromuscular Re-education, Self-Care Home Management, Therapeutic Activities, Therapeutic Exercise, and Home Exercise Program instruction  Date: 8/25/2023  TX#: 2/12 Date:   8/28/2023               TX#: 3/12 Date:                 TX#: 4/ Date:                 TX#: 5/ Date:    Tx#: 6/   MT:   Ulnar/Median N assessment  Suboccipital distraction, supine  Gr3 C5-C7 Central P-A, L UPA (no sx reprod)  MT:   Passive L UT stretch 4'  Suboccipital distraction, supine  Gr2 L UPA C2-C6, pain relief      TE:    Supine horiz abduction GTB 2x12  Seated upper trapezius stretch, R/L 3x20s   HEP instruction  TE:    Supine horiz abduction GTB 2x12  Lower trapezius liftoff at wall 2x8  Open Book at wall R/L 2x5 ea  shoulder rolls CW/CCW x10 ea      NR:   Supine chin tuck, 25% for sx management 3x5   Self Ulnar N glide, end range x10   NR:   Supine chin tuck, 50% 3x5   Standing unilateral row GTB 2x10 ea R/L              HEP: ulnar N glide, Upper trapezius stretch     Charges: 1 Manual, 1 TE, 1 NR       Total Timed Treatment: 12' Manual, 18' TE, 15' NR min  Total Treatment Time: 45 min

## 2023-09-01 ENCOUNTER — OFFICE VISIT (OUTPATIENT)
Dept: PHYSICAL THERAPY | Facility: HOSPITAL | Age: 58
End: 2023-09-01
Attending: INTERNAL MEDICINE
Payer: COMMERCIAL

## 2023-09-01 PROCEDURE — 97110 THERAPEUTIC EXERCISES: CPT

## 2023-09-01 PROCEDURE — 97140 MANUAL THERAPY 1/> REGIONS: CPT

## 2023-09-03 NOTE — PATIENT INSTRUCTIONS
Take Claritin (loratidine) 10 mg once a day in the morning. Take Benadryl (diphenhydramine) 25 mg 1-2 tabs in the evening--it can cause drowsiness. Wound care

## 2023-09-05 ENCOUNTER — OFFICE VISIT (OUTPATIENT)
Dept: PHYSICAL THERAPY | Facility: HOSPITAL | Age: 58
End: 2023-09-05
Attending: INTERNAL MEDICINE
Payer: COMMERCIAL

## 2023-09-05 PROCEDURE — 97110 THERAPEUTIC EXERCISES: CPT

## 2023-09-05 PROCEDURE — 97140 MANUAL THERAPY 1/> REGIONS: CPT

## 2023-09-08 ENCOUNTER — TELEPHONE (OUTPATIENT)
Dept: PHYSICAL THERAPY | Facility: HOSPITAL | Age: 58
End: 2023-09-08

## 2023-09-08 ENCOUNTER — APPOINTMENT (OUTPATIENT)
Dept: PHYSICAL THERAPY | Facility: HOSPITAL | Age: 58
End: 2023-09-08
Attending: INTERNAL MEDICINE
Payer: COMMERCIAL

## 2023-09-11 ENCOUNTER — OFFICE VISIT (OUTPATIENT)
Dept: PHYSICAL THERAPY | Facility: HOSPITAL | Age: 58
End: 2023-09-11
Attending: INTERNAL MEDICINE
Payer: COMMERCIAL

## 2023-09-11 ENCOUNTER — HOSPITAL ENCOUNTER (OUTPATIENT)
Dept: GENERAL RADIOLOGY | Facility: HOSPITAL | Age: 58
Discharge: HOME | End: 2023-09-11
Attending: NURSE PRACTITIONER
Payer: COMMERCIAL

## 2023-09-11 DIAGNOSIS — K21.9 GASTROESOPHAGEAL REFLUX DISEASE, UNSPECIFIED WHETHER ESOPHAGITIS PRESENT: ICD-10-CM

## 2023-09-11 DIAGNOSIS — K82.4 GALLBLADDER POLYP: ICD-10-CM

## 2023-09-11 DIAGNOSIS — R13.19 ESOPHAGEAL DYSPHAGIA: ICD-10-CM

## 2023-09-11 DIAGNOSIS — K76.0 HEPATIC STEATOSIS: ICD-10-CM

## 2023-09-11 DIAGNOSIS — Z12.11 COLON CANCER SCREENING: ICD-10-CM

## 2023-09-11 PROCEDURE — 97110 THERAPEUTIC EXERCISES: CPT

## 2023-09-11 PROCEDURE — 97140 MANUAL THERAPY 1/> REGIONS: CPT

## 2023-09-11 PROCEDURE — 74246 X-RAY XM UPR GI TRC 2CNTRST: CPT | Performed by: NURSE PRACTITIONER

## 2023-09-11 NOTE — PROGRESS NOTES
Diagnosis:   Cervical radiculopathy (M54.12)      Referring Provider: Salvador Mercedes  Date of Evaluation:    8/22/2023    Precautions:   Fibromyalgia, MCTD, hx of lumbar fusion Next MD visit:   none scheduled  Date of Surgery: n/a   Insurance Primary/Secondary: [employer] / [facility]     # Auth Visits: no auth, 8 per POC           Subjective: Had to take work off on Friday last week due to pain. Likes SNAGs for pain relief but states the nerve pain down the L arm is getting unbearable. Has reached out to rheumatology for next steps, wants to schedule MRI. Pain: 8/10      Objective:     ULTT (9/11/2023):    Ulnar N: L + (digit 4-5 tingling with end range, worse with cervical SB)  Cozen's Test (lateral epicondylitis): mildly positive- increased pain, no weakness    Tinel's Sign (9/6/2023): R - L + at [facility]       Cervical AROM: (* denotes performed with pain)  Flexion: 55  Extension: 45  Sidebending: R 30; L 43  Rotation: R 80; L 73     Accessory motion: hypomobility with sx reproduction at C5-T1, central P-A and L UPA.  Hypomobility and tenderness along central upper thoracic spine  Palpation: TTP along thoracic spine, soft tissue restriction at B upper trapezius, levator scapulae, and rhomboid     Strength: (* denotes performed with pain)  UE/Scapular   Shoulder Flex: R 5/5, L 4*/5  Shoulder ABD (C5): R 5/5, L 5/5  Biceps (C6): R 5/5, L 5/5  Wrist ext (C6): R 5/5, L 4-/5  Triceps (C7): R 5/5, L 5/5  Wrist Flex (C7): R 5/5, L 5/5  Digit Flex (C8): R 5/5, L 5/5  Thumb Ext (C8): R 5/5, L 5/5  Interossei (T1): R 5/5, L 5/5     Rhomboids: R 4/5, L 4-*/5  Mid trap: R 4/5; L 4-/5  Lats: R 4/5, L 4/5  Low trap: R 4-/5; L 4-*/5      Strength: R 52 lbs; L 53 lbs      Flexibility:   UE/Scapular   Upper Trap: R mod restriction; L mod restriction   Levator Scap: R mod restriction ; L mod restriction       Special tests:   Modified Vertebral Artery: R -, L -  Cervical Spine Upper Quadrant Testing (Ext-ROT-SB): NT  Cervical Spine Lower Quadrant Testing (Ext-SB-ROT): NT  Cervical Distraction: + (sx relief)        Assessment: Slight symptom improvement at digit 4-5 tingling after passive Ulnar nerve glide; pt continues to get nerve pain through lateral epicondyle and into wrist extensors but unchanged with exercise in session. Continue to focus session on distal symptoms for pain management and improved quality of life. Goals:  (to be met in 8  visits)   Pt will improve cervical AROM flexion by 5 degrees to improve tolerance for looking down to tie shoes   Pt will improve cervical AROM extension by 10 degrees to improve tolerance for putting dishes into overhead cabinets   Pt will improve cervical AROM rotation to >82 degrees to improve tolerance for turning head to check blind spot while driving  Pt will have improved thoracic PA mobility to WNL to improve cervical ROM as well as promote upright posturing and decreased pain with prolonged computer work throughout the day. Pt will improve postural strength (mid/low trap) to 4+/5 to promote improved upright posturing and decreased pain with carrying bags of groceries.     Pt will be independent and compliant with comprehensive HEP to maintain progress achieved in PT    Plan:  strength, wrist extensor stretch/strengthening, ulnar nerve glide, scapular strength training  Date: 8/25/2023  TX#: 2/12 Date:   8/28/2023               TX#: 3/12 Date:  9/1/2023                TX#: 4/12 Date:   9/5/2023               TX#: 5/12  Date: 9/11/2023   Tx#: 6/12   MT:   Ulnar/Median N assessment  Suboccipital distraction, supine  Gr3 C5-C7 Central P-A, L UPA (no sx reprod)  MT:   Passive L UT stretch 4'  Suboccipital distraction, supine  Gr2 L UPA C2-C6, pain relief MT:   Suboccipital distraction, supine  Gr2 L UPA C2-C6, pain relief  Gr 3 central P-A C3-T1   Supine Medial/Ulnar N glide for pain relief MT:   Suboccipital distraction, supine  Gr2 L UPA C4-C6, pain relief  Gr 3 central P-A C4-C7 MT:   Supine ulnar glide, mid range for pain relief     TE:    Supine horiz abduction GTB 2x12  Seated upper trapezius stretch, R/L 3x20s   HEP instruction  TE:    Supine horiz abduction GTB 2x12  Lower trapezius liftoff at wall 2x8  Open Book at wall R/L 2x5 ea  shoulder rolls CW/CCW x10 ea TE:   Pulleys, flexion 3'  Seated SNAGs, cervical R/L x10, 10s  Lower trapezius liftoff at wall 2x8 TE:   Tinel's sign testing  Seated Wrist extensor stretch with web 5x20s  Seated wrist flexor stretch with web 5x20s  Seated SNAGs, cervical R/L x10, 10s  Lower trapezius liftoff at wall 2x8  Open Book at wall R/L 2x5 ea  Pectoralis stretch on foam 3x30s   TE:   Seated Wrist extensor stretch with web 5x20s  Seated wrist flexor stretch with web 5x20s  Seated Blue web  training x15  Seated red flex bar x15   Open Book at wall R/L 2x5 ea  Supine horizontal abd GTB x20     NR:   Supine chin tuck, 25% for sx management 3x5   Self Ulnar N glide, end range x10   NR:   Supine chin tuck, 50% 3x5   Standing unilateral row GTB 2x10 ea R/L              HEP: ulnar N glide, Upper trapezius stretch   9/6/2023: Cervical SNAG, wrist flexor/extensor stretching    Charges: 1Manual, 2 TE       Total Timed Treatment: 15' Manual, 25' TE min   Total Treatment Time: 40 min

## 2023-09-12 ENCOUNTER — TELEPHONE (OUTPATIENT)
Dept: GASTROENTEROLOGY | Facility: CLINIC | Age: 58
End: 2023-09-12

## 2023-09-12 NOTE — TELEPHONE ENCOUNTER
Ugi/esophagram 9/11/23:  Impression   CONCLUSION:  1. Small sliding hiatal hernia with a Schatzki's ring and a large amount of gastroesophageal reflux to the upper esophagus. No stricture, ulceration or mass. 2. Aspiration of a small amount of barium into the proximal trachea without cough reflex. Correlate clinically. 3. Somewhat nodular appearance to the duodenum may suggest either prolapse of antral mucosa versus duodenitis. No well-defined ulcer or mass. She denies sob, cough, fever/chills.     Advise:  reflux diet modification  Avoid nsaids  Pantoprazole   Small bites, extra care with chewing/swallowing  Exclude signs/symptoms aspiration pneumonia  Vfss as previously ordered  Plan for egd as scheduled and will likely need disruption of schatzki's ring    She verbalizes understanding and is in agreement with the plan

## 2023-09-13 ENCOUNTER — OFFICE VISIT (OUTPATIENT)
Dept: PHYSICAL THERAPY | Facility: HOSPITAL | Age: 58
End: 2023-09-13
Attending: INTERNAL MEDICINE
Payer: COMMERCIAL

## 2023-09-13 PROCEDURE — 97140 MANUAL THERAPY 1/> REGIONS: CPT

## 2023-09-13 PROCEDURE — 97110 THERAPEUTIC EXERCISES: CPT

## 2023-09-25 NOTE — PROGRESS NOTES
The patient verbally consents to a Virtual/Telephone Check-In visit on 10/02/23. Patient understands and accepts financial responsibility for any deductible, co-insurance and/or co-pays associated with this service. Duration of the service: 30 minutes                                          [facility] - Gastroenterology                                                                                                               Reason for consult: f/u    Requesting physician or provider: Simon Iverson MD    Patient presents with:  Dysphagia      HPI:   Ania Callahan is a 59year old year-old female with history of fibromyalgia, htn, high cholesterol, pre-dm:     she is here today for f/u    Ugi/esophagram 9/11/23:  Impression   CONCLUSION:  1. Small sliding hiatal hernia with a Schatzki's ring and a large amount of gastroesophageal reflux to the upper esophagus. No stricture, ulceration or mass. 2. Aspiration of a small amount of barium into the proximal trachea without cough reflex. Correlate clinically. 3. Somewhat nodular appearance to the duodenum may suggest either prolapse of antral mucosa versus duodenitis. No well-defined ulcer or mass. Advised:  reflux diet modification  Pantoprazole   Small bites, extra care with chewing/swallowing  VFSS scheduled but not yet done    Plan for egd as scheduled and will likely need disruption of schatzki's ring    She says had been developing a cold over the weekend spent the weekend in bed. Had chills with illness, but is improving. She denies sob, cp, fever. Didn't take her ozempic sat. she moves her bowels every 2 days while on oral iron supplement. Stools dark with supplementation. No brbpr. No sob, dizziness, fatigue. she has intermittent gerd on pantoprazole bid. Sx have improved with increasing to bid dosing. she denies dysphagia, odynophagia and/or globus. she denies abdominal pain. she had n/v after drinking 3 beers Friday. Emesis was beer/liquid. she denies recent change in appetite and/or unintentional weight loss. Pertinent Surgical Hx:  No abdominal surgery  - See additional surgical hx below  - No known issues with sedation.  - No known history of sleep apnea. Pertinent Family Hx:  - No family hx of esophageal, gastric or colon cancer  - No family history of IBD. Prior endoscopies:  October 2019 EGD performed by Dr. Cheko Solis with MAC related to GERD/atypical chest pain, findings: Moderate acute/chronic reflux changes at the GE junction, random biopsies unremarkable     January 2018 EGD performed by Dr. Jeovany Dougherty with IV twilight related to epigastric abdominal pain, nausea and vomiting, findings: Gastropathy, biopsies negative for H. pylori/malignancy/dysplasia, continue omeprazole 20 mg daily     January 2014 colonoscopy performed by Dr. Jeovany Dougherty with IV twilight related to colon cancer screening, findings: Normal colonoscopy to the terminal ileum, internal hemorrhoids, 10-year recall     Social Hx:  + Former smoker  + Occasional EtOH  + occasional cannabis  - LMP: Postmenopausal  - Occupation:   - Lives with spouse  - NSAIDs/ASA use: PRN    Wt Readings from Last 6 Encounters:  08/22/23 : 183 lb (83 kg)  08/09/23 : 183 lb (83 kg)  07/05/23 : 190 lb 3.2 oz (86.3 kg)  06/07/23 : 196 lb 3.2 oz (89 kg)  04/19/23 : 198 lb (89.8 kg)  04/18/23 : 198 lb 12.8 oz (90.2 kg)       History, Medications, Allergies, ROS:      Past Medical History:   Diagnosis Date    Abnormal vaginal bleeding     polypectomy    Fibromyalgia     Gastropathy 1/18/2018    Hemorrhoids 2014    High blood pressure     High cholesterol     MCTD (mixed connective tissue disease) (HCC)     Mood disorder (HCC)     Other ill-defined conditions(799.89)     Per [employer]:  \"son and girlfriend with 4 kids moved in. \"    Prediabetes     Spinal stenosis     Comments:  spondylosis. Management:  surgery in 12/2009.     Visual impairment       Past Surgical History: Procedure Laterality Date    BACK SURGERY      COLONOSCOPY      HYSTEROSCOPY,DIAGNOSTIC  2006    polypectomy    OTHER SURGICAL HISTORY  2009    Surgery (due to spinal stenosis/spondylosis). OTHER SURGICAL HISTORY Right     ankle surgery      Family Hx:   Family History   Problem Relation Age of Onset    Heart Disease Father         CAD, (cause of death) age 52    Diabetes Father     Stroke Maternal Grandmother         Cerebrovascular accident (Stroke)    Heart Disease Paternal Grandmother         CAD    Breast Cancer Maternal Aunt         76s     Heart Disease Maternal Aunt         CAD    Heart Disease Maternal Aunt     Other (osteoarthritis) Mother     Other (psoriasis) Sister       Social History:   Social History     Socioeconomic History    Marital status:    Tobacco Use    Smoking status: Former     Packs/day: 0     Types: Cigarettes     Quit date: 1989     Years since quittin.7    Smokeless tobacco: Never   Vaping Use    Vaping Use: Never used   Substance and Sexual Activity    Alcohol use: Yes     Alcohol/week: 1.0 standard drink of alcohol     Types: 1 Glasses of wine per week     Comment: Occasionally wine    Drug use: Yes     Types: Cannabis     Comment: occ    Sexual activity: Yes     Partners: Male   Other Topics Concern    Caffeine Concern Yes     Comment: (Coffee, Soda) 2 cups daily    Pt has a pacemaker No    Pt has a defibrillator No    Reaction to local anesthetic No        Medications (Active prior to today's visit):  Current Outpatient Medications   Medication Sig Dispense Refill    methylPREDNISolone 4 MG Oral Tablet Therapy Pack Take as directed on package. 1 each 0    Sod Picosulfate-Mag Ox-Cit Acd (CLENPIQ) 10-3.5-12 MG-GM -GM/175ML Oral Solution Take 350 mL by mouth As Directed. Take as directed per our written instructions 350 mL 0    metFORMIN  MG Oral Tablet 24 Hr Take 1 tablet (750 mg total) by mouth 2 (two) times daily with meals.  180 tablet 0 losartan-hydroCHLOROthiazide 100-25 MG Oral Tab Take 1 tablet by mouth daily. 90 tablet 0    metoprolol tartrate 50 MG Oral Tab Take 1 tablet (50 mg total) by mouth 2 (two) times daily. 180 tablet 0    atorvastatin 20 MG Oral Tab Take 1 tablet (20 mg total) by mouth nightly. 90 tablet 0    methylPREDNISolone 4 MG Oral Tablet Therapy Pack Take as directed on package. (Patient not taking: Reported on 8/22/2023) 1 each 0    cyclobenzaprine 10 MG Oral Tab Take 10-20mg at night as needed 180 tablet 0    semaglutide 8 MG/3ML Subcutaneous Solution Pen-injector Inject 2 mg into the skin once a week. (Patient not taking: Reported on 8/22/2023) 9 mL 0    traMADol 50 MG Oral Tab Take 1 tablet (50 mg total) by mouth every 8 (eight) hours as needed for Pain. (Patient not taking: Reported on 8/22/2023) 30 tablet 5    ciprofloxacin 500 MG Oral Tab Take 1 tablet (500 mg total) by mouth every 12 (twelve) hours. (Patient not taking: Reported on 8/22/2023) 14 tablet 0    VENLAFAXINE  MG Oral Capsule SR 24 Hr TAKE 1 CAPSULE DAILY (Patient not taking: Reported on 8/22/2023) 90 capsule 0    HYDROXYCHLOROQUINE 200 MG Oral Tab TAKE 1 TABLET NIGHTLY 90 tablet 1    venlafaxine  MG Oral Capsule SR 24 Hr Take 1 capsule (150 mg total) by mouth daily. 10 capsule 0    semaglutide 4 MG/3ML Subcutaneous Solution Pen-injector Inject 1 mg into the skin once a week. (Patient not taking: Reported on 8/22/2023) 3 mL 0    semaglutide (OZEMPIC, 0.25 OR 0.5 MG/DOSE,) 2 MG/1.5ML Subcutaneous Solution Pen-injector Inject 0.5 mg into the skin every 7 days. 3 mL 0    OZEMPIC, 0.25 OR 0.5 MG/DOSE, 2 MG/1.5ML Subcutaneous Solution Pen-injector Inject 0.25 mg into the skin every 7 days. (Patient taking differently: Inject 0.5 mg into the skin every 7 days. ) 1 each 0    Glucose Blood (FREESTYLE LITE TEST) In Vitro Strip Test  strip 0    cephalexin 500 MG Oral Cap Take 1 capsule (500 mg total) by mouth every 8 (eight) hours.  (Patient not taking: Reported on 4/3/2023) 21 capsule 0    ciprofloxacin-dexamethasone 0.3-0.1 % Otic Suspension Place 4 drops into the left ear 2 (two) times daily. (Patient not taking: Reported on 4/3/2023) 7.5 mL 0    nabumetone 750 MG Oral Tab Take 1 tablet (750 mg total) by mouth 2 (two) times daily. (Patient not taking: Reported on 8/22/2023) 60 tablet 5    FREESTYLE LANCETS Does not apply Misc TEST DAILY AS DIRECTED 100 each 3    meclizine 25 MG Oral Tab Take 1 tablet (25 mg total) by mouth 3 (three) times daily as needed for Dizziness or Nausea. (Patient not taking: Reported on 8/22/2023) 20 tablet 0    Insulin Pen Needle (PEN NEEDLES) 32G X 4 MM Does not apply Misc 1 each every 7 days. 30 each 0    Insulin Pen Needle (PEN NEEDLES) 32G X 4 MM Does not apply Misc 1 each daily. 90 each 0    ondansetron (ZOFRAN) 4 mg tablet Take 1 tablet (4 mg total) by mouth every 8 (eight) hours as needed for Nausea. (Patient not taking: Reported on 4/3/2023) 20 tablet 0    lidocaine 5 % External Patch Place 2 patches onto the skin daily. 60 patch 0    Betamethasone Dipropionate Aug 0.05 % External Cream Apply 1 Application topically 2 (two) times daily. Apply to affected areas as needed bid 50 g 3    losartan 100 MG Oral Tab Take 1 tablet (100 mg total) by mouth daily. (Patient not taking: Reported on 8/22/2023)  1    Phenazopyridine HCl 200 MG Oral Tab Take 1 tablet (200 mg total) by mouth 3 (three) times daily as needed for Pain. (Patient not taking: Reported on 4/3/2023) 15 tablet 0    Lactobacillus (PROBIOTIC ACIDOPHILUS OR) Take by mouth.  (Patient not taking: Reported on 8/22/2023)         Allergies:    No Known Allergies      UNKNOWN    ROS:   CONSTITUTIONAL: negative for fevers, chills, sweats and weight loss  EYES Negative for red eyes, yellow eyes, changes in vision  HEENT: Negative for dysphagia and hoarseness  RESPIRATORY: Negative for cough and shortness of breath  CARDIOVASCULAR: Negative for chest pain, palpitations  GASTROINTESTINAL: See HPI  GENITOURINARY: Negative for dysuria and frequency  MUSCULOSKELETAL: Negative for arthralgias and myalgias  NEUROLOGICAL: Negative for dizziness and headaches  BEHAVIOR/PSYCH: Negative for anxiety and poor appetite    PHYSICAL EXAM:   Limited 2/2 telehealth visit    GEN: WD/WN, NAD    LUNGS: No increased work of breathing    NEURO: Alert and interactive, normal gait    Labs/Imaging/Procedures:       Ugi/esophagram 9/11/23:  Impression   CONCLUSION:  1. Small sliding hiatal hernia with a Schatzki's ring and a large amount of gastroesophageal reflux to the upper esophagus. No stricture, ulceration or mass. 2. Aspiration of a small amount of barium into the proximal trachea without cough reflex. Correlate clinically. 3. Somewhat nodular appearance to the duodenum may suggest either prolapse of antral mucosa versus duodenitis. No well-defined ulcer or mass. .  ASSESSMENT/PLAN:   Rachael Bullard is a 59year old year-old female with history of fibromyalgia, htn, high cholesterol, pre-dm:     #dysphagia  #gerd  #crc screening  Partial improvement in symptoms since increasing pantoprazole to bid. S/p esophagram and planning for vfss. She is currently scheduled for cln/egd in jan. Pending vfss results and symptoms, will try to expedite procedure.    -consider holding ozempic dose with pcp  -avoid nsaids  -pantoprazole 40 mg twice daily x 4 weeks and then reduce dose to once daily  -may add pepcid 40 mg at night if needed  -Avoid hard solids  -extra care with chewing, swallowing  -video swallow  -cln/egd  -er if condition decline      Orders This Visit:  No orders of the defined types were placed in this encounter. Meds This Visit:  Requested Prescriptions      No prescriptions requested or ordered in this encounter       Imaging & Referrals:  None      Carla Rhoades, TONYA   10/2/2023        This note was partially prepared using i[employer] recognition dictation software. As a result, errors may occur. When identified, these errors have been corrected.  While every attempt is made to correct errors during dictation, discrepancies may still exist.

## 2023-10-02 ENCOUNTER — TELEMEDICINE (OUTPATIENT)
Facility: CLINIC | Age: 58
End: 2023-10-02

## 2023-10-02 DIAGNOSIS — Z12.11 COLON CANCER SCREENING: Primary | ICD-10-CM

## 2023-10-02 DIAGNOSIS — K21.9 GASTROESOPHAGEAL REFLUX DISEASE, UNSPECIFIED WHETHER ESOPHAGITIS PRESENT: ICD-10-CM

## 2023-10-02 DIAGNOSIS — R13.19 ESOPHAGEAL DYSPHAGIA: ICD-10-CM

## 2023-10-09 PROBLEM — R41.89 COGNITIVE DECLINE: Status: ACTIVE | Noted: 2023-10-09

## 2023-10-09 PROBLEM — M54.12 CERVICAL RADICULAR PAIN: Status: ACTIVE | Noted: 2023-10-09

## 2023-10-09 PROBLEM — G47.30 SLEEP DISORDER BREATHING: Status: ACTIVE | Noted: 2023-10-09

## 2023-10-24 ENCOUNTER — HOSPITAL ENCOUNTER (EMERGENCY)
Facility: HOSPITAL | Age: 58
Discharge: HOME | End: 2023-10-24
Attending: STUDENT IN AN ORGANIZED HEALTH CARE EDUCATION/TRAINING PROGRAM
Payer: COMMERCIAL

## 2023-10-24 ENCOUNTER — APPOINTMENT (OUTPATIENT)
Dept: GENERAL RADIOLOGY | Facility: HOSPITAL | Age: 58
End: 2023-10-24
Attending: STUDENT IN AN ORGANIZED HEALTH CARE EDUCATION/TRAINING PROGRAM
Payer: COMMERCIAL

## 2023-10-24 ENCOUNTER — TELEPHONE (OUTPATIENT)
Dept: SPEECH THERAPY | Facility: HOSPITAL | Age: 58
End: 2023-10-24

## 2023-10-24 ENCOUNTER — PATIENT MESSAGE (OUTPATIENT)
Facility: CLINIC | Age: 58
End: 2023-10-24

## 2023-10-24 VITALS
DIASTOLIC BLOOD PRESSURE: 86 MMHG | WEIGHT: 185 LBS | SYSTOLIC BLOOD PRESSURE: 147 MMHG | OXYGEN SATURATION: 98 % | RESPIRATION RATE: 18 BRPM | HEART RATE: 89 BPM | BODY MASS INDEX: 34.93 KG/M2 | HEIGHT: 61 IN | TEMPERATURE: 99 F

## 2023-10-24 DIAGNOSIS — R11.2 NAUSEA AND VOMITING IN ADULT: Primary | ICD-10-CM

## 2023-10-24 LAB
ALBUMIN SERPL-MCNC: 4.1 G/DL (ref 3.4–5)
ALBUMIN/GLOB SERPL: 1 {RATIO} (ref 1–2)
ALP LIVER SERPL-CCNC: 131 U/L
ALT SERPL-CCNC: 61 U/L
ANION GAP SERPL CALC-SCNC: 6 MMOL/L (ref 0–18)
AST SERPL-CCNC: 27 U/L (ref 15–37)
BASOPHILS # BLD AUTO: 0.06 X10(3) UL (ref 0–0.2)
BASOPHILS NFR BLD AUTO: 0.8 %
BILIRUB SERPL-MCNC: 0.7 MG/DL (ref 0.1–2)
BUN BLD-MCNC: 13 MG/DL (ref 7–18)
BUN/CREAT SERPL: 15.3 (ref 10–20)
CALCIUM BLD-MCNC: 10 MG/DL (ref 8.5–10.1)
CHLORIDE SERPL-SCNC: 103 MMOL/L (ref 98–112)
CO2 SERPL-SCNC: 29 MMOL/L (ref 21–32)
CREAT BLD-MCNC: 0.85 MG/DL
DEPRECATED RDW RBC AUTO: 41.1 FL (ref 35.1–46.3)
EGFRCR SERPLBLD CKD-EPI 2021: 78 ML/MIN/1.73M2 (ref 60–?)
EOSINOPHIL # BLD AUTO: 0.04 X10(3) UL (ref 0–0.7)
EOSINOPHIL NFR BLD AUTO: 0.5 %
ERYTHROCYTE [DISTWIDTH] IN BLOOD BY AUTOMATED COUNT: 12.2 % (ref 11–15)
GLOBULIN PLAS-MCNC: 4 G/DL (ref 2.8–4.4)
GLUCOSE BLD-MCNC: 132 MG/DL (ref 70–99)
HCT VFR BLD AUTO: 36.4 %
HGB BLD-MCNC: 12.4 G/DL
IMM GRANULOCYTES # BLD AUTO: 0.02 X10(3) UL (ref 0–1)
IMM GRANULOCYTES NFR BLD: 0.3 %
LIPASE SERPL-CCNC: 35 U/L (ref 13–75)
LYMPHOCYTES # BLD AUTO: 1.34 X10(3) UL (ref 1–4)
LYMPHOCYTES NFR BLD AUTO: 18.4 %
MCH RBC QN AUTO: 31.2 PG (ref 26–34)
MCHC RBC AUTO-ENTMCNC: 34.1 G/DL (ref 31–37)
MCV RBC AUTO: 91.5 FL
MONOCYTES # BLD AUTO: 0.5 X10(3) UL (ref 0.1–1)
MONOCYTES NFR BLD AUTO: 6.9 %
NEUTROPHILS # BLD AUTO: 5.33 X10 (3) UL (ref 1.5–7.7)
NEUTROPHILS # BLD AUTO: 5.33 X10(3) UL (ref 1.5–7.7)
NEUTROPHILS NFR BLD AUTO: 73.1 %
OSMOLALITY SERPL CALC.SUM OF ELEC: 288 MOSM/KG (ref 275–295)
PLATELET # BLD AUTO: 334 10(3)UL (ref 150–450)
POTASSIUM SERPL-SCNC: 4 MMOL/L (ref 3.5–5.1)
PROT SERPL-MCNC: 8.1 G/DL (ref 6.4–8.2)
RBC # BLD AUTO: 3.98 X10(6)UL
SODIUM SERPL-SCNC: 138 MMOL/L (ref 136–145)
WBC # BLD AUTO: 7.3 X10(3) UL (ref 4–11)

## 2023-10-24 PROCEDURE — 99284 EMERGENCY DEPT VISIT MOD MDM: CPT

## 2023-10-24 PROCEDURE — 85025 COMPLETE CBC W/AUTO DIFF WBC: CPT

## 2023-10-24 PROCEDURE — 96374 THER/PROPH/DIAG INJ IV PUSH: CPT

## 2023-10-24 PROCEDURE — 71045 X-RAY EXAM CHEST 1 VIEW: CPT | Performed by: STUDENT IN AN ORGANIZED HEALTH CARE EDUCATION/TRAINING PROGRAM

## 2023-10-24 PROCEDURE — 85025 COMPLETE CBC W/AUTO DIFF WBC: CPT | Performed by: STUDENT IN AN ORGANIZED HEALTH CARE EDUCATION/TRAINING PROGRAM

## 2023-10-24 PROCEDURE — 96361 HYDRATE IV INFUSION ADD-ON: CPT

## 2023-10-24 PROCEDURE — 80053 COMPREHEN METABOLIC PANEL: CPT | Performed by: STUDENT IN AN ORGANIZED HEALTH CARE EDUCATION/TRAINING PROGRAM

## 2023-10-24 PROCEDURE — 99285 EMERGENCY DEPT VISIT HI MDM: CPT

## 2023-10-24 PROCEDURE — 83690 ASSAY OF LIPASE: CPT | Performed by: STUDENT IN AN ORGANIZED HEALTH CARE EDUCATION/TRAINING PROGRAM

## 2023-10-24 PROCEDURE — 80053 COMPREHEN METABOLIC PANEL: CPT

## 2023-10-24 RX ORDER — ONDANSETRON 2 MG/ML
4 INJECTION INTRAMUSCULAR; INTRAVENOUS ONCE
Status: COMPLETED | OUTPATIENT
Start: 2023-10-24 | End: 2023-10-24

## 2023-10-24 RX ORDER — ACETAMINOPHEN 500 MG
1000 TABLET ORAL ONCE
Status: COMPLETED | OUTPATIENT
Start: 2023-10-24 | End: 2023-10-24

## 2023-10-24 RX ORDER — ONDANSETRON 4 MG/1
4 TABLET, ORALLY DISINTEGRATING ORAL EVERY 4 HOURS PRN
Qty: 10 TABLET | Refills: 0 | Status: SHIPPED | OUTPATIENT
Start: 2023-10-24 | End: 2023-10-31

## 2023-10-24 NOTE — TELEPHONE ENCOUNTER
Called to confirm appointment for VFSS tomorrow. Pt was actually in the ER due to vomiting. If she is not able to come for the test tomorrow, she will call Central Scheduling to re-schedule.     Rosanna Do MA/CCC-SLP  Speech Language Pathologist  [address] [facility]  [phone]

## 2023-10-24 NOTE — TELEPHONE ENCOUNTER
Gustave Leyden ,    I spoke to the pt this morning    She states 2 days ago she started taking her Ozempic again after taking a break    She has been nauseated for 2 days since she restarted the medication    Last night she started vomiting again non stop    Due to possibility of aspiration,  I advised pt to go to ER this morning per your recommendation    Pt agrees to the plan

## 2023-10-24 NOTE — DISCHARGE INSTRUCTIONS
Thank you for seeking care at [facility] Emergency [facility]. You have been seen and evaluated for vomiting. We discussed the results of your workup   Please read the instructions provided   If given prescriptions, take as instructed    Please drink plenty of fluids at home. Progressively advance your diet as tolerated. Drink clear, sugar-free, non-citrus liquids frequently in small amounts. Advance your diet from bland foods (crackers, plain boiled rice, potato, noodles, pasta, oatmeal, boiled egg, boiled vegetables, broths, applesauce, bananas, non-citrus fruits) as tolerated. Avoid greasy, fatty, or fried foods, fast food, junk food or snack food. Remember, your care process does not end after your visit today. Please follow-up with your doctor within 1-2 days for a follow-up check to ensure you are  improving, to see if you need any further evaluation/testing, or to evaluate for any alternate diagnoses. Please return to the emergency department if you develop high fevers, have persistent severe abdominal pain, or have bloody stools or vomit, if you are unable to keep down liquids due to frequent vomiting, or if you develop any other new or concerning symptoms as these could be signs of more serious medical illness. We hope you feel better.

## 2023-10-24 NOTE — TELEPHONE ENCOUNTER
From: Elliot Ibanez  To: Latricia Newell  Sent: 10/24/2023 8:06 AM CDT  Subject: Lisa Quiñones, I have been vomiting since last night. You said if I got pretty sick I might have to go to er for aspirating in lung. Should I with this?

## 2023-10-26 ENCOUNTER — TELEPHONE (OUTPATIENT)
Dept: INTERNAL MEDICINE CLINIC | Facility: CLINIC | Age: 58
End: 2023-10-26

## 2023-10-26 ENCOUNTER — NURSE TRIAGE (OUTPATIENT)
Dept: INTERNAL MEDICINE CLINIC | Facility: CLINIC | Age: 58
End: 2023-10-26

## 2023-10-26 RX ORDER — ONDANSETRON 4 MG/1
4 TABLET, ORALLY DISINTEGRATING ORAL EVERY 8 HOURS PRN
Qty: 30 TABLET | Refills: 0 | Status: SHIPPED | OUTPATIENT
Start: 2023-10-26

## 2023-10-26 NOTE — TELEPHONE ENCOUNTER
Discussed with the patient   Rigo Husbands to the ER yesterday for N/ V and diarrhea   She was off ozempic for a month but Monday she started straight to 2 mg  Discussed that is likely what caused her symptoms as we usually titrate the dose if has been off   Advised to stop all GLP1   Continue metformin   To send a message to GI to see if she can move her egd / colon to earlier

## 2023-10-26 NOTE — TELEPHONE ENCOUNTER
A refill request was received for:  Requested Prescriptions     Pending Prescriptions Disp Refills    VENLAFAXINE  MG Oral Capsule SR 24 Hr [Pharmacy Med Name: VENLAFAXINE  CAP 150MG ER] 90 capsule 0     Sig: TAKE 1 CAPSULE DAILY     Last refill date:  6/2/23    Last office visit: 8/9/23      Future Appointments   Date Time Provider Erin Rosario   11/1/2023 11:00 AM [facility] RM4 FLUORO [facility]  [address] [place] [address]   11/8/2023  8:00 AM Kathryn Heredia MD [facility] [address]   11/22/2023  7:40 AM Kathryn Heredia MD [facility] [address]   12/5/2023  3:00 PM [facility] SLEEP ROOMS [facility] SLEEP [facility] Sleep [facility]   1/16/2024  8:15 AM JOSE DANIEL, BLAIR [facility] None

## 2023-10-26 NOTE — TELEPHONE ENCOUNTER
Was in the ED Tueday due to vomiting. Appointment given. Future Appointments   Date Time Provider Erin Couchisti   10/30/2023 11:00 AM MD OCHOA Paz [address]   11/1/2023 11:00 AM [facility] XR RM4 FLUORO [facility] XR  [facility] [address]   11/22/2023  7:40 AM MD OCHOA Paz [address]   12/5/2023  3:00 PM [facility] SLEEP ROOMS [facility] SLEEP EM Sleep C[facility]   1/16/2024  8:15 AM SKY, PROCEDURE [address] None      Reason for Disposition   Taking prescription medication that could cause nausea (e.g., narcotics/opiates, antibiotics, OCPs, many others)    Answer Assessment - Initial Assessment Questions  1. NAUSEA SEVERITY: \"How bad is the nausea? \" (e.g., mild, moderate, severe; dehydration, weight loss)    - MILD: loss of appetite without change in eating habits    - MODERATE: decreased oral intake without significant weight loss, dehydration, or malnutrition    - SEVERE: inadequate caloric or fluid intake, significant weight loss, symptoms of dehydration      Moderate   2. ONSET: \"When did the nausea begin? \"      10/23/23  3. VOMITING: \"Any vomiting? \" If Yes, ask: \"How many times today? \"      No vomiting today but vomited 10/23-10/24  4. RECURRENT SYMPTOM: \"Have you had nausea before? \" If Yes, ask: \"When was the last time? \" \"What happened that time? \"      Now  5. CAUSE: \"What do you think is causing the nausea? \"      Ozempic  6. PREGNANCY: \"Is there any chance you are pregnant? \" (e.g., unprotected intercourse, missed birth control pill, broken condom)    Protocols used: Debbi Ayala

## 2023-10-26 NOTE — TELEPHONE ENCOUNTER
Patient had a hospital visit on 10/24 and is asking for a call back from the nurse, as she said she is still having issues that she would like to discuss. Balbir Delgado

## 2023-10-27 ENCOUNTER — PATIENT MESSAGE (OUTPATIENT)
Facility: CLINIC | Age: 58
End: 2023-10-27

## 2023-10-27 ENCOUNTER — TELEPHONE (OUTPATIENT)
Facility: CLINIC | Age: 58
End: 2023-10-27

## 2023-10-27 RX ORDER — VENLAFAXINE HYDROCHLORIDE 150 MG/1
150 CAPSULE, EXTENDED RELEASE ORAL DAILY
Qty: 90 CAPSULE | Refills: 0 | Status: SHIPPED | OUTPATIENT
Start: 2023-10-27

## 2023-10-27 NOTE — TELEPHONE ENCOUNTER
1st Reminder letter was sent to patient mychart for orders pending:     XR VIDEO SWALLOW (CPT=74230) (Order #[id number]) on 8/22/23

## 2023-10-30 NOTE — TELEPHONE ENCOUNTER
Noted PCP notes (10/26/23)-  Chhaya Moreno to the ER yesterday for N/ V and diarrhea   She was off ozempic for a month but Monday she started straight to 2 mg  Discussed that is likely what caused her symptoms as we usually titrate the dose if has been off   Advised to stop all GLP1   Continue metformin   To send a message to GI to see if she can move her egd / colon to earlier

## 2023-10-30 NOTE — TELEPHONE ENCOUNTER
Routed below request per APRN for sooner procedural date to scheduling pool for further assistance. This encounter closed.

## 2023-10-31 NOTE — TELEPHONE ENCOUNTER
RN called and spoke with pt. Pt asked if she should still have video swallow done tomorrow as scheduled, no questions about EGD/colon on 11/6/23. RN instructed pt to keep video swallow appt as planned and reiterated that written instructions were sent to [facility] regarding bowel prep instructions. Pt verbalized understanding.

## 2023-10-31 NOTE — TELEPHONE ENCOUNTER
From: Estee Hyman  To: Chey East  Sent: 10/27/2023 9:42 AM CDT  Subject: Request from Dr Wyatt Agent, I spoke with Dr Letitia Brown yesterday she asked me to contact you about asking to do the endoscopy and colonoscopy sooner then Jan. Can you let me know if possible.

## 2023-10-31 NOTE — TELEPHONE ENCOUNTER
CSS: Please transfer call to GI RN triage if [facility] Cardiology should call. Thanks! RN called and spoke to [facility] at [facility] to request urgent cardiac clearance as pt's procedure date for EGD/colon has been moved up to 11/6/23. [facility] states she will send the urgent request to clearance nurse now.

## 2023-11-01 ENCOUNTER — HOSPITAL ENCOUNTER (OUTPATIENT)
Dept: GENERAL RADIOLOGY | Facility: HOSPITAL | Age: 58
Discharge: HOME | End: 2023-11-01
Attending: NURSE PRACTITIONER
Payer: COMMERCIAL

## 2023-11-01 DIAGNOSIS — K82.4 GALLBLADDER POLYP: ICD-10-CM

## 2023-11-01 DIAGNOSIS — Z12.11 COLON CANCER SCREENING: ICD-10-CM

## 2023-11-01 DIAGNOSIS — R13.19 ESOPHAGEAL DYSPHAGIA: ICD-10-CM

## 2023-11-01 DIAGNOSIS — K76.0 HEPATIC STEATOSIS: ICD-10-CM

## 2023-11-01 DIAGNOSIS — K21.9 GASTROESOPHAGEAL REFLUX DISEASE, UNSPECIFIED WHETHER ESOPHAGITIS PRESENT: ICD-10-CM

## 2023-11-01 PROCEDURE — 74230 X-RAY XM SWLNG FUNCJ C+: CPT | Performed by: NURSE PRACTITIONER

## 2023-11-01 PROCEDURE — 92611 MOTION FLUOROSCOPY/SWALLOW: CPT

## 2023-11-01 NOTE — PROGRESS NOTES
ADULT VIDEOFLUOROSCOPIC SWALLOWING STUDY       ADULT VIDEOFLUOROSCOPIC SWALLOWING STUDY:   Referring Physician: Aye Marin      Radiologist: Dr. Aubrey Hart  Diagnosis: dysphagia    Date of Service: 11/1/2023     PATIENT SUMMARY   Chief Complaint: Pt reports food getting stuck in her chest area, coughing and choking and vomiting on food. She notes discomfort when swallowing until the food passes. She has a history of GERD. Pt lost 8 pound in 4 days, but has gained 2 back. An esophagram revealed hiatal hernia, Schatzki's ring and aspiration. Pt is scheduled for a colonoscopy and EGD for Monday. She had a recent visit to the ER for vomiting. She denies shortness of breath and difficulty with pills. Current Diet: regular foods and liquids, avoiding rice. Problem List  Active Problems:  Active Problems:    * No active hospital problems. *      Past Medical History  Past Medical History:   Diagnosis Date    Abnormal vaginal bleeding     polypectomy    Fibromyalgia     Gastropathy 1/18/2018    Hemorrhoids 2014    High blood pressure     High cholesterol     MCTD (mixed connective tissue disease) (HCC)     Mood disorder (HCC)     Other ill-defined conditions(799.89)     Per [employer]:  \"son and girlfriend with 4 kids moved in. \"    Prediabetes     Spinal stenosis     Comments:  spondylosis. Management:  surgery in 12/2009. Visual impairment         Imaging results: 10/24/23 CXR:  No radiographic evidence of acute cardiopulmonary process. 9/11/23 esophagram:  CONCLUSION:   1. Small sliding hiatal hernia with a Schatzki's ring and a large amount of gastroesophageal reflux to the upper esophagus. No stricture, ulceration or mass. 2. Aspiration of a small amount of barium into the proximal trachea without cough reflex. Correlate clinically. 3. Somewhat nodular appearance to the duodenum may suggest either prolapse of antral mucosa versus duodenitis. No well-defined ulcer or mass.      ASSESSMENT   DYSPHAGIA ASSESSMENT  Test completed in conjunction with Radiologist.   Food/Liquid Types Presented: puree, solid, and thin liquids. Study Position and View:  Patient was seated upright and viewed laterally and A-P. Pain Assessment: The patient reports pain at a level of 0/10. Oral phase:  Minimally reduced bolus control and containment resulted in intermittent premature spillage with thin liquids. Mild oral residue of all consistencies was cleared with second swallow. Pharyngeal phase:  The pharyngeal response triggered at the tongue base, valleculae and pyriform sinuses for thin liquids due to intermittent premature spillage. Puree and solids triggered at the valleculae. Slightly delayed epiglottic inversion was observed, but did not affect the safety of the swallow as no airway invasion was observed with any consistency. Base of tongue retraction and hyolaryngeal excursion was adequate. Minimal vallecular retention remained intermittently with puree and solids. Esophageal phase:   Adequate flow of bolus through upper esophagus     Penetration Aspiration Scale: 1/8. Material does not enter the airway. Overall Impression: Essentially normal swallow without laryngeal penetration or aspiration. FCM category and level: Swallowing, 7  PLAN   Diet Recommendations:  Solids: Regular  Liquids: Thin    Recommended compensatory strategies:   Sit upright when eating and for 1 hour afterwards  Alternate liquids and solids    Medication Administration:  No restrictions    Further Follow-up:  No follow up warranted with this service. Follow up with GI.      EDUCATION/INSTRUCTION  Reviewed results and recommendations with patient. Written instructions were provided. Agreement/Understanding verbalized and all questions answered to their apparent satisfaction. INTERDISCIPLINARY COMMUNICATION  Reviewed results with Radiologist; agreement verbalized.         Patient was advised of these findings, precautions, recommendations and treatment options and has agreed to actively participate in planning and for this course of care. Thank you for your referral. Please co-sign or sign and return this letter via fax as soon as possible. If you have any questions, please contact me at .     Deandre Weinstein MA/CCC-SLP  Speech Language Pathologist  [address] [place] [address]  [phone]    Electronically signed by therapist: LAZARUS Grissom  Physician's certification required: No

## 2023-11-01 NOTE — PATIENT INSTRUCTIONS
VIDEO SWALLOW STUDY    Diet Recommendations:  Solids: Regular  Liquids: Thin    Recommended compensatory strategies:   Sit upright when eating and for 1 hour afterwards  Alternate liquids and solids    Medication Administration:  No restrictions    Further Follow-up:  No follow up warranted with this service.   Follow up with AMANDA.    Diana Olivares MA/CCC-SLP  Speech Language Pathologist  [address] [place] [address]  [phone]

## 2023-11-02 ENCOUNTER — MED REC SCAN ONLY (OUTPATIENT)
Facility: CLINIC | Age: 58
End: 2023-11-02

## 2023-11-02 NOTE — TELEPHONE ENCOUNTER
RN called and spoke to [facility], [facility] states pt is in office currently for clinic appt. Reiterated that GI procedure was moved up to sooner date and is now scheduled for 11/6/23. RN to follow up tomorrow morning (11/3/23).

## 2023-11-03 NOTE — TELEPHONE ENCOUNTER
GI office received cardiac clearance for EGD/colon scheduled on 11/6/23. Sent to scan also. RN called and spoke to pt, informed her we are good to proceed with procedure as planned. RN answered all pt's questions. Pt currently taking pantoprazole 40 mg BID, will need new order or refill if MD wants pt to continue after EGD (pt had recent swallow eval and it was recommended to continue ppi by APN). Pt verbalized understanding.

## 2023-11-06 ENCOUNTER — HOSPITAL ENCOUNTER (OUTPATIENT)
Facility: HOSPITAL | Age: 58
Setting detail: HOSPITAL OUTPATIENT SURGERY
Discharge: HOME | End: 2023-11-06
Attending: INTERNAL MEDICINE | Admitting: INTERNAL MEDICINE
Payer: COMMERCIAL

## 2023-11-06 ENCOUNTER — ANESTHESIA (OUTPATIENT)
Dept: ENDOSCOPY | Facility: HOSPITAL | Age: 58
End: 2023-11-06
Payer: COMMERCIAL

## 2023-11-06 ENCOUNTER — ANESTHESIA EVENT (OUTPATIENT)
Dept: ENDOSCOPY | Facility: HOSPITAL | Age: 58
End: 2023-11-06
Payer: COMMERCIAL

## 2023-11-06 ENCOUNTER — MED REC SCAN ONLY (OUTPATIENT)
Dept: INTERNAL MEDICINE CLINIC | Facility: CLINIC | Age: 58
End: 2023-11-06

## 2023-11-06 VITALS
BODY MASS INDEX: 33.99 KG/M2 | RESPIRATION RATE: 13 BRPM | OXYGEN SATURATION: 98 % | HEIGHT: 61 IN | WEIGHT: 180 LBS | SYSTOLIC BLOOD PRESSURE: 126 MMHG | HEART RATE: 64 BPM | DIASTOLIC BLOOD PRESSURE: 75 MMHG

## 2023-11-06 DIAGNOSIS — Z12.11 SCREENING FOR COLON CANCER: ICD-10-CM

## 2023-11-06 DIAGNOSIS — R13.10 DYSPHAGIA, UNSPECIFIED TYPE: ICD-10-CM

## 2023-11-06 DIAGNOSIS — K21.9 GASTROESOPHAGEAL REFLUX DISEASE, UNSPECIFIED WHETHER ESOPHAGITIS PRESENT: ICD-10-CM

## 2023-11-06 LAB — GLUCOSE BLDC GLUCOMTR-MCNC: 130 MG/DL (ref 70–99)

## 2023-11-06 PROCEDURE — 0DB98ZX EXCISION OF DUODENUM, VIA NATURAL OR ARTIFICIAL OPENING ENDOSCOPIC, DIAGNOSTIC: ICD-10-PCS | Performed by: INTERNAL MEDICINE

## 2023-11-06 PROCEDURE — 45380 COLONOSCOPY AND BIOPSY: CPT | Performed by: INTERNAL MEDICINE

## 2023-11-06 PROCEDURE — 43239 EGD BIOPSY SINGLE/MULTIPLE: CPT | Performed by: INTERNAL MEDICINE

## 2023-11-06 PROCEDURE — 0DB38ZX EXCISION OF LOWER ESOPHAGUS, VIA NATURAL OR ARTIFICIAL OPENING ENDOSCOPIC, DIAGNOSTIC: ICD-10-PCS | Performed by: INTERNAL MEDICINE

## 2023-11-06 PROCEDURE — 43249 ESOPH EGD DILATION <30 MM: CPT | Performed by: INTERNAL MEDICINE

## 2023-11-06 PROCEDURE — 0DB68ZX EXCISION OF STOMACH, VIA NATURAL OR ARTIFICIAL OPENING ENDOSCOPIC, DIAGNOSTIC: ICD-10-PCS | Performed by: INTERNAL MEDICINE

## 2023-11-06 PROCEDURE — 0D748ZZ DILATION OF ESOPHAGOGASTRIC JUNCTION, VIA NATURAL OR ARTIFICIAL OPENING ENDOSCOPIC: ICD-10-PCS | Performed by: INTERNAL MEDICINE

## 2023-11-06 PROCEDURE — S0028 INJECTION, FAMOTIDINE, 20 MG: HCPCS

## 2023-11-06 PROCEDURE — 0DBM8ZX EXCISION OF DESCENDING COLON, VIA NATURAL OR ARTIFICIAL OPENING ENDOSCOPIC, DIAGNOSTIC: ICD-10-PCS | Performed by: INTERNAL MEDICINE

## 2023-11-06 RX ORDER — SODIUM CHLORIDE, SODIUM LACTATE, POTASSIUM CHLORIDE, CALCIUM CHLORIDE 600; 310; 30; 20 MG/100ML; MG/100ML; MG/100ML; MG/100ML
INJECTION, SOLUTION INTRAVENOUS CONTINUOUS
Status: DISCONTINUED | OUTPATIENT
Start: 2023-11-06 | End: 2023-11-06

## 2023-11-06 RX ORDER — ALBUTEROL SULFATE 2.5 MG/3ML
2.5 SOLUTION RESPIRATORY (INHALATION) AS NEEDED
Status: DISCONTINUED | OUTPATIENT
Start: 2023-11-06 | End: 2023-11-06

## 2023-11-06 RX ORDER — DEXTROSE MONOHYDRATE 25 G/50ML
50 INJECTION, SOLUTION INTRAVENOUS
Status: DISCONTINUED | OUTPATIENT
Start: 2023-11-06 | End: 2023-11-06

## 2023-11-06 RX ORDER — ONDANSETRON 2 MG/ML
4 INJECTION INTRAMUSCULAR; INTRAVENOUS ONCE AS NEEDED
Status: DISCONTINUED | OUTPATIENT
Start: 2023-11-06 | End: 2023-11-06

## 2023-11-06 RX ORDER — FAMOTIDINE 10 MG/ML
INJECTION, SOLUTION INTRAVENOUS AS NEEDED
Status: DISCONTINUED | OUTPATIENT
Start: 2023-11-06 | End: 2023-11-06 | Stop reason: SURG

## 2023-11-06 RX ORDER — NICOTINE POLACRILEX 4 MG
15 LOZENGE BUCCAL
Status: DISCONTINUED | OUTPATIENT
Start: 2023-11-06 | End: 2023-11-06

## 2023-11-06 RX ORDER — NICOTINE POLACRILEX 4 MG
30 LOZENGE BUCCAL
Status: DISCONTINUED | OUTPATIENT
Start: 2023-11-06 | End: 2023-11-06

## 2023-11-06 RX ORDER — NALOXONE HYDROCHLORIDE 0.4 MG/ML
80 INJECTION, SOLUTION INTRAMUSCULAR; INTRAVENOUS; SUBCUTANEOUS AS NEEDED
Status: DISCONTINUED | OUTPATIENT
Start: 2023-11-06 | End: 2023-11-06

## 2023-11-06 RX ORDER — ONDANSETRON 2 MG/ML
INJECTION INTRAMUSCULAR; INTRAVENOUS AS NEEDED
Status: DISCONTINUED | OUTPATIENT
Start: 2023-11-06 | End: 2023-11-06 | Stop reason: SURG

## 2023-11-06 RX ORDER — LIDOCAINE HYDROCHLORIDE 10 MG/ML
INJECTION, SOLUTION EPIDURAL; INFILTRATION; INTRACAUDAL; PERINEURAL AS NEEDED
Status: DISCONTINUED | OUTPATIENT
Start: 2023-11-06 | End: 2023-11-06 | Stop reason: SURG

## 2023-11-06 RX ORDER — CITRIC ACID/SODIUM CITRATE 334-500MG
SOLUTION, ORAL ORAL AS NEEDED
Status: DISCONTINUED | OUTPATIENT
Start: 2023-11-06 | End: 2023-11-06 | Stop reason: SURG

## 2023-11-06 RX ADMIN — FAMOTIDINE 20 MG: 10 INJECTION, SOLUTION INTRAVENOUS at 07:27:00

## 2023-11-06 RX ADMIN — LIDOCAINE HYDROCHLORIDE 50 MG: 10 INJECTION, SOLUTION EPIDURAL; INFILTRATION; INTRACAUDAL; PERINEURAL at 07:37:00

## 2023-11-06 RX ADMIN — LIDOCAINE HYDROCHLORIDE 50 MG: 10 INJECTION, SOLUTION EPIDURAL; INFILTRATION; INTRACAUDAL; PERINEURAL at 07:40:00

## 2023-11-06 RX ADMIN — SODIUM CHLORIDE, SODIUM LACTATE, POTASSIUM CHLORIDE, CALCIUM CHLORIDE: 600; 310; 30; 20 INJECTION, SOLUTION INTRAVENOUS at 07:56:00

## 2023-11-06 RX ADMIN — SODIUM CHLORIDE, SODIUM LACTATE, POTASSIUM CHLORIDE, CALCIUM CHLORIDE: 600; 310; 30; 20 INJECTION, SOLUTION INTRAVENOUS at 07:34:00

## 2023-11-06 RX ADMIN — CITRIC ACID/SODIUM CITRATE 30 ML: 334-500MG SOLUTION, ORAL ORAL at 07:35:00

## 2023-11-06 RX ADMIN — ONDANSETRON 4 MG: 2 INJECTION INTRAMUSCULAR; INTRAVENOUS at 07:31:00

## 2023-11-06 NOTE — ANESTHESIA POSTPROCEDURE EVALUATION
Patient: Babak Logan    Procedure Summary       Date: 11/06/23 Room / Location: [address] [place] [address] ENDOSCOPY 04 / 300 [facility] ENDOSCOPY    Anesthesia Start: 7601 Anesthesia Stop: 8314    Procedures:       COLONOSCOPY      ESOPHAGOGASTRODUODENOSCOPY (EGD) w possible dilation Diagnosis:       Screening for colon cancer      Gastroesophageal reflux disease, unspecified whether esophagitis present      Dysphagia, unspecified type      (esophagitis,gastric erosion, esohageal ring,  small hiatial hernia; colon polyp, hemorrhoids)    Surgeons: Chiquis Waters MD Anesthesiologist: Glendy Reed CRNA    Anesthesia Type: general, MAC ASA Status: 3            Anesthesia Type: general, MAC    Vitals Value Taken Time   BP 73/55 11/06/23 0817   Temp 36.6 11/06/23 0818   Pulse 72 11/06/23 0817   Resp 14 11/06/23 0817   SpO2 99 % 11/06/23 0817       [facility] AN Post Evaluation:   Patient Evaluated in PACU  Patient Participation: complete - patient participated  Level of Consciousness: awake and alert  Pain Score: 0  Pain Management: satisfactory to patient  Airway Patency:patent  Dental exam unchanged from preop  Yes    Cardiovascular Status: stable  Respiratory Status: room air, nonlabored ventilation and acceptable  Postoperative Hydration acceptable      Tiffani Rosenberg CRNA  11/6/2023 8:18 AM

## 2023-11-06 NOTE — DISCHARGE INSTRUCTIONS

## 2023-11-06 NOTE — OPERATIVE REPORT
ESOPHAGOGASTRODUODENOSCOPY (EGD) & COLONOSCOPY REPORT    James Marcelino     1965 Age 59year old   PCP Viviane Wang MD Endoscopist Carmela Gutierrez MD     Date of procedure: 23    Procedure: EGD w/biopsies and dilation & Colonoscopy w/cold biopsy polypectomy    Pre-operative diagnosis: dysphagia, reflux and screening colonoscopy    Post-operative diagnosis: esophageal ring, small hiatal hernia, gastric erosion, colon polyp and hemorrhoids    Medications: MAC sedation    Withdrawal time: 15 minutes    Complications: none    Procedure: Informed consent was obtained from the patient after the risks of the procedure were discussed, including but not limited to bleeding, perforation, aspiration, infection, or possibility of a missed lesion. We discussed the risks/benefits and alternatives to this procedure, as well as the planned sedation. EGD procedure: The patient was placed in the left lateral decubitus position and begun on continuous blood pressure pulse oximetry and EKG monitoring and this was maintained throughout the procedure. Once an adequate level of sedation was obtained a bite block was placed. Then the lubricated tip of the [id number] diagnostic video upper endoscope was inserted and advanced using direct visualization into the posterior pharynx and ultimately into the esophagus. Colonoscopy procedure: Once an adequate level of sedation was obtained a digital rectal exam was completed. Then the lubricated tip of the Afwprup-[id number] diagnostic video colonoscope was inserted and advanced without difficulty to the cecum using the CO2 insufflation technique. The cecum was identified by localizing the trifold, the appendix and the ileocecal valve. A routine second examination of the cecum/ascending colon was performed. Withdrawal was begun with thorough washing and careful examination of the colonic walls and folds. Photodocumentation was obtained. The bowel prep was poor.  Views of the colon were fair with washing. I then carefully withdrew the instrument from the patient who tolerated the procedure well. Complications: None    EGD findings:      1. Esophagus: The squamocolumnar junction was noted at 37 cm and appeared irregular. The GE junction was noted at 37 cm from the incisors. Hiatus was at 39 cm. The esophageal mucosa appeared scarred with a ring at 37 cm. There was LA grade A esophagitis and biopsied to rule out barretts. Dilated with CRE balloon 18-19-20 with a little tearing. 2. Stomach: The stomach distended normally. Normal rugal folds were seen. The pylorus was patent. The gastric mucosa appeared erythematous with small erosion so biopsies taken. Retroflexion revealed a normal fundus and a normal cardia. 3. Duodenum: The duodenal mucosa appeared normal in the 1st and 2nd portion of the duodenum. Biopsies taken    Colonoscopy findings:    1. ANNY: normal rectal tone, no masses palpated. 2. 1 polyp(s) noted as follows:      A. 3 mm polyp in the descending colon; flat morphology; cold biopsy polypectomy and retrieved. 3. Terminal ileum: the visualized mucosa appeared normal.  4. The colonic mucosa throughout the colon showed normal vascular pattern, without evidence of angioectasias or inflammation. 5. Diverticulosis: none appreciated. 6. A retroflexed view of the rectum revealed hemorrhoids. Recommend:  Await pathology, likely repeat colonoscopy in 5 years pending pathology given prep. The interval for the next colonoscopy will be determined after reviewing pathology. If new signs or symptoms develop, colonoscopy may need to be repeated sooner. High fiber diet. Monitor for blood in the stool. If having more than just tinge of blood, call office or go to the ER.   Here are some reflux precautions:   - Avoid trigger foods  - Anti-reflux measures: raising the head of the bed at night, avoiding tight clothing or belts, avoiding eating late at night and not lying down shortly after mealtime and achieving weight loss   - Avoid NSAID's, caffeine, peppermints, alcohol, tobacco and foods that incite symptoms     >>>If tissue was obtained and you have not received your pathology results either by phone or letter within 2 weeks, please call our office at [phone].     Specimens: duodenal biopsies, esophageal biopsies, gastric biopsies, descending polyp

## 2023-11-07 ENCOUNTER — TELEPHONE (OUTPATIENT)
Facility: CLINIC | Age: 58
End: 2023-11-07

## 2023-11-07 NOTE — TELEPHONE ENCOUNTER
Patient states she has abdominal pains this afternoon - patient states she had CLN and EGD yesterday. Please call. Thank you.

## 2023-11-07 NOTE — TELEPHONE ENCOUNTER
Dr. Barbara Lainez,   Pt c/o constant pain across entire lower abdomen today after eating lunch (had turkey sandwich). Pt is passing gas, no bm yet. 7/10. Feels like it might be gas pain but unsure. Denies bleeding/fevers/N/V. No upper GI pain but some rt lower back pain (hx of fibro and connective tissue disease). Denies urinary symptoms. Heating pad with no relief. Discussed moving, staying hydrated, and trying gas-x when home. Discussed er precautions. Please advise on any other recs.    Thanks,  Della Alexander

## 2023-11-08 NOTE — TELEPHONE ENCOUNTER
She is usually on the constipated side so can start miralax daily to help go to the bathroom   Continue drinking water and walking around

## 2023-11-08 NOTE — TELEPHONE ENCOUNTER
RN called and spoke to pt. Pt states she is feeling better today, the lower abdominal pain has essentially resolved. Had bm today. Did have some discomfort in upper abdomen with dinner yesterday-pt had EGD with dilation done. Pt still reports right lower back pain-unsure if related MSK, kidneys, etc. Instructed pt to continue to stay hydrated, ambulate, and notify PCP if back pain persists. Instructed to notify GI office for any new or worsening symptoms. Pt agreeable and verbalized understanding.

## 2023-11-15 ENCOUNTER — TELEPHONE (OUTPATIENT)
Dept: INTERNAL MEDICINE CLINIC | Facility: CLINIC | Age: 58
End: 2023-11-15

## 2023-11-15 RX ORDER — METOPROLOL TARTRATE 50 MG/1
50 TABLET, FILM COATED ORAL 2 TIMES DAILY
Qty: 180 TABLET | Refills: 0 | Status: SHIPPED | OUTPATIENT
Start: 2023-11-15

## 2023-11-15 RX ORDER — ATORVASTATIN CALCIUM 20 MG/1
20 TABLET, FILM COATED ORAL NIGHTLY
Qty: 90 TABLET | Refills: 0 | Status: SHIPPED | OUTPATIENT
Start: 2023-11-15

## 2023-11-15 RX ORDER — METFORMIN HYDROCHLORIDE 750 MG/1
750 TABLET, EXTENDED RELEASE ORAL 2 TIMES DAILY WITH MEALS
Qty: 180 TABLET | Refills: 0 | Status: SHIPPED | OUTPATIENT
Start: 2023-11-15

## 2023-11-15 RX ORDER — LOSARTAN POTASSIUM AND HYDROCHLOROTHIAZIDE 25; 100 MG/1; MG/1
1 TABLET ORAL DAILY
Qty: 90 TABLET | Refills: 0 | Status: SHIPPED | OUTPATIENT
Start: 2023-11-15

## 2023-11-15 NOTE — TELEPHONE ENCOUNTER
Future Appt. NO Future Appt. Last Visit: 08/09/2023    Medication Requested:   Requested Prescriptions     Pending Prescriptions Disp Refills    atorvastatin 20 MG Oral Tab 90 tablet 0     Sig: Take 1 tablet (20 mg total) by mouth nightly. losartan-hydroCHLOROthiazide 100-25 MG Oral Tab 90 tablet 0     Sig: Take 1 tablet by mouth daily. metoprolol tartrate 50 MG Oral Tab 180 tablet 0     Sig: Take 1 tablet (50 mg total) by mouth 2 (two) times daily. metFORMIN  MG Oral Tablet 24 Hr 180 tablet 0     Sig: Take 1 tablet (750 mg total) by mouth 2 (two) times daily with meals.         Last refill:      atorvastatin 20 MG Oral Tab 90 tablet 0 8/24/2023     losartan-hydroCHLOROthiazide 100-25 MG Oral Tab 90 tablet 0 8/24/2023     metoprolol tartrate 50 MG Oral Tab 180 tablet 0 8/24/2023     metFORMIN  MG Oral Tablet 24 Hr 180 tablet 0 8/24/2023

## 2023-11-16 RX ORDER — METOPROLOL TARTRATE 50 MG/1
50 TABLET, FILM COATED ORAL 2 TIMES DAILY
Qty: 180 TABLET | Refills: 0 | OUTPATIENT
Start: 2023-11-16

## 2023-11-16 RX ORDER — LOSARTAN POTASSIUM AND HYDROCHLOROTHIAZIDE 25; 100 MG/1; MG/1
1 TABLET ORAL DAILY
Qty: 90 TABLET | Refills: 0 | OUTPATIENT
Start: 2023-11-16

## 2023-11-16 RX ORDER — METFORMIN HYDROCHLORIDE 750 MG/1
750 TABLET, EXTENDED RELEASE ORAL 2 TIMES DAILY WITH MEALS
Qty: 180 TABLET | Refills: 0 | OUTPATIENT
Start: 2023-11-16

## 2023-11-16 RX ORDER — ATORVASTATIN CALCIUM 20 MG/1
20 TABLET, FILM COATED ORAL NIGHTLY
Qty: 90 TABLET | Refills: 0 | OUTPATIENT
Start: 2023-11-16

## 2023-11-20 ENCOUNTER — OFFICE VISIT (OUTPATIENT)
Dept: SLEEP CENTER | Age: 58
End: 2023-11-20
Attending: Other
Payer: COMMERCIAL

## 2023-11-20 ENCOUNTER — TELEPHONE (OUTPATIENT)
Facility: CLINIC | Age: 58
End: 2023-11-20

## 2023-11-20 DIAGNOSIS — G47.30 SLEEP DISORDER BREATHING: ICD-10-CM

## 2023-11-20 DIAGNOSIS — R41.89 COGNITIVE DECLINE: ICD-10-CM

## 2023-11-20 PROCEDURE — 95806 SLEEP STUDY UNATT&RESP EFFT: CPT

## 2023-11-20 NOTE — TELEPHONE ENCOUNTER
----- Message from Siena Yin MD sent at 11/18/2023  3:55 PM CST -----  GI staff: please place recall for colonoscopy in 5 years

## 2023-11-20 NOTE — TELEPHONE ENCOUNTER
Health Maintenance Updated. 5 year colonoscopy recall entered into patient outreach in [address] [place] [address] Rd. Next colonoscopy will be due 11/6/2028.     Patient viewed below result note in MyChart:  Seen by patient Omer Charles on 11/18/2023  9:04 PM

## 2023-11-22 ENCOUNTER — OFFICE VISIT (OUTPATIENT)
Dept: INTERNAL MEDICINE CLINIC | Facility: CLINIC | Age: 58
End: 2023-11-22
Payer: COMMERCIAL

## 2023-11-22 VITALS
BODY MASS INDEX: 35.8 KG/M2 | OXYGEN SATURATION: 98 % | HEIGHT: 61 IN | WEIGHT: 189.63 LBS | HEART RATE: 64 BPM | SYSTOLIC BLOOD PRESSURE: 124 MMHG | DIASTOLIC BLOOD PRESSURE: 70 MMHG

## 2023-11-22 DIAGNOSIS — K21.9 GASTROESOPHAGEAL REFLUX DISEASE, UNSPECIFIED WHETHER ESOPHAGITIS PRESENT: ICD-10-CM

## 2023-11-22 DIAGNOSIS — E11.9 TYPE 2 DIABETES MELLITUS WITHOUT COMPLICATION, UNSPECIFIED WHETHER LONG TERM INSULIN USE (HCC): ICD-10-CM

## 2023-11-22 DIAGNOSIS — K76.0 NAFLD (NONALCOHOLIC FATTY LIVER DISEASE): ICD-10-CM

## 2023-11-22 DIAGNOSIS — M35.1 MCTD (MIXED CONNECTIVE TISSUE DISEASE) (HCC): ICD-10-CM

## 2023-11-22 DIAGNOSIS — Z00.00 ANNUAL PHYSICAL EXAM: Primary | ICD-10-CM

## 2023-11-22 DIAGNOSIS — Z12.4 SCREENING FOR CERVICAL CANCER: ICD-10-CM

## 2023-11-22 LAB
CARTRIDGE LOT#: 641 NUMERIC
HEMOGLOBIN A1C: 6.7 % (ref 4.3–5.6)

## 2023-11-22 PROCEDURE — 3074F SYST BP LT 130 MM HG: CPT | Performed by: INTERNAL MEDICINE

## 2023-11-22 PROCEDURE — 99396 PREV VISIT EST AGE 40-64: CPT | Performed by: INTERNAL MEDICINE

## 2023-11-22 PROCEDURE — 3044F HG A1C LEVEL LT 7.0%: CPT | Performed by: INTERNAL MEDICINE

## 2023-11-22 PROCEDURE — 83036 HEMOGLOBIN GLYCOSYLATED A1C: CPT | Performed by: INTERNAL MEDICINE

## 2023-11-22 PROCEDURE — 3008F BODY MASS INDEX DOCD: CPT | Performed by: INTERNAL MEDICINE

## 2023-11-22 PROCEDURE — 87624 HPV HI-RISK TYP POOLED RSLT: CPT | Performed by: INTERNAL MEDICINE

## 2023-11-22 PROCEDURE — 3078F DIAST BP <80 MM HG: CPT | Performed by: INTERNAL MEDICINE

## 2023-11-22 RX ORDER — PANTOPRAZOLE SODIUM 20 MG/1
TABLET, DELAYED RELEASE ORAL
COMMUNITY
Start: 2023-06-21

## 2023-11-22 RX ORDER — FERROUS SULFATE 324(65)MG
1 TABLET, DELAYED RELEASE (ENTERIC COATED) ORAL DAILY
Qty: 90 TABLET | Refills: 0 | Status: SHIPPED | OUTPATIENT
Start: 2023-11-22 | End: 2024-02-20

## 2023-11-22 RX ORDER — ERGOCALCIFEROL 1.25 MG/1
50000 CAPSULE ORAL WEEKLY
COMMUNITY
Start: 2023-08-23

## 2023-11-24 LAB — HPV I/H RISK 1 DNA SPEC QL NAA+PROBE: NEGATIVE

## 2023-11-30 ENCOUNTER — PATIENT MESSAGE (OUTPATIENT)
Dept: INTERNAL MEDICINE CLINIC | Facility: CLINIC | Age: 58
End: 2023-11-30

## 2023-12-02 RX ORDER — TIRZEPATIDE 2.5 MG/.5ML
2.5 INJECTION, SOLUTION SUBCUTANEOUS WEEKLY
Qty: 2 ML | Refills: 0 | Status: SHIPPED | OUTPATIENT
Start: 2023-12-02

## 2023-12-02 NOTE — TELEPHONE ENCOUNTER
Awais Farooq, [facility] 12/1/2023 7:51 AM CST      ----- Message -----  From: Moises Madsen  Sent: 11/30/2023 6:24 PM CST  To: Carlos Eduardo Hern[facility]dez Clinical Staff  Subject: follow up     Yes I would like to try

## 2023-12-04 ENCOUNTER — TELEPHONE (OUTPATIENT)
Dept: NEUROLOGY | Facility: CLINIC | Age: 58
End: 2023-12-04

## 2023-12-04 NOTE — TELEPHONE ENCOUNTER
----- Message from Kiera Mustafa DO sent at 12/1/2023  5:45 PM CST -----  Hi,  Please call the patient and let them know that they have sleep apnea. This means that they stop breathing while they are sleeping. They would benefit from having a CPAP mask. They need to go for another study to determine the right settings for the mask. I have placed an order for CPAP titration. Please give him the phone number to call to schedule it. They will need to follow-up with pulmonology if they have any questions. I have ordered a pulmonology referral   If the sleep center tells them they need to contact neurology then please inform the patient that they need to speak to the pulmonologists/lung doctors. Any issues regarding scheduling, questions about CPAP, and neck steps will be dealt with pulmonology.

## 2023-12-14 ENCOUNTER — TELEPHONE (OUTPATIENT)
Dept: INTERNAL MEDICINE CLINIC | Facility: CLINIC | Age: 58
End: 2023-12-14

## 2023-12-14 NOTE — TELEPHONE ENCOUNTER
Christi Braxton ([facility]) - [phone]    Mounjaro 2.5MG/0.5ML pen-injectors  Status: PA RequestCreated: December 2nd, 2023 [phone]: December 14th, 2023    INDICATION : TYPE 2 DIABETES

## 2023-12-15 ENCOUNTER — TELEPHONE (OUTPATIENT)
Dept: NEUROLOGY | Facility: CLINIC | Age: 58
End: 2023-12-15

## 2023-12-15 NOTE — TELEPHONE ENCOUNTER
Received neuropsych report. Copy has been placed in the nurse bin. Copy has been sent for scanning. Please contact the patient to schedule an office visit to review with Dr. Ambreen Martinez. Reviewed and electronically signed by:  [address], [facility], [facility]

## 2023-12-19 NOTE — TELEPHONE ENCOUNTER
Pt verbalized understanding. No further action required from Rheumatology staff at this time. Benzoyl Peroxide Pregnancy And Lactation Text: This medication is Pregnancy Category C. It is unknown if benzoyl peroxide is excreted in breast milk.

## 2023-12-19 NOTE — TELEPHONE ENCOUNTER
Requested Prescriptions     Pending Prescriptions Disp Refills    HYDROXYCHLOROQUINE 200 MG Oral Tab [Pharmacy Med Name: Kathy Reynoso TAB 200MG] 90 tablet 1     Sig: TAKE 1 TABLET NIGHTLY     LF; 6/2/23 #90 TAB W/ 1 RF  LOV: 7/5/23   Future Appointments   Date Time Provider Erin Marlene   1/2/2024  3:40 PM [id number]   3/7/2024  8:40 AM MD OCHOA Paz [address]     Labs:   Component      Latest Ref Rng 10/24/2023   WBC      4.0 - 11.0 x10(3) uL 7.3    RBC      3.80 - 5.30 x10(6)uL 3.98    Hemoglobin      12.0 - 16.0 g/dL 12.4    Hematocrit      35.0 - 48.0 % 36.4    MCV      80.0 - 100.0 fL 91.5    MCH      26.0 - 34.0 pg 31.2    MCHC      31.0 - 37.0 g/dL 34.1    RDW-SD      35.1 - 46.3 fL 41.1    RDW      11.0 - 15.0 % 12.2    Platelet Count      614.6 - 450.0 10(3)uL 334.0    Prelim Neutrophil Abs      1.50 - 7.70 x10 (3) uL 5.33    Neutrophils Absolute      1.50 - 7.70 x10(3) uL 5.33    Lymphocytes Absolute      1.00 - 4.00 x10(3) uL 1.34    Monocytes Absolute      0.10 - 1.00 x10(3) uL 0.50    Eosinophils Absolute      0.00 - 0.70 x10(3) uL 0.04    Basophils Absolute      0.00 - 0.20 x10(3) uL 0.06    Immature Granulocyte Absolute      0.00 - 1.00 x10(3) uL 0.02    Neutrophils %      % 73.1    Lymphocytes %      % 18.4    Monocytes %      % 6.9    Eosinophils %      % 0.5    Basophils %      % 0.8    Immature Granulocyte %      % 0.3    Glucose      70 - 99 mg/dL 132 (H)    Sodium      136 - 145 mmol/L 138    Potassium      3.5 - 5.1 mmol/L 4.0    Chloride      98 - 112 mmol/L 103    Carbon Dioxide, Total      21.0 - 32.0 mmol/L 29.0    ANION GAP      0 - 18 mmol/L 6    BUN      7 - 18 mg/dL 13    CREATININE      0.55 - 1.02 mg/dL 0.85    BUN/CREATININE RATIO      10.0 - 20.0  15.3    CALCIUM      8.5 - 10.1 mg/dL 10.0    CALCULATED OSMOLALITY      275 - 295 mOsm/kg 288    EGFR      >=60 mL/min/1.73m2 78    ALT (SGPT)      13 - 56 U/L 61 (H)    AST (SGOT)      15 - 37 U/L 27    ALKALINE PHOSPHATASE      46 - 118 U/L 131 (H)    Total Bilirubin      0.1 - 2.0 mg/dL 0.7    PROTEIN, TOTAL      6.4 - 8.2 g/dL 8.1    Albumin      3.4 - 5.0 g/dL 4.1    Globulin      2.8 - 4.4 g/dL 4.0    A/G Ratio      1.0 - 2.0  1.0       Legend:  (H) High      Summary:     1. Cont. hydroxychrouqine 200mg - once ad ay -   2. Cont. Cyclobenzaprine to 10 every 8 hours as needed  3. Try physical therapy - for neck pain   4. Tramadol 50mg as needed. 5.  Try nabumetone 750mg twice a day as needed. 6 . Medrol andressa   7. Check labs in 6 months. 8. Return to clinic in 6 months. 9. Eye exam once a year -   8. Check c spine xray   - cont.  cbd oil is ok -   - she declines gabapentin and Funmi Melgoza MD  7/5/2023   8:54 AM  - [place]ed [place]- [place]  through [employer]

## 2023-12-19 NOTE — TELEPHONE ENCOUNTER
LOV: 7/5/2023    RTC: 6 months   Future Appointments   Date Time Provider Erin Rosario   1/2/2024  3:40 PM Nahomy Valadez DO [id number] [address]   3/7/2024  8:40 AM MD OCHOA Zamudio [address]   LABS:   Component      Latest Ref Rng 8/19/2023   WBC      4.0 - 11.0 x10(3) uL    RBC      3.80 - 5.30 x10(6)uL    Hemoglobin      12.0 - 16.0 g/dL    Hematocrit      35.0 - 48.0 %    MCV      80.0 - 100.0 fL    MCH      26.0 - 34.0 pg    MCHC      31.0 - 37.0 g/dL    RDW-SD      35.1 - 46.3 fL    RDW      11.0 - 15.0 %    Platelet Count      409.6 - 450.0 10(3)uL    Prelim Neutrophil Abs      1.50 - 7.70 x10 (3) uL    Neutrophils Absolute      1.50 - 7.70 x10(3) uL    Lymphocytes Absolute      1.00 - 4.00 x10(3) uL    Monocytes Absolute      0.10 - 1.00 x10(3) uL    Eosinophils Absolute      0.00 - 0.70 x10(3) uL    Basophils Absolute      0.00 - 0.20 x10(3) uL    Immature Granulocyte Absolute      0.00 - 1.00 x10(3) uL    Neutrophils %      %    Lymphocytes %      %    Monocytes %      %    Eosinophils %      %    Basophils %      %    Immature Granulocyte %      %    Glucose      70 - 99 mg/dL    Sodium      136 - 145 mmol/L    Potassium      3.5 - 5.1 mmol/L    Chloride      98 - 112 mmol/L    Carbon Dioxide, Total      21.0 - 32.0 mmol/L    ANION GAP      0 - 18 mmol/L    BUN      7 - 18 mg/dL    CREATININE      0.55 - 1.02 mg/dL    BUN/CREATININE RATIO      10.0 - 20.0     CALCIUM      8.5 - 10.1 mg/dL    CALCULATED OSMOLALITY      275 - 295 mOsm/kg    EGFR      >=60 mL/min/1.73m2    ALT (SGPT)      13 - 56 U/L    AST (SGOT)      15 - 37 U/L    ALKALINE PHOSPHATASE      46 - 118 U/L    Total Bilirubin      0.1 - 2.0 mg/dL    PROTEIN, TOTAL      6.4 - 8.2 g/dL    Albumin      3.4 - 5.0 g/dL    Globulin      2.8 - 4.4 g/dL    A/G Ratio      1.0 - 2.0     Iron, Serum      50 - 170 ug/dL 50    Transferrin      200 - 360 mg/dL 332    Iron Bind. Cap.(TIBC)      240 - 450 ug/dL 495 (H)    Iron Saturation      15 - 50 % 10 (L)    FOLATE (FOLIC ACID), SERUM      >=8.7 ng/mL 14.6    FERRITIN      18.0 - 340.0 ng/mL 40.2    Lipase      13 - 75 U/L       Legend:  (H) High  (L) Low    Component      Latest Ref [facility] 10/24/2023   WBC      4.0 - 11.0 x10(3) uL 7.3    RBC      3.80 - 5.30 x10(6)uL 3.98    Hemoglobin      12.0 - 16.0 g/dL 12.4    Hematocrit      35.0 - 48.0 % 36.4    MCV      80.0 - 100.0 fL 91.5    MCH      26.0 - 34.0 pg 31.2    MCHC      31.0 - 37.0 g/dL 34.1    RDW-SD      35.1 - 46.3 fL 41.1    RDW      11.0 - 15.0 % 12.2    Platelet Count      648.2 - 450.0 10(3)uL 334.0    Prelim Neutrophil Abs      1.50 - 7.70 x10 (3) uL 5.33    Neutrophils Absolute      1.50 - 7.70 x10(3) uL 5.33    Lymphocytes Absolute      1.00 - 4.00 x10(3) uL 1.34    Monocytes Absolute      0.10 - 1.00 x10(3) uL 0.50    Eosinophils Absolute      0.00 - 0.70 x10(3) uL 0.04    Basophils Absolute      0.00 - 0.20 x10(3) uL 0.06    Immature Granulocyte Absolute      0.00 - 1.00 x10(3) uL 0.02    Neutrophils %      % 73.1    Lymphocytes %      % 18.4    Monocytes %      % 6.9    Eosinophils %      % 0.5    Basophils %      % 0.8    Immature Granulocyte %      % 0.3    Glucose      70 - 99 mg/dL 132 (H)    Sodium      136 - 145 mmol/L 138    Potassium      3.5 - 5.1 mmol/L 4.0    Chloride      98 - 112 mmol/L 103    Carbon Dioxide, Total      21.0 - 32.0 mmol/L 29.0    ANION GAP      0 - 18 mmol/L 6    BUN      7 - 18 mg/dL 13    CREATININE      0.55 - 1.02 mg/dL 0.85    BUN/CREATININE RATIO      10.0 - 20.0  15.3    CALCIUM      8.5 - 10.1 mg/dL 10.0    CALCULATED OSMOLALITY      275 - 295 mOsm/kg 288    EGFR      >=60 mL/min/1.73m2 78    ALT (SGPT)      13 - 56 U/L 61 (H)    AST (SGOT)      15 - 37 U/L 27    ALKALINE PHOSPHATASE      46 - 118 U/L 131 (H)    Total Bilirubin      0.1 - 2.0 mg/dL 0.7    PROTEIN, TOTAL      6.4 - 8.2 g/dL 8.1    Albumin      3.4 - 5.0 g/dL 4.1    Globulin      2.8 - 4.4 g/dL 4.0    A/G Ratio 1.0 - 2.0  1.0    Iron, Serum      50 - 170 ug/dL    Transferrin      200 - 360 mg/dL    Iron Bind. Cap.(TIBC)      240 - 450 ug/dL    Iron Saturation      15 - 50 %    FOLATE (FOLIC ACID), SERUM      >=8.7 ng/mL    FERRITIN      18.0 - 340.0 ng/mL    Lipase      13 - 75 U/L 35       Legend:  (H) High    LAST REFILL: 6/19/2023, Quantity 30 tablets, Refills 5    Dr Agustín Alberto-- [facility] sent as a reminder to schedule a follow up appt; orders pending, approve if agreeable.

## 2023-12-20 RX ORDER — TRAMADOL HYDROCHLORIDE 50 MG/1
50 TABLET ORAL EVERY 8 HOURS PRN
Qty: 30 TABLET | Refills: 5 | Status: SHIPPED | OUTPATIENT
Start: 2023-12-20

## 2023-12-20 RX ORDER — HYDROXYCHLOROQUINE SULFATE 200 MG/1
200 TABLET, FILM COATED ORAL NIGHTLY
Qty: 90 TABLET | Refills: 1 | Status: SHIPPED | OUTPATIENT
Start: 2023-12-20

## 2023-12-21 ENCOUNTER — TELEPHONE (OUTPATIENT)
Dept: RHEUMATOLOGY | Facility: CLINIC | Age: 58
End: 2023-12-21

## 2023-12-21 NOTE — TELEPHONE ENCOUNTER
Current Outpatient Medications   Medication Sig Dispense Refill    traMADol 50 MG Oral Tab Take 1 tablet (50 mg total) by mouth every 8 (eight) hours as needed for Pain.  30 tablet 5         Key: [id number]

## 2023-12-21 NOTE — TELEPHONE ENCOUNTER
PA start    Prior authorization for: Tramadol     Medication form: 50 mg tablets    Submission method: Doroteo    Spoke with (if by phone):     Date submitted: 12/21/2023

## 2023-12-21 NOTE — TELEPHONE ENCOUNTER
PA Approved    Prior authorization for:  Tramadol     Medication form: 50 mg tablet    Date received: 12/21/23    Approval #: PA# [employer]- [employer] Amy Gonzalezaries O [id number]     Approved dates: 12/21/2023 to 6/18/2024    Pharmacy aware of approval.

## 2024-01-02 PROBLEM — G31.84 MCI (MILD COGNITIVE IMPAIRMENT): Status: ACTIVE | Noted: 2023-10-09

## 2024-01-13 NOTE — TELEPHONE ENCOUNTER
Cheri Nawaf, [address] [facility] 7/21/2022 3:34 PM CDT      ----- Message -----  From: Jeanmarie Goodson  Sent: 7/21/2022 3:24 PM CDT  To: Carlos Eduardo Hernández Clinical Staff  Subject: Metoprolol tartrate 25 mg     Hi, you changed my prescription to 50 mg day and night and I know only have enough for one more day. Can I get a prescription? Pt. lying in bed in NAD upon SLP arrival this AM. Pt. AAOx4 and cooperative. Cough at baseline noted with pt. report of PND and allergies.     Per chart:   "70 M HTN, COPD, CKD, chronic diastolic CHF, anxiety, right foot wound 2/2 gangrene and sx, prostate cancer s/p resection here w/ epistaxis for the past several days, did not improve w/ saline or gels outpatient."    -WBC WNL per lab review   -No recent chest imaging     Pt. denied any difficulty swallowing and endorsed strong dislike for current Pureed diet. PTA, pt. was tolerating Regular/Thin at [facility]. No coughing, globus sensation or dysphagia reported. Pt. lying in bed in NAD upon SLP arrival this AM. Pt. AAOx4 and cooperative. Cough at baseline noted with pt. report of PND and allergies.     Per chart:   "70 M HTN, COPD, CKD, chronic diastolic CHF, anxiety, right foot wound 2/2 gangrene and sx, prostate cancer s/p resection here w/ epistaxis for the past several days, did not improve w/ saline or gels outpatient."    -WBC WNL per lab review   -No recent chest imaging     Pt. denied any difficulty swallowing and endorsed strong dislike for current Pureed diet. PTA, pt. was tolerating Regular/Thin at [facility]. No coughing, globus sensation or dysphagia reported.

## 2024-01-25 RX ORDER — TIRZEPATIDE 5 MG/.5ML
5 INJECTION, SOLUTION SUBCUTANEOUS WEEKLY
Qty: 6 ML | Refills: 0 | Status: SHIPPED | OUTPATIENT
Start: 2024-01-25

## 2024-01-30 ENCOUNTER — TELEPHONE (OUTPATIENT)
Dept: INTERNAL MEDICINE CLINIC | Facility: CLINIC | Age: 59
End: 2024-01-30

## 2024-01-30 ENCOUNTER — NURSE TRIAGE (OUTPATIENT)
Dept: INTERNAL MEDICINE CLINIC | Facility: CLINIC | Age: 59
End: 2024-01-30

## 2024-01-30 ENCOUNTER — APPOINTMENT (OUTPATIENT)
Dept: GENERAL RADIOLOGY | Age: 59
End: 2024-01-30
Attending: PHYSICIAN ASSISTANT
Payer: COMMERCIAL

## 2024-01-30 ENCOUNTER — HOSPITAL ENCOUNTER (OUTPATIENT)
Age: 59
Discharge: HOME | End: 2024-01-30
Payer: COMMERCIAL

## 2024-01-30 VITALS
DIASTOLIC BLOOD PRESSURE: 77 MMHG | HEART RATE: 109 BPM | RESPIRATION RATE: 18 BRPM | SYSTOLIC BLOOD PRESSURE: 128 MMHG | OXYGEN SATURATION: 98 % | TEMPERATURE: 99 F

## 2024-01-30 DIAGNOSIS — U07.1 ACUTE BRONCHITIS DUE TO COVID-19 VIRUS: Primary | ICD-10-CM

## 2024-01-30 DIAGNOSIS — J20.8 ACUTE BRONCHITIS DUE TO COVID-19 VIRUS: Primary | ICD-10-CM

## 2024-01-30 LAB — AMB EXT COVID-19 RESULT: DETECTED

## 2024-01-30 PROCEDURE — 99214 OFFICE O/P EST MOD 30 MIN: CPT | Performed by: PHYSICIAN ASSISTANT

## 2024-01-30 PROCEDURE — 71046 X-RAY EXAM CHEST 2 VIEWS: CPT | Performed by: PHYSICIAN ASSISTANT

## 2024-01-30 RX ORDER — BENZONATATE 100 MG/1
100 CAPSULE ORAL 3 TIMES DAILY PRN
Qty: 30 CAPSULE | Refills: 0 | Status: SHIPPED | OUTPATIENT
Start: 2024-01-30

## 2024-01-30 NOTE — TELEPHONE ENCOUNTER
Pt used Advil/ Tylenol  Heart rated 120/ pulse ox 90-92.  Pt advised to go to [facility]  today.  Reason for Disposition   [1] HIGH RISK for severe COVID complications (e.g., weak immune system, age > 64 years, obesity with BMI of 30 or higher, pregnant, chronic lung disease or other chronic medical condition) AND [2] COVID symptoms (e.g., cough, fever)  (Exceptions: Already seen by PCP and no new or worsening symptoms.)    Answer Assessment - Initial Assessment Questions  1. COVID-19 DIAGNOSIS: \"Who made your COVID-19 diagnosis?\" \"Was it confirmed by a positive lab test or self-test?\" If not diagnosed by a doctor (or NP/PA), ask \"Are there lots of cases (community spread) where you live?\" Note: See public health department website, if unsure.       had covid  2. COVID-19 EXPOSURE: \"Was there any known exposure to COVID before the symptoms began?\" CDC Definition of close contact: within 6 feet (2 meters) for a total of 15 minutes or more over a 24-hour period.       Positive Home Covid  1/29/24  3. ONSET: \"When did the COVID-19 symptoms start?\"       1/29/24  4. WORST SYMPTOM: \"What is your worst symptom?\" (e.g., cough, fever, shortness of breath, muscle aches)      Cold  5. COUGH: \"Do you have a cough?\" If Yes, ask: \"How bad is the cough?\"        Yes with clear Phlegm   6. FEVER: \"Do you have a fever?\" If Yes, ask: \"What is your temperature, how was it measured, and when did it start?\"      97.9 forehead  7. RESPIRATORY STATUS: \"Describe your breathing?\" (e.g., shortness of breath, wheezing, unable to speak)       Wheezing  8. BETTER-SAME-WORSE: \"Are you getting better, staying the same or getting worse compared to yesterday?\"  If getting worse, ask, \"In what way?\"      Worse  9. HIGH RISK DISEASE: \"Do you have any chronic medical problems?\" (e.g., asthma, heart or lung disease, weak immune system, obesity, etc.)        10. VACCINE: \"Have you had the COVID-19 vaccine?\" If Yes, ask: \"Which one, how many  shots, when did you get it?\"        2  11. BOOSTER: \"Have you received your COVID-19 booster?\" If Yes, ask: \"Which one and when did you get it?\"        2 [employer] in 2020   12. PREGNANCY: \"Is there any chance you are pregnant?\" \"When was your last menstrual period?\"        NA  13. OTHER SYMPTOMS: \"Do you have any other symptoms?\"  (e.g., chills, fatigue, headache, loss of smell or taste, muscle pain, sore throat)        Headache/ body aches/ chills/ shaky    14. O2 SATURATION MONITOR:  \"Do you use an oxygen saturation monitor (pulse oximeter) at home?\" If Yes, ask \"What is your reading (oxygen level) today?\" \"What is your usual oxygen saturation reading?\" (e.g., 95%)     90- 92    Protocols used: Coronavirus (COVID-19) Diagnosed or [id number]

## 2024-01-30 NOTE — ED INITIAL ASSESSMENT (HPI)
Pt presents to the IC with c/o generalized body aches. +covid test last night.   Wishes to discuss treatment options.

## 2024-01-30 NOTE — TELEPHONE ENCOUNTER
Ernestine Hough called and said she will be faxing a sheet for this patient who is having oral surgery tomorrow in their office.    She will highlight the section that the doctor needs to complete.  Also, she said she faxed a sheet to the patient's Cardiologist to complete.  
Patient tested + for Covid today   
Spoke with Ernestine Hough and made her aware that pt has Covid.   
Progress Note reviewed and summarized for off-service hand off on ___9/20_____ by ___JS______ .     RSV PROPHYLAXIS:   Maternal RSV vaccine [Abrysvo]: [ _ ] Yes  [ _ ] No  SYNAGIS [palivizumab] candidate [ _ ] Yes  [ _ ] No;   Received SYNAGIS [palivizumab]? : [ _ ] Yes  [ _ ] No,  IF yes, date _________        or   [ _ ] ELIGIBLE AT A LATER DATE   - [ _ ]<29 weeks      [ _ ]<32 weeks and O2 use forrest 28 days    [ _ ]  other criteria.   Received BEYFORTUS [Nirsevimab] [ _ ] Yes  [ _ ] No  IF yes, date _________         or    [ _ ] Declined RSV Prophylaxis     CIrcumcision:   Hip US rec:    Neurodevelop eval?	  CPR class done?  	  PVS at DC?  Vit D at DC?	  FE at DC?    G6PD screen sent on  ____ . Result ______ . 	    PMD:          Name:  ______________ _             Contact information:  ______________ _  Pharmacy: Name:  ______________ _              Contact information:  ______________ _    Follow-up appointments (list):      [ _ ] Discharge time spent >30 min    [ _ ] Car Seat Challenge lasting 90 min was performed. Today I have reviewed and interpreted the nurses’ records of pulse oximetry, heart rate and respiratory rate and observations during testing period. Car Seat Challenge  passed. The patient is cleared to begin using rear-facing car seat upon discharge. Parents were counseled on rear-facing car seat use.    
No
Progress Note reviewed and summarized for off-service hand off on ___2024 _____ by ___ZI ______ .     RSV PROPHYLAXIS:   Maternal RSV vaccine [Abrysvo]: [ _ ] Yes  [ _ ] No  SYNAGIS [palivizumab] candidate [ _ ] Yes  [ _ ] No;   Received SYNAGIS [palivizumab]? : [ _ ] Yes  [ _ ] No,  IF yes, date _________        or   [ _ ] ELIGIBLE AT A LATER DATE   - [ _ ]<29 weeks      [ _ ]<32 weeks and O2 use forrest 28 days    [ _ ]  other criteria.   Received BEYFORTUS [Nirsevimab] [ _ ] Yes  [ _ ] No  IF yes, date _________         or    [ _ ] Declined RSV Prophylaxis     CIrcumcision:   Hip  rec:    Neurodevelop eval?	  CPR class done?  	  PVS at DC?  Vit D at DC?	  FE at DC?    G6PD screen sent on  ____ . Result ______ . 	    PMD:          Name:  ______________ _             Contact information:  ______________ _  Pharmacy: Name:  ______________ _              Contact information:  ______________ _    Follow-up appointments (list):      [ _ ] Discharge time spent >30 min    [ _ ] Car Seat Challenge lasting 90 min was performed. Today I have reviewed and interpreted the nurses’ records of pulse oximetry, heart rate and respiratory rate and observations during testing period. Car Seat Challenge  passed. The patient is cleared to begin using rear-facing car seat upon discharge. Parents were counseled on rear-facing car seat use.

## 2024-01-30 NOTE — TELEPHONE ENCOUNTER
Pt tested positive for Covid 01/30/24 and feels terrible.   She states that she is high risk and wants to know what she should do.

## 2024-01-30 NOTE — ED PROVIDER NOTES
Chief Complaint   Patient presents with    Covid       HPI:     Dana Marsh is a 58 year old female who presents for evaluation of congestion cough over the last 2 days, tested positive for coronavirus yesterday.  Notes spouse positive coronavirus few days ago in quarantine.  Notes previous history of coronavirus last year with IV infusion without hospitalization or complication based on narrative provided.  Denies recent booster.  States spoke with PCP and sent for for formal discussion on oral antivirals.  Denies previous use.  Notes mild headache associated, 4 out of 10, taking dual ibuprofen Tylenol with mild improvement this morning prior to arrival.  Denies dizziness documented fever sore throat dysphagia neck pain chest pain shortness of breath abdominal pain vomiting diarrhea dysuria or rash.      PFSH    PFSH asessment screens reviewed and agree.  Nurses notes reviewed I agree with documentation.    Family History   Problem Relation Age of Onset    Heart Disease Father         CAD, (cause of death) age 49    Diabetes Father     Stroke Maternal Grandmother         Cerebrovascular accident (Stroke)    Heart Disease Paternal Grandmother         CAD    Breast Cancer Maternal Aunt         70s     Heart Disease Maternal Aunt         CAD    Heart Disease Maternal Aunt     Other (osteoarthritis) Mother     Other (psoriasis) Sister      Family history reviewed with patient/caregiver and is not pertinent to presenting problem.  Social History     Socioeconomic History    Marital status:      Spouse name: Not on file    Number of children: Not on file    Years of education: Not on file    Highest education level: Not on file   Occupational History    Not on file   Tobacco Use    Smoking status: Former     Packs/day: 0     Types: Cigarettes     Quit date: 1989     Years since quittin.0    Smokeless tobacco: Never   Vaping Use    Vaping Use: Never used   Substance and Sexual Activity    Alcohol  use: Yes     Alcohol/week: 1.0 standard drink of alcohol     Types: 1 Glasses of wine per week     Comment: Occasionally wine    Drug use: Yes     Types: Cannabis     Comment: occ    Sexual activity: Yes     Partners: Male   Other Topics Concern     Service Not Asked    Blood Transfusions Not Asked    Caffeine Concern Yes     Comment: (Coffee, Soda) 2 cups daily    Occupational Exposure Not Asked    Hobby Hazards Not Asked    Sleep Concern Not Asked    Stress Concern Not Asked    Weight Concern Not Asked    Special Diet Not Asked    Back Care Not Asked    Exercise Not Asked    Bike Helmet Not Asked    Seat Belt Not Asked    Self-Exams Not Asked    Grew up on a farm Not Asked    History of tanning Not Asked    Outdoor occupation Not Asked    Pt has a pacemaker No    Pt has a defibrillator No    Breast feeding Not Asked    Reaction to local anesthetic No   Social History Narrative    Not on file     Social Determinants of Health     Financial Resource Strain: Not on file   Food Insecurity: Not on file   Transportation Needs: Not on file   Physical Activity: Not on file   Stress: Not on file   Social Connections: Not on file   Housing Stability: Not on file         ROS:   Positive for stated complaint: Body aches, headache, nasal congestion, cough.  All other systems reviewed and negative except as noted above.  Constitutional and Vital Signs Reviewed.      Physical Exam:     Findings:    /77   Pulse 109   Temp 98.6 °F (37 °C)   Resp 18   LMP 09/06/2013 (Approximate)   SpO2 98%   GENERAL: well developed, well nourished, well hydrated, no distress  SKIN: good skin turgor, no obvious rashes  NECK: No nuchal rigidity.  Supple, no adenopathy  CARDIO: RRR without murmur  EXTREMITIES: no cyanosis or edema. YEAGER without difficulty  GI: soft, non-tender, normal bowel sounds  HEAD: normocephalic, atraumatic  EYES: sclera non icteric bilateral, conjunctiva clear  EARS: TMs clear bilaterally. Canals  clear.  NOSE:Rhinorrhea.   MMM.  Nasal turbinates: pink, normal mucosa  THROAT: clear, without exudates, uvula midline, and airway patent  LUNGS: no Retractions.  Clear to auscultation bilaterally; no rales, rhonchi, or wheezes  NEURO: No focal deficits  PSYCH: Alert and oriented x3.  Answering questions appropriately.  Mood appropriate.    MDM/Assessment/Plan:   Orders for this encounter:    Orders Placed This Encounter    XR CHEST PA + LAT CHEST (NYP=21370)     Order Specific Question:   What is the Relevant Clinical Indication / Reason for Exam?     Answer:   covid +; r/o infiltrate     Order Specific Question:   Release to patient     Answer:   Immediate    [facility] EXTERNAL COVID-19 INFECTION     This external order was created through the Results Console.     Order Specific Question:   Release to patient     Answer:   Immediate    nirmatrelvir-ritonavir 300-100 MG Oral Tablet Therapy Pack     Sig: Take two nirmatrelvir tablets (300mg) with one ritonavir tablet (100mg) together twice daily for 5 days.     Dispense:  30 tablet     Refill:  0    benzonatate 100 MG Oral Cap     Sig: Take 1 capsule (100 mg total) by mouth 3 (three) times daily as needed for cough.     Dispense:  30 capsule     Refill:  0       Labs performed this visit:  No results found for this or any previous visit (from the past 10 hour(s)).    MDM:  X-ray W/O infiltrate.  Patient saturating over 96% during encounter without tachypnea.  No chest pain or shortness of breath by history or encounter.  Discussed Paxlovid considerations based on immune health, offered prescription with recent metabolic profile showing normal renal function not warranting renal sparing alternative in October 2023.  To hold cholesterol statin through course.  Instructed on home care follow-up as well as indications to return vs go to the ER. Alert non toxic appearing.     Diagnosis:    ICD-10-CM    1. Acute bronchitis due to COVID-19 virus  U07.1 XR CHEST PA + LAT CHEST  (CPT=71046)    J20.8 XR CHEST PA + LAT CHEST (CPT=71046)          All results reviewed and discussed with patient.  See AVS for detailed discharge instructions for your condition today.    Follow Up with:  Remy Rojas MD  [address] [place] [id number]  [phone]    Schedule an appointment as soon as possible for a visit in 3 days  As needed, If symptoms worsen

## 2024-01-30 NOTE — DISCHARGE INSTRUCTIONS
HOLD YOUR STATIN/CHOLESTEROL MEDICATION THROUGH ANTIVIRAL COURSE as INSTRUCTED. FOLLOW UP WITH PRIMARY OR GO TO ER FOR BREAKTHROUGH CHANGES as EDUCATED.

## 2024-02-07 NOTE — PROGRESS NOTES
[facility] Follow-up Visit    Sushila Patient called stating she is on her last dosage of nimotop as of 3 pm and wants to know if she is to stop taking Asprin , atorvastatin and midodrine. They have sent a [employer] message as well    history of IBD.     Prior endoscopies:  October 2019 EGD performed by Dr. Jess Farmer with MAC related to GERD/atypical chest pain, findings:  Moderate acute/chronic reflux changes at the GE junction, random biopsies unremarkable    January 2018 EGD performed by Heart Disease Maternal Aunt         CAD   • Heart Disease Maternal Aunt    • Other (osteoarthritis) Mother    • Other (psoriasis) Sister       Social History: Social History    Tobacco Use      Smoking status: Former Smoker        Packs/day: 0.00        Ty FreeStyle Lancets Does not apply Misc 1 lancet by Finger stick route 2 (two) times daily. Use as directed. 1 Box 0   • losartan 100 MG Oral Tab Take 100 mg by mouth daily. 1   • amLODIPine Besylate 10 MG Oral Tab Take 10 mg by mouth daily.      • [place] Allergies:    No Known Allergies      UNKNOWN    ROS:   CONSTITUTIONAL:  negative for fevers, chills  EYES:  negative for change in vision  RESPIRATORY:  negative for  shortness of breath  CARDIOVASCULAR:  negative for  chest pain  GASTROINTESTINAL: anemia      1. STIVEN: New onset iron deficiency anemia noted on February 2021 lab work. She is not currently on p.o. iron. The patient denies any overt signs of upper or lower GI bleeding. She is status post menopausal.  Does not donate blood.   No excess

## 2024-02-08 ENCOUNTER — TELEPHONE (OUTPATIENT)
Dept: INTERNAL MEDICINE CLINIC | Facility: CLINIC | Age: 59
End: 2024-02-08

## 2024-02-08 NOTE — TELEPHONE ENCOUNTER
I spoke to [facility] regarding clearance for her dental procedure     Received her cardiologist clearance     \"PCP will need to comment on any neck extension limitations\"    Please advise?

## 2024-02-12 NOTE — TELEPHONE ENCOUNTER
[employer] message sent to patient re: informing her of the letter Dr Rojas drafted for her, visible in [employer].

## 2024-02-22 ENCOUNTER — LAB ENCOUNTER (OUTPATIENT)
Dept: LAB | Facility: HOSPITAL | Age: 59
End: 2024-02-22
Attending: INTERNAL MEDICINE
Payer: COMMERCIAL

## 2024-02-22 DIAGNOSIS — M35.1 MCTD (MIXED CONNECTIVE TISSUE DISEASE) (HCC): ICD-10-CM

## 2024-02-22 DIAGNOSIS — Z51.81 THERAPEUTIC DRUG MONITORING: ICD-10-CM

## 2024-02-22 LAB
CRP SERPL-MCNC: <0.4 MG/DL (ref ?–1)
ERYTHROCYTE [SEDIMENTATION RATE] IN BLOOD: 10 MM/HR

## 2024-02-22 PROCEDURE — 86140 C-REACTIVE PROTEIN: CPT

## 2024-02-22 PROCEDURE — 85652 RBC SED RATE AUTOMATED: CPT

## 2024-02-22 PROCEDURE — 36415 COLL VENOUS BLD VENIPUNCTURE: CPT

## 2024-02-29 RX ORDER — LOSARTAN POTASSIUM AND HYDROCHLOROTHIAZIDE 25; 100 MG/1; MG/1
1 TABLET ORAL DAILY
Qty: 90 TABLET | Refills: 0 | Status: SHIPPED | OUTPATIENT
Start: 2024-02-29

## 2024-02-29 RX ORDER — METFORMIN HYDROCHLORIDE 750 MG/1
750 TABLET, EXTENDED RELEASE ORAL 2 TIMES DAILY WITH MEALS
Qty: 180 TABLET | Refills: 0 | Status: SHIPPED | OUTPATIENT
Start: 2024-02-29

## 2024-02-29 RX ORDER — VENLAFAXINE HYDROCHLORIDE 150 MG/1
150 CAPSULE, EXTENDED RELEASE ORAL DAILY
Qty: 90 CAPSULE | Refills: 0 | Status: SHIPPED | OUTPATIENT
Start: 2024-02-29

## 2024-02-29 RX ORDER — METOPROLOL TARTRATE 50 MG/1
50 TABLET, FILM COATED ORAL 2 TIMES DAILY
Qty: 180 TABLET | Refills: 0 | Status: SHIPPED | OUTPATIENT
Start: 2024-02-29

## 2024-02-29 RX ORDER — ATORVASTATIN CALCIUM 20 MG/1
20 TABLET, FILM COATED ORAL NIGHTLY
Qty: 90 TABLET | Refills: 0 | Status: SHIPPED | OUTPATIENT
Start: 2024-02-29

## 2024-02-29 NOTE — TELEPHONE ENCOUNTER
A refill request was received for:  Requested Prescriptions     Pending Prescriptions Disp Refills    METOPROLOL TARTRATE 50 MG Oral Tab [Pharmacy Med Name: METOPROL TAR TAB 50MG] 180 tablet 0     Sig: TAKE 1 TABLET TWICE A DAY    ATORVASTATIN 20 MG Oral Tab [Pharmacy Med Name: ATORVASTATIN TAB 20MG] 90 tablet 0     Sig: TAKE 1 TABLET NIGHTLY    METFORMIN  MG Oral Tablet 24 Hr [Pharmacy Med Name: METFORMIN ER TAB 750MG GP] 180 tablet 0     Sig: TAKE 1 TABLET TWICE DAILY  WITH MEALS    LOSARTAN-HYDROCHLOROTHIAZIDE 100-25 MG Oral Tab [Pharmacy Med Name: LOSARTAN/HCT -25] 90 tablet 0     Sig: TAKE 1 TABLET DAILY    VENLAFAXINE  MG Oral Capsule SR 24 Hr [Pharmacy Med Name: VENLAFAXINE  CAP 150MG ER] 90 capsule 0     Sig: TAKE 1 CAPSULE DAILY     Last refill date:  10/27/23    Last office visit: 11/22/23      Future Appointments   Date Time Provider Department Center   3/7/2024  8:40 AM Remy Rojas MD [facility] [address]   3/20/2024  2:50 PM Hay Lynn MD [facility]   4/29/2024  7:20 AM Remy Rojas MD [facility] [address]

## 2024-03-07 ENCOUNTER — OFFICE VISIT (OUTPATIENT)
Dept: INTERNAL MEDICINE CLINIC | Facility: CLINIC | Age: 59
End: 2024-03-07
Payer: COMMERCIAL

## 2024-03-07 VITALS
BODY MASS INDEX: 35.12 KG/M2 | SYSTOLIC BLOOD PRESSURE: 120 MMHG | OXYGEN SATURATION: 98 % | DIASTOLIC BLOOD PRESSURE: 70 MMHG | HEART RATE: 75 BPM | WEIGHT: 186 LBS | HEIGHT: 61 IN

## 2024-03-07 DIAGNOSIS — E66.01 CLASS 2 SEVERE OBESITY DUE TO EXCESS CALORIES WITH SERIOUS COMORBIDITY AND BODY MASS INDEX (BMI) OF 37.0 TO 37.9 IN ADULT (HCC): ICD-10-CM

## 2024-03-07 DIAGNOSIS — E11.9 TYPE 2 DIABETES MELLITUS WITHOUT COMPLICATION, UNSPECIFIED WHETHER LONG TERM INSULIN USE (HCC): Primary | ICD-10-CM

## 2024-03-07 DIAGNOSIS — G47.00 INSOMNIA, UNSPECIFIED TYPE: ICD-10-CM

## 2024-03-07 DIAGNOSIS — G47.33 OSA (OBSTRUCTIVE SLEEP APNEA): ICD-10-CM

## 2024-03-07 PROBLEM — E11.69 MIXED DIABETIC HYPERLIPIDEMIA ASSOCIATED WITH TYPE 2 DIABETES MELLITUS (HCC): Status: ACTIVE | Noted: 2023-06-21

## 2024-03-07 PROBLEM — E78.2 MIXED DIABETIC HYPERLIPIDEMIA ASSOCIATED WITH TYPE 2 DIABETES MELLITUS (HCC): Status: ACTIVE | Noted: 2023-06-21

## 2024-03-07 PROCEDURE — 99214 OFFICE O/P EST MOD 30 MIN: CPT | Performed by: INTERNAL MEDICINE

## 2024-03-07 RX ORDER — AMOXICILLIN 500 MG/1
TABLET, FILM COATED ORAL
COMMUNITY
Start: 2023-11-29

## 2024-03-07 RX ORDER — IBUPROFEN 600 MG/1
TABLET ORAL
COMMUNITY
Start: 2024-02-15

## 2024-03-07 RX ORDER — TIRZEPATIDE 7.5 MG/.5ML
7.5 INJECTION, SOLUTION SUBCUTANEOUS WEEKLY
Qty: 6 ML | Refills: 0 | Status: SHIPPED | OUTPATIENT
Start: 2024-03-07

## 2024-03-07 RX ORDER — HYDROCODONE BITARTRATE AND ACETAMINOPHEN 5; 325 MG/1; MG/1
1 TABLET ORAL
COMMUNITY
Start: 2024-02-15 | End: 2024-03-07 | Stop reason: ALTCHOICE

## 2024-03-07 NOTE — PROGRESS NOTES
Dana Marsh is a 58 year old female.    Chief complaint: follow up on chronic conditions       HPI:     Dana Marsh is a 58 year old pleasant female who presents for follow up on chronic conditions         DM   Mounjaro 5 mg   Last eye doctor was last year       GERD   On pantoprazole  20 mg   Did have recent EGD         TE   Insomnia  Cannot sleep more than 5 hours   Using the cpap machine       Cousin diagnosed with breast cancer   Triple negative             Current Outpatient Medications   Medication Sig Dispense Refill    amoxicillin 500 MG Oral Tab TAKE 1 TABLET BY MOUTH THREE TIMES DAILY UNTIL GONE      dexamethasone 0.75 MG Oral Tab Take 5 mg by mouth As Directed.      ibuprofen 600 MG Oral Tab       metoprolol tartrate 50 MG Oral Tab Take 1 tablet (50 mg total) by mouth 2 (two) times daily. 180 tablet 0    atorvastatin 20 MG Oral Tab Take 1 tablet (20 mg total) by mouth nightly. 90 tablet 0    metFORMIN  MG Oral Tablet 24 Hr Take 1 tablet (750 mg total) by mouth 2 (two) times daily with meals. 180 tablet 0    losartan-hydroCHLOROthiazide 100-25 MG Oral Tab Take 1 tablet by mouth daily. 90 tablet 0    venlafaxine  MG Oral Capsule SR 24 Hr Take 1 capsule (150 mg total) by mouth daily. 90 capsule 0    benzonatate 100 MG Oral Cap Take 1 capsule (100 mg total) by mouth 3 (three) times daily as needed for cough. 30 capsule 0    Tirzepatide (MOUNJARO) 5 MG/0.5ML Subcutaneous Solution Pen-injector Inject 5 mg into the skin once a week. 6 mL 0    traMADol 50 MG Oral Tab Take 1 tablet (50 mg total) by mouth every 8 (eight) hours as needed for Pain. 30 tablet 5    hydroxychloroquine 200 MG Oral Tab Take 1 tablet (200 mg total) by mouth nightly. 90 tablet 1    Tirzepatide (MOUNJARO) 2.5 MG/0.5ML Subcutaneous Solution Pen-injector Inject 2.5 mg into the skin once a week. 2 mL 0    pantoprazole 20 MG Oral Tab EC pantoprazole 20 MG Oral Tab EC, [RxNorm: 240687]      ergocalciferol 1.25 MG (10924  UT) Oral Cap Take 1 capsule (50,000 Units total) by mouth once a week.      ondansetron 4 MG Oral Tablet Dispersible Take 1 tablet (4 mg total) by mouth every 8 (eight) hours as needed for Nausea. 30 tablet 0    methylPREDNISolone 4 MG Oral Tablet Therapy Pack Take as directed on package. 1 each 0    methylPREDNISolone 4 MG Oral Tablet Therapy Pack Take as directed on package. 1 each 0    cyclobenzaprine 10 MG Oral Tab Take 10-20mg at night as needed 180 tablet 0    semaglutide 8 MG/3ML Subcutaneous Solution Pen-injector Inject 2 mg into the skin once a week. 9 mL 0    ciprofloxacin 500 MG Oral Tab Take 1 tablet (500 mg total) by mouth every 12 (twelve) hours. 14 tablet 0    venlafaxine  MG Oral Capsule SR 24 Hr Take 1 capsule (150 mg total) by mouth daily. 10 capsule 0    semaglutide 4 MG/3ML Subcutaneous Solution Pen-injector Inject 1 mg into the skin once a week. 3 mL 0    semaglutide (OZEMPIC, 0.25 OR 0.5 MG/DOSE,) 2 MG/1.5ML Subcutaneous Solution Pen-injector Inject 0.5 mg into the skin every 7 days. 3 mL 0    cephalexin 500 MG Oral Cap Take 1 capsule (500 mg total) by mouth every 8 (eight) hours. 21 capsule 0    ciprofloxacin-dexamethasone 0.3-0.1 % Otic Suspension Place 4 drops into the left ear 2 (two) times daily. 7.5 mL 0    nabumetone 750 MG Oral Tab Take 1 tablet (750 mg total) by mouth 2 (two) times daily. 60 tablet 5    FREESTYLE LANCETS Does not apply Misc TEST DAILY AS DIRECTED 100 each 3    meclizine 25 MG Oral Tab Take 1 tablet (25 mg total) by mouth 3 (three) times daily as needed for Dizziness or Nausea. 20 tablet 0    Insulin Pen Needle (PEN NEEDLES) 32G X 4 MM Does not apply Misc 1 each every 7 days. 30 each 0    Insulin Pen Needle (PEN NEEDLES) 32G X 4 MM Does not apply Misc 1 each daily. 90 each 0    ondansetron (ZOFRAN) 4 mg tablet Take 1 tablet (4 mg total) by mouth every 8 (eight) hours as needed for Nausea. 20 tablet 0    lidocaine 5 % External Patch Place 2 patches onto the skin  daily. 60 patch 0    Betamethasone Dipropionate Aug 0.05 % External Cream Apply 1 Application topically 2 (two) times daily. Apply to affected areas as needed bid 50 g 3    losartan 100 MG Oral Tab Take 1 tablet (100 mg total) by mouth daily.  1    Phenazopyridine HCl 200 MG Oral Tab Take 1 tablet (200 mg total) by mouth 3 (three) times daily as needed for Pain. 15 tablet 0    Lactobacillus (PROBIOTIC ACIDOPHILUS OR) Take by mouth.        Past Medical History:   Diagnosis Date    Abnormal vaginal bleeding     polypectomy    Diabetes (HCC)     Disorder of liver     Fibromyalgia     Gastropathy 01/18/2018    Hemorrhoids 2014    High blood pressure     High cholesterol     MCTD (mixed connective tissue disease) (HCC)     Mood disorder (HCC)     Other ill-defined conditions(799.89)     Per [employer]:  \"son and girlfriend with 4 kids moved in.\"    Spinal stenosis     Comments:  spondylosis.  Management:  surgery in 12/2009.    Visual impairment      Past Surgical History:   Procedure Laterality Date    BACK SURGERY      COLONOSCOPY  2014    COLONOSCOPY N/A 11/6/2023    Procedure: COLONOSCOPY;  Surgeon: Clari Smith MD;  Location: [facility] ENDOSCOPY    HYSTEROSCOPY,DIAGNOSTIC  08/2006    polypectomy    OTHER SURGICAL HISTORY  12/2009    Surgery (due to spinal stenosis/spondylosis).    OTHER SURGICAL HISTORY Right     ankle surgery             Family History   Problem Relation Age of Onset    Heart Disease Father         CAD, (cause of death) age 49    Diabetes Father     Stroke Maternal Grandmother         Cerebrovascular accident (Stroke)    Heart Disease Paternal Grandmother         CAD    Breast Cancer Maternal Aunt         70s     Heart Disease Maternal Aunt         CAD    Heart Disease Maternal Aunt     Other (osteoarthritis) Mother     Other (psoriasis) Sister      Patient Active Problem List   Diagnosis    NAFLD (nonalcoholic fatty liver disease)    Gallbladder polyp    Gastropathy    Routine health maintenance     Essential hypertension    Irritable bowel syndrome with diarrhea    Fibromyalgia    Mixed connective tissue disease (HCC)    Allergic rhinitis    Recurrent UTI    Obesity (BMI 30-39.9)    Dietary counseling and surveillance    Hyperlipidemia    Snoring    Fatigue    Postcoital bleeding    Nausea and vomiting    Constipation    Abnormal urine    Disorder of connective tissue ([facility])    Excessive or frequent menstruation    External hemorrhoids    Leukorrhea    Lumbosacral spondylosis without myelopathy    Metrorrhagia    Palpitations    Shortness of breath    Irregular menstrual cycle    Tachycardia    Type 2 diabetes mellitus without complication ([facility])    Vaginitis and vulvovaginitis    Primary fibromyalgia syndrome    Polyp of corpus uteri    MCTD (mixed connective tissue disease) ([facility])    Nonalcoholic fatty liver disease    Annual physical exam    Gall bladder polyp    Dizziness    Petechiae    Skin rash    Class 2 severe obesity due to excess calories with serious comorbidity and body mass index (BMI) of 37.0 to 37.9 in adult ([facility])    Dysphagia    MCI (mild cognitive impairment)    Sleep disorder breathing    Cervical radicular pain    Gastroesophageal reflux disease    Screening for cervical cancer    Mixed diabetic hyperlipidemia associated with type 2 diabetes mellitus ([facility])       REVIEW OF SYSTEMS:   A comprehensive 10 point review of systems was completed.  Pertinent positives and negatives noted in the the HPI            EXAM:   /70   Pulse 75   Ht 5' 1\" (1.549 m)   Wt 186 lb (84.4 kg)   LMP 09/06/2013 (Approximate)   SpO2 98%   BMI 35.14 kg/m²   GENERAL: well developed, well nourished,in no apparent distress    LUNGS: clear to auscultation  CARDIO: RRR without murmur    NEURO: no gross deficits              Orders Placed This Encounter    amoxicillin 500 MG Oral Tab     Sig: TAKE 1 TABLET BY MOUTH THREE TIMES DAILY UNTIL GONE    dexamethasone 0.75 MG Oral Tab     Sig: Take 5 mg by mouth As  Directed.    DISCONTD: HYDROcodone-acetaminophen 5-325 MG Oral Tab     Sig: Take 1 tablet by mouth every 4 to 6 hours as needed for Pain.    ibuprofen 600 MG Oral Tab     XR CHEST PA + LAT CHEST (CPT=71046)    Result Date: 1/30/2024  CONCLUSION: No acute cardiopulmonary disease.    Dictated by (CST): Dustin Grayson MD on 1/30/2024 at 11:03 AM     Finalized by (CST): Dustin Grayson MD on 1/30/2024 at 11:04 AM                ASSESSMENT AND PLAN:     1. TE (obstructive sleep apnea)  Referral to pulmonary   - amoxicillin 500 MG Oral Tab; TAKE 1 TABLET BY MOUTH THREE TIMES DAILY UNTIL GONE  - dexamethasone 0.75 MG Oral Tab; Take 5 mg by mouth As Directed.  - ibuprofen 600 MG Oral Tab  - Pulmonary Referral - In Network  - Tirzepatide (MOUNJARO) 7.5 MG/0.5ML Subcutaneous Solution Pen-injector; Inject 7.5 mg into the skin once a week.  Dispense: 6 mL; Refill: 0    2. Class 2 severe obesity due to excess calories with serious comorbidity and body mass index (BMI) of 37.0 to 37.9 in adult (HCC)  Increase mounjaro to 7.5 mg     - amoxicillin 500 MG Oral Tab; TAKE 1 TABLET BY MOUTH THREE TIMES DAILY UNTIL GONE  - dexamethasone 0.75 MG Oral Tab; Take 5 mg by mouth As Directed.  - ibuprofen 600 MG Oral Tab  - Pulmonary Referral - In Network  - Tirzepatide (MOUNJARO) 7.5 MG/0.5ML Subcutaneous Solution Pen-injector; Inject 7.5 mg into the skin once a week.  Dispense: 6 mL; Refill: 0    3. Type 2 diabetes mellitus without complication, unspecified whether long term insulin use (HCC)  Increase mounjaro to 7.5 mg   She did have blood work with employer   - amoxicillin 500 MG Oral Tab; TAKE 1 TABLET BY MOUTH THREE TIMES DAILY UNTIL GONE  - dexamethasone 0.75 MG Oral Tab; Take 5 mg by mouth As Directed.  - ibuprofen 600 MG Oral Tab  - Pulmonary Referral - In Network  - Tirzepatide (MOUNJARO) 7.5 MG/0.5ML Subcutaneous Solution Pen-injector; Inject 7.5 mg into the skin once a week.  Dispense: 6 mL; Refill: 0    4. Insomnia,  unspecified type  Discussed sleep hygiene  Can take melatonin as needed   Referral to sleep specialist    - amoxicillin 500 MG Oral Tab; TAKE 1 TABLET BY MOUTH THREE TIMES DAILY UNTIL GONE  - dexamethasone 0.75 MG Oral Tab; Take 5 mg by mouth As Directed.  - ibuprofen 600 MG Oral Tab  - Pulmonary Referral - In Network  - Tirzepatide (MOUNJARO) 7.5 MG/0.5ML Subcutaneous Solution Pen-injector; Inject 7.5 mg into the skin once a week.  Dispense: 6 mL; Refill: 0     I spent 30 minutes at the day of the service seeing the patient, examination, reviewing labs, independently interpreting results, completing charting and counseling the patient and/or on coordination of care.  The diagnosis, prognosis, and general treatment was explained to the patient.     Please return to the clinic if you are having persistent symptoms. If worsening symptoms should go to the ER    Remy Rojas MD,   Diplomate of the American Board of Internal Medicine  Diplomate of the American Board of Obesity Medicine

## 2024-03-14 ENCOUNTER — OFFICE VISIT (OUTPATIENT)
Dept: PULMONOLOGY | Facility: CLINIC | Age: 59
End: 2024-03-14

## 2024-03-14 VITALS
HEIGHT: 61 IN | OXYGEN SATURATION: 100 % | BODY MASS INDEX: 36.06 KG/M2 | HEART RATE: 76 BPM | DIASTOLIC BLOOD PRESSURE: 72 MMHG | WEIGHT: 191 LBS | SYSTOLIC BLOOD PRESSURE: 122 MMHG

## 2024-03-14 DIAGNOSIS — K21.9 GASTROESOPHAGEAL REFLUX DISEASE, UNSPECIFIED WHETHER ESOPHAGITIS PRESENT: ICD-10-CM

## 2024-03-14 DIAGNOSIS — E11.9 TYPE 2 DIABETES MELLITUS WITHOUT COMPLICATION, UNSPECIFIED WHETHER LONG TERM INSULIN USE (HCC): ICD-10-CM

## 2024-03-14 DIAGNOSIS — M35.1 MCTD (MIXED CONNECTIVE TISSUE DISEASE) (HCC): ICD-10-CM

## 2024-03-14 DIAGNOSIS — Z12.4 SCREENING FOR CERVICAL CANCER: ICD-10-CM

## 2024-03-14 DIAGNOSIS — K76.0 NAFLD (NONALCOHOLIC FATTY LIVER DISEASE): ICD-10-CM

## 2024-03-14 DIAGNOSIS — G47.33 OSA (OBSTRUCTIVE SLEEP APNEA): Primary | ICD-10-CM

## 2024-03-14 DIAGNOSIS — Z00.00 ANNUAL PHYSICAL EXAM: ICD-10-CM

## 2024-03-14 PROCEDURE — 99203 OFFICE O/P NEW LOW 30 MIN: CPT | Performed by: INTERNAL MEDICINE

## 2024-03-14 RX ORDER — PANTOPRAZOLE SODIUM 20 MG/1
20 TABLET, DELAYED RELEASE ORAL
Qty: 90 TABLET | Refills: 1 | Status: SHIPPED | OUTPATIENT
Start: 2024-03-14 | End: 2024-06-12

## 2024-03-14 NOTE — TELEPHONE ENCOUNTER
A refill request was received for:  Requested Prescriptions     Pending Prescriptions Disp Refills    pantoprazole 20 MG Oral Tab EC  0     Last refill date:  6/21/23    Last office visit: 3/7/24      Future Appointments   Date Time Provider Department Center   3/14/2024 11:30 AM Vipin Lopez MD [facility]   3/20/2024  2:50 PM Hay Lynn MD [facility]   4/29/2024  7:20 AM Remy Rojas MD [facility]ORTHELM [facility] [address]

## 2024-03-14 NOTE — PROGRESS NOTES
Dear  Remy  :           As you know, Dana is a 58-year-old female who I am now evaluating for obstructive sleep apnea.       HISTORY OF PRESENT ILLNESS: The patient underwent home sleep test demonstrating 14.3 respiratory events per hour and she was set up with auto titrating CPAP 5-20 CWP with residual events of 5-1/2/h and average daily usage of 4-1/2 hours.  She has ongoing fatigue.  There is some nocturnal awakenings.  She does have frequent snoring such that others complain and there are occasional witnessed apneic events.  There is no cataplexy, sleep paralysis nor hypnagogic hallucination and no urge to move the limbs at night.  She goes to bed at 10 PM and gets up at 5 PM.  She does not feel refreshed upon awakening.  She has gained 6 pounds in the past 2 weeks.  There is no morning headache but there is a depressed mood and the [place] Sleepiness Scale score is 7 out of 24.  She has trouble with the mask interface with air blowing in the eyes.    PAST MEDICAL AND SURGICAL HISTORY:   1.  Mixed connective tissue disorder diabetes mellitus hypertension hyperlipidemia sleep apnea    SOCIAL HISTORY: , 3 kids, quit tobacco 30 years ago, occasional alcohol, sales    FAMILY HISTORY: Mother alive and well, father  MI    ALLERGIES TO MEDICATIONS: None    MEDICATIONS: See medicine reconciliation sheet    REVIEW OF SYSTEMS: Review of Systems:  Vision normal. Ear nose and throat normal. Bowel normal. Bladder function normal. Depression. No thyroid disease. No lymphatic system concerns.  No rash. Muscles and joints unremarkable. No weight loss no weight gain.    PHYSICAL EXAMINATION: Physical Examination:  Vital signs normal. HEENT examination is unremarkable with pupils equal round and reactive to light and accommodation. Neck without adenopathy, thyromegaly, JVD nor bruit. Lungs clear to auscultation and percussion. Cardiac regular rate and rhythm no murmur. Abdomen nontender, without hepatosplenomegaly  and no mass appreciable. Extremities and Musculoskeletal without clubbing cyanosis nor edema, and mobility acceptable. Neurologic grossly intact with symmetric tone and strength and reflex.  Crowded oropharynx-Mallampati score 3.    LABORATORY: Home sleep test-14.3 respiratory events per hour.    ASSESSMENT AND PLAN:  PROBLEM 1.  Sleep disturbance with modest obstructive sleep apnea-the patient has minimal residual respiratory event and difficulty with the mask interface with the auto titrating CPAP.  However, the complaint of residual fatigue is more likely related to sleep deprivation as she is using the device only on average 4-1/2 hours per night.    RECOMMENDATIONS:  1.  Will try a nasal cushion style of mask interface  2.  Patient to extend her sleep time by 1 to 2 hours to assess for impact on daytime sleepiness  3.  Weight loss  4.  Avoid alcohol  5.  Avoid sedating drug  6.  Never drive if sleepy  7.  Sleep apnea literature provided  8.  Patient to return to see me at the 6 to 12-month interval or sooner if needed and contact me promptly if new trouble.    I am delighted to assist in Dana's care.            With warmest regards,     Vipin Lopez MD  Medical Director, Critical Care, [facility]  Medical Director, [facility] [place]

## 2024-03-15 NOTE — PROGRESS NOTES
Integrated mask order, demographics face sheet, and chart notes faxed to [facility] at fax#[phone] with confirmation.

## 2024-03-25 ENCOUNTER — NURSE TRIAGE (OUTPATIENT)
Dept: INTERNAL MEDICINE CLINIC | Facility: CLINIC | Age: 59
End: 2024-03-25

## 2024-03-25 NOTE — TELEPHONE ENCOUNTER
Patient called asking to please speak to the nurse. Her voice is weak and she said this has been going on for a while.    She wants to know if the nurse feels she needs to be seen in the office.

## 2024-03-25 NOTE — TELEPHONE ENCOUNTER
S/w Dana stated she is having voice disturbance and cough x 2 weeks. Pt had almost no voice for 4-5 days. Pt is starting to sneeze with post nasal drip. Pt is very fatigued. Pt had covid-19  1-2 months ago. Pt denied lingering covid symptoms. Pt stated Tessalon Perles are not effective. Pt denied SOB or chest discomfort. Pt is able to eat and drink fluids.    Disposition: Seen in office 3 days    Scheduled with Dr. Rojas on 3/26/24 at 8:20 AM. Pt advised if having SOB or inability to swallow liquids to seek ED care. Pt voiced understanding.      Reason for Disposition   Nasal discharge present > 10 days    Answer Assessment - Initial Assessment Questions  1. ONSET: \"When did the cough begin?\"       2 weeks   2. SEVERITY: \"How bad is the cough today?\"       Constant cough at night.  3. SPUTUM: \"Describe the color of your sputum\" (none, dry cough; clear, white, yellow, green)      Denied  4. HEMOPTYSIS: \"Are you coughing up any blood?\" If so ask: \"How much?\" (flecks, streaks, tablespoons, etc.)      Denied  5. DIFFICULTY BREATHING: \"Are you having difficulty breathing?\" If Yes, ask: \"How bad is it?\" (e.g., mild, moderate, severe)     - MILD: No SOB at rest, mild SOB with walking, speaks normally in sentences, can lie down, no retractions, pulse < 100.     - MODERATE: SOB at rest, SOB with minimal exertion and prefers to sit, cannot lie down flat, speaks in phrases, mild retractions, audible wheezing, pulse 100-120.     - SEVERE: Very SOB at rest, speaks in single words, struggling to breathe, sitting hunched forward, retractions, pulse > 120       Denied  6. FEVER: \"Do you have a fever?\" If Yes, ask: \"What is your temperature, how was it measured, and when did it start?\"      Denied  7. CARDIAC HISTORY: \"Do you have any history of heart disease?\" (e.g., heart attack, congestive heart failure)       Denied  8. LUNG HISTORY: \"Do you have any history of lung disease?\"  (e.g., pulmonary embolus, asthma, emphysema)       Denied  9. PE RISK FACTORS: \"Do you have a history of blood clots?\" (or: recent major surgery, recent prolonged travel, bedridden)      Denied  10. OTHER SYMPTOMS: \"Do you have any other symptoms?\" (e.g., runny nose, wheezing, chest pain)        Denied  11. PREGNANCY: \"Is there any chance you are pregnant?\" \"When was your last menstrual period?\"        N/A  12. TRAVEL: \"Have you traveled out of the country in the last month?\" (e.g., travel history, exposures)        Denied    Protocols used: Cough-A-OH

## 2024-03-26 ENCOUNTER — TELEPHONE (OUTPATIENT)
Dept: INTERNAL MEDICINE CLINIC | Facility: CLINIC | Age: 59
End: 2024-03-26

## 2024-03-26 ENCOUNTER — OFFICE VISIT (OUTPATIENT)
Dept: INTERNAL MEDICINE CLINIC | Facility: CLINIC | Age: 59
End: 2024-03-26
Payer: COMMERCIAL

## 2024-03-26 VITALS
BODY MASS INDEX: 36.4 KG/M2 | DIASTOLIC BLOOD PRESSURE: 64 MMHG | HEART RATE: 76 BPM | OXYGEN SATURATION: 98 % | WEIGHT: 192.81 LBS | HEIGHT: 61 IN | SYSTOLIC BLOOD PRESSURE: 122 MMHG

## 2024-03-26 DIAGNOSIS — R05.1 ACUTE COUGH: Primary | ICD-10-CM

## 2024-03-26 DIAGNOSIS — J04.0 LARYNGITIS: ICD-10-CM

## 2024-03-26 PROCEDURE — 99213 OFFICE O/P EST LOW 20 MIN: CPT | Performed by: INTERNAL MEDICINE

## 2024-03-26 RX ORDER — AZITHROMYCIN 250 MG/1
TABLET, FILM COATED ORAL
Qty: 6 TABLET | Refills: 0 | Status: SHIPPED | OUTPATIENT
Start: 2024-03-26 | End: 2024-03-30

## 2024-03-26 RX ORDER — PROMETHAZINE HYDROCHLORIDE AND CODEINE PHOSPHATE 6.25; 1 MG/5ML; MG/5ML
2.5 SYRUP ORAL EVERY 6 HOURS PRN
Qty: 118 ML | Refills: 0 | Status: SHIPPED | OUTPATIENT
Start: 2024-03-26

## 2024-03-26 RX ORDER — ALBUTEROL SULFATE 90 UG/1
2 AEROSOL, METERED RESPIRATORY (INHALATION) EVERY 6 HOURS PRN
Qty: 6.7 G | Refills: 0 | Status: SHIPPED | OUTPATIENT
Start: 2024-03-26

## 2024-03-26 NOTE — PROGRESS NOTES
Dana Marsh is a 58 year old female.    Chief complaint: cough and URTI     HPI:     Dana Marsh is a 58 year old pleasant female who presents for a    2weeks ago   Started having cough lost her voice   Has been having headache for the last couple of days   No fever or chills   Throat hurts from coughing   No ear pain   No chest pain   Started with wet cough and now is dry   The other day felt a little sob   Has been taking benzonatate as needed   Has been taking advil as needed   Feeling worse than when it first started         Current Outpatient Medications   Medication Sig Dispense Refill    pantoprazole 20 MG Oral Tab EC Take 1 tablet (20 mg total) by mouth every morning before breakfast. 90 tablet 1    Tirzepatide (MOUNJARO) 7.5 MG/0.5ML Subcutaneous Solution Pen-injector Inject 7.5 mg into the skin once a week. 6 mL 0    metoprolol tartrate 50 MG Oral Tab Take 1 tablet (50 mg total) by mouth 2 (two) times daily. 180 tablet 0    atorvastatin 20 MG Oral Tab Take 1 tablet (20 mg total) by mouth nightly. 90 tablet 0    metFORMIN  MG Oral Tablet 24 Hr Take 1 tablet (750 mg total) by mouth 2 (two) times daily with meals. 180 tablet 0    losartan-hydroCHLOROthiazide 100-25 MG Oral Tab Take 1 tablet by mouth daily. 90 tablet 0    hydroxychloroquine 200 MG Oral Tab Take 1 tablet (200 mg total) by mouth nightly. 90 tablet 1    ondansetron 4 MG Oral Tablet Dispersible Take 1 tablet (4 mg total) by mouth every 8 (eight) hours as needed for Nausea. 30 tablet 0    cyclobenzaprine 10 MG Oral Tab Take 10-20mg at night as needed 180 tablet 0    semaglutide 4 MG/3ML Subcutaneous Solution Pen-injector Inject 1 mg into the skin once a week. 3 mL 0    FREESTYLE LANCETS Does not apply Misc TEST DAILY AS DIRECTED 100 each 3    meclizine 25 MG Oral Tab Take 1 tablet (25 mg total) by mouth 3 (three) times daily as needed for Dizziness or Nausea. 20 tablet 0    lidocaine 5 % External Patch Place 2 patches onto the  skin daily. 60 patch 0    Lactobacillus (PROBIOTIC ACIDOPHILUS OR) Take by mouth.      venlafaxine  MG Oral Capsule SR 24 Hr Take 1 capsule (150 mg total) by mouth daily. 90 capsule 0    traMADol 50 MG Oral Tab Take 1 tablet (50 mg total) by mouth every 8 (eight) hours as needed for Pain. (Patient not taking: Reported on 3/14/2024) 30 tablet 5    nabumetone 750 MG Oral Tab Take 1 tablet (750 mg total) by mouth 2 (two) times daily. 60 tablet 5    Betamethasone Dipropionate Aug 0.05 % External Cream Apply 1 Application topically 2 (two) times daily. Apply to affected areas as needed bid 50 g 3      Past Medical History:   Diagnosis Date    Abnormal vaginal bleeding     polypectomy    Diabetes (HCC)     Disorder of liver     Fibromyalgia     Gastropathy 01/18/2018    Hemorrhoids 2014    High blood pressure     High cholesterol     MCTD (mixed connective tissue disease) (HCC)     Mood disorder (HCC)     Other ill-defined conditions(799.89)     Per [employer]:  \"son and girlfriend with 4 kids moved in.\"    Spinal stenosis     Comments:  spondylosis.  Management:  surgery in 12/2009.    Visual impairment      Past Surgical History:   Procedure Laterality Date    BACK SURGERY      COLONOSCOPY  2014    COLONOSCOPY N/A 11/6/2023    Procedure: COLONOSCOPY;  Surgeon: Clari Smith MD;  Location: [facility] ENDOSCOPY    HYSTEROSCOPY,DIAGNOSTIC  08/2006    polypectomy    OTHER SURGICAL HISTORY  12/2009    Surgery (due to spinal stenosis/spondylosis).    OTHER SURGICAL HISTORY Right     ankle surgery             Family History   Problem Relation Age of Onset    Heart Disease Father         CAD, (cause of death) age 49    Diabetes Father     Stroke Maternal Grandmother         Cerebrovascular accident (Stroke)    Heart Disease Paternal Grandmother         CAD    Breast Cancer Maternal Aunt         70s     Heart Disease Maternal Aunt         CAD    Heart Disease Maternal Aunt     Other (osteoarthritis) Mother     Other (psoriasis)  Sister      Patient Active Problem List   Diagnosis    NAFLD (nonalcoholic fatty liver disease)    Gallbladder polyp    Gastropathy    Routine health maintenance    Essential hypertension    Irritable bowel syndrome with diarrhea    Fibromyalgia    Mixed connective tissue disease (HCC)    Allergic rhinitis    Recurrent UTI    Obesity (BMI 30-39.9)    Dietary counseling and surveillance    Hyperlipidemia    Snoring    Fatigue    Postcoital bleeding    Nausea and vomiting    Constipation    Abnormal urine    Disorder of connective tissue (HCC)    Excessive or frequent menstruation    External hemorrhoids    Leukorrhea    Lumbosacral spondylosis without myelopathy    Metrorrhagia    Palpitations    Shortness of breath    Irregular menstrual cycle    Tachycardia    Type 2 diabetes mellitus without complication (HCC)    Vaginitis and vulvovaginitis    Primary fibromyalgia syndrome    Polyp of corpus uteri    MCTD (mixed connective tissue disease) (HCC)    Nonalcoholic fatty liver disease    Annual physical exam    Gall bladder polyp    Dizziness    Petechiae    Skin rash    Class 2 severe obesity due to excess calories with serious comorbidity and body mass index (BMI) of 37.0 to 37.9 in adult (HCC)    Dysphagia    MCI (mild cognitive impairment)    Sleep disorder breathing    Cervical radicular pain    Gastroesophageal reflux disease    Screening for cervical cancer    Mixed diabetic hyperlipidemia associated with type 2 diabetes mellitus (HCC)    TE (obstructive sleep apnea)    Insomnia       REVIEW OF SYSTEMS:   A comprehensive 10 point review of systems was completed.  Pertinent positives and negatives noted in the the HPI            EXAM:   /64   Pulse 76   Ht 5' 1\" (1.549 m)   Wt 192 lb 12.8 oz (87.5 kg)   LMP 09/06/2013 (Approximate)   SpO2 98%   BMI 36.43 kg/m²   GENERAL: well developed, well nourished,in no apparent distress    LUNGS: coarse breathing sound   CARDIO: RRR without murmur    NEURO: no  gross deficits              No orders of the defined types were placed in this encounter.    XR CHEST PA + LAT CHEST (CPT=71046)    Result Date: 1/30/2024  CONCLUSION: No acute cardiopulmonary disease.    Dictated by (CST): Dustin Grayson MD on 1/30/2024 at 11:03 AM     Finalized by (CST): Dustin Grayson MD on 1/30/2024 at 11:04 AM                ASSESSMENT AND PLAN:       ICD-10-CM    1. Acute cough  R05.1 XR CHEST PA + LAT CHEST (CPT=71046)     albuterol 108 (90 Base) MCG/ACT Inhalation Aero Soln     azithromycin (ZITHROMAX Z-RAFAEL) 250 MG Oral Tab     promethazine-codeine 6.25-10 MG/5ML Oral Syrup      2. Laryngitis  J04.0 XR CHEST PA + LAT CHEST (CPT=71046)     albuterol 108 (90 Base) MCG/ACT Inhalation Aero Soln     azithromycin (ZITHROMAX Z-RAFAEL) 250 MG Oral Tab     promethazine-codeine 6.25-10 MG/5ML Oral Syrup         Good hydration and symptoms treatment   Zpack   Albuterol as needed   Promethazine with codeine advised not to take and drive   If no improvement or worsening to have CXR       Please return to the clinic if you are having persistent symptoms. If worsening symptoms should go to the ER    Remy Rojas MD,   Diplomate of the American Board of Internal Medicine  Diplomate of the American Board of Obesity Medicine

## 2024-03-26 NOTE — TELEPHONE ENCOUNTER
PRIOR AUTHORIZATION     Medication Name:          Tirzepatide (MOUNJARO) 7.5 MG/0.5ML Subcutaneous Solution Pen-injector 6 mL 0 3/7/2024 --   Sig:   Inject 7.5 mg into the skin once a week.     Route:   Subcutaneous     Order #:   [id number]         Indication per provider:      Type 2 diabetes mellitus without complication, unspecified whether long term insulin use ([facility]) E11.9    Class 2 severe obesity due to excess calories with serious comorbidity and body mass index (BMI) of 37.0 to 37.9 in adult ([facility]) E66.01, Z68.37

## 2024-04-17 ENCOUNTER — OFFICE VISIT (OUTPATIENT)
Dept: RHEUMATOLOGY | Facility: CLINIC | Age: 59
End: 2024-04-17

## 2024-04-17 VITALS
HEART RATE: 85 BPM | BODY MASS INDEX: 36.25 KG/M2 | SYSTOLIC BLOOD PRESSURE: 126 MMHG | WEIGHT: 192 LBS | DIASTOLIC BLOOD PRESSURE: 69 MMHG | HEIGHT: 61 IN

## 2024-04-17 DIAGNOSIS — M35.1 MCTD (MIXED CONNECTIVE TISSUE DISEASE) (HCC): Primary | ICD-10-CM

## 2024-04-17 DIAGNOSIS — Z51.81 THERAPEUTIC DRUG MONITORING: ICD-10-CM

## 2024-04-17 DIAGNOSIS — M79.7 FIBROMYALGIA: ICD-10-CM

## 2024-04-17 PROCEDURE — 99214 OFFICE O/P EST MOD 30 MIN: CPT | Performed by: INTERNAL MEDICINE

## 2024-04-17 RX ORDER — NABUMETONE 750 MG/1
750 TABLET, FILM COATED ORAL DAILY
Qty: 90 TABLET | Refills: 3 | Status: SHIPPED | OUTPATIENT
Start: 2024-04-17

## 2024-04-17 RX ORDER — CYCLOBENZAPRINE HCL 10 MG
10 TABLET ORAL NIGHTLY PRN
Qty: 90 TABLET | Refills: 3 | Status: SHIPPED | OUTPATIENT
Start: 2024-04-17

## 2024-04-17 RX ORDER — HYDROXYCHLOROQUINE SULFATE 200 MG/1
200 TABLET, FILM COATED ORAL 2 TIMES DAILY
Qty: 180 TABLET | Refills: 3 | Status: SHIPPED | OUTPATIENT
Start: 2024-04-17

## 2024-04-17 NOTE — PATIENT INSTRUCTIONS
1. increase hydroxychrouqine 200mg - twice a day    2. Cont. Cyclobenzaprine 10 at night as needed  3.  Try nabumetone 750mg twice a day as needed.   4. Treatment with sleep apnea  5. Eye exam yearly - 2024 -   6 . Tramadol 50mg as needed.   7. Check labs in 6 months.   8. Return to clinic in 6 months.

## 2024-04-17 NOTE — PROGRESS NOTES
Dana Marsh is a 58 year old female who presents for   Chief Complaint   Patient presents with    Mixed Connective Tissue Disease    Joint Pain       HPI:     I had the pleasure of seeing Dana Marsh on 4/5/2017 for evaluation. Referred here by Dr. Perez and Dr. Miguel.     She is a pleasant 51 year old who has recnetly been dx with MCTD and fibromyalgia and is here ro re-establish.   Dr. Sanchez was her previous rheumatolgoist.   She has recently had elevated liver funciton tests and is concerned about the.   She was diagnoesd in 9/2015.     She got her eye exam for glaucoma and for plaquenil. She had a bad eye infection in 10 an 11/2016. She then saw her eye doctor - Lei Magallon.. She had this sudden swolling her her eye. She took drops for a few days and thought it was infection. No pink eye. No pain.     She had tremndous arm pain in 9/2015. She had this for 8 months in both arms. She was then sent to rheumatolgosit and got labs. She was then diagnosed. She was started on plaquenil 200mg bid. She didn't feel that helped the pain. She then stopped her cholesterol medication.   She has right knee apin right now.   She has fibormyalgia since age 28.   She just started lyrica and savella in the last 3 months.   She feelst he pain is better. It's not totally gone. Dr. Miguel started these.   She didn't want narcotics.   She's stopped meloxicam - but taking only as needeed. She also stopped gabpentin 3 months ago and effexor 3 months.   She was on cymbalta years ago.     Only her right knee is hurting. Her arms and hips can still hurt. If she cleans the garage she can't do anything for 1 1/2 days. It used to be a week.   Back of her legs hurt. She uses massage therapy. sshe feels they are swollen.   She has occl left sided neck pain.   She ahs some lower back pain - she had hx of lower back sx. In 12/2009.     4/19/2017  Repeat RAINE nd RNP are negative - for MCTD. She's never flet plaquenil helped her.   She  has no sob.   She has been having n/v. That' sbeen ahving a lot. She doesn's tknow if it's her IBS.   sarbjit didn't see GI yet. She got labs and us liver.   She has been having loose bowel movement and goto the bathrrom and then she throws up. - did this easter Sunday. Then has been fine . X 1 days. She gets this every 1-2 weeks. For the last couple of months. herh right slower hip are anad stomach area bothers her when she is sick.   Sarbjit has 5/10 pain.   She feels her pain is so much deejay sharonda lyrica and savella.   She's not on cyclbenzaprine.   The arm pain is better. Its bothering her on and off. It's a tooth ache in her body. She's osn lyirca 150mg bid and savella is 50mg bid. She occl forgets her pills in am.   The back of her right knee still hurts.   She only takes melxoicam as needed. He hasn't fellt she's bad enough to take it. Th eknee pain is going on for 6-7 months. It's not helping.     8/28/2017  She was haivng a lot of pain in her hands , arms and back and right knee. Dr. Miguel gave her a prednisone pack for 5 days. She felt a little better so she is back.   She cut back on all three of meds.   Her knee felt better on the prednisone.   The fingers and hands felt better. Her fibro was still hurting.   She can be irritable sometimes.   She feels her knee never got better until she was taking the prednisone pack.     11/30/2017  She doesn't feel great. She felt 3 weeks ago she reached her point. She had a pcp. She saw him.   She restarted meloxicam from Dr. Miguel and she's feeling better.   She increased the lyrica back to 150mg twice a day. Then she increased the savella to 50mg bid. She's seeing more an improvmeent now.   She's vomitting again. It's happened to her before. She doesn't feel meloxicam makes her feel worse.   She's noticing some dizziness with meloxicam so she is taking it at night.   She still has soreness in her arms. She couldn't lay on her hips bfore now that' sbetter.   She felt her  right knee was bad. Now she' sbetter with laying on it now.   No rashes.     She has 5/10 apin in her hips, legs and arms .     4/11/2018   She had a bad flare of neck pain 1-2 weeks ago. She felt advil and tyelnol wasn't helping.   She's been having improved right arm funciton after physical therapy.   She still has hpain in her elbows.   She called 2 weeks ago with pain in her.   She is not covered for the savella. It's still 140 30 days suppply - she's started with a 25mg twice a day - and she feels nauseaous from thsi. Hasn't thrown up since going offof it.   She was offo of it for 2 weeks and shes' gradually going back on.   She was not nasueeated int he past.   She also felt more pain off the savell in the last 2 weeks.   She has 5/10 pain right now.   She feelst he nabuemtone 750mg bid is helping her much more than the meloxicam. It's helped her arms.   Not much gastritis with it.     8/15/2018  She was getting sick with lyrica and savella b/c she was vomitting 2-3 times a week. She had seen Dr. Vallecillo and Dr. Kenney - and they determined it was the medications.   She ahs been off for 1 month. She is having 3/10. In her right knee , right wrist , arm and hand.   She feels better off the meds.   Years ago she was on lyrica - she is bloated and crabby while on lyrica. So she wanted ot stop both even though savella was more linked to the nausea.   She had trouble with sleeping -   Her knee and wrist was bothering her now.   She's been on savella nd lyrica combination was for 3 years.     1/16/2019  She has had a few bouts of really bad pain. Her neck and right shoulders. Sh ehad to goto immediate care   . Prednisone burst given and it helped. She was also given a painkiller.   She was good for 1 week   Then her left side shoulder started hurting and she was in bed last weekend.   The nabumetone is not hleping much.   She is dealing with depression and she is now on venalfaxine by dr. Rojas.   She is getting a  lot of UTI's.   She doesn't have pain much at times and then it can come on suddenly.   She had physical therapy for her neck years ago.     4/2/2019  Her hips and knee are bad. For the most part , she feels ok. But she gets bouts of pain. Her arms are much better. She is still going to physical therapy for pressure points and that's helping.she's getting it for neck and arms - and that has gotten better.   Physical lifting hurts her. Over doing cleaning can hurt.   She has to lay on the heating pad.   Her knee doesn't hurt while walking but bending her knee hurts. Going up and down the stairs.   At night she's on amitripytline 10mg at night.   She's had 8/10 pain.   The hip pain just started last tnight.    2/24/2020    She is doing well.   She has pain in her  B/l 2nd fingers . She has 4/10 pain.   She has been tired again lately.   Her hips are not worse but still havin gpain.   She keeps getting bladder infections.   She is having repeat infetions but no cysitis.   She's had an infection once a month.     1/19/2021  VIDEO VISIT  She has extreme fatigue.   She had covid exposure last week and is quaratining. That's why she is doing a video visit today.   She had this fatigue on 1/13/2021  She had pink eye thought to be from contacts in 11/2020 -saw opthlmologist weekly and was put on steroids. She was tested for inflammation at that time and told that this was fine.   She was sent back to cardiologist b/c she has tachycardia - 120s. She was d with PVCs. She is on metoprolol.   hse had fatigue priror to starting betablocker.   She's getting a sleep study.   She missed cylocbenzaprien 5mg at night - and she noticed that she doenst' sleep well without it.   She's not super achy.   Her hips are touchy but not like before.   She has stress - mother had open heart sx , motherin law had panic attacks and she had lost her job and switched to a new job. Leaving her other job was nice.   She takes nabumetone as needed -  it's not every day - it's was a few weeks ago she used it.     7/16/2021  She feels the last 2 weeks the pain has been unbearable in her elbows /arms and her hips .   Her arms are the most pain. She bought pain patches and not helping.   She has about 8/10 pain   She takes edibles during the night.   She had a bad earache this year and she even took a norco from taht time and it didn't help and she couldn't even sleep with it.   She started taking the nabumetone twice a day it's not doing anything.   giovanna was taking it as needed.   She was pretty good until the last 2 weeks.   It's affecting her daily life and work.   It's sensitive to the touch     She is helping take care of ehr mother in law at home and she has a littl stress with work. She thinks she is handling it. But she feels the pain is like when she was first diagnosed.   She just had the covid vaccine 2 weeks.     10/11/2021  She can barely move since Thursday and can't move well. She has a flare of her left hip and lower back.   She can't lift her left left much off the ground. No sciatic pain.   Usually a flare goes away after a few days.   She has 9/10 pain   She tried to take an extra flexeril a tnight and taking advil but she's not better.   If fshe lays flat on a sleeping   She also might have a UTI.     She never tried the lyrica 50mg bid.   She had a wedding last week and she is good.   She was taking nabumetone but not better on this.     8/4/2022  She has been so so . She has had back pain for the last 3 months. She went to chiropracter this week and is starting treatment.   She had covid. The pain was bad before and has gotten progressively worse.   She goes to bed 2-3 times and when she lays on her left side her whole left side is miserable.   She ran out of cycloenzaprine - 3 weeks ago . She feels worse off of ithis.   No recent injury except soemthing fell on her left leg at work 3 weeks ago.   She is mostly having mid back pain and now her  upper back is so inflammed.     Stress ful home life.   She feels some days she is sleeping. She is exahusted after work.     Her heart was racing more sine her last viist - she had her metoprolol raised.     She has been diagnosed with diabetse since -     1/4/2023  She got the flu over break.   She's getting numbness down her left arm and it's more and more often. It was here and there. But it's more often.   And affects her left 4th and 5th fingers.   Her neck doenst' bother her during these episodes now.   She never did do physical therapy b/c her upper back and shoulder pain calmed down.   Was going to the [email or web address] and was off for several weeks -b/c she was sick . She felt it was helping her.   holiding up her arm she has more tingling.   Today there is more tingling. No neck or shoulder pain.     Her pain is about 3/10 pain.   Not taking tramadol or nabuemtone.     7/5/2023    She's getting a left arm  tingling and numbness. X 1month.   She seems like she can get this under control the pain hasn't been able to get   At night she has hd to ice and using heating pad   The last 2 days - her pain is increased - in her left hand - 2 days constant numbness and tingling -   She called on 6/18 - and tramadol given.   In the past gabpaneitn did not helpher.   She had a right knee steroid injection - with dr. Stokes in 4/2023 - she's doing ok -     4/17/2024  She has increased brain fog - bothering her so much - that she went to see neurology. Saw Dr Fuentes in 1/2024 - and she has joint pain in her arms in her elbows .   If she does something she has pain in her neck and lower back.   Her knees have been so and so .   She takes the nabumetone more often - it helps her when she takes it as needed.   Reviewed notes from neurology - mentioned she has Mild cognitive defect and dylexia. Mentioned to improve her fibromyalgia control to help the fibrofog.   - she thinks brain fog increased with work stress - now she  changed jobs - it's better -   She doesn't think it's the medications - so she feels it was the stress.     She takes medical marijuana every night.   She was smoking at night and that makes her very hungry versus the edibles.   She sleeps well with the edibles.       Wt Readings from Last 2 Encounters:   04/17/24 192 lb (87.1 kg)   03/26/24 192 lb 12.8 oz (87.5 kg)     Body mass index is 36.28 kg/m².      Current Outpatient Medications   Medication Sig Dispense Refill    albuterol 108 (90 Base) MCG/ACT Inhalation Aero Soln Inhale 2 puffs into the lungs every 6 (six) hours as needed for Wheezing or Shortness of Breath. 6.7 g 0    promethazine-codeine 6.25-10 MG/5ML Oral Syrup Take 2.5 mL by mouth every 6 (six) hours as needed for cough. 118 mL 0    pantoprazole 20 MG Oral Tab EC Take 1 tablet (20 mg total) by mouth every morning before breakfast. 90 tablet 1    Tirzepatide (MOUNJARO) 7.5 MG/0.5ML Subcutaneous Solution Pen-injector Inject 7.5 mg into the skin once a week. 6 mL 0    metoprolol tartrate 50 MG Oral Tab Take 1 tablet (50 mg total) by mouth 2 (two) times daily. 180 tablet 0    atorvastatin 20 MG Oral Tab Take 1 tablet (20 mg total) by mouth nightly. 90 tablet 0    metFORMIN  MG Oral Tablet 24 Hr Take 1 tablet (750 mg total) by mouth 2 (two) times daily with meals. 180 tablet 0    losartan-hydroCHLOROthiazide 100-25 MG Oral Tab Take 1 tablet by mouth daily. 90 tablet 0    venlafaxine  MG Oral Capsule SR 24 Hr Take 1 capsule (150 mg total) by mouth daily. 90 capsule 0    hydroxychloroquine 200 MG Oral Tab Take 1 tablet (200 mg total) by mouth nightly. 90 tablet 1    ondansetron 4 MG Oral Tablet Dispersible Take 1 tablet (4 mg total) by mouth every 8 (eight) hours as needed for Nausea. 30 tablet 0    cyclobenzaprine 10 MG Oral Tab Take 10-20mg at night as needed 180 tablet 0    semaglutide 4 MG/3ML Subcutaneous Solution Pen-injector Inject 1 mg into the skin once a week. 3 mL 0    nabumetone 750  MG Oral Tab Take 1 tablet (750 mg total) by mouth 2 (two) times daily. 60 tablet 5    meclizine 25 MG Oral Tab Take 1 tablet (25 mg total) by mouth 3 (three) times daily as needed for Dizziness or Nausea. 20 tablet 0    lidocaine 5 % External Patch Place 2 patches onto the skin daily. 60 patch 0    Betamethasone Dipropionate Aug 0.05 % External Cream Apply 1 Application topically 2 (two) times daily. Apply to affected areas as needed bid 50 g 3    Lactobacillus (PROBIOTIC ACIDOPHILUS OR) Take by mouth.      traMADol 50 MG Oral Tab Take 1 tablet (50 mg total) by mouth every 8 (eight) hours as needed for Pain. (Patient not taking: Reported on 3/14/2024) 30 tablet 5    FREESTYLE LANCETS Does not apply Misc TEST DAILY AS DIRECTED 100 each 3      Past Medical History:    Abnormal vaginal bleeding    polypectomy    Diabetes (HCC)    Disorder of liver    Fibromyalgia    Gastropathy    Hemorrhoids    High blood pressure    High cholesterol    MCTD (mixed connective tissue disease) (HCC)    Mood disorder (HCC)    Other ill-defined conditions(799.89)    Per [employer]:  \"son and girlfriend with 4 kids moved in.\"    Spinal stenosis    Comments:  spondylosis.  Management:  surgery in 12/2009.    Visual impairment      Past Surgical History:   Procedure Laterality Date    Back surgery      Colonoscopy  2014    Colonoscopy N/A 11/6/2023    Procedure: COLONOSCOPY;  Surgeon: Clari Smith MD;  Location: [facility] ENDOSCOPY    Hysteroscopy,diagnostic  08/2006    polypectomy    Other surgical history  12/2009    Surgery (due to spinal stenosis/spondylosis).    Other surgical history Right     ankle surgery      Family History   Problem Relation Age of Onset    Heart Disease Father         CAD, (cause of death) age 49    Diabetes Father     Stroke Maternal Grandmother         Cerebrovascular accident (Stroke)    Heart Disease Paternal Grandmother         CAD    Breast Cancer Maternal Aunt         70s     Heart Disease Maternal Aunt         CAD     Heart Disease Maternal Aunt     Other (osteoarthritis) Mother     Other (psoriasis) Sister    mom - has arthriits   Sister doesn't have psa. 3rd cousin has psa.    Social History:  Social History     Socioeconomic History    Marital status:    Tobacco Use    Smoking status: Former     Current packs/day: 0.00     Types: Cigarettes     Quit date: 1989     Years since quittin.2    Smokeless tobacco: Never   Vaping Use    Vaping status: Never Used   Substance and Sexual Activity    Alcohol use: Yes     Alcohol/week: 1.0 standard drink of alcohol     Types: 1 Glasses of wine per week     Comment: Occasionally wine    Drug use: Yes     Types: Cannabis     Comment: occ    Sexual activity: Yes     Partners: Male   Other Topics Concern    Caffeine Concern Yes     Comment: (Coffee, Soda) 2 cups daily    Pt has a pacemaker No    Pt has a defibrillator No    Reaction to local anesthetic No    35 years ago she quit smoking.   Works in [employer].   , 3 boys, youngest will live at home -      REVIEW OF SYSTEMS:   Review Of Systems:  Fatigue  Constitutional:No fever, no change in weight or appetitie  Derm: No rashes, no oral ulcers, no alopecia, no photosensitivity, no psoriasis  HEENT: No dry eyes, has dry mouth, no Raynaud's, hands always cold, no nasal ulcers, no parotid swelling, no neck pain, no jaw pain, no temple pain  Eyes: No visual changes, eye exam - 10 or 2016 - was done - with dr. Magallon, has contacts as well, episode of eye swelling left eye - in .   CVS: No chest pain, no heart disease, had echo long time ago - thought she had skipped beat - not recently - on [facility] -   RS: No SOB, no Cough, No Pleurtic pain,   GI: ocla vomiiting, no abominal pain, no hx of ulcer, no gastritis, no heartburn, no dyshpagia, no BRBPR or melena, irritable bowel - lately she has n/v   Colonoscopy - 2 years ago - in [place] - dr. Vallecillo 2014 - normal -   : no dysuria,  hx of UTIs - 3 childner, had several hosptilizations as a child for urinary tract issues, 1 this last year, no hx of miscarriages, no DVT Hx, no hx of OCP,   Neuro: No numbness or tingling, no headache, no hx of seizures,   Psych: no hx of anxiety or depression  ENDO: no hx of thyroid disease, no hx of DM  Joint/Muscluskeltal: see HPI,   All other ROS are negative.     EXAM:   /69 (BP Location: Left arm, Patient Position: Sitting, Cuff Size: large)   Pulse 85   Ht 5' 1\" (1.549 m)   Wt 192 lb (87.1 kg)   LMP 09/06/2013 (Approximate)   BMI 36.28 kg/m²   HEENT: , EOM intact, clear sclera, PERRLA, pleasant, no acute distress, no CAD, no neck tendnerness, good ROM,   No rashes  CVS: RRR, no murmurs  RS: CTAB, no crackles, no rhonchi  ABD: Soft Non tender,   Joint exam:    tender points - very tender to light touch in shhoulders, neck, elbows and arms   Also in  and hips, and knees a 14/18 tender points. -  Lherimitte's sign positive - - left sided   Neck tendenress thoughtout c spine - and left sided  paracervical tenderness.    b/l trochanteric bursitis -   Left medial area of left elbows - not tender - to compression    latearl part of elbow tender to touch -   Not  tender in lower back -   no left hip tenderness   SLR negative.     No synvoitis seen  Good cap refill   Good grasp.   No edema    Component      Latest Ref Rng & Units 11/18/2020 7/15/2020   WBC      4.0 - 11.0 x10(3) uL 8.2    RBC      3.80 - 5.30 x10(6)uL 3.96    Hemoglobin      12.0 - 16.0 g/dL 12.4    Hematocrit      35.0 - 48.0 % 37.0    MCV      80.0 - 100.0 fL 93.4    MCH      26.0 - 34.0 pg 31.3    MCHC      31.0 - 37.0 g/dL 33.5    RDW-SD      35.1 - 46.3 fL 40.5    RDW      11.0 - 15.0 % 11.9    Platelet Count      150.0 - 450.0 10(3)uL 319.0    Prelim Neutrophil Abs      1.50 - 7.70 x10 (3) uL 4.53    Neutrophils Absolute      1.50 - 7.70 x10(3) uL 4.53    Lymphocytes Absolute      1.00 - 4.00 x10(3) uL 2.73    Monocytes Absolute       0.10 - 1.00 x10(3) uL 0.71    Eosinophils Absolute      0.00 - 0.70 x10(3) uL 0.12    Basophils Absolute      0.00 - 0.20 x10(3) uL 0.06    Immature Granulocyte Absolute      0.00 - 1.00 x10(3) uL 0.05    Neutrophils %      % 55.2    Lymphocytes %      % 33.3    Monocytes %      % 8.7    Eosinophils %      % 1.5    Basophils %      % 0.7    Immature Granulocyte %      % 0.6    Glucose      70 - 99 mg/dL 93    Sodium      136 - 145 mmol/L 137    Potassium      3.5 - 5.1 mmol/L 4.1    Chloride      98 - 112 mmol/L 101    Carbon Dioxide, Total      21.0 - 32.0 mmol/L 32.0    ANION GAP      0 - 18 mmol/L 4    BUN      7 - 18 mg/dL 17    CREATININE      0.55 - 1.02 mg/dL 1.00    BUN/CREAT Ratio      10.0 - 20.0 17.0    CALCIUM      8.5 - 10.1 mg/dL 10.3 (H)    CALCULATED OSMOLALITY      275 - 295 mOsm/kg 285    eGFR NON-AFR. AMERICAN      >=60 63    eGFR       >=60 72    ALT (SGPT)      13 - 56 U/L 33    AST (SGOT)      15 - 37 U/L 18    ALKALINE PHOSPHATASE      46 - 118 U/L 59    Total Bilirubin      0.1 - 2.0 mg/dL 0.3    TOTAL PROTEIN      6.4 - 8.2 g/dL 8.1    Albumin      3.4 - 5.0 g/dL 4.4    Globulin      2.8 - 4.4 g/dL 3.7    A/G Ratio      1.0 - 2.0 1.2    Patient Fasting?       No    Color Urine      Yellow Yellow    CLARITY URINE      Clear Clear    Spec Gravity      1.002 - 1.035 1.010    Glucose Urine      Negative mg/dL Negative    Bilirubin      Negative Negative    Ketones, UA      Negative mg/dL Negative    Blood Urine      Negative Negative    PH Urine      5.0 - 8.0 6.0    Protein Urine      Negative mg/dL Negative    Urobilinogen Urine      <2.0 <2.0    Nitrite Urine      Negative Negative    Leukocyte Esterase Urine      Negative Small (A)    SQUAM EPI CELLS UR      /HPF Few    WBC Urine      0 - 5 /HPF 4    RBC URINE      0 - 2 /HPF 1    Bacteria Urine      None Seen /HPF Few (A)    RAINE Titer/Pattern      <80  160 (A)   Reviewed By:        Ismael Mcwilliams M.D.   Anti-Sjogren's A       Negative  Negative   Anti-Sjogren's B      Negative  Negative   Anti-Smith Antibody      Negative  Negative   Anti-Holcomb/RNP Antibody      Negative  Negative   SED RATE      0 - 30 mm/Hr  12   RHEUMATOID FACTOR      <15 IU/mL  <10   C-Citrullinated Peptide IgG AB      0.0 - 6.9 U/mL  1.1   C-REACTIVE PROTEIN      <0.30 mg/dL  <0.29   RAINE SCREEN WITH REFLEX (S)      Negative  Positive (A)   Anti Double Strand DNA      <10  <10     Component      Latest Ref Rng & Units 2/23/2021   Anti-Holcomb/RNP Antibody      Negative Negative       Component      Latest Ref Rng 4/18/2023   WBC      4.0 - 11.0 x10(3) uL 7.3    RBC      3.80 - 5.30 x10(6)uL 4.11    Hemoglobin      12.0 - 16.0 g/dL 12.4    Hematocrit      35.0 - 48.0 % 37.9    MCV      80.0 - 100.0 fL 92.2    MCH      26.0 - 34.0 pg 30.2    MCHC      31.0 - 37.0 g/dL 32.7    RDW-SD      35.1 - 46.3 fL 39.1    RDW      11.0 - 15.0 % 11.6    Platelet Count      150.0 - 450.0 10(3)uL 322.0    Prelim Neutrophil Abs      1.50 - 7.70 x10 (3) uL 4.04    Neutrophils Absolute      1.50 - 7.70 x10(3) uL 4.04    Lymphocytes Absolute      1.00 - 4.00 x10(3) uL 2.51    Monocytes Absolute      0.10 - 1.00 x10(3) uL 0.47    Eosinophils Absolute      0.00 - 0.70 x10(3) uL 0.16    Basophils Absolute      0.00 - 0.20 x10(3) uL 0.09    Immature Granulocyte Absolute      0.00 - 1.00 x10(3) uL 0.03    Neutrophils %      % 55.4    Lymphocytes %      % 34.4    Monocytes %      % 6.4    Eosinophils %      % 2.2    Basophils %      % 1.2    Immature Granulocyte %      % 0.4    Glucose      70 - 99 mg/dL 124 (H)    Sodium      136 - 145 mmol/L 135 (L)    Potassium      3.5 - 5.1 mmol/L 4.2    Chloride      98 - 112 mmol/L 103    Carbon Dioxide, Total      21.0 - 32.0 mmol/L 28.0    ANION GAP      0 - 18 mmol/L 4    BUN      7 - 18 mg/dL 13    CREATININE      0.55 - 1.02 mg/dL 0.84    BUN/CREATININE RATIO      10.0 - 20.0  15.5    CALCIUM      8.5 - 10.1 mg/dL 9.8    CALCULATED OSMOLALITY       275 - 295 mOsm/kg 282    eGFR-Cr      >=60 mL/min/1.73m2 80    ALT (SGPT)      13 - 56 U/L 34    AST (SGOT)      15 - 37 U/L 14 (L)    ALKALINE PHOSPHATASE      46 - 118 U/L 68    Total Bilirubin      0.1 - 2.0 mg/dL 0.5    PROTEIN, TOTAL      6.4 - 8.2 g/dL 7.7    Albumin      3.4 - 5.0 g/dL 4.0    Globulin      2.8 - 4.4 g/dL 3.7    A/G Ratio      1.0 - 2.0  1.1    Patient Fasting for CMP? Yes    Cholesterol, Total      <200 mg/dL 148    HDL Cholesterol      40 - 59 mg/dL 55    Triglycerides      30 - 149 mg/dL 124    LDL Cholesterol Calc      <100 mg/dL 71    VLDL      0 - 30 mg/dL 19    NON-HDL CHOLESTEROL      <130 mg/dL 93    Patient Fasting for Lipid? Yes    MALB URINE      mg/dL 0.52    CREATININE UR RANDOM      mg/dL 42.10    MALB/CRE CALC      <=30.0 ug/mg 12.4    HEMOGLOBIN A1c      <5.7 % 6.9 (H)    ESTIMATED AVERAGE GLUCOSE      68 - 126 mg/dL 151 (H)    TSH      0.358 - 3.740 mIU/mL 1.040       Component      Latest Ref Rn 10/24/2023   WBC      4.0 - 11.0 x10(3) uL 7.3    RBC      3.80 - 5.30 x10(6)uL 3.98    Hemoglobin      12.0 - 16.0 g/dL 12.4    Hematocrit      35.0 - 48.0 % 36.4    MCV      80.0 - 100.0 fL 91.5    MCH      26.0 - 34.0 pg 31.2    MCHC      31.0 - 37.0 g/dL 34.1    RDW-SD      35.1 - 46.3 fL 41.1    RDW      11.0 - 15.0 % 12.2    Platelet Count      150.0 - 450.0 10(3)uL 334.0    Prelim Neutrophil Abs      1.50 - 7.70 x10 (3) uL 5.33    Neutrophils Absolute      1.50 - 7.70 x10(3) uL 5.33    Lymphocytes Absolute      1.00 - 4.00 x10(3) uL 1.34    Monocytes Absolute      0.10 - 1.00 x10(3) uL 0.50    Eosinophils Absolute      0.00 - 0.70 x10(3) uL 0.04    Basophils Absolute      0.00 - 0.20 x10(3) uL 0.06    Immature Granulocyte Absolute      0.00 - 1.00 x10(3) uL 0.02    Neutrophils %      % 73.1    Lymphocytes %      % 18.4    Monocytes %      % 6.9    Eosinophils %      % 0.5    Basophils %      % 0.8    Immature Granulocyte %      % 0.3    Glucose      70 - 99 mg/dL 132 (H)     Sodium      136 - 145 mmol/L 138    Potassium      3.5 - 5.1 mmol/L 4.0    Chloride      98 - 112 mmol/L 103    Carbon Dioxide, Total      21.0 - 32.0 mmol/L 29.0    ANION GAP      0 - 18 mmol/L 6    BUN      7 - 18 mg/dL 13    CREATININE      0.55 - 1.02 mg/dL 0.85    BUN/CREATININE RATIO      10.0 - 20.0  15.3    CALCIUM      8.5 - 10.1 mg/dL 10.0    CALCULATED OSMOLALITY      275 - 295 mOsm/kg 288    EGFR      >=60 mL/min/1.73m2 78    ALT (SGPT)      13 - 56 U/L 61 (H)    AST (SGOT)      15 - 37 U/L 27    ALKALINE PHOSPHATASE      46 - 118 U/L 131 (H)    Total Bilirubin      0.1 - 2.0 mg/dL 0.7    PROTEIN, TOTAL      6.4 - 8.2 g/dL 8.1    Albumin      3.4 - 5.0 g/dL 4.1    Globulin      2.8 - 4.4 g/dL 4.0    A/G Ratio      1.0 - 2.0  1.0    Lipase      13 - 75 U/L 35       Legend:  (H) High  Component      Latest Ref Rng 2/22/2024   SED RATE      0 - 30 mm/Hr 10    C-REACTIVE PROTEIN      <1.00 mg/dL <0.40          1/10/2014 - ct abd/pelvis  1. Stable findings from January 8, 2012.  2. No acute disease.  3. Normal appendix.  4. 1.57 cm splenule.  5. Small umbilical hernia.  6. IUD in place.  7. Postop changes lumbar spine.  8. Small amount of air in the urinary bladder probably iatrogenically     introduced    2/4/2016 - lumbar xray   1. Postop changes L5-S1 without obvious complication.  2. Degenerative arthritis.  3. Limited range of motion but no abnormal motion.  4. IUD in the pelvis    3/17/2016 - c spine xray   1. Straightening of cervical curve.  2. Moderate localized degenerative arthritis which has progressed     significantly since September 20, 2006.\    4/10/2017 - us/ liver  1. Normal size liver. Mild diffuse heterogeneous increased echogenicity with sonographic appearance consistent with fatty infiltration and/or hepatocellular disease.  2. Normal color flow. No focal hepatic masses or intraductal dilatation.  3. Small gallbladder wall polyp; gallbladder otherwise unremarkable.    5/4/2018 -  2decho  Study Conclusions  1. Left ventricle: The cavity size was normal. Wall thickness was     increased in a pattern of mild LVH. Systolic function was     normal. The estimated ejection fraction was 55-60%. Wall motion     was normal; there were no regional wall motion abnormalities.     Left ventricular diastolic function parameters were normal.    5/14/2018 - pfts -   The PFTs are Normal. DLCO 92%.     4/2019 - 2decho   - normal     10/21/2022 - lumbar xray   1. L5-S1:  Laminectomy with posterior and interbody fusion.  No evidence of complication.  Minimal stable anterolisthesis.   2. Mild multilevel lumbar degenerative disc disease.   3. Left lower pole renal calculi.   4. Atherosclerotic vascular calcification.       7/5/2023 - c spine xray   1. Multilevel degenerative changes of the cervical spine, particularly at C5-C6 and C6-C7.      2. Suspected neural foraminal compromise, suboptimally evaluated on this examination. Consider follow-up MRI of the cervical spine for further assessment.      3. No evidence of provoked subluxation on dynamic views.      4. No radiographically visible acute osseous injury of the cervical spine.     10/18/2023 - mri c spine  1. Straightening of the normal cervical lordosis with suggestion of a minimal grade 1 retrolisthesis of C5 on C6. Otherwise normal cervical and with intact cervical vertebral heights.   2. Mild to moderate diffuse cervical spondylosis and degenerative facet disease throughout the cervical spine resulting in mild spinal stenosis at C3-C4, C5-C6, and C6-C7 with neural foraminal narrowing at these levels as described above.     ASSESSMENT AND PLAN:   Dana Marsh is a 58 year old female who presents for   Chief Complaint   Patient presents with    Mixed Connective Tissue Disease    Joint Pain       1.  MCTD  - Repeat RAINE is positive 1:160    fatigue -increasing , stable    Left ulnar neuropathy - with neck pain -   - will increase   hydroxychlrqouien  200mg to twice  day -   since esr slightly elevated and increasing joint pain  Eye exam done in 2023   - 5/2018 - baseline echo and pfts   - got work up from dr. tatum and the were not showing the RNP or RAINE - general labs normal -   Return to clinic in 4-6 months.  Check labs in 6 months       in the past, She had a flare up really bad all over and in hands and got pred burst and felt better  -  but she doesn't like to take it b/c it makes her angry -     2. fibromylagia -  with now with left sided cervical radiculoapthy -   - stable   -  on cyclobenzaprien 10mg atn ight - this is helping   And she takes an edible at night - and that helps. -     For incresaing left ulnar neuropathy - asked her to use foam pad for desk and use a towel wrap at night   - also asked her to do a maintenacne check with her chiropracter- to see if this can help if it's related to her neck - currently ahs no neck pain   - cervical radiculoapthy pain improved but has chronic neck pain  - s/p 2  medrol andressa , and it helped.   - s/p  physical therapy for neck and it helped   - cont.  [place] 750mg twic e adya - prn -   - cont. tramadol 50mg daiy prn   In the past:   N/v with savella   - lyrica - blaoting and increased emotionally - crabby -   She didn't feel the cycmbalta and gabapentin helped her in the past - no side effects -   rtc in 6 months.     In the past :   Was doing better on  savella 50mg bid - - and lyrica 150mg bid x 3 years - but now severe n/v - so off since 7/2018 -   Off . amitriptyline from 10mg to 20mg a at night. To avoid palpiattations   - was on effexor and gabpentin without complete relief  Cont. cbd oil - - she takes it prn    - hx of using cymbalta without it helping -   - melxoicam as needed.   - declines low dose naltrexone.   - tried amitiprtyline in the past - cont. 10mg at night.      3. DIEHL - Elevated GGT - is going to see Dr. Vallecillo end of the month.   Intermittent vomitting - heart burn can make worse -  omeprazole does help a little bit. Likely lfrom savella.   Take omepraozoel   4. Right knee pain - seems like bakers cysts - meloxicma prn info given  5/2017 -  u/s of right knee - poplitleat area was normal  - if pain increases she can get this.   dicofenac gel 1%. - didn't help her.   Left knee pain - intermittent - better with PT   5. DM - was on ozempic but bad reflux with this,  , now on manjuora -   6. Eye exam - dr. westbrook - yearly  - 11/2023  7. Depression - on venlafaxine -   8. palpaitions - f/u cards, cont. Metoprolol   9. recurrent UTI's  Dysuria - check ua and urine culture -   Hx of bladder infection - had 4 - 5 leomigdalia   Went to urologist and did tests for this        -   Summary:     1. increase hydroxychrouqine 200mg - twice a day    2. Cont. Cyclobenzaprine 10 at night as needed  3.  Try nabumetone 750mg twice a day as needed.   4. Treatment with sleep apnea  5. Eye exam yearly - 2024 -   6 . Tramadol 50mg as needed.   7. Check labs in 6 months.   8. Return to clinic in 6 months.      - cont. cbd oil is ok - and edibles -   - she declines gabapentin and lyrica     Hay Lynn MD  4/17/2024   9:01 AM

## 2024-05-23 ENCOUNTER — OFFICE VISIT (OUTPATIENT)
Dept: SURGERY | Facility: CLINIC | Age: 59
End: 2024-05-23

## 2024-05-23 VITALS
SYSTOLIC BLOOD PRESSURE: 132 MMHG | BODY MASS INDEX: 36 KG/M2 | HEART RATE: 87 BPM | DIASTOLIC BLOOD PRESSURE: 80 MMHG | WEIGHT: 193 LBS

## 2024-05-23 DIAGNOSIS — R20.2 NUMBNESS AND TINGLING IN LEFT ARM: Primary | ICD-10-CM

## 2024-05-23 DIAGNOSIS — M54.12 LEFT CERVICAL RADICULOPATHY: ICD-10-CM

## 2024-05-23 DIAGNOSIS — R20.0 NUMBNESS AND TINGLING IN LEFT ARM: Primary | ICD-10-CM

## 2024-05-23 DIAGNOSIS — M50.20 CERVICAL DISC HERNIATION: ICD-10-CM

## 2024-05-23 DIAGNOSIS — M50.30 DDD (DEGENERATIVE DISC DISEASE), CERVICAL: ICD-10-CM

## 2024-05-23 DIAGNOSIS — M48.02 FORAMINAL STENOSIS OF CERVICAL REGION: ICD-10-CM

## 2024-05-23 DIAGNOSIS — G56.22 ULNAR NEUROPATHY AT ELBOW, LEFT: ICD-10-CM

## 2024-05-23 NOTE — PATIENT INSTRUCTIONS
Refill policies:    Allow 2-3 business days for refills; controlled substances may take longer.  Contact your pharmacy at least 5 days prior to running out of medication and have them send an electronic request or submit request through the “request refill” option in your [employer] account.  Refills are not addressed on weekends; covering physicians do not authorize routine medications on weekends.  No narcotics or controlled substances are refilled after noon on Fridays or by on call physicians.  By law, narcotics must be electronically prescribed.  A 30 day supply with no refills is the maximum allowed.  If your prescription is due for a refill, you may be due for a follow up appointment.  To best provide you care, patients receiving routine medications need to be seen at least once a year.  Patients receiving narcotic/controlled substance medications need to be seen at least once every 3 months.  In the event that your preferred pharmacy does not have the requested medication in stock (e.g. Backordered), it is your responsibility to find another pharmacy that has the requested medication available.  We will gladly send a new prescription to that pharmacy at your request.    Scheduling Tests:    If your physician has ordered radiology tests such as MRI or CT scans, please contact Central Scheduling at [phone] right away to schedule the test.  Once scheduled, the [facility] Centralized Referral Team will work with your insurance carrier to obtain pre-certification or prior authorization.  Depending on your insurance carrier, approval may take 3-10 days.  It is highly recommended patients assure they have received an authorization before having a test performed.  If test is done without insurance authorization, patient may be responsible for the entire amount billed.      Precertification and Prior Authorizations:  If your physician has recommended that you have a procedure or additional testing performed the [facility]  Centralized Referral Team will contact your insurance carrier to obtain pre-certification or prior authorization.    You are strongly encouraged to contact your insurance carrier to verify that your procedure/test has been approved and is a COVERED benefit.  Although the [facility] Centralized Referral Team does its due diligence, the insurance carrier gives the disclaimer that \"Although the procedure is authorized, this does not guarantee payment.\"    Ultimately the patient is responsible for payment.   Thank you for your understanding in this matter.  Paperwork Completion:  If you require FMLA or disability paperwork for your recovery, please make sure to either drop it off or have it faxed to our office at [phone]. Be sure the form has your name and date of birth on it.  The form will be faxed to our Forms Department and they will complete it for you.  There is a 25$ fee for all forms that need to be filled out.  Please be aware there is a 10-14 day turnaround time.  You will need to sign a release of information (ROSS) form if your paperwork does not come with one.  You may call the Forms Department with any questions at [phone].  Their fax number is [phone].

## 2024-05-23 NOTE — H&P
Patient: Dana Marsh  Medical Record Number: [id number]  YOB: 1965  PCP: Remy Rojas MD    Reason for visit: Left arm pain    Thank you very much for requesting this consultation. I had the opportunity to evaluate and initiate care for your patient today, as per your request.    HISTORY OF CHIEF COMPLAINT:    Dana Marsh is a very pleasant 58 year old female, with a pertinent PMH of MCTD, DM type II, obesity, HLP, HTN, NAFLD, GERD, TE, fatigue  History of lumbar fusion with Dr. Blackmon in 2010.  She has had chronic back issues for many years.  This is stable.  Her radicular symptoms resolved after her fusion.  She has no current lower extremity radiating pain, numbness, tingling or weakness.  Presents today for a complaint of: Left arm pain  Onset/location: Symptoms began 2 to 3 years prior with left elbow pain that progressed down to the forearm and the fifth and fourth digit as well as up towards the shoulder region.  Within the past year or 2 she is developed left-sided neck pain with continued pain down the arm.  She occasionally gets pain in her second digit as well now.  Duration/Timing: Symptoms have been occurring for 2 to 3 years.  Pain is intermittent  What brought her to neurosurgery was completing an MRI with neurology, she was recommended for follow-up.  She used to state that her left upper extremity symptoms were secondary to her fibromyalgia or MCTD.  Another reason for follow-up is the fact that her flares are becoming more frequent.  She endorses that when she states \"flares\" she is referring to severe or bad flareups.  The left upper extremity symptoms occur in between these flares as well.  She has had 3-4 severe flareups this past year.  Character: Radiating pain, numbness, tingling.  Tightness in the left neck region.  Rate: Patient rates the pain  7/10.   Completed physical therapy last fall without significant relief.  This prompted her MRI.  Medrol Dosepak's  provide relief during the flares.  She typically requires 2 packs prior to receiving relief she endorses.  Patient Denies bladder/bowel incontinence/retention.  No gait instability.  Endorses consistently doing HEP    Past Medical History:    Abnormal vaginal bleeding    polypectomy    Diabetes (HCC)    Disorder of liver    Fibromyalgia    Gastropathy    Hemorrhoids    High blood pressure    High cholesterol    MCTD (mixed connective tissue disease) (HCC)    Mood disorder (HCC)    Other ill-defined conditions(799.89)    Per [employer]:  \"son and girlfriend with 4 kids moved in.\"    Spinal stenosis    Comments:  spondylosis.  Management:  surgery in 2009.    Visual impairment      Past Surgical History:   Procedure Laterality Date    Back surgery      Colonoscopy      Colonoscopy N/A 2023    Procedure: COLONOSCOPY;  Surgeon: Clari Smith MD;  Location: [facility] ENDOSCOPY    Hysteroscopy,diagnostic  2006    polypectomy    Other surgical history  2009    Surgery (due to spinal stenosis/spondylosis).    Other surgical history Right     ankle surgery      Family History   Problem Relation Age of Onset    Heart Disease Father         CAD, (cause of death) age 49    Diabetes Father     Stroke Maternal Grandmother         Cerebrovascular accident (Stroke)    Heart Disease Paternal Grandmother         CAD    Breast Cancer Maternal Aunt         70s     Heart Disease Maternal Aunt         CAD    Heart Disease Maternal Aunt     Other (osteoarthritis) Mother     Other (psoriasis) Sister       Social History     Socioeconomic History    Marital status:    Tobacco Use    Smoking status: Former     Current packs/day: 0.00     Types: Cigarettes     Quit date: 1989     Years since quittin.3    Smokeless tobacco: Never   Vaping Use    Vaping status: Never Used   Substance and Sexual Activity    Alcohol use: Yes     Alcohol/week: 1.0 standard drink of alcohol     Types: 1 Glasses of wine per week      Comment: Occasionally wine    Drug use: Yes     Types: Cannabis     Comment: occ    Sexual activity: Yes     Partners: Male   Other Topics Concern    Caffeine Concern Yes     Comment: (Coffee, Soda) 2 cups daily    Pt has a pacemaker No    Pt has a defibrillator No    Reaction to local anesthetic No      Allergies   Allergen Reactions    No Known Allergies UNKNOWN      Current Medications:  Current Outpatient Medications   Medication Sig Dispense Refill    methylPREDNISolone 4 MG Oral Tablet Therapy Pack Take as directed on package. 1 each 0    methylPREDNISolone 4 MG Oral Tablet Therapy Pack Take as directed on package. 1 each 0    hydroxychloroquine 200 MG Oral Tab Take 1 tablet (200 mg total) by mouth 2 (two) times daily. 180 tablet 3    cyclobenzaprine 10 MG Oral Tab Take 1 tablet (10 mg total) by mouth nightly as needed for Muscle spasms. 90 tablet 3    nabumetone 750 MG Oral Tab Take 1 tablet (750 mg total) by mouth daily. 90 tablet 3    albuterol 108 (90 Base) MCG/ACT Inhalation Aero Soln Inhale 2 puffs into the lungs every 6 (six) hours as needed for Wheezing or Shortness of Breath. 6.7 g 0    promethazine-codeine 6.25-10 MG/5ML Oral Syrup Take 2.5 mL by mouth every 6 (six) hours as needed for cough. 118 mL 0    pantoprazole 20 MG Oral Tab EC Take 1 tablet (20 mg total) by mouth every morning before breakfast. 90 tablet 1    Tirzepatide (MOUNJARO) 7.5 MG/0.5ML Subcutaneous Solution Pen-injector Inject 7.5 mg into the skin once a week. 6 mL 0    metoprolol tartrate 50 MG Oral Tab Take 1 tablet (50 mg total) by mouth 2 (two) times daily. 180 tablet 0    atorvastatin 20 MG Oral Tab Take 1 tablet (20 mg total) by mouth nightly. 90 tablet 0    metFORMIN  MG Oral Tablet 24 Hr Take 1 tablet (750 mg total) by mouth 2 (two) times daily with meals. 180 tablet 0    losartan-hydroCHLOROthiazide 100-25 MG Oral Tab Take 1 tablet by mouth daily. 90 tablet 0    venlafaxine  MG Oral Capsule SR 24 Hr Take 1  capsule (150 mg total) by mouth daily. 90 capsule 0    traMADol 50 MG Oral Tab Take 1 tablet (50 mg total) by mouth every 8 (eight) hours as needed for Pain. (Patient not taking: Reported on 3/14/2024) 30 tablet 5    ondansetron 4 MG Oral Tablet Dispersible Take 1 tablet (4 mg total) by mouth every 8 (eight) hours as needed for Nausea. 30 tablet 0    semaglutide 4 MG/3ML Subcutaneous Solution Pen-injector Inject 1 mg into the skin once a week. 3 mL 0    FREESTYLE LANCETS Does not apply Misc TEST DAILY AS DIRECTED 100 each 3    meclizine 25 MG Oral Tab Take 1 tablet (25 mg total) by mouth 3 (three) times daily as needed for Dizziness or Nausea. 20 tablet 0    lidocaine 5 % External Patch Place 2 patches onto the skin daily. 60 patch 0    Betamethasone Dipropionate Aug 0.05 % External Cream Apply 1 Application topically 2 (two) times daily. Apply to affected areas as needed bid 50 g 3    Lactobacillus (PROBIOTIC ACIDOPHILUS OR) Take by mouth.          REVIEW OF SYSTEMS   Comprehensive review of systems done. Negative except what is outlined in the above HPI.     PHYSICAL EXAMIMATION    vitals were not taken for this visit.   GENERAL: Very pleasant patient is in no apparent distress. Sitting comfortably in the examination chair.   HEENT: Normocephalic, atraumatic.  RESPIRATORY RATE: Easy and Even  SKIN: Warm and dry  NEURO: Awake, alert and orientated. Speech fluent, comprehension intact, answering questions appropriately.     SPINE:  Gait/Coordination: Gait deferred  Palpation: Non tender to palpation of midline cervical spine.  Positive significant tenderness over the left upper trapezius region  Palpation Right   (POS or NEG) Left   (POS or NEG)   Paraspinal Muscles, Cervical Negative Negative     Upper Extremity Strength:    Deltoid  Biceps  Triceps  W.extension  F.extension Thumb adduction Thumb abduction  Intrinsics      Right 5 5 5 5  5 5 5 5     Left 5 5 5 5 5 5 5 5   Lower Extremity Strength:     Iliopsoas   Hamstrings   Quads    D-flexion P-flexion Great Toe   Right       5         5       5         5 5 5   Left       5         5       5         5 5 5     Tests:   Test Right   (POS or NEG) Left   (POS or NEG)   Guzman's Sign Neg Neg   Clonus Neg Neg     DATA:   None    IMAGING:     Result Narrative      Exam: MRI CERVICAL SP W/O CONTRAST  CPT Code(s): 38395 - MRI NECK SPINE W/O DYE    INDICATION:  Neck pain. No known injuries. Cervical radiculopathy.    COMPARISON:  7/5/2023 cervical spinal radiographs from [facility].    TECHNIQUE:  Routine cervical spine MR exam performed on a wide bore ultra high field 3.0 T Siemens Skyra MR scanner, without intravenous contrast.    FINDINGS:  There is straightening of the normal cervical lordosis with a minimal grade 1 retrolisthesis of C5 on C6. Cervical alignment is otherwise normal. Multilevel mild degenerative endplate signal changes are identified. No additional bone marrow  signal abnormalities are appreciated with intact cervical vertebral heights. There is no evidence of cerebellar ectopia.  The cervical and visualized upper thoracic spinal cord are normal in caliber and signal intensity.    C1-C2: Unremarkable anterior atlantoaxial articulation.  C2-C3: No significant disc bulge. Mild facet arthrosis. No significant spinal stenosis or neural foraminal narrowing. .  C3-C4: Disc desiccation with mild disc space narrowing. Small diffuse disc bulge with marginal osteophytes. Mild facet arthrosis. Mild flattening of the ventral aspect of the spinal cord with mild spinal stenosis (9 mm AP thecal sac). No significant  neural foraminal narrowing.  C4-C5: Disc desiccation. Minimal disc bulge. Mild facet arthrosis. No significant spinal stenosis or neural foraminal narrowing.  C5-C6: Minimal grade 1 retrolisthesis. Moderate disc space narrowing with disc desiccation. Small diffuse disc bulge with marginal osteophytes, left greater than right. Mild facet arthrosis.  Mild to moderate left and mild right neural foraminal  narrowing. Mild flattening of the ventral aspect of the spinal cord with mild spinal stenosis (9 mm AP thecal sac).  C6-C7: Moderate disc space narrowing with disc desiccation. Small diffuse disc bulge with marginal osteophytes. Mild facet arthrosis. Flattening of the ventral aspect of the spinal cord with mild spinal stenosis (9 mm AP thecal sac). Mild bilateral  neural foraminal narrowing.  C7-T1: Disc desiccation. Small disc bulge with marginal osteophytes. Mild facet arthrosis. Moderate left and mild right neural foraminal narrowing. No significant spinal stenosis (11 mm AP thecal sac).    IMPRESSION:  1. Straightening of the normal cervical lordosis with suggestion of a minimal grade 1 retrolisthesis of C5 on C6. Otherwise normal cervical and with intact cervical vertebral heights.  2. Mild to moderate diffuse cervical spondylosis and degenerative facet disease throughout the cervical spine resulting in mild spinal stenosis at C3-C4, C5-C6, and C6-C7 with neural foraminal narrowing at these levels as described above.    Interpreting Radiologist:    Danny Casillas M.D.  Electronically Signed: 10/18/2023 06:33 PM    MEDICAL DECISION MAKING:     ASSESSMENT and PLAN:    ICD-10-CM    1. Numbness and tingling in left arm  R20.0 Physiatry Referral - In Network    R20.2 MRI SPINE CERVICAL (CPT=72141) [[id number]]     EMG (At [facility])      2. Left cervical radiculopathy  M54.12 Physiatry Referral - In Network     MRI SPINE CERVICAL (CPT=72141) [[id number]]     EMG (At [facility])      3. Ulnar neuropathy at elbow, left  G56.22 Physiatry Referral - In Network     MRI SPINE CERVICAL (CPT=72141) [[id number]]     EMG (At [facility])      4. DDD (degenerative disc disease), cervical  M50.30 Physiatry Referral - In Network     MRI SPINE CERVICAL (CPT=72141) [[id number]]     EMG (At [facility])       5. Cervical disc herniation  M50.20 Physiatry Referral - In Network     MRI SPINE CERVICAL (CPT=72141) [[id number]]     EMG (At [facility])      6. Foraminal stenosis of cervical region  M48.02 Physiatry Referral - In Network     MRI SPINE CERVICAL (CPT=72141) [[id number]]     EMG (At [facility])        PLAN:   1. Medication: None prescribed  2. Imaging:    - Reviewed today:    -MRI cervical spine 10/2023:     -Cervical degenerative disc disease, multilevel.  Left C6/7 foraminal narrowing and left C7-T1 narrowing, possibly secondary to disc herniation.  Poor quality MRI imaging, recommend repeat imaging to further assess.  Imaging is also greater than 6 months old.  Her flares are increasing as well.  Given these factors, recommend further imaging to assess.   - Ordered today:    - MRI cervical spine:     - Further assess LUE symptoms that are becoming more frequent. Imaging is also greater than 6 months old. Given these factors, recommend further imaging to assess. Recent HEP without significant improvement of her symptoms   3. Activity:    - PT offered, patient states she will continue HEP per PT from last fall, which she has been consistently doing she endorses.   4. Referral:    - Physiatry:    - For further conservative treatment options  5. EMG:   - Further assess left UE complaints, suspect left ulnar neuropathy and/or cervical radiculopathy. She may have 2 etiologies contributing to her symptoms   6. Follow up once MRI and EMG are complete or call or follow up sooner or go to the ED for any new, worsening or concerning signs or symptoms     Dr. Jett and I reviewed imaging and discussed the plan. Dr. Jett agrees with the plan. Dr. Jett and SUE reviewed imaging and discussed the plan with the patient. The patient agrees with the plan, verbalized understanding and is appreciative. All questions were sought out and thoroughly answered to satisfaction.       Total  visit time: 60 min  More than 50% spent coordinating care, providing patient education, reviewing imaging, discussing further imaging, discussing physiatry, discussing PT/HEP, discussing EMG and counseling.    Thank you very much for the kind referral.  Respectfully yours,    Rachael Mccormick M.S., CLAUDIA  [address], [address] [address]  [phone]  5/23/2024 2:57 PM    Dragon speech recognition software was used to prepare this note. If a word or phrase is confusing, it is likely due to a failure of recognition. Please contact me with any questions or clarifications.

## 2024-05-23 NOTE — PROGRESS NOTES
Last Procedure: 2009 - L5-S1 Posterior Lumbar Fusion  Most recent imaging dated on: 10/18/23 - MRI Brain  Pain Level: 7/10. Patient states that they are having pain located on their neck and left shoulder.   Numbness / Tingling: Patient reports numbness and tingling on left soulder that radiates down to arm and ring/pinky finger.   Physical Therapy / Injections: Patient denies completing any recent Physical Therapy / Injections.

## 2024-06-05 ENCOUNTER — OFFICE VISIT (OUTPATIENT)
Dept: PHYSICAL MEDICINE AND REHAB | Facility: CLINIC | Age: 59
End: 2024-06-05
Payer: COMMERCIAL

## 2024-06-05 ENCOUNTER — PATIENT MESSAGE (OUTPATIENT)
Dept: INTERNAL MEDICINE CLINIC | Facility: CLINIC | Age: 59
End: 2024-06-05

## 2024-06-05 ENCOUNTER — TELEPHONE (OUTPATIENT)
Facility: CLINIC | Age: 59
End: 2024-06-05

## 2024-06-05 VITALS — WEIGHT: 190 LBS | BODY MASS INDEX: 36 KG/M2 | HEART RATE: 119 BPM | OXYGEN SATURATION: 99 %

## 2024-06-05 DIAGNOSIS — M35.1 MIXED CONNECTIVE TISSUE DISEASE (HCC): ICD-10-CM

## 2024-06-05 DIAGNOSIS — E11.9 TYPE 2 DIABETES MELLITUS WITHOUT COMPLICATION, UNSPECIFIED WHETHER LONG TERM INSULIN USE (HCC): Primary | ICD-10-CM

## 2024-06-05 DIAGNOSIS — E66.9 OBESITY (BMI 30-39.9): ICD-10-CM

## 2024-06-05 DIAGNOSIS — E11.9 TYPE 2 DIABETES MELLITUS WITHOUT COMPLICATION, UNSPECIFIED WHETHER LONG TERM INSULIN USE (HCC): ICD-10-CM

## 2024-06-05 DIAGNOSIS — K21.9 GASTROESOPHAGEAL REFLUX DISEASE, UNSPECIFIED WHETHER ESOPHAGITIS PRESENT: ICD-10-CM

## 2024-06-05 DIAGNOSIS — M79.602 LEFT ARM PAIN: Primary | ICD-10-CM

## 2024-06-05 DIAGNOSIS — G47.33 OSA (OBSTRUCTIVE SLEEP APNEA): ICD-10-CM

## 2024-06-05 DIAGNOSIS — R13.19 OTHER DYSPHAGIA: ICD-10-CM

## 2024-06-05 DIAGNOSIS — F41.9 ANXIETY: ICD-10-CM

## 2024-06-05 DIAGNOSIS — K22.2 SCHATZKI'S RING: Primary | ICD-10-CM

## 2024-06-05 DIAGNOSIS — F32.A DEPRESSION, UNSPECIFIED DEPRESSION TYPE: ICD-10-CM

## 2024-06-05 PROCEDURE — 99204 OFFICE O/P NEW MOD 45 MIN: CPT | Performed by: PHYSICAL MEDICINE & REHABILITATION

## 2024-06-05 RX ORDER — BLOOD-GLUCOSE METER
1 KIT MISCELLANEOUS 2 TIMES DAILY
Qty: 1 EACH | Refills: 3 | COMMUNITY
Start: 2024-06-05 | End: 2025-06-05

## 2024-06-05 NOTE — TELEPHONE ENCOUNTER
RN called pt, no answer so left message instructing pt to call GI office back to discuss symptoms to determine best way to proceed. GI office number provided.            From: Dana Marsh  To: Carla Jarrett  Sent: 6/5/2024 10:40 AM CDT  Subject: Swallowing    I am having trouble swallowing again. Not sure what to do. Should I make another appointment? Can it be virtual?

## 2024-06-05 NOTE — TELEPHONE ENCOUNTER
From: Dana Marsh  To: Remy Rojas  Sent: 6/5/2024 10:02 AM CDT  Subject: Blood teater    I lost my freestyle blood lesley, can I get a new one prescribed?

## 2024-06-05 NOTE — PROGRESS NOTES
[facility]  Progress Note    CHIEF COMPLAINT:    Chief Complaint   Patient presents with    New Patient     New R handed patient is here for L sided neck and L arm pain. Denies injury. She's had the pain for a couplle of years but pain worsened about 9 months ago. MRI 10/18/23. PT with no improvement. No injections. T/n in L shoulder blade that radiates down medial LUE and into L 4th and 5th fingers. Pain 4/10. She just completed her second medrol dose pack that helps while taking. Tramadol and nabumetone prn. Flexeril nightly.        History of Present Illness:  Dana Marsh is a 58 year old female who is being seen in consultation at the request of CLARICE Tyler.  The patient has a chief complaint of left neck and arm pain for years. She had an MRI in October 2023 and saw Rachael Mccormick recently, who recommended a new MRI and EMG.  Symptoms radiate into the fourth and fifth digits of the left side, are annoying and not functionally limiting.  Has been told the symptoms are due to fibromyalgia.  She had physical therapy remotely without relief.    PAST MEDICAL HISTORY:  Past Medical History:    Abnormal vaginal bleeding    polypectomy    Anxiety    Depression    Diabetes (HCC)    Disorder of liver    Essential hypertension    Fibromyalgia    Gastropathy    Hemorrhoids    High blood pressure    High cholesterol    MCTD (mixed connective tissue disease) (HCC)    Mood disorder (HCC)    Other ill-defined conditions(799.89)    Per [employer]:  \"son and girlfriend with 4 kids moved in.\"    Spinal stenosis    Comments:  spondylosis.  Management:  surgery in 12/2009.    Visual impairment       SURGICAL HISTORY:  Past Surgical History:   Procedure Laterality Date    Back surgery      Colonoscopy  2014    Colonoscopy N/A 11/6/2023    Procedure: COLONOSCOPY;  Surgeon: Clari Smith MD;  Location: [facility] ENDOSCOPY    Hysteroscopy,diagnostic  08/2006    polypectomy    Other  surgical history  2009    Surgery (due to spinal stenosis/spondylosis).    Other surgical history Right     ankle surgery       SOCIAL HISTORY:   Social History     Occupational History    Not on file   Tobacco Use    Smoking status: Former     Current packs/day: 0.00     Types: Cigarettes     Quit date: 1989     Years since quittin.4    Smokeless tobacco: Never   Vaping Use    Vaping status: Never Used   Substance and Sexual Activity    Alcohol use: Yes     Alcohol/week: 1.0 standard drink of alcohol     Types: 1 Glasses of wine per week     Comment: Occasionally    Drug use: Yes     Frequency: 5.0 times per week     Types: Cannabis     Comment: Marijuana for pain once in a while    Sexual activity: Yes     Partners: Male       CURRENT MEDICATIONS:   Current Outpatient Medications   Medication Sig Dispense Refill    Blood Glucose Monitoring Suppl (FREESTYLE LITE) Does not apply Device 1 Device by Other route 2 (two) times daily. Use as directed. 1 each 3    hydroxychloroquine 200 MG Oral Tab Take 1 tablet (200 mg total) by mouth 2 (two) times daily. 180 tablet 3    cyclobenzaprine 10 MG Oral Tab Take 1 tablet (10 mg total) by mouth nightly as needed for Muscle spasms. 90 tablet 3    nabumetone 750 MG Oral Tab Take 1 tablet (750 mg total) by mouth daily. 90 tablet 3    pantoprazole 20 MG Oral Tab EC Take 1 tablet (20 mg total) by mouth every morning before breakfast. 90 tablet 1    Tirzepatide (MOUNJARO) 7.5 MG/0.5ML Subcutaneous Solution Pen-injector Inject 7.5 mg into the skin once a week. 6 mL 0    metoprolol tartrate 50 MG Oral Tab Take 1 tablet (50 mg total) by mouth 2 (two) times daily. 180 tablet 0    atorvastatin 20 MG Oral Tab Take 1 tablet (20 mg total) by mouth nightly. 90 tablet 0    metFORMIN  MG Oral Tablet 24 Hr Take 1 tablet (750 mg total) by mouth 2 (two) times daily with meals. 180 tablet 0    losartan-hydroCHLOROthiazide 100-25 MG Oral Tab Take 1 tablet by mouth daily. 90 tablet  0    venlafaxine  MG Oral Capsule SR 24 Hr Take 1 capsule (150 mg total) by mouth daily. 90 capsule 0    traMADol 50 MG Oral Tab Take 1 tablet (50 mg total) by mouth every 8 (eight) hours as needed for Pain. 30 tablet 5    ondansetron 4 MG Oral Tablet Dispersible Take 1 tablet (4 mg total) by mouth every 8 (eight) hours as needed for Nausea. 30 tablet 0    FREESTYLE LANCETS Does not apply Misc TEST DAILY AS DIRECTED 100 each 3    meclizine 25 MG Oral Tab Take 1 tablet (25 mg total) by mouth 3 (three) times daily as needed for Dizziness or Nausea. 20 tablet 0    lidocaine 5 % External Patch Place 2 patches onto the skin daily. 60 patch 0    Betamethasone Dipropionate Aug 0.05 % External Cream Apply 1 Application topically 2 (two) times daily. Apply to affected areas as needed bid 50 g 3    methylPREDNISolone 4 MG Oral Tablet Therapy Pack Take as directed on package. (Patient not taking: Reported on 6/5/2024) 1 each 0    methylPREDNISolone 4 MG Oral Tablet Therapy Pack Take as directed on package. (Patient not taking: Reported on 6/5/2024) 1 each 0       ALLERGIES:   Allergies   Allergen Reactions    No Known Allergies UNKNOWN       REVIEW OF SYSTEMS:   Patient-reported ROS  Constitutional  Sleep Disturbance: admits  Chills: denies  Fever: denies  Weight Gain: denies  Weight Loss: denies   Cardiovascular  Chest Pain: denies  Irregular Heartbeat: denies   Respiratory  Painful Breathing: denies  Wheezing: denies   Gastrointestinal  Bowel Incontinence: denies  Heartburn: denies  Abdominal Pain: denies  Blood in Stool : denies  Rectal Pain: denies   Hematology  Easy Bruising: denies  Easy Bleeding: denies   Genitourinary  Difficulty Urinating: denies  Bladder Incontinence: denies  Pelvic Pain: denies  Painful Urination: denies   Musculoskeletal  Joint Stiffness: denies  Painful Joints: denies  Tailbone Pain: denies  Swollen Joints: denies   Peripheral Vascular  Swelling of Legs/Feet: denies  Cold Extremities: denies    Skin  Open Sores: denies  Nodules or Lumps: denies  Rash: denies   Neurological  Loss of Strength Since last Visit: denies  Tingling/Numbness: admits  Balance: denies   Psychiatric  Anxiety: denies  Depressed Mood: denies             PHYSICAL EXAM:   Pulse 119   Wt 190 lb (86.2 kg)   LMP 09/06/2013 (Approximate)   SpO2 99%   BMI 35.90 kg/m²     Body mass index is 35.9 kg/m².      General: No immediate distress  Head: Normocephalic/ Atraumatic  Extremities: No upper extremity edema bilaterally. Peripheral pulses intact.  Spine:  full and painfree cervical ROM in all directions, tender to palpation over the posterior scalene which recreates all arm symptoms.  The anterior scalene recreates some arm symptoms but not all the way down to the fourth and fifth digits.  Shoulders: full and painfree ROM   Neuro:   Cognition: alert & oriented x 3, attentive, able to follow 2 step commands, comprehention intact, spontaneous speech intact  Strength: Upper extremities have 5/5 strength  Sensation: Normal upper extremities  Reflexes: Normal upper extremities  Spurling's sign: neg    Data    Radiology Imaging:  I personally reviewed a cervical MRI from October 2023.  It reveals decreased lordosis straightening, not quite kyphosis.  No central canal stenosis severe enough to cause cord compression or cord injury.  No cord signal changes.  Foramina are diffusely narrowed on a minimal basis, no specific foraminal narrowing on the left.    ASSESSMENT AND PLAN:  1. Left arm pain  Clinically does not appear to be due to an identifiable neurological source although the differential diagnosis does include see a radiculopathy versus ulnar neuropathy.  An EMG has been recommended but has not been scheduled.  Encouraged her to schedule that.  Additionally repeat MRI has been scheduled, this will be obtained sometime soon.  Will compared to her old MRI.    2. Type 2 diabetes mellitus without complication, unspecified whether long term  insulin use (HCC)  Avoiding steroids, limits treatment options.    3. Mixed connective tissue disease (HCC)  May cause diffuse soft tissue discomfort    5. Gastroesophageal reflux disease, unspecified whether esophagitis present  Avoiding NSAIDs, limits treatment options    6. TE (obstructive sleep apnea)  Symptoms reproduced with palpation over the scalenes, may well have functional thoracic outlet syndrome.    7. Depression, unspecified depression type  Negative comorbidity for painful conditions    8. Anxiety  Negative comorbidity for painful conditions        RTC: For EMG      The patient was in agreement with the assessment and plan.  All questions were answered.         Kaleb Perez MD  Physical Medicine and Rehabilitation/Sports Medicine  [facility]

## 2024-06-06 NOTE — TELEPHONE ENCOUNTER
h/o schatzki's ring.     She reports improvement in sx following dilation 11/2203.    Sx returned 1 week ago.    Dysphagia with solids.  Has been using pantoprazole prn bc of insurance   No significant gerd, n/v, unintentional weight loss.     Recommend:  Pantoprazole daily  Avoid hard solids  Extra care with chewing, swallowing  Er if condition decline    Scheduling:  Egd w/ mac w/ Dr. Smith w/ dil  Dx: schatzki's ring, dysphagia    Hold metformin am of procedure  Hold mounjaro 7 days prior to procedure    She verbalizes understanding and is in agreement with the plan    ENDOSCOPIC RISK BENEFIT DISCUSSION: I described the procedure in great detail with the patient. I discussed the risks and benefits, including but not limited to: bleeding, perforation, infection, anesthesia complications, and even death. Patient will be NPO after midnight and will have a person physically present at time of pick-up to drive patient home. Patient verbalized understanding and agrees to proceed with procedure as planned.

## 2024-06-06 NOTE — TELEPHONE ENCOUNTER
Carla Gates I spoke with patient to discuss her mychart message    She stated in the last week or so, she has been experiencing difficulty swallowing. She states the food feels stuck in her chest area and she feels like it takes time for the food to digest to her stomach.    She denies heartburn. She is not currently taking the pantoprazole.    LOV 8/22/2023    Pt asking if she can be seen for a virtual visit. You have an opening on Monday if that is ok.    Please advise  Thank you!

## 2024-06-07 NOTE — TELEPHONE ENCOUNTER
Schedulers: Please see APN message about scheduling EGD as soon as possible. She is on mounjaro and has to hold for 7 days prior. Thanks!

## 2024-06-10 NOTE — TELEPHONE ENCOUNTER
Scheduled for:    EGD with DIL [id number]   Provider Name:  Dr. Smith   Date:  6/17/2024   Location:    [facility]  Sedation:  Mac  Time:  11:30 (patient is aware arrival time is 10:30)  Prep:  NPO after midnight   Meds/Allergies Reconciled?:  Physician reviewed   Diagnosis with codes:  schatzki's ring K22.2 , dysphagia R13.19  Was patient informed to call insurance with codes (Y/N):  Yes, I confirmed [employer]  insurance with the patient.   Referral sent?:  Referral was sent at the time of electronic surgical scheduling.  EM or [facility] notified?:  I sent an electronic request to Endo Scheduling and received a confirmation today.   Medication Orders:  This patient verbally confirmed that she is not taking:   Iron, blood thinners, BP meds, and is not diabetic   Not latex allergy, Not PCN allergy and does not have a pacemaker  Misc Orders:  I discussed the prep instructions with the patient which she verbally understood and is aware that I will mychart  the instructions today.  Further instructions given by staff:     Hold metformin am of procedure  Hold mounjaro 7 days prior to procedure

## 2024-06-10 NOTE — TELEPHONE ENCOUNTER
PPD-   Patient is scheduled for EGD with DIL on 6/17/24 with Dr. Smith at [facility]. Patient needs to be at EM due to health hx.   Thank you!

## 2024-06-11 ENCOUNTER — TELEPHONE (OUTPATIENT)
Dept: PHYSICAL MEDICINE AND REHAB | Facility: CLINIC | Age: 59
End: 2024-06-11

## 2024-06-11 NOTE — TELEPHONE ENCOUNTER
Pt thought Dr. Perez placed an order for physical therapy but pt does not see order on MyChart. She would like to confirm if doc would like her to do physical therapy. Please advise, thank you.

## 2024-06-11 NOTE — TELEPHONE ENCOUNTER
Case is pending approval from both primary and secondary insurance           Verified coverage, authorization needed from primary insurance [employer] via [employer], case started, clinicals submitted.   Pending reference number: [id number]     Called [employer] at [phone] spoke with [employer] authorization needed, case has been started, clinicals faxed to [phone]   Pending case # [id number]

## 2024-06-11 NOTE — TELEPHONE ENCOUNTER
S/w patient and reviewed her LOV plan as follows, \"An EMG has been recommended but has not been scheduled. Encouraged her to schedule that. Additionally repeat MRI has been scheduled, this will be obtained sometime soon. Will compared to her old MRI.\"    Patient has been scheduled for earlier EMG with Dr. Cardona. Patient has MRI scheduled at an outside facility. Patient advised to bring images and report to follow up. Patient verbalized understanding and appreciative.

## 2024-06-12 ENCOUNTER — TELEPHONE (OUTPATIENT)
Dept: INTERNAL MEDICINE CLINIC | Facility: CLINIC | Age: 59
End: 2024-06-12

## 2024-06-12 NOTE — TELEPHONE ENCOUNTER
[facility] called the office.    Blood Glucose Monitoring Suppl (FREESTYLE LITE) Does not apply Device sent to the pharmacy.    They are needing more specifics on what was sent.    What all is needed?  Device, lancets & strips?    Please send new script.

## 2024-06-14 RX ORDER — LANCETS 28 GAUGE
EACH MISCELLANEOUS
Qty: 100 EACH | Refills: 3 | Status: SHIPPED | OUTPATIENT
Start: 2024-06-14

## 2024-06-14 RX ORDER — BLOOD-GLUCOSE METER
KIT MISCELLANEOUS
Qty: 100 STRIP | Refills: 0 | Status: SHIPPED | OUTPATIENT
Start: 2024-06-14 | End: 2025-06-14

## 2024-06-17 ENCOUNTER — HOSPITAL ENCOUNTER (OUTPATIENT)
Facility: HOSPITAL | Age: 59
Setting detail: HOSPITAL OUTPATIENT SURGERY
Discharge: HOME | End: 2024-06-17
Attending: INTERNAL MEDICINE | Admitting: INTERNAL MEDICINE

## 2024-06-17 ENCOUNTER — ANESTHESIA EVENT (OUTPATIENT)
Dept: ENDOSCOPY | Facility: HOSPITAL | Age: 59
End: 2024-06-17
Payer: COMMERCIAL

## 2024-06-17 ENCOUNTER — ANESTHESIA (OUTPATIENT)
Dept: ENDOSCOPY | Facility: HOSPITAL | Age: 59
End: 2024-06-17
Payer: COMMERCIAL

## 2024-06-17 DIAGNOSIS — K22.2 SCHATZKI'S RING: ICD-10-CM

## 2024-06-17 DIAGNOSIS — R13.19 OTHER DYSPHAGIA: ICD-10-CM

## 2024-06-17 LAB — GLUCOSE BLDC GLUCOMTR-MCNC: 112 MG/DL (ref 70–99)

## 2024-06-17 PROCEDURE — 43239 EGD BIOPSY SINGLE/MULTIPLE: CPT | Performed by: INTERNAL MEDICINE

## 2024-06-17 PROCEDURE — 0DB38ZX EXCISION OF LOWER ESOPHAGUS, VIA NATURAL OR ARTIFICIAL OPENING ENDOSCOPIC, DIAGNOSTIC: ICD-10-PCS | Performed by: INTERNAL MEDICINE

## 2024-06-17 PROCEDURE — 43249 ESOPH EGD DILATION <30 MM: CPT | Performed by: INTERNAL MEDICINE

## 2024-06-17 PROCEDURE — 0D748ZZ DILATION OF ESOPHAGOGASTRIC JUNCTION, VIA NATURAL OR ARTIFICIAL OPENING ENDOSCOPIC: ICD-10-PCS | Performed by: INTERNAL MEDICINE

## 2024-06-17 RX ORDER — SODIUM CHLORIDE, SODIUM LACTATE, POTASSIUM CHLORIDE, CALCIUM CHLORIDE 600; 310; 30; 20 MG/100ML; MG/100ML; MG/100ML; MG/100ML
INJECTION, SOLUTION INTRAVENOUS CONTINUOUS
Status: DISCONTINUED | OUTPATIENT
Start: 2024-06-17 | End: 2024-06-17

## 2024-06-17 RX ORDER — OMEPRAZOLE 40 MG/1
40 CAPSULE, DELAYED RELEASE ORAL
Qty: 90 CAPSULE | Refills: 3 | Status: SHIPPED | OUTPATIENT
Start: 2024-06-17

## 2024-06-17 RX ORDER — NALOXONE HYDROCHLORIDE 0.4 MG/ML
0.08 INJECTION, SOLUTION INTRAMUSCULAR; INTRAVENOUS; SUBCUTANEOUS ONCE AS NEEDED
Status: DISCONTINUED | OUTPATIENT
Start: 2024-06-17 | End: 2024-06-17

## 2024-06-17 RX ORDER — LIDOCAINE HYDROCHLORIDE 10 MG/ML
INJECTION, SOLUTION EPIDURAL; INFILTRATION; INTRACAUDAL; PERINEURAL AS NEEDED
Status: DISCONTINUED | OUTPATIENT
Start: 2024-06-17 | End: 2024-06-17 | Stop reason: SURG

## 2024-06-17 RX ADMIN — SODIUM CHLORIDE, SODIUM LACTATE, POTASSIUM CHLORIDE, CALCIUM CHLORIDE: 600; 310; 30; 20 INJECTION, SOLUTION INTRAVENOUS at 11:56:00

## 2024-06-17 RX ADMIN — SODIUM CHLORIDE, SODIUM LACTATE, POTASSIUM CHLORIDE, CALCIUM CHLORIDE: 600; 310; 30; 20 INJECTION, SOLUTION INTRAVENOUS at 12:10:00

## 2024-06-17 RX ADMIN — LIDOCAINE HYDROCHLORIDE 50 MG: 10 INJECTION, SOLUTION EPIDURAL; INFILTRATION; INTRACAUDAL; PERINEURAL at 11:57:00

## 2024-06-17 NOTE — ANESTHESIA POSTPROCEDURE EVALUATION
Patient: Dana Marsh    Procedure Summary       Date: 06/17/24 Room / Location: [facility] ENDOSCOPY 04 / [facility] ENDOSCOPY    Anesthesia Start: 1155 Anesthesia Stop:     Procedure: ESOPHAGOGASTRODUODENOSCOPY with Dilation Diagnosis:       Schatzki's ring      Other dysphagia      (Esophagitis, hiatal hernia, esophageal ring)    Surgeons: Clari Smith MD Anesthesiologist: Ambreen Yates CRNA    Anesthesia Type: MAC ASA Status: 2            Anesthesia Type: MAC    Vitals Value Taken Time   /80 06/17/24 1219   Temp 98 °F (36.7 °C) 06/17/24 1219   Pulse 78 06/17/24 1219   Resp 14 06/17/24 1219   SpO2 98 % 06/17/24 1219   Vitals shown include unfiled device data.    [facility] AN Post Evaluation:   Patient Evaluated in PACU  Patient Participation: complete - patient participated  Level of Consciousness: sleepy but conscious  Pain Score: 0  Pain Management: adequate  Airway Patency:patent  Dental exam unchanged from preop  Yes    Cardiovascular Status: stable and acceptable  Respiratory Status: acceptable and room air  Postoperative Hydration acceptable      AMBREEN YATES CRNA  6/17/2024 12:20 PM

## 2024-06-17 NOTE — ANESTHESIA PREPROCEDURE EVALUATION
Anesthesia PreOp Note    HPI:     Dana Marsh is a 58 year old female who presents for preoperative consultation requested by: Clari Smith MD    Date of Surgery: 6/17/2024    Procedure(s):  ESOPHAGOGASTRODUODENOSCOPY with Dilation  Indication: Schatzki's ring   Other dysphagia    Relevant Problems   No relevant active problems       NPO:  Last Liquid Consumption Date: 06/17/24  Last Liquid Consumption Time: 0000  Last Solid Consumption Date: 06/16/24  Last Solid Consumption Time: 1900  Last Liquid Consumption Date: 06/17/24          History Review:  Patient Active Problem List    Diagnosis Date Noted    Depression 06/05/2024    TE (obstructive sleep apnea) 03/07/2024    Insomnia 03/07/2024    Gastroesophageal reflux disease 11/22/2023    Screening for cervical cancer 11/22/2023    MCI (mild cognitive impairment) 10/09/2023    Sleep disorder breathing 10/09/2023    Cervical radicular pain 10/09/2023    Dysphagia 08/09/2023    Mixed diabetic hyperlipidemia associated with type 2 diabetes mellitus (HCC) 06/21/2023    Petechiae 04/18/2023    Skin rash 04/18/2023    Class 2 severe obesity due to excess calories with serious comorbidity and body mass index (BMI) of 37.0 to 37.9 in adult (HCC) 04/18/2023    Annual physical exam 04/26/2022    Gall bladder polyp 04/26/2022    Dizziness 04/26/2022    Abnormal urine 02/21/2022    Constipation 12/30/2021    Nausea and vomiting 12/16/2021    Fatigue 01/13/2021    Postcoital bleeding 01/13/2021    Obesity (BMI 30-39.9) 05/12/2020    Dietary counseling and surveillance 05/12/2020    Recurrent UTI 09/18/2019    Palpitations 02/18/2019    Shortness of breath 02/18/2019    Tachycardia 02/18/2019    Allergic rhinitis 07/19/2018    Mixed connective tissue disease (HCC)     Routine health maintenance 03/01/2018    Irritable bowel syndrome with diarrhea 03/01/2018    Fibromyalgia 03/01/2018    Gastropathy 01/18/2018    Gallbladder polyp 01/10/2018    Snoring 11/16/2017     Nonalcoholic fatty liver disease 06/15/2017    NAFLD (nonalcoholic fatty liver disease) 05/30/2017    Hyperlipidemia 01/11/2017    Disorder of connective tissue (HCC) 01/11/2017    Essential hypertension 12/26/2016    Type 2 diabetes mellitus without complication (HCC) 12/26/2016    Primary fibromyalgia syndrome 12/26/2016    MCTD (mixed connective tissue disease) (HCC) 09/01/2015    External hemorrhoids 01/17/2014    Irregular menstrual cycle 06/15/2012    Lumbosacral spondylosis without myelopathy 12/28/2011    Leukorrhea 07/17/2008    Vaginitis and vulvovaginitis 07/17/2008    Metrorrhagia 06/19/2007    Excessive or frequent menstruation 08/18/2006    Polyp of corpus uteri 08/18/2006       Past Medical History:    Abnormal vaginal bleeding    polypectomy    Anxiety    Depression    Diabetes (HCC)    Disorder of liver    Essential hypertension    Fibromyalgia    Gastropathy    Hemorrhoids    High blood pressure    High cholesterol    MCTD (mixed connective tissue disease) (HCC)    Mood disorder (HCC)    Other ill-defined conditions(799.89)    Per [employer]:  \"son and girlfriend with 4 kids moved in.\"    Sleep apnea    Spinal stenosis    Comments:  spondylosis.  Management:  surgery in 12/2009.    Visual impairment       Past Surgical History:   Procedure Laterality Date    Back surgery      Colonoscopy  2014    Colonoscopy N/A 11/6/2023    Procedure: COLONOSCOPY;  Surgeon: Clari Smith MD;  Location: [facility] ENDOSCOPY    Hysteroscopy,diagnostic  08/2006    polypectomy    Other surgical history  12/2009    Surgery (due to spinal stenosis/spondylosis).    Other surgical history Right     ankle surgery       Medications Prior to Admission   Medication Sig Dispense Refill Last Dose    hydroxychloroquine 200 MG Oral Tab Take 1 tablet (200 mg total) by mouth 2 (two) times daily. 180 tablet 3 6/16/2024    cyclobenzaprine 10 MG Oral Tab Take 1 tablet (10 mg total) by mouth nightly as needed for Muscle spasms. 90 tablet 3  2024    [] pantoprazole 20 MG Oral Tab EC Take 1 tablet (20 mg total) by mouth every morning before breakfast. 90 tablet 1 2024    Tirzepatide (MOUNJARO) 7.5 MG/0.5ML Subcutaneous Solution Pen-injector Inject 7.5 mg into the skin once a week. 6 mL 0 2024    metoprolol tartrate 50 MG Oral Tab Take 1 tablet (50 mg total) by mouth 2 (two) times daily. 180 tablet 0 2024    atorvastatin 20 MG Oral Tab Take 1 tablet (20 mg total) by mouth nightly. 90 tablet 0 2024    metFORMIN  MG Oral Tablet 24 Hr Take 1 tablet (750 mg total) by mouth 2 (two) times daily with meals. 180 tablet 0 6/15/2024    losartan-hydroCHLOROthiazide 100-25 MG Oral Tab Take 1 tablet by mouth daily. 90 tablet 0 2024    meclizine 25 MG Oral Tab Take 1 tablet (25 mg total) by mouth 3 (three) times daily as needed for Dizziness or Nausea. 20 tablet 0 unknown    FREESTYLE LANCETS Does not apply Misc TEST DAILY AS DIRECTED 100 each 3     Glucose Blood (FREESTYLE LITE TEST) In Vitro Strip Use as directed. 100 strip 0     Blood Glucose Monitoring Suppl (FREESTYLE LITE) Does not apply Device 1 Device by Other route 2 (two) times daily. Use as directed. 1 each 3     methylPREDNISolone 4 MG Oral Tablet Therapy Pack Take as directed on package. (Patient not taking: Reported on 2024) 1 each 0     methylPREDNISolone 4 MG Oral Tablet Therapy Pack Take as directed on package. (Patient not taking: Reported on 2024) 1 each 0     [] Tirzepatide (MOUNJARO) 2.5 MG/0.5ML Subcutaneous Solution Pen-injector Inject 2.5 mg into the skin once a week for 4 doses. 2 mL 0     nabumetone 750 MG Oral Tab Take 1 tablet (750 mg total) by mouth daily. 90 tablet 3 PRN    [] azithromycin (ZITHROMAX Z-RAFAEL) 250 MG Oral Tab Take 2 tablets (500 mg total) by mouth daily for 1 day, THEN 1 tablet (250 mg total) daily for 4 days. 6 tablet 0     venlafaxine  MG Oral Capsule SR 24 Hr Take 1 capsule (150 mg total) by mouth daily.  90 capsule 0 2024    traMADol 50 MG Oral Tab Take 1 tablet (50 mg total) by mouth every 8 (eight) hours as needed for Pain. 30 tablet 5 PRN    ondansetron 4 MG Oral Tablet Dispersible Take 1 tablet (4 mg total) by mouth every 8 (eight) hours as needed for Nausea. 30 tablet 0 unknown    lidocaine 5 % External Patch Place 2 patches onto the skin daily. 60 patch 0     Betamethasone Dipropionate Aug 0.05 % External Cream Apply 1 Application topically 2 (two) times daily. Apply to affected areas as needed bid 50 g 3      Current Facility-Administered Medications Ordered in Epic   Medication Dose Route Frequency Provider Last Rate Last Admin    lactated ringers infusion   Intravenous Continuous Clari Smith MD 20 mL/hr at 24 1137 New Bag at 24 1137     No current [facility]-ordered outpatient medications on file.       Allergies   Allergen Reactions    No Known Allergies UNKNOWN       Family History   Problem Relation Age of Onset    Heart Disease Father         CAD, (cause of death) age 49    Diabetes Father     Stroke Maternal Grandmother         Cerebrovascular accident (Stroke)    Heart Disease Paternal Grandmother         CAD    Breast Cancer Maternal Aunt         70s     Heart Disease Maternal Aunt         CAD    Heart Disease Maternal Aunt     Other (osteoarthritis) Mother     Diabetes Mother     Heart Disease Mother     Hypertension Mother     Stroke Mother     Other (psoriasis) Sister     Diabetes Maternal Grandfather     Diabetes Paternal Grandfather     Hypertension Sister      Social History     Socioeconomic History    Marital status:    Tobacco Use    Smoking status: Former     Current packs/day: 0.00     Types: Cigarettes     Quit date: 1989     Years since quittin.4    Smokeless tobacco: Never   Vaping Use    Vaping status: Never Used   Substance and Sexual Activity    Alcohol use: Not Currently     Alcohol/week: 2.0 standard drinks of alcohol     Types: 2 Standard drinks or  equivalent per week     Comment: Occasionally    Drug use: Yes     Frequency: 5.0 times per week     Types: Cannabis     Comment: nightly    Sexual activity: Yes     Partners: Male   Other Topics Concern    Caffeine Concern Yes     Comment: (Coffee, Soda) 2 cups daily    Pt has a pacemaker No    Pt has a defibrillator No    Reaction to local anesthetic No       Available pre-op labs reviewed.     Lab Results   Component Value Date    PGLU 112 (H) 06/17/2024          Vital Signs:  Body mass index is 35.52 kg/m².   height is 1.549 m (5' 1\") and weight is 85.3 kg (188 lb). Her blood pressure is 130/79 and her pulse is 72. Her respiration is 12 and oxygen saturation is 99%.   Vitals:    06/11/24 0948 06/17/24 1125   BP:  130/79   Pulse:  72   Resp:  12   SpO2:  99%   Weight: 85.3 kg (188 lb) 85.3 kg (188 lb)   Height: 1.549 m (5' 1\") 1.549 m (5' 1\")        Anesthesia Evaluation     Patient summary reviewed and Nursing notes reviewed    Airway   Mallampati: II  TM distance: >3 FB  Neck ROM: full  Dental - Dentition appears grossly intact     Pulmonary - normal exam    breath sounds clear to auscultation  (+) sleep apnea on CPAP  Cardiovascular - normal exam  (+) hypertension well controlled    Neuro/Psych    (+)  anxiety/panic attacks,  depression      GI/Hepatic/Renal    (+) GERD    Endo/Other    (+) diabetes mellitus type 2 well controlled  Abdominal  - normal exam                 Anesthesia Plan:   ASA:  2  Plan:   MAC  Informed Consent Plan and Risks Discussed With:  Patient  Discussed plan with:  Surgeon      I have informed Dana Marsh and/or legal guardian or family member of the nature of the anesthetic plan, benefits, risks including possible dental damage if relevant, major complications, and any alternative forms of anesthetic management.   All of the patient's questions were answered to the best of my ability. The patient desires the anesthetic management as planned.  KARIN YATES, MARTI  6/17/2024  11:48 AM  Present on Admission:  **None**

## 2024-06-17 NOTE — H&P
Pre Procedure History & Physical Examination    Patient Name: Dana Marsh  MRN: [id number]  [facility]: [id number]  YOB: 1965    Diagnosis: Schatzki ring and esophagitis history, now with dysphagia again follow up    Medications Prior to Admission   Medication Sig Dispense Refill Last Dose    hydroxychloroquine 200 MG Oral Tab Take 1 tablet (200 mg total) by mouth 2 (two) times daily. 180 tablet 3 2024    cyclobenzaprine 10 MG Oral Tab Take 1 tablet (10 mg total) by mouth nightly as needed for Muscle spasms. 90 tablet 3 2024    [] pantoprazole 20 MG Oral Tab EC Take 1 tablet (20 mg total) by mouth every morning before breakfast. 90 tablet 1 2024    Tirzepatide (MOUNJARO) 7.5 MG/0.5ML Subcutaneous Solution Pen-injector Inject 7.5 mg into the skin once a week. 6 mL 0 2024    metoprolol tartrate 50 MG Oral Tab Take 1 tablet (50 mg total) by mouth 2 (two) times daily. 180 tablet 0 2024    atorvastatin 20 MG Oral Tab Take 1 tablet (20 mg total) by mouth nightly. 90 tablet 0 2024    metFORMIN  MG Oral Tablet 24 Hr Take 1 tablet (750 mg total) by mouth 2 (two) times daily with meals. 180 tablet 0 6/15/2024    losartan-hydroCHLOROthiazide 100-25 MG Oral Tab Take 1 tablet by mouth daily. 90 tablet 0 2024    meclizine 25 MG Oral Tab Take 1 tablet (25 mg total) by mouth 3 (three) times daily as needed for Dizziness or Nausea. 20 tablet 0 unknown    FREESTYLE LANCETS Does not apply Misc TEST DAILY AS DIRECTED 100 each 3     Glucose Blood (FREESTYLE LITE TEST) In Vitro Strip Use as directed. 100 strip 0     Blood Glucose Monitoring Suppl (FREESTYLE LITE) Does not apply Device 1 Device by Other route 2 (two) times daily. Use as directed. 1 each 3     methylPREDNISolone 4 MG Oral Tablet Therapy Pack Take as directed on package. (Patient not taking: Reported on 2024) 1 each 0     methylPREDNISolone 4 MG Oral Tablet Therapy Pack Take as directed on package. (Patient not  taking: Reported on 2024) 1 each 0     [] Tirzepatide (MOUNJARO) 2.5 MG/0.5ML Subcutaneous Solution Pen-injector Inject 2.5 mg into the skin once a week for 4 doses. 2 mL 0     nabumetone 750 MG Oral Tab Take 1 tablet (750 mg total) by mouth daily. 90 tablet 3 PRN    [] azithromycin (ZITHROMAX Z-RAFAEL) 250 MG Oral Tab Take 2 tablets (500 mg total) by mouth daily for 1 day, THEN 1 tablet (250 mg total) daily for 4 days. 6 tablet 0     venlafaxine  MG Oral Capsule SR 24 Hr Take 1 capsule (150 mg total) by mouth daily. 90 capsule 0 2024    traMADol 50 MG Oral Tab Take 1 tablet (50 mg total) by mouth every 8 (eight) hours as needed for Pain. 30 tablet 5 PRN    ondansetron 4 MG Oral Tablet Dispersible Take 1 tablet (4 mg total) by mouth every 8 (eight) hours as needed for Nausea. 30 tablet 0 unknown    lidocaine 5 % External Patch Place 2 patches onto the skin daily. 60 patch 0     Betamethasone Dipropionate Aug 0.05 % External Cream Apply 1 Application topically 2 (two) times daily. Apply to affected areas as needed bid 50 g 3      Current Facility-Administered Medications   Medication Dose Route Frequency    lactated ringers infusion   Intravenous Continuous       Allergies:   Allergies   Allergen Reactions    No Known Allergies UNKNOWN       Past Medical History:    Abnormal vaginal bleeding    polypectomy    Anxiety    Depression    Diabetes (HCC)    Disorder of liver    Essential hypertension    Fibromyalgia    Gastropathy    Hemorrhoids    High blood pressure    High cholesterol    MCTD (mixed connective tissue disease) (HCC)    Mood disorder ([facility])    Other ill-defined conditions(799.89)    Per [employer]:  \"son and girlfriend with 4 kids moved in.\"    Sleep apnea    Spinal stenosis    Comments:  spondylosis.  Management:  surgery in 2009.    Visual impairment     Past Surgical History:   Procedure Laterality Date    Back surgery      Colonoscopy      Colonoscopy N/A 2023     Procedure: COLONOSCOPY;  Surgeon: Clari Smith MD;  Location: [facility] ENDOSCOPY    Hysteroscopy,diagnostic  2006    polypectomy    Other surgical history  2009    Surgery (due to spinal stenosis/spondylosis).    Other surgical history Right     ankle surgery     Family History   Problem Relation Age of Onset    Heart Disease Father         CAD, (cause of death) age 49    Diabetes Father     Stroke Maternal Grandmother         Cerebrovascular accident (Stroke)    Heart Disease Paternal Grandmother         CAD    Breast Cancer Maternal Aunt         70s     Heart Disease Maternal Aunt         CAD    Heart Disease Maternal Aunt     Other (osteoarthritis) Mother     Diabetes Mother     Heart Disease Mother     Hypertension Mother     Stroke Mother     Other (psoriasis) Sister     Diabetes Maternal Grandfather     Diabetes Paternal Grandfather     Hypertension Sister      Social History     Tobacco Use    Smoking status: Former     Current packs/day: 0.00     Types: Cigarettes     Quit date: 1989     Years since quittin.4    Smokeless tobacco: Never   Substance Use Topics    Alcohol use: Not Currently     Alcohol/week: 2.0 standard drinks of alcohol     Types: 2 Standard drinks or equivalent per week     Comment: Occasionally         ROS:   CONSTITUTIONAL:  negative for fevers, rigors  EYES:  negative for diplopia   RESPIRATORY:  negative for severe shortness of breath  CARDIOVASCULAR:  negative for crushing sub-sternal chest pain  GASTROINTESTINAL:  see HPI  GENITOURINARY:  negative for dysuria or gross hematuria  INTEGUMENT/BREAST:  SKIN:  negative for jaundice   ALLERGIC/IMMUNOLOGIC:  negative for hay fever  ENDOCRINE:  negative for cold intolerance and heat intolerance  MUSCULOSKELETAL:  negative for joint effusion/severe erythema  BEHAVIOR/PSYCH:  negative for psychotic behavior      PHYSICAL EXAM:   /79 (BP Location: Left arm)   Pulse 72   Resp 12   Ht 5' 1\" (1.549 m)   Wt 188 lb (85.3 kg)    LMP 09/06/2013 (Approximate)   SpO2 99%   BMI 35.52 kg/m²       Gen: Patient appears comfortable and in no acute discomfort  HEENT: the sclera appears anicteric, oropharynx clear, mucus membranes appear moist  CV: regular rate and rhythm, the extremities are warm and well perfused   Lung: Moves air well; No labored breathing  Abdomen: soft, non-tender exam in all quadrants without rigidity or guarding, non-distended, no abnormal bowel sounds noted, no masses are palpated  Skin: No jaundice  Ext: no cyanosis, clubbing or edema is evident.   Neuro: Alert and interactive, and gross movements of extremities normal    I have discussed the risks and benefits and alternatives of the procedure with the patient/family.  They understand and agree to proceed with plan of care.   I have reviewed recent H&P/clinic notes  Clari Smith MD  [facility] - Gastroenterology  6/17/2024  11:52 AM

## 2024-06-17 NOTE — OPERATIVE REPORT
ESOPHAGOGASTRODUODENOSCOPY (EGD) REPORT    Dana Marsh     1965 Age 58 year old   PCP Remy Rojas MD Endoscopist Clari Smith MD       Date of procedure: 24    Procedure: EGD w/biopsies and dilation    Pre-operative diagnosis: dysphagia and acid reflux    Post-operative diagnosis: esophagitis, esophageal ring and hiatal hernia    Medications: MAC sedation    Complications: none    Procedure:  Informed consent was obtained from the patient after the risks of the procedure were discussed, including but not limited to bleeding, perforation, aspiration, infection, or possibility of a missed lesion. After discussions of the risks/benefits and alternatives to this procedure, as well as the planned sedation, the patient was placed in the left lateral decubitus position and begun on continuous blood pressure pulse oximetry and EKG monitoring and this was maintained throughout the procedure. Once an adequate level of sedation was obtained a bite block was placed. Then the lubricated tip of the [id number] diagnostic video upper endoscope was inserted and advanced using direct visualization into the posterior pharynx and ultimately into the esophagus.    Complications: None    Findings:      EGD findings:      1. Esophagus: The squamocolumnar junction was noted at 37 cm and appeared irregular. The GE junction was noted at 37 cm from the incisors. Hiatus was at 39 cm. The esophageal mucosa appeared scarred with a ring at 37 cm. There was LA grade A esophagitis and biopsied to rule out tan's and to disrupt the ring. Dilated with CRE balloon 18-19-20 with a little tearing.  2. Stomach: The stomach distended normally. Normal rugal folds were seen. The pylorus was patent. The gastric mucosa appeared normal. Retroflexion revealed a normal fundus and a normal cardia.   3. Duodenum: The duodenal mucosa appeared normal in the 1st and 2nd portion of the duodenum.     Impression:  1. Esophagitis and  esophageal ring, small hiatal hernia    Recommend:  Here are some reflux precautions:   - Avoid trigger foods  - Anti-reflux measures: raising the head of the bed at night, avoiding tight clothing or belts, avoiding eating late at night and not lying down shortly after mealtime and achieving weight loss   - Avoid NSAID's, caffeine, peppermints, alcohol, tobacco and foods that incite symptoms   Avoid smoking  Omeprazole daily before breakfast    >>>If tissue was obtained and you have not received your pathology results either by phone or letter within 2 weeks, please call our office at [phone].    Specimens: esophageal biopsies distal and mid

## 2024-06-17 NOTE — DISCHARGE INSTRUCTIONS
Home Care Instructions for Gastroscopy with Sedation    Diet:  - Resume your regular diet as tolerated unless otherwise instructed.  - Start with light meals to minimize bloating.  - Do not drink alcohol today.    Medication:  - If you have questions about resuming your normal medications, please contact your Primary Care Physician.    Activities:  - Take it easy today. Do not return to work today.  - Do not drive today.  - Do not operate any machinery today (including kitchen equipment).    Gastroscopy:  - You may have a sore throat for 2-3 days following the exam. This is normal. Gargling with warm salt water (1/2 tsp salt to 1 glass warm water) or using throat lozenges will help.  - If you experience any sharp pain in your neck, abdomen or chest, vomiting of blood, oral temperature over 100 degrees Fahrenheit, light-headedness or dizziness, or any other problems, contact your doctor.    **If unable to reach your doctor, please go to the [facility] Emergency Room**    - Your referring physician will receive a full report of your examination.  - If you do not hear from your doctor's office within two weeks of your biopsy, please call them for your results.    You may be able to see your laboratory results in [employer] between 4 and 7 business days.  In some cases, your physician may not have viewed the results before they are released to [employer].  If you have questions regarding your results contact the physician who ordered the test/exam by phone or via [employer] by choosing \"Ask a Medical Question.\"

## 2024-06-18 VITALS
TEMPERATURE: 98 F | BODY MASS INDEX: 35.5 KG/M2 | RESPIRATION RATE: 12 BRPM | OXYGEN SATURATION: 100 % | HEIGHT: 61 IN | SYSTOLIC BLOOD PRESSURE: 127 MMHG | DIASTOLIC BLOOD PRESSURE: 72 MMHG | HEART RATE: 65 BPM | WEIGHT: 188 LBS

## 2024-07-08 ENCOUNTER — PROCEDURE VISIT (OUTPATIENT)
Dept: PHYSICAL MEDICINE AND REHAB | Facility: CLINIC | Age: 59
End: 2024-07-08
Payer: COMMERCIAL

## 2024-07-08 DIAGNOSIS — M79.602 LEFT ARM PAIN: ICD-10-CM

## 2024-07-08 DIAGNOSIS — M54.2 NECK PAIN ON LEFT SIDE: ICD-10-CM

## 2024-07-08 DIAGNOSIS — R20.2 NUMBNESS AND TINGLING IN LEFT HAND: Primary | ICD-10-CM

## 2024-07-08 DIAGNOSIS — R20.0 NUMBNESS AND TINGLING IN LEFT HAND: Primary | ICD-10-CM

## 2024-07-08 PROCEDURE — 95909 NRV CNDJ TST 5-6 STUDIES: CPT | Performed by: PHYSICAL MEDICINE & REHABILITATION

## 2024-07-08 PROCEDURE — 95886 MUSC TEST DONE W/N TEST COMP: CPT | Performed by: PHYSICAL MEDICINE & REHABILITATION

## 2024-07-08 NOTE — PROCEDURES
[address]  [address] [place] [id number]  Phone: [phone]  Fax: [phone]    ELECTRODIAGNOSTIC REPORT          Patient: Dana Marsh Hand Dominance:  Right   Patient ID: [id number] Referring Dr:  Rachael Rose PA-C   Sex: Female Test Dr:  D[place]ce   YOB: 1965           Visit Date: 58 Years Examining MD: Mio Cardona DO   Age: 58 Years Referred by: Rachael Rose PA-C   Height: 5 feet 1 inch     Referred for: 58 year old right handed female presents with a 2 year history of left neck and arm pain, numbness and tingling. Symptoms radiate from the left scapular region into the neck, and down the arm to the 4th and 5th digits. The symptoms are episodic, and often relieved by a course of oral corticosteroids.     PMH: She has an approximate 1 year history of DM. No history of thyroid disease or EtOH abuse    Physical exam:  normal strength in bilateral extremities, except for mild give way weakness with finger flexion and resisted thumb abduction  SILT C5-T1 dermatomes bilaterally  2/4 LUE reflexes               Summary    The motor conduction test was normal in all 3 of the tested nerves: L Median - APB, L Ulnar - ADM, L Ulnar - FDI - Inching.    The sensory conduction test was normal in all 3 of the tested nerves: L Median - Digit II (Antidromic), L Ulnar - Digit V (Antidromic), L Radial - Anatomical snuff box (Forearm).    The needle EMG study was normal in all 6 tested muscles: L. Deltoid, L. Biceps brachii, L. Triceps brachii, L. Pronator teres, L. Extensor indicis proprius, L. First dorsal interosseous.      Conclusion:   This is a normal electrodiagnostic study. There is no electrodiagnostic evidence of mononeuropathy, brachial plexopathy or cervical radiculopathy in the left upper extremity.     Thank you for allowing me to participate in this patient's care,    Mio Cardona DO  Physical Medicine and Rehabilitation  [place]  [facility]  ________________________________      Motor NCS      Nerve / Sites Muscle Latency Amplitude Amp % Duration Segments Distance Lat Diff Velocity     ms mV % ms  cm ms m/s   L Median - APB      Wrist APB 3.10 14.2 100 5.67 Wrist - APB 8        Ref.  ?4.40 ?4.0 ?100  Ref.         Elbow APB 6.67 13.9 98.4 6.00 Elbow - Wrist 19.5 3.56 55      Ref.    ?80  Ref.   ?49   L Ulnar - ADM      Wrist ADM 2.52 10.7 100 6.69 Wrist - ADM 8        Ref.  ?3.90 ?5.0 ?100  Ref.         B.Elbow ADM 6.00 10.8 101 6.83 B.Elbow - Wrist 20 3.48 57      Ref.    ?80  Ref.   ?50      A.Elbow ADM 7.60 10.3 96.7 7.12 A.Elbow - B.Elbow 9 1.60 56      Ref.      Ref.   ?50       Motor NCS Inching      Nerve / Sites Muscle Latency Amplitude Rel Amp Segments Distance Lat Diff     ms mV %  cm ms   L Ulnar - FDI      Distal 4 cm FDI 6.40 6.3 100 Distal 4 cm - FDI        3 cm FDI 6.46 6.3 100 3 cm - Distal 4 cm 1 0.06      2 cm FDI 6.54 6.2 97.4 2 cm - 3 cm 1 0.08      1 cm FDI 6.75 6.0 96.9 1 cm - 2 cm 1 0.21      Elbow FDI 6.88 5.9 97.8 Elbow - 1 cm 1 0.13      1 cm prox FDI 7.13 5.6 96.3 1 cm prox - Elbow 1 0.25      2 cm FDI 7.48 5.5 97.3 2 cm - 1 cm prox 1 0.35      3 cm FDI 7.60 5.4 98.1 3 cm - 2 cm 1 0.13      4 cm FDI 7.85 5.1 95.5 4 cm - 3 cm 1 0.25       Sensory NCS      Nerve / Sites Rec. Site Onset Lat Peak Lat NP Amp PP Amp Segments Distance Velocity     ms ms µV µV  cm m/s   L Median - Digit II (Antidromic)      Wrist Dig II 2.75 3.40 26.6 46.7 Wrist - Dig II 13 47      Ref.   ?3.40 ?15.0 ?20.0 Ref.     L Ulnar - Digit V (Antidromic)      Wrist Dig V 2.23 3.13 28.9 47.6 Wrist - Dig V 14 63      Ref.   ?3.70 ?15.0 ?15.0 Ref.     L Radial - Anatomical snuff box (Forearm)      Forearm Wrist 1.79 2.35 28.9 15.6 Forearm - Wrist 10 56      Ref.   ?2.70 ?5.0 ?5.0 Ref.         EMG Summary Table     Spontaneous MUAP Recruitment   Muscle Nerve Roots IA Fib PSW Fasc H.F. Comments Amp Dur. PPP Pattern   L. Deltoid Axillary  C5-C6 N None None None None Normal N N N N   L. Biceps brachii Musculocutaneous C5-C6 N None None None None Normal N N N N   L. Triceps brachii Radial C6-C8 N None None None None Normal N N N N   L. Pronator teres Median C6-C7 N None None None None Normal N N N N   L. Extensor indicis proprius Radial C7-C8 N None None None None Normal N N N N   L. First dorsal interosseous Ulnar C8-T1 N None None None None Normal N N N N           Motor NCS Inching: L Ulnar - FDI

## 2024-07-09 ENCOUNTER — OFFICE VISIT (OUTPATIENT)
Dept: PHYSICAL MEDICINE AND REHAB | Facility: CLINIC | Age: 59
End: 2024-07-09
Payer: COMMERCIAL

## 2024-07-09 VITALS
SYSTOLIC BLOOD PRESSURE: 122 MMHG | RESPIRATION RATE: 18 BRPM | WEIGHT: 190 LBS | BODY MASS INDEX: 35.87 KG/M2 | DIASTOLIC BLOOD PRESSURE: 78 MMHG | HEIGHT: 61 IN

## 2024-07-09 DIAGNOSIS — K21.9 GASTROESOPHAGEAL REFLUX DISEASE, UNSPECIFIED WHETHER ESOPHAGITIS PRESENT: ICD-10-CM

## 2024-07-09 DIAGNOSIS — F32.A DEPRESSION, UNSPECIFIED DEPRESSION TYPE: ICD-10-CM

## 2024-07-09 DIAGNOSIS — M35.1 MIXED CONNECTIVE TISSUE DISEASE (HCC): ICD-10-CM

## 2024-07-09 DIAGNOSIS — E11.9 TYPE 2 DIABETES MELLITUS WITHOUT COMPLICATION, UNSPECIFIED WHETHER LONG TERM INSULIN USE (HCC): ICD-10-CM

## 2024-07-09 DIAGNOSIS — M79.602 LEFT ARM PAIN: Primary | ICD-10-CM

## 2024-07-09 DIAGNOSIS — F41.9 ANXIETY: ICD-10-CM

## 2024-07-09 PROCEDURE — 99214 OFFICE O/P EST MOD 30 MIN: CPT | Performed by: PHYSICAL MEDICINE & REHABILITATION

## 2024-07-09 NOTE — PROGRESS NOTES
[facility]  Progress Note    CHIEF COMPLAINT:    Chief Complaint   Patient presents with    Follow - Up     Patient here for a follow up appointment for left shoulder pain traveling down the left arm with numbness and tingling. Intermittent, per patient not sure what aggravates it.  Patient had an EMG done yesterday. Pain rated 02/10. Patient on Tramadol as needed (little help). Has tried Prednisone in the past (some help).        History of Present Illness:  Dana Marsh is a 58 year old female who presents today for follow up for symptoms of chronic left arm pain. She had an MRI in October 2023 and saw Rachael Mccormick who recommended a new MRI and EMG.  Symptoms radiate into the fourth and fifth digits of the left side, are annoying and not functionally limiting.  Has been told the symptoms are due to fibromyalgia.  She had physical therapy remotely without relief.  She is here to review her most recent MRI and EMG.    PAST MEDICAL HISTORY:  Past Medical History:    Abnormal vaginal bleeding    polypectomy    Anxiety    Depression    Diabetes (HCC)    Disorder of liver    Essential hypertension    Fibromyalgia    Gastropathy    Hemorrhoids    High blood pressure    High cholesterol    MCTD (mixed connective tissue disease) (HCC)    Mood disorder (HCC)    Other ill-defined conditions(799.89)    Per [employer]:  \"son and girlfriend with 4 kids moved in.\"    Sleep apnea    Spinal stenosis    Comments:  spondylosis.  Management:  surgery in 12/2009.    Visual impairment       SURGICAL HISTORY:  Past Surgical History:   Procedure Laterality Date    Back surgery      Colonoscopy  2014    Colonoscopy N/A 11/6/2023    Procedure: COLONOSCOPY;  Surgeon: Clari Smith MD;  Location: [facility] ENDOSCOPY    Hysteroscopy,diagnostic  08/2006    polypectomy    Other surgical history  12/2009    Surgery (due to spinal stenosis/spondylosis).    Other surgical history Right     ankle  surgery       SOCIAL HISTORY:   Social History     Occupational History    Not on file   Tobacco Use    Smoking status: Former     Current packs/day: 0.00     Types: Cigarettes     Quit date: 1989     Years since quittin.5    Smokeless tobacco: Never   Vaping Use    Vaping status: Never Used   Substance and Sexual Activity    Alcohol use: Yes     Alcohol/week: 2.0 standard drinks of alcohol     Types: 2 Standard drinks or equivalent per week     Comment: Occasionally    Drug use: Yes     Frequency: 7.0 times per week     Types: Cannabis     Comment: Marijuana for pain nightly    Sexual activity: Yes     Partners: Male       CURRENT MEDICATIONS:   Current Outpatient Medications   Medication Sig Dispense Refill    Omeprazole 40 MG Oral Capsule Delayed Release Take 1 capsule (40 mg total) by mouth every morning before breakfast. Take 1 capsule by mouth daily before breakfast. 90 capsule 3    FREESTYLE LANCETS Does not apply Misc TEST DAILY AS DIRECTED 100 each 3    Glucose Blood (FREESTYLE LITE TEST) In Vitro Strip Use as directed. 100 strip 0    Blood Glucose Monitoring Suppl (FREESTYLE LITE) Does not apply Device 1 Device by Other route 2 (two) times daily. Use as directed. 1 each 3    methylPREDNISolone 4 MG Oral Tablet Therapy Pack Take as directed on package. (Patient not taking: Reported on 2024) 1 each 0    methylPREDNISolone 4 MG Oral Tablet Therapy Pack Take as directed on package. (Patient not taking: Reported on 2024) 1 each 0    hydroxychloroquine 200 MG Oral Tab Take 1 tablet (200 mg total) by mouth 2 (two) times daily. 180 tablet 3    cyclobenzaprine 10 MG Oral Tab Take 1 tablet (10 mg total) by mouth nightly as needed for Muscle spasms. 90 tablet 3    nabumetone 750 MG Oral Tab Take 1 tablet (750 mg total) by mouth daily. 90 tablet 3    Tirzepatide (MOUNJARO) 7.5 MG/0.5ML Subcutaneous Solution Pen-injector Inject 7.5 mg into the skin once a week. 6 mL 0    metoprolol tartrate 50 MG Oral  Tab Take 1 tablet (50 mg total) by mouth 2 (two) times daily. 180 tablet 0    atorvastatin 20 MG Oral Tab Take 1 tablet (20 mg total) by mouth nightly. 90 tablet 0    metFORMIN  MG Oral Tablet 24 Hr Take 1 tablet (750 mg total) by mouth 2 (two) times daily with meals. 180 tablet 0    losartan-hydroCHLOROthiazide 100-25 MG Oral Tab Take 1 tablet by mouth daily. 90 tablet 0    venlafaxine  MG Oral Capsule SR 24 Hr Take 1 capsule (150 mg total) by mouth daily. 90 capsule 0    traMADol 50 MG Oral Tab Take 1 tablet (50 mg total) by mouth every 8 (eight) hours as needed for Pain. 30 tablet 5    ondansetron 4 MG Oral Tablet Dispersible Take 1 tablet (4 mg total) by mouth every 8 (eight) hours as needed for Nausea. 30 tablet 0    meclizine 25 MG Oral Tab Take 1 tablet (25 mg total) by mouth 3 (three) times daily as needed for Dizziness or Nausea. 20 tablet 0    lidocaine 5 % External Patch Place 2 patches onto the skin daily. 60 patch 0    Betamethasone Dipropionate Aug 0.05 % External Cream Apply 1 Application topically 2 (two) times daily. Apply to affected areas as needed bid 50 g 3       ALLERGIES:   Allergies   Allergen Reactions    No Known Allergies UNKNOWN         PHYSICAL EXAM:   /78   Resp 18   Ht 61\"   Wt 190 lb (86.2 kg)   LMP 09/06/2013 (Approximate)   BMI 35.90 kg/m²     Body mass index is 35.9 kg/m².      General: No immediate distress  Extremities: No upper extremity edema bilaterally   Spine: full and painfree cervical ROM in all directions  Shoulders: full and painfree ROM   Neuro:   Cognition: alert & oriented x 3, attentive, comprehention intact, spontaneous speech intact  Strength:  Upper extremities have 5/5 strength  Sensation: Normal upper extremities  Reflexes: Normal upper extremities  Spurling's sign: neg        Data  I personally reviewed EMG data from a left upper extremity EMG from July 2024 done by Dr. Cardona was normal.    Radiology Imaging:  I personally reviewed a  cervical MRI from June 2024 with chronic degenerative change and no change since her previous MRI from October 2023.  I previously reviewed a cervical MRI from October 2023. It reveals decreased lordosis straightening, not quite kyphosis. No central canal stenosis severe enough to cause cord compression or cord injury. No cord signal changes. Foramina are diffusely narrowed on a minimal basis, no specific foraminal narrowing on the left.     ASSESSMENT AND PLAN:  1. Left arm pain [M79.602]  Fortunately she has no significant neurological compression on MRI imaging different from October 2023.  Her EMG is negative.  Right now we decided to pursue a course of watchful waiting.  She does have mixed connective tissue disease and a previous diagnosis of fibromyalgia which may indicate a soft tissue source.  The patient is agreeable to this plan.    2. Type 2 diabetes mellitus without complication, unspecified whether long term insulin use (HCC)  Avoiding steroids, limits medication options    3. Mixed connective tissue disease (HCC)  Has diffuse soft tissue pain    4. Gastroesophageal reflux disease, unspecified whether esophagitis present  No NSAIDs, limits medication options    5. Depression, unspecified depression type  Negative comorbidity for painful conditions    6. Anxiety  Negative comorbidity for painful conditions        RTC as needed        The patient was in agreement with the assessment and plan.  All questions were answered.        Kaleb Perez MD  Physical Medicine and Rehabilitation/Sports Medicine  [facility]

## 2024-07-30 ENCOUNTER — PATIENT MESSAGE (OUTPATIENT)
Dept: INTERNAL MEDICINE CLINIC | Facility: CLINIC | Age: 59
End: 2024-07-30

## 2024-07-30 ENCOUNTER — ORDER TRANSCRIPTION (OUTPATIENT)
Dept: ADMINISTRATIVE | Facility: HOSPITAL | Age: 59
End: 2024-07-30

## 2024-07-30 DIAGNOSIS — R06.09 DOE (DYSPNEA ON EXERTION): ICD-10-CM

## 2024-07-30 DIAGNOSIS — M35.1 MCTD (MIXED CONNECTIVE TISSUE DISEASE) (HCC): Primary | ICD-10-CM

## 2024-07-31 RX ORDER — TIRZEPATIDE 2.5 MG/.5ML
2.5 INJECTION, SOLUTION SUBCUTANEOUS WEEKLY
Qty: 2 ML | Refills: 0 | Status: SHIPPED | OUTPATIENT
Start: 2024-07-31

## 2024-07-31 NOTE — TELEPHONE ENCOUNTER
Tamra Metz, STEPAN 7/30/2024 3:06 PM CDT    Please advise?  ----- Message -----  From: Dana Marsh  Sent: 7/30/2024 1:06 PM CDT  To: Carlos Eduardo Hern[facility]dez Clinical Staff  Subject: Fredis     I started back up with the 2.5 but I am out. My appointment is not until Sept. can I get a refill?

## 2024-08-05 ENCOUNTER — HOSPITAL ENCOUNTER (OUTPATIENT)
Dept: RESPIRATORY THERAPY | Facility: HOSPITAL | Age: 59
Discharge: HOME | End: 2024-08-05
Attending: INTERNAL MEDICINE
Payer: COMMERCIAL

## 2024-08-05 DIAGNOSIS — M35.1 MCTD (MIXED CONNECTIVE TISSUE DISEASE) (HCC): ICD-10-CM

## 2024-08-05 DIAGNOSIS — R06.09 DOE (DYSPNEA ON EXERTION): ICD-10-CM

## 2024-08-05 PROCEDURE — 94726 PLETHYSMOGRAPHY LUNG VOLUMES: CPT | Performed by: INTERNAL MEDICINE

## 2024-08-05 PROCEDURE — 94729 DIFFUSING CAPACITY: CPT | Performed by: INTERNAL MEDICINE

## 2024-08-05 PROCEDURE — 94010 BREATHING CAPACITY TEST: CPT | Performed by: INTERNAL MEDICINE

## 2024-08-06 NOTE — PROCEDURES
[facility]  part of [facility]    PFT Interpretation    Dana Marsh     1965 MRN [id number]   Height  61 inh Age 58 year old   Weight  190 lbs  Sex Female         Spirometry:   Normal spirometry  FVC 3.97 L which is 152%  FEV1 3.46 L which is 164%  FEV1 over FVC ratio 87% which is normal      FVL:   Normal      Lung Volume:   Normal lung volumes  Total lung capacity 6.10 L which is 133%  Vital capacity 4.04 L which is 133%  RV/TLC ratio is normal      DLCO:   107% which is normal      Impression:   Normal PFTs        Thank you for allowing me to participate in the care of your patient.    Hope Tatum MD  2024  2:02 PM

## 2024-08-20 ENCOUNTER — HOSPITAL ENCOUNTER (OUTPATIENT)
Dept: CV DIAGNOSTICS | Facility: HOSPITAL | Age: 59
Discharge: HOME | End: 2024-08-20
Attending: INTERNAL MEDICINE
Payer: COMMERCIAL

## 2024-08-20 DIAGNOSIS — R06.09 DOE (DYSPNEA ON EXERTION): ICD-10-CM

## 2024-08-20 DIAGNOSIS — M35.1 MCTD (MIXED CONNECTIVE TISSUE DISEASE) (HCC): ICD-10-CM

## 2024-08-20 PROCEDURE — 93306 TTE W/DOPPLER COMPLETE: CPT | Performed by: INTERNAL MEDICINE

## 2024-08-22 ENCOUNTER — PATIENT MESSAGE (OUTPATIENT)
Dept: RHEUMATOLOGY | Facility: CLINIC | Age: 59
End: 2024-08-22

## 2024-08-22 ENCOUNTER — PATIENT MESSAGE (OUTPATIENT)
Dept: INTERNAL MEDICINE CLINIC | Facility: CLINIC | Age: 59
End: 2024-08-22

## 2024-08-22 ENCOUNTER — HOSPITAL ENCOUNTER (OUTPATIENT)
Dept: MAMMOGRAPHY | Age: 59
Discharge: HOME | End: 2024-08-22
Attending: INTERNAL MEDICINE
Payer: COMMERCIAL

## 2024-08-22 DIAGNOSIS — E11.9 TYPE 2 DIABETES MELLITUS WITHOUT COMPLICATION, UNSPECIFIED WHETHER LONG TERM INSULIN USE (HCC): ICD-10-CM

## 2024-08-22 DIAGNOSIS — M35.1 MCTD (MIXED CONNECTIVE TISSUE DISEASE) (HCC): ICD-10-CM

## 2024-08-22 DIAGNOSIS — K21.9 GASTROESOPHAGEAL REFLUX DISEASE, UNSPECIFIED WHETHER ESOPHAGITIS PRESENT: ICD-10-CM

## 2024-08-22 DIAGNOSIS — I27.20 PULMONARY HYPERTENSION (HCC): Primary | ICD-10-CM

## 2024-08-22 DIAGNOSIS — K76.0 NAFLD (NONALCOHOLIC FATTY LIVER DISEASE): ICD-10-CM

## 2024-08-22 DIAGNOSIS — Z12.4 SCREENING FOR CERVICAL CANCER: ICD-10-CM

## 2024-08-22 DIAGNOSIS — Z00.00 ANNUAL PHYSICAL EXAM: ICD-10-CM

## 2024-08-22 PROCEDURE — 77067 SCR MAMMO BI INCL CAD: CPT | Performed by: INTERNAL MEDICINE

## 2024-08-22 PROCEDURE — 77063 BREAST TOMOSYNTHESIS BI: CPT | Performed by: INTERNAL MEDICINE

## 2024-08-22 NOTE — TELEPHONE ENCOUNTER
From: Dana Marsh  To: Hay Lynn  Sent: 8/22/2024 8:30 AM CDT  Subject: Eco    Can the results be faxed to the cardiologist   Mario Alberto Live MD?

## 2024-08-25 NOTE — TELEPHONE ENCOUNTER
Gloria Marie MA 8/22/2024 8:32 AM CDT      ----- Message -----  From: Dana Marsh  Sent: 8/22/2024 8:13 AM CDT  To: [facility] Hern[facility]dez Clinical Staff  Subject: Test results from eco     Dr. ZENDEJAS ordered an eco to compare, should o be concerned about results?

## 2024-08-26 ENCOUNTER — TELEPHONE (OUTPATIENT)
Dept: PULMONOLOGY | Facility: CLINIC | Age: 59
End: 2024-08-26

## 2024-08-26 ENCOUNTER — PATIENT MESSAGE (OUTPATIENT)
Dept: INTERNAL MEDICINE CLINIC | Facility: CLINIC | Age: 59
End: 2024-08-26

## 2024-08-26 RX ORDER — TIRZEPATIDE 5 MG/.5ML
5 INJECTION, SOLUTION SUBCUTANEOUS WEEKLY
Qty: 6 ML | Refills: 0 | Status: SHIPPED | OUTPATIENT
Start: 2024-08-26

## 2024-08-26 NOTE — TELEPHONE ENCOUNTER
RN, please call the patient to offer her reassurance.  The pulmonary hypertension is mild and certainly could be explained by the sleep apnea but nothing different to do at this juncture as long as she is vigilant with her CPAP device.  Can likely keep her scheduled appointment unless worsening clinically.

## 2024-08-26 NOTE — TELEPHONE ENCOUNTER
Dr. Lopez - Please see Echo 8/20/24.  Do you want to see patient earlier than scheduled appointment on 2/25/25?

## 2024-08-26 NOTE — TELEPHONE ENCOUNTER
Patient called to see if she can make a sooner appointment with Dr. Lopez. Patient isn't sure if she should wait for the appointment or if she should be seen sooner due to the results of the echocardiogram. Please call.

## 2024-08-27 ENCOUNTER — OFFICE VISIT (OUTPATIENT)
Dept: ORTHOPEDICS CLINIC | Facility: CLINIC | Age: 59
End: 2024-08-27

## 2024-08-27 VITALS — DIASTOLIC BLOOD PRESSURE: 85 MMHG | SYSTOLIC BLOOD PRESSURE: 143 MMHG | HEART RATE: 62 BPM

## 2024-08-27 DIAGNOSIS — M17.11 PRIMARY OSTEOARTHRITIS OF RIGHT KNEE: Primary | ICD-10-CM

## 2024-08-27 PROCEDURE — 20610 DRAIN/INJ JOINT/BURSA W/O US: CPT

## 2024-08-27 PROCEDURE — 99213 OFFICE O/P EST LOW 20 MIN: CPT

## 2024-08-27 RX ORDER — TRIAMCINOLONE ACETONIDE 40 MG/ML
40 INJECTION, SUSPENSION INTRA-ARTICULAR; INTRAMUSCULAR ONCE
Status: COMPLETED | OUTPATIENT
Start: 2024-08-27 | End: 2024-08-27

## 2024-08-27 RX ADMIN — TRIAMCINOLONE ACETONIDE 40 MG: 40 INJECTION, SUSPENSION INTRA-ARTICULAR; INTRAMUSCULAR at 11:27:00

## 2024-08-27 NOTE — TELEPHONE ENCOUNTER
Tamra Metz, STEPAN 8/26/2024 1:59 PM CDT    Requesting Mounjaro 5 mg  ----- Message -----  From: Dana Marsh  Sent: 8/26/2024 1:57 PM CDT  To: Carlos Eduardo Hernández Clinical Staff  Subject: Fredis     I have one more week supply of the 2.5 it has been eight weeks can we move up to 5? I am not in until second week of September. Thank you

## 2024-08-27 NOTE — PROGRESS NOTES
Per verbal order from Dr. Dimas, draw up 5ml of 0.5% Marcaine and 1ml of Kenalog 40 for cortisone injection to right knee Marsha Dixon  Patient provided education handout for cortisone injection.

## 2024-08-27 NOTE — PROGRESS NOTES
NURSING INTAKE COMMENTS:   Chief Complaint   Patient presents with    Knee Pain     Right knee pain. Patient wants cortisone injection. She rates her pain 3/10 at this time. Last cortisone injection was given on 4/3.          HPI: This 58 year old female presents today for follow-up of right knee pain.  Patient was last seen in our office in April 2023, which she received a cortisone injection.  She reports that the cortisone shot lasted over a year.  She said at that time she did have Medrol Dosepaks due to pain in her back, which she feels like could possibly help extend her shot.  She denies any numbness or tingling today.  Patient does have diabetes.  She does not take any coagulation.  She is taking Mounjaro but has not lost any weight at this time.    Past Medical History:    Abnormal vaginal bleeding    polypectomy    Anxiety    Depression    Diabetes (HCC)    Disorder of liver    Essential hypertension    Fibromyalgia    Gastropathy    Hemorrhoids    High blood pressure    High cholesterol    MCTD (mixed connective tissue disease) (HCC)    Mood disorder (HCC)    Other ill-defined conditions(799.89)    Per [employer]:  \"son and girlfriend with 4 kids moved in.\"    Sleep apnea    Spinal stenosis    Comments:  spondylosis.  Management:  surgery in 12/2009.    Visual impairment     Past Surgical History:   Procedure Laterality Date    Back surgery      Colonoscopy  2014    Colonoscopy N/A 11/6/2023    Procedure: COLONOSCOPY;  Surgeon: Clari Smith MD;  Location: [facility] ENDOSCOPY    Hysteroscopy,diagnostic  08/2006    polypectomy    Other surgical history  12/2009    Surgery (due to spinal stenosis/spondylosis).    Other surgical history Right     ankle surgery     Current Outpatient Medications   Medication Sig Dispense Refill    Tirzepatide (MOUNJARO) 5 MG/0.5ML Subcutaneous Solution Pen-injector Inject 5 mg into the skin once a week. 6 mL 0    Tirzepatide (MOUNJARO) 2.5 MG/0.5ML Subcutaneous Solution Pen-injector  Inject 2.5 mg into the skin once a week. 2 mL 0    Omeprazole 40 MG Oral Capsule Delayed Release Take 1 capsule (40 mg total) by mouth every morning before breakfast. Take 1 capsule by mouth daily before breakfast. 90 capsule 3    FREESTYLE LANCETS Does not apply Misc TEST DAILY AS DIRECTED 100 each 3    Glucose Blood (FREESTYLE LITE TEST) In Vitro Strip Use as directed. 100 strip 0    Blood Glucose Monitoring Suppl (FREESTYLE LITE) Does not apply Device 1 Device by Other route 2 (two) times daily. Use as directed. 1 each 3    hydroxychloroquine 200 MG Oral Tab Take 1 tablet (200 mg total) by mouth 2 (two) times daily. 180 tablet 3    cyclobenzaprine 10 MG Oral Tab Take 1 tablet (10 mg total) by mouth nightly as needed for Muscle spasms. 90 tablet 3    nabumetone 750 MG Oral Tab Take 1 tablet (750 mg total) by mouth daily. 90 tablet 3    metoprolol tartrate 50 MG Oral Tab Take 1 tablet (50 mg total) by mouth 2 (two) times daily. 180 tablet 0    atorvastatin 20 MG Oral Tab Take 1 tablet (20 mg total) by mouth nightly. 90 tablet 0    metFORMIN  MG Oral Tablet 24 Hr Take 1 tablet (750 mg total) by mouth 2 (two) times daily with meals. 180 tablet 0    losartan-hydroCHLOROthiazide 100-25 MG Oral Tab Take 1 tablet by mouth daily. 90 tablet 0    venlafaxine  MG Oral Capsule SR 24 Hr Take 1 capsule (150 mg total) by mouth daily. 90 capsule 0    traMADol 50 MG Oral Tab Take 1 tablet (50 mg total) by mouth every 8 (eight) hours as needed for Pain. 30 tablet 5    meclizine 25 MG Oral Tab Take 1 tablet (25 mg total) by mouth 3 (three) times daily as needed for Dizziness or Nausea. 20 tablet 0    Betamethasone Dipropionate Aug 0.05 % External Cream Apply 1 Application topically 2 (two) times daily. Apply to affected areas as needed bid 50 g 3    methylPREDNISolone 4 MG Oral Tablet Therapy Pack Take as directed on package. (Patient not taking: Reported on 6/5/2024) 1 each 0    methylPREDNISolone 4 MG Oral Tablet  Therapy Pack Take as directed on package. (Patient not taking: Reported on 2024) 1 each 0    Tirzepatide (MOUNJARO) 7.5 MG/0.5ML Subcutaneous Solution Pen-injector Inject 7.5 mg into the skin once a week. 6 mL 0    ondansetron 4 MG Oral Tablet Dispersible Take 1 tablet (4 mg total) by mouth every 8 (eight) hours as needed for Nausea. 30 tablet 0    lidocaine 5 % External Patch Place 2 patches onto the skin daily. 60 patch 0     Allergies   Allergen Reactions    No Known Allergies UNKNOWN     Family History   Problem Relation Age of Onset    Other (osteoarthritis) Mother     Diabetes Mother     Heart Disease Mother     Hypertension Mother     Stroke Mother     Heart Disease Father         CAD, (cause of death) age 49    Diabetes Father     Heart Attack Father     Other (psoriasis) Sister     Hypertension Sister     Stroke Maternal Grandmother         Cerebrovascular accident (Stroke)    Diabetes Maternal Grandfather     Heart Disease Paternal Grandmother         CAD    Diabetes Paternal Grandfather     Breast Cancer Maternal Aunt         70s     Heart Disease Maternal Aunt         CAD    Heart Disease Maternal Aunt     Breast Cancer Other 55        cousin     No family Hx of DVT/PE    Social History     Occupational History    Not on file   Tobacco Use    Smoking status: Former     Current packs/day: 0.00     Types: Cigarettes     Quit date: 1989     Years since quittin.6    Smokeless tobacco: Never   Vaping Use    Vaping status: Never Used   Substance and Sexual Activity    Alcohol use: Yes     Alcohol/week: 2.0 standard drinks of alcohol     Types: 2 Standard drinks or equivalent per week     Comment: Occasionally    Drug use: Yes     Frequency: 7.0 times per week     Types: Cannabis     Comment: Marijuana for pain nightly    Sexual activity: Yes     Partners: Male        Review of Systems:  GENERAL: feels generally well, no significant weight loss or weight gain  SKIN: no ulcerated or worrisome skin  lesions  EYES:denies blurred vision or double vision  HEENT: denies new nasal congestion, sinus pain or ST  LUNGS: denies shortness of breath  CARDIOVASCULAR: denies chest pain  GI: no hematemesis, no worsening heartburn, no diarrhea  : no dysuria, no blood in urine, no difficulty urinating, no incontinence  MUSCULOSKELETAL: no other musculoskeletal complaints other than in HPI  NEURO: no numbness or tingling, no weakness or balance disorder  PSYCHE: no depression or anxiety  HEMATOLOGIC: no hx of blood dyscrasia, no Hx DVT/PE  ENDOCRINE: no thyroid or diabetes issues  ALL/ASTHMA: no new hx of severe allergy or asthma    Physical Examination:    /85 (BP Location: Right arm, Patient Position: Sitting, Cuff Size: adult)   Pulse 62   LMP 09/06/2013 (Approximate)   Constitutional: appears well hydrated, alert and responsive, no acute distress noted  Extremities: Lower extremity skin healthy, no pitting edema.  Calf soft nontender.  Musculoskeletal: Right knee range of motion 0 to 125 degrees, no instability.  Tenderness to medial joint line.  No tenderness lateral joint line, pes bursa, or Karyn's tubercle.  Able to straight leg raise against resistance.  Patellofemoral grind negative for crepitus or pain today.  Neurological: Motor and sensory function intact.    Imaging: None taken today.      [facility] OREN 2D+3D SCREENING BILAT (CPT=77067/02880)    Result Date: 8/22/2024  PROCEDURE: [facility] OREN 2D+3D SCREENING BILAT ([id number])  COMPARISON: [facility], [facility] OREN 2D+3D DIAGNOSTIC JOSHAU LEFT (CPT=77065/96992), 7/19/2019, 9:05 AM.  [facility], [facility] OREN 2D+3D DIAGNOSTIC JOSHUA BILAT (CPT=77066/85409), 1/08/2020, 7:59 AM.  External Exams, [facility] OREN 2D+3D DIAGNOSTIC JOSHUA BILAT (LFS=[id number]), 8/13/2020, 2:02 PM.  [facility], [facility] OREN 2D+3D SCREENING BILAT ([id number]), 3/07/2022, 4:33 PM.  INDICATIONS: K21.9 Gastroesophageal reflux disease,  unspecified whether esophagitis present M35.1 MCTD (mixed connective tissue disease) (HCC) E11.9 Type 2 diabetes mellitu*  TECHNIQUE:  Full field direct screening mammography was performed and images were reviewed with the [employer]ix 1.5.1.5 CAD device.  3D tomosynthesis was performed and reviewed   BREAST COMPOSITION:   Category c-Heterogeneously dense, which may obscure small masses.   FINDINGS: There is no suspicious asymmetry, mass, architectural distortion, or microcalcifications identified in either breast.           CONCLUSION:   There is no mammographic evidence of malignancy in either breast. As long as patient's clinical breast exam remains unchanged, annual screening mammogram is recommended.  BI-RADS CATEGORY:   DIAGNOSTIC CATEGORY 1--NEGATIVE ASSESSMENT.   RECOMMENDATIONS:  ROUTINE MAMMOGRAM AND CLINICAL EVALUATION IN 12 MONTHS.       PLEASE NOTE: NORMAL MAMMOGRAM DOES NOT EXCLUDE THE POSSIBILITY OF BREAST CANCER.  A CLINICALLY SUSPICIOUS PALPABLE LUMP SHOULD BE BIOPSIED.   For patients over the age of 40, the target due date for the patient's next mammogram has been entered into a reminder system.   Patient received a discharge summary from the technologist after completion of exam.  Breast marker legend used on images  Triangle = Palpable lump [place] = Skin tag or mole BB = Nipple Linear navya = [place] = Pain    Dictated by (CST): Sharon Paula DO on 2024 at 1:44 PM     Finalized by (CST): Sharon Paula DO on 2024 at 1:44 PM          CARD ECHO 2D DOPPLER (CPT=93306)    Result Date: 2024  Transthoracic Echocardiogram Name:Dana Marsh Date: 2024 :  1965 Ht:  (61in)  BP: 130 / 80 MRN:  [id number]    Age:  58years    Wt:  (190lb) HR: 75bpm Loc:  EM       Gndr: F          BSA: 1.85m^2 Sonographer: Bill KNOX RVT Ordering:    Hay Lynn Referring:   Hay Lynn ----------------------------------------------------------------------------  History/Indications:  Mixed connective tissue disease. Dyspnea on exertion. ---------------------------------------------------------------------------- Procedure information:  A transthoracic complete 2D study was performed. Additional evaluation included M-mode, complete spectral Doppler, and color Doppler.  Patient status:  Outpatient.  Location:  Echo laboratory. Comparison was made to the study of 05/05/2018.    This was a routine study. Transthoracic echocardiography for diagnosis, ventricular function evaluation, and assessment of valvular function. Image quality was adequate. ECG rhythm:   Normal sinus ---------------------------------------------------------------------------- Conclusions: 1. Left ventricle: The cavity size was normal. Wall thickness was mildly to    moderately increased. Systolic function was normal. The estimated    ejection fraction was 60-65%, by 3D assessment. No diagnostic evidence    for regional wall motion abnormalities. Left ventricular diastolic    function parameters were normal. 2. Left atrium: The left atrial volume was normal. 3. Right atrium: The atrium was mildly dilated. 4. Ascending aorta: The ascending aorta was dilated and 3.5cm diameter. 5. Pulmonary arteries: Systolic pressure was mildly increased, estimated to    be 39mm Hg. Impressions:  This study is compared with previous dated 05/04/2018: * ---------------------------------------------------------------------------- * Findings: Left ventricle:  The cavity size was normal. Wall thickness was mildly to moderately increased. Systolic function was normal. The estimated ejection fraction was 60-65%, by 3D assessment. No diagnostic evidence for regional wall motion abnormalities. Left ventricular diastolic function parameters were normal. Left atrium:  The left atrial volume was normal. Right ventricle:  The cavity size was normal. Systolic function was normal. Right atrium:  The atrium was mildly dilated. Mitral  valve:  The valve was structurally normal. Leaflet separation was normal.  Doppler:  Transvalvular velocity was within the normal range. There was no evidence for stenosis. There was trivial regurgitation. Aortic valve:  The valve was structurally normal. The valve was trileaflet. Cusp separation was normal.  Doppler:  Transvalvular velocity was within the normal range. There was no evidence for stenosis. There was no significant regurgitation. Tricuspid valve:  The valve is structurally normal. Leaflet separation was normal.  Doppler:  Transvalvular velocity was within the normal range. There was no evidence for stenosis. There was mild regurgitation. Pulmonic valve:   The valve is structurally normal. Cusp separation was normal.  Doppler:  Transvalvular velocity was within the normal range. There was no evidence for stenosis. There was trivial regurgitation. Pericardium:   There was no pericardial effusion. Aorta: Aortic root: The aortic root was normal. Ascending aorta: The ascending aorta was dilated and 3.5cm diameter. Pulmonary arteries: Systolic pressure was mildly increased, estimated to be 39mm Hg. Systemic veins:  Central venous respirophasic diameter changes are blunted (< 50%). Inferior vena cava: The IVC was dilated. ---------------------------------------------------------------------------- Measurements  Left ventricle                    Value        Ref  LV end-diastolic volume, 3D       120   ml     ---------  LV end-systolic volume, 3D        45    ml     ---------  LV ejection fraction, 3D          62    %      54 - 74  LV end-diastolic volume/bsa,      65    ml/m^2 <=71  3D  LV end-systolic volume/bsa,       24    ml/m^2 <=28  3D  LV wall mass, 3D                  130   g      ---------  LV wall mass/bsa, 3D              70    g/m^2  ---------  IVS thickness, ED, PLAX       (H) 1.3   cm     0.6 - 0.9  LV ID, ED, PLAX               (L) 3.3   cm     3.8 - 5.2  LV ID, ES, PLAX               (L) 2.1    cm     2.2 - 3.5  LV PW thickness, ED, PLAX     (H) 1.4   cm     0.6 - 0.9  IVS/LV PW ratio, ED, PLAX         0.93         ---------  LV PW/LV ID ratio, ED, PLAX       0.42         ---------  LV ejection fraction              67    %      54 - 74  Stroke volume/bsa, 2D             39    ml/m^2 ---------  LV e', lateral                    13.9  cm/sec >=10.0  LV E/e', lateral                  6            <=13  LV e', medial                     10.3  cm/sec >=7.0  LV E/e', medial                   8            ---------  LV e', average                    12.1  cm/sec ---------  LV E/e', average                  7            <=14  LVOT                              Value        Ref  LVOT ID                           2.1   cm     ---------  LVOT peak velocity, S             0.94  m/sec  ---------  LVOT VTI, S                       21.0  cm     ---------  LVOT peak gradient, S             4     mm Hg  ---------  LVOT mean gradient, S             2     mm Hg  ---------  Stroke volume (SV), LVOT DP       73    ml     ---------  Stroke index (SV/bsa), LVOT       39    ml/m^2 ---------  DP  Aortic root                       Value        Ref  Aortic root ID                    3.1   cm     2.6 - 4.0  Ascending aorta                   Value        Ref  Ascending aorta ID                3.5   cm     1.9 - 3.5  Left atrium                       Value        Ref  LA ID, A-P, ES                (H) 4.0   cm     2.7 - 3.8  LA volume, S                  (H) 57    ml     22 - 52  LA volume/bsa, S                  31    ml/m^2 16 - 34  LA volume, ES, 1-p A4C        (H) 59    ml     22 - 52  LA volume, ES, 1-p A2C        (H) 55    ml     22 - 52  LA volume, ES, A/L                65    ml     ---------  LA volume/bsa, ES, A/L        (H) 35    ml/m^2 16 - 34  LA/aortic root ratio              1.29         ---------  LA volume, ES, 3D                 51    ml     22 - 52  LA volume/bsa, ES, 3D             28    ml/m^2 ---------  Mitral  valve                      Value        Ref  Mitral E-wave peak velocity       0.86  m/sec  ---------  Mitral A-wave peak velocity       0.74  m/sec  ---------  Mitral deceleration time          174   ms     ---------  Mitral peak gradient, D           3     mm Hg  ---------  Mitral E/A ratio, peak            1.2          ---------  Pulmonary artery                  Value        Ref  PA pressure, S, DP                39    mm Hg  ---------  Tricuspid valve                   Value        Ref  Tricuspid regurg peak             2.45  m/sec  <=2.8  velocity  Tricuspid peak RV-RA gradient     24    mm Hg  ---------  Systemic veins                    Value        Ref  Estimated CVP                     15    mm Hg  ---------  Inferior vena cava                Value        Ref  ID                            (H) 2.3   cm     <=2.1  Right ventricle                   Value        Ref  TAPSE, MM                         2.83  cm     >=1.70  RV pressure, S, DP                39    mm Hg  ---------  RV s', lateral                    15.2  cm/sec >=9.5 Legend: (L)  and  (H)  navya values outside specified reference range. ---------------------------------------------------------------------------- Prepared and electronically signed by Radha West 08/20/2024 13:30        Lab Results   Component Value Date    WBC 7.3 10/24/2023    HGB 12.4 10/24/2023    .0 10/24/2023      Lab Results   Component Value Date     (H) 10/24/2023    BUN 13 10/24/2023    CREATSERUM 0.85 10/24/2023    GFRNAA 92 04/25/2022    GFRAA 107 04/25/2022        Assessment and Plan:  Diagnoses and all orders for this visit:    Primary osteoarthritis of right knee  -     Large joint - right aspiration/injection  -     triamcinolone acetonide (Kenalog-40) 40 MG/ML injection 40 mg        Assessment: As above    Plan: Wanted another cortisone injection today.  Discussed with the patient since she is diabetic this injection could raise her glucose.  If her  glucose goes over 300 she should call her primary care provider.    Aspiration of right knee is negative.  5 cc 0.5% bupivacaine, 1 cc Kenalog injected, patient tolerated injection well.  Discussed with her if she has any irritation she can use Tylenol as needed as well as ice with washcloth.  Discussed she can have this injection every 4 months, but should extend it further if possible.  We will see her as needed at this time.    At her next visit she will require new x-rays of her right knee.    Follow Up: No follow-ups on file.    CLARICE Rubalcava

## 2024-09-16 ENCOUNTER — OFFICE VISIT (OUTPATIENT)
Dept: INTERNAL MEDICINE CLINIC | Facility: CLINIC | Age: 59
End: 2024-09-16
Payer: COMMERCIAL

## 2024-09-16 VITALS
HEART RATE: 77 BPM | DIASTOLIC BLOOD PRESSURE: 68 MMHG | BODY MASS INDEX: 35.87 KG/M2 | OXYGEN SATURATION: 99 % | WEIGHT: 190 LBS | HEIGHT: 61 IN | SYSTOLIC BLOOD PRESSURE: 108 MMHG

## 2024-09-16 DIAGNOSIS — E61.1 IRON DEFICIENCY: ICD-10-CM

## 2024-09-16 DIAGNOSIS — I10 PRIMARY HYPERTENSION: ICD-10-CM

## 2024-09-16 DIAGNOSIS — R00.2 PALPITATION: ICD-10-CM

## 2024-09-16 DIAGNOSIS — E61.1 IRON DEFICIENCY: Primary | ICD-10-CM

## 2024-09-16 DIAGNOSIS — E11.9 TYPE 2 DIABETES MELLITUS WITHOUT COMPLICATION, UNSPECIFIED WHETHER LONG TERM INSULIN USE (HCC): Primary | ICD-10-CM

## 2024-09-16 DIAGNOSIS — M35.1 MIXED CONNECTIVE TISSUE DISEASE (HCC): ICD-10-CM

## 2024-09-16 DIAGNOSIS — E66.01 CLASS 2 SEVERE OBESITY DUE TO EXCESS CALORIES WITH SERIOUS COMORBIDITY AND BODY MASS INDEX (BMI) OF 37.0 TO 37.9 IN ADULT (HCC): ICD-10-CM

## 2024-09-16 LAB
ALBUMIN SERPL-MCNC: 4.5 G/DL (ref 3.2–4.8)
ALBUMIN/GLOB SERPL: 1.7 {RATIO} (ref 1–2)
ALP LIVER SERPL-CCNC: 67 U/L
ALT SERPL-CCNC: 12 U/L
ANION GAP SERPL CALC-SCNC: 7 MMOL/L (ref 0–18)
AST SERPL-CCNC: 12 U/L (ref ?–34)
BASOPHILS # BLD AUTO: 0.04 X10(3) UL (ref 0–0.2)
BASOPHILS NFR BLD AUTO: 0.8 %
BILIRUB SERPL-MCNC: 0.3 MG/DL (ref 0.2–1.1)
BUN BLD-MCNC: 14 MG/DL (ref 9–23)
BUN/CREAT SERPL: 16.7 (ref 10–20)
CALCIUM BLD-MCNC: 10.1 MG/DL (ref 8.7–10.4)
CHLORIDE SERPL-SCNC: 103 MMOL/L (ref 98–112)
CHOLEST SERPL-MCNC: 146 MG/DL (ref ?–200)
CO2 SERPL-SCNC: 29 MMOL/L (ref 21–32)
CREAT BLD-MCNC: 0.84 MG/DL
CREAT UR-SCNC: 234.5 MG/DL
CRP SERPL-MCNC: <0.4 MG/DL (ref ?–1)
DEPRECATED HBV CORE AB SER IA-ACNC: 27.8 NG/ML
DEPRECATED RDW RBC AUTO: 39.8 FL (ref 35.1–46.3)
EGFRCR SERPLBLD CKD-EPI 2021: 79 ML/MIN/1.73M2 (ref 60–?)
EOSINOPHIL # BLD AUTO: 0.11 X10(3) UL (ref 0–0.7)
EOSINOPHIL NFR BLD AUTO: 2.2 %
ERYTHROCYTE [DISTWIDTH] IN BLOOD BY AUTOMATED COUNT: 11.7 % (ref 11–15)
ERYTHROCYTE [SEDIMENTATION RATE] IN BLOOD: 20 MM/HR
EST. AVERAGE GLUCOSE BLD GHB EST-MCNC: 131 MG/DL (ref 68–126)
FASTING PATIENT LIPID ANSWER: YES
FASTING STATUS PATIENT QL REPORTED: YES
GLOBULIN PLAS-MCNC: 2.7 G/DL (ref 2–3.5)
GLUCOSE BLD-MCNC: 118 MG/DL (ref 70–99)
HBA1C MFR BLD: 6.2 % (ref ?–5.7)
HCT VFR BLD AUTO: 34.2 %
HDLC SERPL-MCNC: 45 MG/DL (ref 40–59)
HGB BLD-MCNC: 11.1 G/DL
IMM GRANULOCYTES # BLD AUTO: 0.03 X10(3) UL (ref 0–1)
IMM GRANULOCYTES NFR BLD: 0.6 %
IRON SATN MFR SERPL: 16 %
IRON SERPL-MCNC: 74 UG/DL
LDLC SERPL CALC-MCNC: 80 MG/DL (ref ?–100)
LYMPHOCYTES # BLD AUTO: 2.61 X10(3) UL (ref 1–4)
LYMPHOCYTES NFR BLD AUTO: 51.4 %
MCH RBC QN AUTO: 30 PG (ref 26–34)
MCHC RBC AUTO-ENTMCNC: 32.5 G/DL (ref 31–37)
MCV RBC AUTO: 92.4 FL
MICROALBUMIN UR-MCNC: 2.1 MG/DL
MICROALBUMIN/CREAT 24H UR-RTO: 9 UG/MG (ref ?–30)
MONOCYTES # BLD AUTO: 0.47 X10(3) UL (ref 0.1–1)
MONOCYTES NFR BLD AUTO: 9.3 %
NEUTROPHILS # BLD AUTO: 1.82 X10 (3) UL (ref 1.5–7.7)
NEUTROPHILS # BLD AUTO: 1.82 X10(3) UL (ref 1.5–7.7)
NEUTROPHILS NFR BLD AUTO: 35.7 %
NONHDLC SERPL-MCNC: 101 MG/DL (ref ?–130)
OSMOLALITY SERPL CALC.SUM OF ELEC: 290 MOSM/KG (ref 275–295)
PLATELET # BLD AUTO: 350 10(3)UL (ref 150–450)
POTASSIUM SERPL-SCNC: 4.4 MMOL/L (ref 3.5–5.1)
PROT SERPL-MCNC: 7.2 G/DL (ref 5.7–8.2)
RBC # BLD AUTO: 3.7 X10(6)UL
SODIUM SERPL-SCNC: 139 MMOL/L (ref 136–145)
TIBC SERPL-MCNC: 477 UG/DL (ref 250–425)
TRANSFERRIN SERPL-MCNC: 320 MG/DL (ref 250–380)
TRIGL SERPL-MCNC: 115 MG/DL (ref 30–149)
TSI SER-ACNC: 1.6 MIU/ML (ref 0.55–4.78)
VLDLC SERPL CALC-MCNC: 18 MG/DL (ref 0–30)
WBC # BLD AUTO: 5.1 X10(3) UL (ref 4–11)

## 2024-09-16 PROCEDURE — 82728 ASSAY OF FERRITIN: CPT | Performed by: INTERNAL MEDICINE

## 2024-09-16 PROCEDURE — 85652 RBC SED RATE AUTOMATED: CPT | Performed by: INTERNAL MEDICINE

## 2024-09-16 PROCEDURE — 99214 OFFICE O/P EST MOD 30 MIN: CPT | Performed by: INTERNAL MEDICINE

## 2024-09-16 PROCEDURE — 85025 COMPLETE CBC W/AUTO DIFF WBC: CPT | Performed by: INTERNAL MEDICINE

## 2024-09-16 PROCEDURE — 80053 COMPREHEN METABOLIC PANEL: CPT | Performed by: INTERNAL MEDICINE

## 2024-09-16 PROCEDURE — 86140 C-REACTIVE PROTEIN: CPT | Performed by: INTERNAL MEDICINE

## 2024-09-16 PROCEDURE — 82570 ASSAY OF URINE CREATININE: CPT | Performed by: INTERNAL MEDICINE

## 2024-09-16 PROCEDURE — 80061 LIPID PANEL: CPT | Performed by: INTERNAL MEDICINE

## 2024-09-16 PROCEDURE — 84443 ASSAY THYROID STIM HORMONE: CPT | Performed by: INTERNAL MEDICINE

## 2024-09-16 PROCEDURE — 83036 HEMOGLOBIN GLYCOSYLATED A1C: CPT | Performed by: INTERNAL MEDICINE

## 2024-09-16 PROCEDURE — 83540 ASSAY OF IRON: CPT | Performed by: INTERNAL MEDICINE

## 2024-09-16 PROCEDURE — 84466 ASSAY OF TRANSFERRIN: CPT | Performed by: INTERNAL MEDICINE

## 2024-09-16 PROCEDURE — 82043 UR ALBUMIN QUANTITATIVE: CPT | Performed by: INTERNAL MEDICINE

## 2024-09-16 NOTE — PROGRESS NOTES
Dana Marsh is a 59 year old female.    Chief complaint:weight loss follow up , medication refill, therapeutic drug monitoring    HPI:   DANA here for follow up on weight loss   Wt Readings from Last 12 Encounters:   09/16/24 190 lb (86.2 kg)   07/09/24 190 lb (86.2 kg)   06/17/24 188 lb (85.3 kg)   06/05/24 190 lb (86.2 kg)   05/23/24 193 lb (87.5 kg)   04/17/24 192 lb (87.1 kg)   03/26/24 192 lb 12.8 oz (87.5 kg)   03/14/24 191 lb (86.6 kg)   03/07/24 186 lb (84.4 kg)   11/22/23 189 lb 9.6 oz (86 kg)   11/06/23 180 lb (81.6 kg)   10/24/23 185 lb (83.9 kg)     Starting weight: 205 lbs     Total weight loss: 15 lbs     Medication: Yes: Mounjaro     Typical diet   Breakfast: Lunch: Dinner: Snacks:   Fasting until noon    Doesn't eat a lot at lunch   Sometimes she will eat half a sandwich   Wrap    Balanced   Salad   A little bit of potatoes   A lot of veggies   Meat    Snacking on tiny chocolate bars at night   Now not as much   Pop tarts   Potatoes chips        Started using the carb kike again         Exercise: No, walking with her     Challenges: night eating   Stress eating     Side effect of medication: no  Score to self ( how well she is doing ):5/10         Knee OA   S/p steroid injection   Had to get 2 shots in the knee      Recently   Has been having a lot of issues with the heart rate   She is going for a holter monitor on the 17th  Did see the cardiologist           HTN   On metoprolol   Losartan hydrochlorothiazide      Current Outpatient Medications   Medication Sig Dispense Refill    Tirzepatide (MOUNJARO) 5 MG/0.5ML Subcutaneous Solution Pen-injector Inject 5 mg into the skin once a week. 6 mL 0    Tirzepatide (MOUNJARO) 2.5 MG/0.5ML Subcutaneous Solution Pen-injector Inject 2.5 mg into the skin once a week. 2 mL 0    Omeprazole 40 MG Oral Capsule Delayed Release Take 1 capsule (40 mg total) by mouth every morning before breakfast. Take 1 capsule by mouth daily before breakfast. 90  capsule 3    FREESTYLE LANCETS Does not apply Misc TEST DAILY AS DIRECTED 100 each 3    Glucose Blood (FREESTYLE LITE TEST) In Vitro Strip Use as directed. 100 strip 0    Blood Glucose Monitoring Suppl (FREESTYLE LITE) Does not apply Device 1 Device by Other route 2 (two) times daily. Use as directed. 1 each 3    hydroxychloroquine 200 MG Oral Tab Take 1 tablet (200 mg total) by mouth 2 (two) times daily. 180 tablet 3    cyclobenzaprine 10 MG Oral Tab Take 1 tablet (10 mg total) by mouth nightly as needed for Muscle spasms. 90 tablet 3    nabumetone 750 MG Oral Tab Take 1 tablet (750 mg total) by mouth daily. 90 tablet 3    metoprolol tartrate 50 MG Oral Tab Take 1 tablet (50 mg total) by mouth 2 (two) times daily. 180 tablet 0    atorvastatin 20 MG Oral Tab Take 1 tablet (20 mg total) by mouth nightly. 90 tablet 0    metFORMIN  MG Oral Tablet 24 Hr Take 1 tablet (750 mg total) by mouth 2 (two) times daily with meals. 180 tablet 0    losartan-hydroCHLOROthiazide 100-25 MG Oral Tab Take 1 tablet by mouth daily. 90 tablet 0    venlafaxine  MG Oral Capsule SR 24 Hr Take 1 capsule (150 mg total) by mouth daily. 90 capsule 0    traMADol 50 MG Oral Tab Take 1 tablet (50 mg total) by mouth every 8 (eight) hours as needed for Pain. 30 tablet 5    meclizine 25 MG Oral Tab Take 1 tablet (25 mg total) by mouth 3 (three) times daily as needed for Dizziness or Nausea. 20 tablet 0    Betamethasone Dipropionate Aug 0.05 % External Cream Apply 1 Application topically 2 (two) times daily. Apply to affected areas as needed bid 50 g 3      Past Medical History:    Abnormal vaginal bleeding    polypectomy    Anxiety    Depression    Diabetes (HCC)    Disorder of liver    Essential hypertension    Fibromyalgia    Gastropathy    Hemorrhoids    High blood pressure    High cholesterol    MCTD (mixed connective tissue disease) (HCC)    Mood disorder (HCC)    Other ill-defined conditions(356.89)    Per [employer]:  \"son and  girlfriend with 4 kids moved in.\"    Sleep apnea    Spinal stenosis    Comments:  spondylosis.  Management:  surgery in 2009.    Visual impairment     Past Surgical History:   Procedure Laterality Date    Back surgery      Colonoscopy  2014    Colonoscopy N/A 2023    Procedure: COLONOSCOPY;  Surgeon: Clari Smith MD;  Location: [facility] ENDOSCOPY    Hysteroscopy,diagnostic  2006    polypectomy    Other surgical history  2009    Surgery (due to spinal stenosis/spondylosis).    Other surgical history Right     ankle surgery        Social History:  Social History     Socioeconomic History    Marital status:    Tobacco Use    Smoking status: Former     Current packs/day: 0.00     Types: Cigarettes     Quit date: 1989     Years since quittin.6    Smokeless tobacco: Never   Vaping Use    Vaping status: Never Used   Substance and Sexual Activity    Alcohol use: Yes     Alcohol/week: 2.0 standard drinks of alcohol     Types: 2 Standard drinks or equivalent per week     Comment: Occasionally    Drug use: Yes     Frequency: 7.0 times per week     Types: Cannabis     Comment: Marijuana for pain nightly    Sexual activity: Yes     Partners: Male   Other Topics Concern    Caffeine Concern Yes     Comment: (Coffee, Soda) 2 cups daily    Pt has a pacemaker No    Pt has a defibrillator No    Reaction to local anesthetic No        Family History   Problem Relation Age of Onset    Other (osteoarthritis) Mother     Diabetes Mother     Heart Disease Mother     Hypertension Mother     Stroke Mother     Heart Disease Father         CAD, (cause of death) age 49    Diabetes Father     Heart Attack Father     Other (psoriasis) Sister     Hypertension Sister     Stroke Maternal Grandmother         Cerebrovascular accident (Stroke)    Diabetes Maternal Grandfather     Heart Disease Paternal Grandmother         CAD    Diabetes Paternal Grandfather     Breast Cancer Maternal Aunt         70s     Heart Disease Maternal  Aunt         CAD    Heart Disease Maternal Aunt     Breast Cancer Other 55        cousin     Patient Active Problem List   Diagnosis    NAFLD (nonalcoholic fatty liver disease)    Gallbladder polyp    Gastropathy    Routine health maintenance    Essential hypertension    Irritable bowel syndrome with diarrhea    Fibromyalgia    Mixed connective tissue disease (HCC)    Allergic rhinitis    Recurrent UTI    Obesity (BMI 30-39.9)    Dietary counseling and surveillance    Hyperlipidemia    Snoring    Fatigue    Postcoital bleeding    Nausea and vomiting    Constipation    Abnormal urine    Disorder of connective tissue (HCC)    Excessive or frequent menstruation    External hemorrhoids    Leukorrhea    Lumbosacral spondylosis without myelopathy    Metrorrhagia    Palpitations    Shortness of breath    Irregular menstrual cycle    Tachycardia    Type 2 diabetes mellitus without complication (HCC)    Vaginitis and vulvovaginitis    Primary fibromyalgia syndrome    Polyp of corpus uteri    MCTD (mixed connective tissue disease) (HCC)    Nonalcoholic fatty liver disease    Annual physical exam    Gall bladder polyp    Dizziness    Petechiae    Skin rash    Class 2 severe obesity due to excess calories with serious comorbidity and body mass index (BMI) of 37.0 to 37.9 in adult (HCC)    Dysphagia    MCI (mild cognitive impairment)    Sleep disorder breathing    Cervical radicular pain    Gastroesophageal reflux disease    Screening for cervical cancer    Mixed diabetic hyperlipidemia associated with type 2 diabetes mellitus (HCC)    TE (obstructive sleep apnea)    Insomnia    Depression               REVIEW OF SYSTEMS:   A comprehensive 10 point review of systems was completed.  Pertinent positives and negatives noted in the the HPI          PHYSICAL EXAM:   /68   Pulse 77   Ht 5' 1\" (1.549 m)   Wt 190 lb (86.2 kg)   LMP 09/06/2013 (Approximate)   SpO2 99%   BMI 35.90 kg/m²   GENERAL: well developed, well  nourished,in no apparent distress  LUNGS: clear to auscultation  CARDIO: RRR without murmur  NEURO: no gross deficits              No orders of the defined types were placed in this encounter.        ASSESSMENT/PLAN:       ICD-10-CM    1. Type 2 diabetes mellitus without complication, unspecified whether long term insulin use ([facility])  E11.9 CBC With Differential With Platelet     Comp Metabolic Panel (14)     Lipid Panel     Hemoglobin A1C     TSH W Reflex To Free T4     Ferritin     Iron And Tibc     Microalb/Creat Ratio, Random Urine [E]     Sed Rate, Westergren (Automated)     C-Reactive Protein [E]     Metanephrines, Plasma Free [E]     CBC With Differential With Platelet     Comp Metabolic Panel (14)     Lipid Panel     Hemoglobin A1C     TSH W Reflex To Free T4     Ferritin     Iron And Tibc     Microalb/Creat Ratio, Random Urine [E]     Sed Rate, Westergren (Automated)     C-Reactive Protein [E]      2. Mixed connective tissue disease ([facility])  M35.1 CBC With Differential With Platelet     Comp Metabolic Panel (14)     Lipid Panel     Hemoglobin A1C     TSH W Reflex To Free T4     Ferritin     Iron And Tibc     Microalb/Creat Ratio, Random Urine [E]     Sed Rate, Westergren (Automated)     C-Reactive Protein [E]     Metanephrines, Plasma Free [E]     CBC With Differential With Platelet     Comp Metabolic Panel (14)     Lipid Panel     Hemoglobin A1C     TSH W Reflex To Free T4     Ferritin     Iron And Tibc     Microalb/Creat Ratio, Random Urine [E]     Sed Rate, Westergren (Automated)     C-Reactive Protein [E]      3. Class 2 severe obesity due to excess calories with serious comorbidity and body mass index (BMI) of 37.0 to 37.9 in adult ([facility])  E66.01 CBC With Differential With Platelet    Z68.37 Comp Metabolic Panel (14)     Lipid Panel     Hemoglobin A1C     TSH W Reflex To Free T4     Ferritin     Iron And Tibc     Microalb/Creat Ratio, Random Urine [E]     Sed Rate, Westergren (Automated)     C-Reactive  Protein [E]     Metanephrines, Plasma Free [E]     CBC With Differential With Platelet     Comp Metabolic Panel (14)     Lipid Panel     Hemoglobin A1C     TSH W Reflex To Free T4     Ferritin     Iron And Tibc     Microalb/Creat Ratio, Random Urine [E]     Sed Rate, Westergren (Automated)     C-Reactive Protein [E]      4. Primary hypertension  I10 CBC With Differential With Platelet     Comp Metabolic Panel (14)     Lipid Panel     Hemoglobin A1C     TSH W Reflex To Free T4     Ferritin     Iron And Tibc     Microalb/Creat Ratio, Random Urine [E]     Sed Rate, Westergren (Automated)     C-Reactive Protein [E]     Metanephrines, Plasma Free [E]     CBC With Differential With Platelet     Comp Metabolic Panel (14)     Lipid Panel     Hemoglobin A1C     TSH W Reflex To Free T4     Ferritin     Iron And Tibc     Microalb/Creat Ratio, Random Urine [E]     Sed Rate, Westergren (Automated)     C-Reactive Protein [E]      5. Iron deficiency  E61.1 CBC With Differential With Platelet     Comp Metabolic Panel (14)     Lipid Panel     Hemoglobin A1C     TSH W Reflex To Free T4     Ferritin     Iron And Tibc     Microalb/Creat Ratio, Random Urine [E]     Sed Rate, Westergren (Automated)     C-Reactive Protein [E]     Metanephrines, Plasma Free [E]     CBC With Differential With Platelet     Comp Metabolic Panel (14)     Lipid Panel     Hemoglobin A1C     TSH W Reflex To Free T4     Ferritin     Iron And Tibc     Microalb/Creat Ratio, Random Urine [E]     Sed Rate, Westergren (Automated)     C-Reactive Protein [E]      6. Palpitation  R00.2 CBC With Differential With Platelet     Comp Metabolic Panel (14)     Lipid Panel     Hemoglobin A1C     TSH W Reflex To Free T4     Ferritin     Iron And Tibc     Microalb/Creat Ratio, Random Urine [E]     Sed Rate, Westergren (Automated)     C-Reactive Protein [E]     Metanephrines, Plasma Free [E]     CBC With Differential With Platelet     Comp Metabolic Panel (14)     Lipid Panel      Hemoglobin A1C     TSH W Reflex To Free T4     Ferritin     Iron And Tibc     Microalb/Creat Ratio, Random Urine [E]     Sed Rate, Westergren (Automated)     C-Reactive Protein [E]         Plan:  Patient has lost weight # since LOV. Patient has lost a total weight loss of 15 lbs # since first weight loss consult.  Labs reviewed  Advised to continue with low carb diet   Goal is less than 50 g per day   Advised to read nutrition labels  Log in carbs if possible   Increase protein intake   exercise goal is 1 hour 3 times per week cardio and strength training   discussed challenges with weight loss   Weigh once a week at least   Avoid sugary drinks or artificially sweetened drinks  Water intake goal is 64 oz per day   Goal weight loss is 9 lbs in 3 months   Continue Mounjaro as tolerated   Blood work   Follow up with the cardiologist         Plan:  Nutrition: low carb diet   Referral RD/nutritionist : no  Behavior:  Motivational interviewing performed      Discussed strategies to overcome habits/challenges       Reviewed:  Nutrition and the importance of regular protein intake  Labs ordered:    no  Treatment plan     Please return to the clinic if you are having persistent or worsening symptoms   Remy Rojas MD,   Diplomate of the American Board of Internal Medicine  Diplomate of the American Board of Obesity Medicine

## 2024-09-17 ENCOUNTER — PATIENT MESSAGE (OUTPATIENT)
Dept: RHEUMATOLOGY | Facility: CLINIC | Age: 59
End: 2024-09-17

## 2024-09-17 NOTE — TELEPHONE ENCOUNTER
Please advice regarding Tramadol refill    LOV: 4/17/24   Last Refilled:12/20/23 #[id number]

## 2024-09-17 NOTE — TELEPHONE ENCOUNTER
From: Dana Marsh  To: Hay Lynn  Sent: 9/17/2024 9:50 AM CDT  Subject: Tramadol    Hi Dr ZENDEJAS. I cannot not find my prescription of tramadol. I had it on vacation and now it is no where to be found. My s boulder is killing me and I need one. Anyway it can be filled?

## 2024-09-18 ENCOUNTER — PATIENT MESSAGE (OUTPATIENT)
Dept: PHYSICAL MEDICINE AND REHAB | Facility: CLINIC | Age: 59
End: 2024-09-18

## 2024-09-18 NOTE — TELEPHONE ENCOUNTER
From: Dana Marsh  To: GUILLE ROSA  Sent: 9/18/2024 1:27 PM CDT  Subject: Arm pain    Last time I was in Dr. told me to come see him next time when pain was happening. It is bad right now and next available is not until October. Please advise

## 2024-09-28 RX ORDER — ATORVASTATIN CALCIUM 20 MG/1
20 TABLET, FILM COATED ORAL NIGHTLY
Qty: 90 TABLET | Refills: 0 | Status: SHIPPED | OUTPATIENT
Start: 2024-09-28

## 2024-10-17 ENCOUNTER — APPOINTMENT (OUTPATIENT)
Dept: HEMATOLOGY/ONCOLOGY | Facility: HOSPITAL | Age: 59
End: 2024-10-17
Attending: INTERNAL MEDICINE
Payer: COMMERCIAL

## 2024-10-18 ENCOUNTER — TELEPHONE (OUTPATIENT)
Dept: HEMATOLOGY/ONCOLOGY | Facility: HOSPITAL | Age: 59
End: 2024-10-18

## 2024-10-28 ENCOUNTER — OFFICE VISIT (OUTPATIENT)
Dept: HEMATOLOGY/ONCOLOGY | Facility: HOSPITAL | Age: 59
End: 2024-10-28
Attending: INTERNAL MEDICINE
Payer: COMMERCIAL

## 2024-10-28 VITALS
HEART RATE: 73 BPM | RESPIRATION RATE: 16 BRPM | DIASTOLIC BLOOD PRESSURE: 63 MMHG | TEMPERATURE: 98 F | SYSTOLIC BLOOD PRESSURE: 127 MMHG | OXYGEN SATURATION: 100 %

## 2024-10-28 DIAGNOSIS — E61.1 IRON DEFICIENCY: Primary | ICD-10-CM

## 2024-10-28 PROCEDURE — 96374 THER/PROPH/DIAG INJ IV PUSH: CPT

## 2024-10-28 NOTE — PROGRESS NOTES
Pt here for 1 of 3 Venofer.. Pt reports fatigue, states by 2pm she wants to go to bed     Ordering Provider: Dr Rojas  Order Exp: after 3rd dose     Pt tolerated infusion without difficulty or complaint. Reviewed next apt date/time:   Reviewed medication profile and possible side effects.  Wrtten information on Venofer given to patient and reviewed.      Education Record  Learner:  Patient  Disease / Diagnosis: Iron Deficient Anemia  Barriers / Limitations:  None  Method:  Discussion and Printed material  General Topics:  Side effects and symptom management and Plan of care reviewed  Outcome:  Shows understanding

## 2024-11-03 ENCOUNTER — PATIENT MESSAGE (OUTPATIENT)
Dept: INTERNAL MEDICINE CLINIC | Facility: CLINIC | Age: 59
End: 2024-11-03

## 2024-11-03 DIAGNOSIS — N39.0 URINARY TRACT INFECTION WITHOUT HEMATURIA, SITE UNSPECIFIED: Primary | ICD-10-CM

## 2024-11-04 ENCOUNTER — OFFICE VISIT (OUTPATIENT)
Dept: HEMATOLOGY/ONCOLOGY | Facility: HOSPITAL | Age: 59
End: 2024-11-04
Attending: INTERNAL MEDICINE
Payer: COMMERCIAL

## 2024-11-04 ENCOUNTER — LAB ENCOUNTER (OUTPATIENT)
Dept: LAB | Facility: HOSPITAL | Age: 59
End: 2024-11-04
Attending: INTERNAL MEDICINE
Payer: COMMERCIAL

## 2024-11-04 VITALS
HEART RATE: 88 BPM | BODY MASS INDEX: 35 KG/M2 | TEMPERATURE: 98 F | DIASTOLIC BLOOD PRESSURE: 70 MMHG | WEIGHT: 184.63 LBS | RESPIRATION RATE: 16 BRPM | SYSTOLIC BLOOD PRESSURE: 132 MMHG | OXYGEN SATURATION: 99 %

## 2024-11-04 DIAGNOSIS — I10 PRIMARY HYPERTENSION: ICD-10-CM

## 2024-11-04 DIAGNOSIS — R00.2 PALPITATION: ICD-10-CM

## 2024-11-04 DIAGNOSIS — E66.01 CLASS 2 SEVERE OBESITY DUE TO EXCESS CALORIES WITH SERIOUS COMORBIDITY AND BODY MASS INDEX (BMI) OF 37.0 TO 37.9 IN ADULT (HCC): ICD-10-CM

## 2024-11-04 DIAGNOSIS — E61.1 IRON DEFICIENCY: Primary | ICD-10-CM

## 2024-11-04 DIAGNOSIS — E11.9 TYPE 2 DIABETES MELLITUS WITHOUT COMPLICATION, UNSPECIFIED WHETHER LONG TERM INSULIN USE (HCC): ICD-10-CM

## 2024-11-04 DIAGNOSIS — E61.1 IRON DEFICIENCY: ICD-10-CM

## 2024-11-04 DIAGNOSIS — E66.812 CLASS 2 SEVERE OBESITY DUE TO EXCESS CALORIES WITH SERIOUS COMORBIDITY AND BODY MASS INDEX (BMI) OF 37.0 TO 37.9 IN ADULT (HCC): ICD-10-CM

## 2024-11-04 DIAGNOSIS — N39.0 URINARY TRACT INFECTION WITHOUT HEMATURIA, SITE UNSPECIFIED: ICD-10-CM

## 2024-11-04 DIAGNOSIS — M35.1 MIXED CONNECTIVE TISSUE DISEASE (HCC): ICD-10-CM

## 2024-11-04 LAB
BILIRUB UR QL: NEGATIVE
CLARITY UR: CLEAR
COLOR UR: YELLOW
GLUCOSE UR-MCNC: NORMAL MG/DL
HGB UR QL STRIP.AUTO: NEGATIVE
KETONES UR-MCNC: NEGATIVE MG/DL
LEUKOCYTE ESTERASE UR QL STRIP.AUTO: 75
PH UR: 5 [PH] (ref 5–8)
SP GR UR STRIP: 1.02 (ref 1–1.03)
UROBILINOGEN UR STRIP-ACNC: NORMAL

## 2024-11-04 PROCEDURE — 36415 COLL VENOUS BLD VENIPUNCTURE: CPT

## 2024-11-04 PROCEDURE — 83835 ASSAY OF METANEPHRINES: CPT

## 2024-11-04 PROCEDURE — 96374 THER/PROPH/DIAG INJ IV PUSH: CPT

## 2024-11-04 PROCEDURE — 87186 SC STD MICRODIL/AGAR DIL: CPT

## 2024-11-04 PROCEDURE — 87077 CULTURE AEROBIC IDENTIFY: CPT

## 2024-11-04 PROCEDURE — 87086 URINE CULTURE/COLONY COUNT: CPT

## 2024-11-04 PROCEDURE — 81001 URINALYSIS AUTO W/SCOPE: CPT

## 2024-11-04 RX ORDER — SULFAMETHOXAZOLE AND TRIMETHOPRIM 800; 160 MG/1; MG/1
1 TABLET ORAL 2 TIMES DAILY
Qty: 14 TABLET | Refills: 0 | Status: SHIPPED | OUTPATIENT
Start: 2024-11-04 | End: 2024-11-11

## 2024-11-04 NOTE — PROGRESS NOTES
Patient here for 200mg Venofer dose 2 of 3. Patient denies any issues or concerns. Tolerated previous Venofer without difficulty.     Ordering Provider: Remy Rojas MD  Order Exp: after 3 doses     Venofer given IVP over 2 mins, patient tolerated without complaint or issue. Reviewed next apt date/time: 11/11 4pm    Education Record  Learner:  Patient  Disease / Diagnosis: iron deficiency   Barriers / Limitations:  None  Method:  Reinforcement  General Topics:  Medication, Side effects and symptom management, and Plan of care reviewed  Outcome:  Shows understanding

## 2024-11-06 ENCOUNTER — NURSE TRIAGE (OUTPATIENT)
Dept: INTERNAL MEDICINE CLINIC | Facility: CLINIC | Age: 59
End: 2024-11-06

## 2024-11-06 RX ORDER — NITROFURANTOIN 25; 75 MG/1; MG/1
100 CAPSULE ORAL 2 TIMES DAILY
Qty: 14 CAPSULE | Refills: 0 | Status: SHIPPED | OUTPATIENT
Start: 2024-11-06 | End: 2024-11-13

## 2024-11-06 NOTE — TELEPHONE ENCOUNTER
Spoke to Lianet who stated she received her iron infusion on 11/4/24 and then the next day started her Septra DS for UTI. Patient stated she is having itching of differing extremities. Patient denied having any time of redness or rash. Patient denied facial or throat swelling, or shortness of breath. Patient is taking Benadryl which is effective. Patient inquiring if should stop taking Septra DS.?      Disposition: Consult with Dr. Rojas    RN advised Lianet to stop Septra DS for today and a message will be sent to Dr. Rojas regarding treatment for UTI.     RN informed Lianet if develop facial or throat swelling or shortness of breath go directly to the ED or call 911. Patient voiced understanding.       Reason for Disposition   Taking prescription medication that could cause itching (e.g., codeine/morphine/other opiates, aspirin)    Answer Assessment - Initial Assessment Questions  1. DESCRIPTION: \"Describe the itching you are having.\"      Deep itchiness   2. SEVERITY: \"How bad is it?\"     - MILD: Doesn't interfere with normal activities.    - MODERATE-SEVERE: Interferes with work, school, sleep, or other activities.       Moderate  3. SCRATCHING: \"Are there any scratch marks? Bleeding?\"      Yes  4. ONSET: \"When did this begin?\"       Yesterday  5. CAUSE: \"What do you think is causing the itching?\" (ask about swimming pools, pollen, animals, soaps, etc.)      Possible antibiotic  6. OTHER SYMPTOMS: \"Do you have any other symptoms?\"       Denied  7. PREGNANCY: \"Is there any chance you are pregnant?\" \"When was your last menstrual period?\"      N/A    Protocols used: Itching - Widespread-A-[place]

## 2024-11-06 NOTE — TELEPHONE ENCOUNTER
Pt took her antibiotic and an hour later had itching all over her body, she did also get her iron infusion yesterday. Pt has not taken pill since and would like to just talk to nurse or provider to see if it was from medication and whether she should stop taking pills or get new ones.

## 2024-11-08 LAB
METANEPHRINE: <25 PG/ML
NORMETANEPHRINE: 133.5 PG/ML

## 2024-11-11 ENCOUNTER — OFFICE VISIT (OUTPATIENT)
Dept: INTERNAL MEDICINE CLINIC | Facility: CLINIC | Age: 59
End: 2024-11-11
Payer: COMMERCIAL

## 2024-11-11 ENCOUNTER — OFFICE VISIT (OUTPATIENT)
Dept: HEMATOLOGY/ONCOLOGY | Facility: HOSPITAL | Age: 59
End: 2024-11-11
Attending: INTERNAL MEDICINE
Payer: COMMERCIAL

## 2024-11-11 VITALS
RESPIRATION RATE: 18 BRPM | SYSTOLIC BLOOD PRESSURE: 152 MMHG | DIASTOLIC BLOOD PRESSURE: 78 MMHG | TEMPERATURE: 98 F | HEART RATE: 75 BPM | OXYGEN SATURATION: 97 %

## 2024-11-11 VITALS
HEART RATE: 87 BPM | BODY MASS INDEX: 34.62 KG/M2 | DIASTOLIC BLOOD PRESSURE: 78 MMHG | HEIGHT: 61 IN | WEIGHT: 183.38 LBS | OXYGEN SATURATION: 100 % | SYSTOLIC BLOOD PRESSURE: 134 MMHG

## 2024-11-11 DIAGNOSIS — E11.9 TYPE 2 DIABETES MELLITUS WITHOUT COMPLICATION, UNSPECIFIED WHETHER LONG TERM INSULIN USE (HCC): Primary | ICD-10-CM

## 2024-11-11 DIAGNOSIS — E61.1 IRON DEFICIENCY: Primary | ICD-10-CM

## 2024-11-11 DIAGNOSIS — N39.0 URINARY TRACT INFECTION WITHOUT HEMATURIA, SITE UNSPECIFIED: ICD-10-CM

## 2024-11-11 DIAGNOSIS — E66.812 CLASS 2 SEVERE OBESITY DUE TO EXCESS CALORIES WITH SERIOUS COMORBIDITY AND BODY MASS INDEX (BMI) OF 37.0 TO 37.9 IN ADULT (HCC): ICD-10-CM

## 2024-11-11 DIAGNOSIS — R10.2 SUPRAPUBIC ABDOMINAL PAIN: ICD-10-CM

## 2024-11-11 DIAGNOSIS — E66.01 CLASS 2 SEVERE OBESITY DUE TO EXCESS CALORIES WITH SERIOUS COMORBIDITY AND BODY MASS INDEX (BMI) OF 37.0 TO 37.9 IN ADULT (HCC): ICD-10-CM

## 2024-11-11 LAB
BILIRUB UR QL: NEGATIVE
CLARITY UR: CLEAR
COLOR UR: YELLOW
GLUCOSE UR-MCNC: NORMAL MG/DL
HGB UR QL STRIP.AUTO: NEGATIVE
KETONES UR-MCNC: NEGATIVE MG/DL
LEUKOCYTE ESTERASE UR QL STRIP.AUTO: NEGATIVE
NITRITE UR QL STRIP.AUTO: NEGATIVE
PH UR: 6 [PH] (ref 5–8)
SP GR UR STRIP: 1.02 (ref 1–1.03)
UROBILINOGEN UR STRIP-ACNC: NORMAL

## 2024-11-11 PROCEDURE — 96374 THER/PROPH/DIAG INJ IV PUSH: CPT

## 2024-11-11 PROCEDURE — 87086 URINE CULTURE/COLONY COUNT: CPT | Performed by: INTERNAL MEDICINE

## 2024-11-11 PROCEDURE — 81003 URINALYSIS AUTO W/O SCOPE: CPT | Performed by: INTERNAL MEDICINE

## 2024-11-11 PROCEDURE — 99214 OFFICE O/P EST MOD 30 MIN: CPT | Performed by: INTERNAL MEDICINE

## 2024-11-11 RX ORDER — METOPROLOL TARTRATE 75 MG/1
1 TABLET ORAL 2 TIMES DAILY
COMMUNITY
Start: 2024-09-30

## 2024-11-11 RX ORDER — TIRZEPATIDE 7.5 MG/.5ML
7.5 INJECTION, SOLUTION SUBCUTANEOUS WEEKLY
Qty: 6 ML | Refills: 0 | Status: SHIPPED | OUTPATIENT
Start: 2024-11-11

## 2024-11-11 NOTE — PROGRESS NOTES
Dana Marsh is a 59 year old female.    Chief complaint: follow up on chronic conditions       HPI:     Dana Marsh is a 59 year old pleasant female who presents for follow up on chronic conditions   She is using Azo over the counter   Recurrent UTI  UTI   Klebsiella pneumonia   Was on bactrim but culture was resistant so was switched to Macrobid   No fever or chills   Dysuria   Suprapubic pain   Still having symptoms         DM   She is on mounjaro 5 mg   Tolerating well   Doesn't eat breakfast   Small lunch   Lettuce wrap   Today salad with chicken   Cottage cheese for breakfast   She gets really hungry at night   She does have sugar craving        DM   On Mounjaro         Current Outpatient Medications   Medication Sig Dispense Refill    Metoprolol Tartrate 75 MG Oral Tab Take 1 tablet by mouth 2 (two) times daily.      nitrofurantoin monohydrate macro (MACROBID) 100 MG Oral Cap Take 1 capsule (100 mg total) by mouth 2 (two) times daily for 7 days. 14 capsule 0    sulfamethoxazole-trimethoprim -160 MG Oral Tab per tablet Take 1 tablet by mouth 2 (two) times daily for 7 days. 14 tablet 0    ATORVASTATIN 20 MG Oral Tab TAKE 1 TABLET NIGHTLY 90 tablet 0    Tirzepatide (MOUNJARO) 5 MG/0.5ML Subcutaneous Solution Pen-injector Inject 5 mg into the skin once a week. 6 mL 0    Tirzepatide (MOUNJARO) 2.5 MG/0.5ML Subcutaneous Solution Pen-injector Inject 2.5 mg into the skin once a week. 2 mL 0    Omeprazole 40 MG Oral Capsule Delayed Release Take 1 capsule (40 mg total) by mouth every morning before breakfast. Take 1 capsule by mouth daily before breakfast. 90 capsule 3    FREESTYLE LANCETS Does not apply Misc TEST DAILY AS DIRECTED 100 each 3    Glucose Blood (FREESTYLE LITE TEST) In Vitro Strip Use as directed. 100 strip 0    Blood Glucose Monitoring Suppl (FREESTYLE LITE) Does not apply Device 1 Device by Other route 2 (two) times daily. Use as directed. 1 each 3    hydroxychloroquine 200 MG Oral Tab  Take 1 tablet (200 mg total) by mouth 2 (two) times daily. 180 tablet 3    cyclobenzaprine 10 MG Oral Tab Take 1 tablet (10 mg total) by mouth nightly as needed for Muscle spasms. 90 tablet 3    nabumetone 750 MG Oral Tab Take 1 tablet (750 mg total) by mouth daily. 90 tablet 3    metoprolol tartrate 50 MG Oral Tab Take 1 tablet (50 mg total) by mouth 2 (two) times daily. 180 tablet 0    metFORMIN  MG Oral Tablet 24 Hr Take 1 tablet (750 mg total) by mouth 2 (two) times daily with meals. 180 tablet 0    losartan-hydroCHLOROthiazide 100-25 MG Oral Tab Take 1 tablet by mouth daily. 90 tablet 0    venlafaxine  MG Oral Capsule SR 24 Hr Take 1 capsule (150 mg total) by mouth daily. 90 capsule 0    traMADol 50 MG Oral Tab Take 1 tablet (50 mg total) by mouth every 8 (eight) hours as needed for Pain. 30 tablet 5    meclizine 25 MG Oral Tab Take 1 tablet (25 mg total) by mouth 3 (three) times daily as needed for Dizziness or Nausea. 20 tablet 0    Betamethasone Dipropionate Aug 0.05 % External Cream Apply 1 Application topically 2 (two) times daily. Apply to affected areas as needed bid 50 g 3      Past Medical History:    Abnormal vaginal bleeding    polypectomy    Anxiety    Depression    Diabetes (HCC)    Disorder of liver    Essential hypertension    Fibromyalgia    Gastropathy    Hemorrhoids    High blood pressure    High cholesterol    MCTD (mixed connective tissue disease) (HCC)    Mood disorder (HCC)    Other ill-defined conditions(799.89)    Per [employer]:  \"son and girlfriend with 4 kids moved in.\"    Sleep apnea    Spinal stenosis    Comments:  spondylosis.  Management:  surgery in 12/2009.    Visual impairment     Past Surgical History:   Procedure Laterality Date    Back surgery      Colonoscopy  2014    Colonoscopy N/A 11/6/2023    Procedure: COLONOSCOPY;  Surgeon: Clari Smith MD;  Location: [facility] ENDOSCOPY    Hysteroscopy,diagnostic  08/2006    polypectomy    Other surgical history  12/2009     Surgery (due to spinal stenosis/spondylosis).    Other surgical history Right     ankle surgery             Family History   Problem Relation Age of Onset    Other (osteoarthritis) Mother     Diabetes Mother     Heart Disease Mother     Hypertension Mother     Stroke Mother     Heart Disease Father         CAD, (cause of death) age 49    Diabetes Father     Heart Attack Father     Other (psoriasis) Sister     Hypertension Sister     Stroke Maternal Grandmother         Cerebrovascular accident (Stroke)    Diabetes Maternal Grandfather     Heart Disease Paternal Grandmother         CAD    Diabetes Paternal Grandfather     Breast Cancer Maternal Aunt         70s     Heart Disease Maternal Aunt         CAD    Heart Disease Maternal Aunt     Breast Cancer Other 55        cousin     Patient Active Problem List   Diagnosis    NAFLD (nonalcoholic fatty liver disease)    Gallbladder polyp    Gastropathy    Routine health maintenance    Essential hypertension    Irritable bowel syndrome with diarrhea    Fibromyalgia    Mixed connective tissue disease (HCC)    Allergic rhinitis    Recurrent UTI    Obesity (BMI 30-39.9)    Dietary counseling and surveillance    Hyperlipidemia    Snoring    Fatigue    Postcoital bleeding    Nausea and vomiting    Constipation    Abnormal urine    Disorder of connective tissue (HCC)    Excessive or frequent menstruation    External hemorrhoids    Leukorrhea    Lumbosacral spondylosis without myelopathy    Metrorrhagia    Palpitations    Shortness of breath    Irregular menstrual cycle    Tachycardia    Type 2 diabetes mellitus without complication (HCC)    Vaginitis and vulvovaginitis    Primary fibromyalgia syndrome    Polyp of corpus uteri    MCTD (mixed connective tissue disease) (HCC)    Nonalcoholic fatty liver disease    Annual physical exam    Gall bladder polyp    Dizziness    Petechiae    Skin rash    Class 2 severe obesity due to excess calories with serious comorbidity and body mass  index (BMI) of 37.0 to 37.9 in adult (HCC)    Dysphagia    MCI (mild cognitive impairment)    Sleep disorder breathing    Cervical radicular pain    Gastroesophageal reflux disease    Screening for cervical cancer    Mixed diabetic hyperlipidemia associated with type 2 diabetes mellitus (HCC)    TE (obstructive sleep apnea)    Insomnia    Depression    Palpitation    Iron deficiency    Primary hypertension       REVIEW OF SYSTEMS:   A comprehensive 10 point review of systems was completed.  Pertinent positives and negatives noted in the the HPI            EXAM:   /78   Pulse 87   Ht 5' 1\" (1.549 m)   Wt 183 lb 6.4 oz (83.2 kg)   LMP 09/06/2013 (Approximate)   SpO2 100%   BMI 34.65 kg/m²   GENERAL: well developed, well nourished,in no apparent distress    LUNGS: clear to auscultation  CARDIO: RRR without murmur  GI:no CVA tenderness   Some tenderness in the suprapubic area                Orders Placed This Encounter    Metoprolol Tartrate 75 MG Oral Tab     Sig: Take 1 tablet by mouth 2 (two) times daily.     [facility] OREN 2D+3D SCREENING BILAT (CPT=77067/74071)    Result Date: 8/22/2024  CONCLUSION:   There is no mammographic evidence of malignancy in either breast. As long as patient's clinical breast exam remains unchanged, annual screening mammogram is recommended.  BI-RADS CATEGORY:   DIAGNOSTIC CATEGORY 1--NEGATIVE ASSESSMENT.   RECOMMENDATIONS:  ROUTINE MAMMOGRAM AND CLINICAL EVALUATION IN 12 MONTHS.       PLEASE NOTE: NORMAL MAMMOGRAM DOES NOT EXCLUDE THE POSSIBILITY OF BREAST CANCER.  A CLINICALLY SUSPICIOUS PALPABLE LUMP SHOULD BE BIOPSIED.   For patients over the age of 40, the target due date for the patient's next mammogram has been entered into a reminder system.   Patient received a discharge summary from the technologist after completion of exam.  Breast marker legend used on images  Triangle = Palpable lump Hoopa = Skin tag or mole BB = Nipple Linear navya = Scar Square = Pain    Dictated by  (CST): Sharon Paula DO on 8/22/2024 at 1:44 PM     Finalized by (CST): Sharon Paula DO on 8/22/2024 at 1:44 PM                ASSESSMENT AND PLAN:   1. Urinary tract infection without hematuria, site unspecified  Repeat UA   Urine culture   - Metoprolol Tartrate 75 MG Oral Tab; Take 1 tablet by mouth 2 (two) times daily.  - Tirzepatide (MOUNJARO) 7.5 MG/0.5ML Subcutaneous Solution Auto-injector; Inject 7.5 mg into the skin once a week.  Dispense: 6 mL; Refill: 0  - Urinalysis, Routine [E][If pt <2 yrs old, must also order Reducing Substance ([id number])]; Future  - Urine Culture, Routine [E]; Future  - CT ABDOMEN+PELVIS KIDNEYSTONE 2D RNDR(NO IV,NO ORAL)(CPT=74176); Future  - Urinalysis, Routine [E][If pt <2 yrs old, must also order Reducing Substance ([id number])]  - Urine Culture, Routine [E]    2. Suprapubic abdominal pain  CT renal protocol   UA   Urine culture   - Metoprolol Tartrate 75 MG Oral Tab; Take 1 tablet by mouth 2 (two) times daily.  - Tirzepatide (MOUNJARO) 7.5 MG/0.5ML Subcutaneous Solution Auto-injector; Inject 7.5 mg into the skin once a week.  Dispense: 6 mL; Refill: 0  - Urinalysis, Routine [E][If pt <2 yrs old, must also order Reducing Substance ([id number])]; Future  - Urine Culture, Routine [E]; Future  - CT ABDOMEN+PELVIS KIDNEYSTONE 2D RNDR(NO IV,NO ORAL)(CPT=74176); Future  - Urinalysis, Routine [E][If pt <2 yrs old, must also order Reducing Substance ([id number])]  - Urine Culture, Routine [E]    3. Type 2 diabetes mellitus without complication, unspecified whether long term insulin use (HCC)  Continue Mounjaro  Blood work reviweed  - Metoprolol Tartrate 75 MG Oral Tab; Take 1 tablet by mouth 2 (two) times daily.  - Tirzepatide (MOUNJARO) 7.5 MG/0.5ML Subcutaneous Solution Auto-injector; Inject 7.5 mg into the skin once a week.  Dispense: 6 mL; Refill: 0  - Urinalysis, Routine [E][If pt <2 yrs old, must also order Reducing Substance ([id number])]; Future  - Urine Culture, Routine [E];  Future  - CT ABDOMEN+PELVIS KIDNEYSTONE 2D RNDR(NO IV,NO ORAL)(CPT=74176); Future  - Urinalysis, Routine [E][If pt <2 yrs old, must also order Reducing Substance ([id number])]  - Urine Culture, Routine [E]    4. Class 2 severe obesity due to excess calories with serious comorbidity and body mass index (BMI) of 37.0 to 37.9 in adult (HCC)  Increase Mounjaro to 7.5 mg     - Metoprolol Tartrate 75 MG Oral Tab; Take 1 tablet by mouth 2 (two) times daily.  - Tirzepatide (MOUNJARO) 7.5 MG/0.5ML Subcutaneous Solution Auto-injector; Inject 7.5 mg into the skin once a week.  Dispense: 6 mL; Refill: 0  - Urinalysis, Routine [E][If pt <2 yrs old, must also order Reducing Substance ([id number])]; Future  - Urine Culture, Routine [E]; Future  - CT ABDOMEN+PELVIS KIDNEYSTONE 2D RNDR(NO IV,NO ORAL)(CPT=74176); Future  - Urinalysis, Routine [E][If pt <2 yrs old, must also order Reducing Substance ([id number])]  - Urine Culture, Routine [E]       Please return to the clinic if you are having persistent symptoms. If worsening symptoms should go to the ER    Remy Rojas MD,   Diplomate of the American Board of Internal Medicine  Diplomate of the American Board of Obesity Medicine

## 2024-11-11 NOTE — PROGRESS NOTES
Pt here for Venofer 200mg 3/3 . Pt denies any issues or concerns.      Ordering Provider: Bob  Order Exp: After this dose.     Pt tolerated infusion without difficulty or complaint. Reviewed next apt date/time: N/A, last infusion. Will f/u with ordering MD in 3 months and have repeat labs then.       Education Record  Learner:  Patient  Disease / Diagnosis: STIVEN  Barriers / Limitations:  None  Method:  Reinforcement  General Topics:  Plan of care reviewed  Outcome:  Shows understanding

## 2024-11-12 ENCOUNTER — HOSPITAL ENCOUNTER (OUTPATIENT)
Dept: CT IMAGING | Facility: HOSPITAL | Age: 59
Discharge: HOME | End: 2024-11-12
Attending: INTERNAL MEDICINE
Payer: COMMERCIAL

## 2024-11-12 DIAGNOSIS — R10.2 SUPRAPUBIC ABDOMINAL PAIN: ICD-10-CM

## 2024-11-12 DIAGNOSIS — E66.01 CLASS 2 SEVERE OBESITY DUE TO EXCESS CALORIES WITH SERIOUS COMORBIDITY AND BODY MASS INDEX (BMI) OF 37.0 TO 37.9 IN ADULT (HCC): ICD-10-CM

## 2024-11-12 DIAGNOSIS — E66.812 CLASS 2 SEVERE OBESITY DUE TO EXCESS CALORIES WITH SERIOUS COMORBIDITY AND BODY MASS INDEX (BMI) OF 37.0 TO 37.9 IN ADULT (HCC): ICD-10-CM

## 2024-11-12 DIAGNOSIS — E11.9 TYPE 2 DIABETES MELLITUS WITHOUT COMPLICATION, UNSPECIFIED WHETHER LONG TERM INSULIN USE (HCC): ICD-10-CM

## 2024-11-12 DIAGNOSIS — N39.0 URINARY TRACT INFECTION WITHOUT HEMATURIA, SITE UNSPECIFIED: ICD-10-CM

## 2024-11-12 PROCEDURE — 74176 CT ABD & PELVIS W/O CONTRAST: CPT | Performed by: INTERNAL MEDICINE

## 2024-12-09 ENCOUNTER — PATIENT MESSAGE (OUTPATIENT)
Dept: INTERNAL MEDICINE CLINIC | Facility: CLINIC | Age: 59
End: 2024-12-09

## 2024-12-09 ENCOUNTER — NURSE TRIAGE (OUTPATIENT)
Dept: INTERNAL MEDICINE CLINIC | Facility: CLINIC | Age: 59
End: 2024-12-09

## 2024-12-09 ENCOUNTER — OFFICE VISIT (OUTPATIENT)
Dept: INTERNAL MEDICINE CLINIC | Facility: CLINIC | Age: 59
End: 2024-12-09
Payer: COMMERCIAL

## 2024-12-09 VITALS
BODY MASS INDEX: 34.93 KG/M2 | DIASTOLIC BLOOD PRESSURE: 82 MMHG | HEART RATE: 76 BPM | HEIGHT: 61 IN | WEIGHT: 185 LBS | RESPIRATION RATE: 17 BRPM | OXYGEN SATURATION: 99 % | SYSTOLIC BLOOD PRESSURE: 130 MMHG

## 2024-12-09 DIAGNOSIS — N39.0 RECURRENT UTI: ICD-10-CM

## 2024-12-09 DIAGNOSIS — R30.0 DYSURIA: Primary | ICD-10-CM

## 2024-12-09 LAB
APPEARANCE: CLEAR
BILIRUBIN: NEGATIVE
GLUCOSE (URINE DIPSTICK): NEGATIVE MG/DL
KETONES (URINE DIPSTICK): NEGATIVE MG/DL
MULTISTIX LOT#: ABNORMAL NUMERIC
NITRITE, URINE: NEGATIVE
OCCULT BLOOD: NEGATIVE
PH, URINE: 7 (ref 4.5–8)
PROTEIN (URINE DIPSTICK): NEGATIVE MG/DL
SPECIFIC GRAVITY: 1.02 (ref 1–1.03)
UROBILINOGEN,SEMI-QN: 0.2 MG/DL (ref 0–1.9)

## 2024-12-09 PROCEDURE — 87077 CULTURE AEROBIC IDENTIFY: CPT

## 2024-12-09 PROCEDURE — 87186 SC STD MICRODIL/AGAR DIL: CPT

## 2024-12-09 PROCEDURE — 87086 URINE CULTURE/COLONY COUNT: CPT

## 2024-12-09 RX ORDER — NITROFURANTOIN 25; 75 MG/1; MG/1
CAPSULE ORAL
Qty: 14 CAPSULE | Refills: 0 | Status: SHIPPED | OUTPATIENT
Start: 2024-12-09

## 2024-12-09 NOTE — TELEPHONE ENCOUNTER
Spoke with Dana who stated she has history or recurrent UTIs with last episode on 11/6/24 where she completed her antibiotic. New dysuria and urinary frequency symptoms started ion 12/7/24. Patient rated dysuria pain 3/10 but she does have pre-existing low back pain which is 12/10, but she is unable to determining worsening symptom due to her urinary symptoms. Patient denied fever, flank pain, orblood in urine.     Disposition: Seen today in the office but there are no appointments today with Dr. Rojas.     Dana scheduled with Jacque RICHTER today at 2:30 PM.     Patient advised if have large amount of blood in her urine or severe flank pain to go to the emergency department. Patient voiced understanding.    Reason for Disposition   Side (flank) or lower back pain present    Answer Assessment - Initial Assessment Questions  1. SYMPTOM: \"What's the main symptom you're concerned about?\" (e.g., frequency, incontinence)      Urinary frequency and burning with voiding.  2. ONSET: \"When did the  12/7/24  start?\"      12/7/24  3. PAIN: \"Is there any pain?\" If Yes, ask: \"How bad is it?\" (Scale: 1-10; mild, moderate, severe)      3/10 voiding, low back pain 12/10 due to fibromyalagia.   4. CAUSE: \"What do you think is causing the symptoms?\"      Possible UTI  5. OTHER SYMPTOMS: \"Do you have any other symptoms?\" (e.g., blood in urine, fever, flank pain, pain with urination)     Lower back pain may be due to prior history of back pain.  6. PREGNANCY: \"Is there any chance you are pregnant?\" \"When was your last menstrual period?\"      N/A    Protocols used: Urinary Symptoms-A-OH

## 2024-12-09 NOTE — PROGRESS NOTES
Dana Marsh is a 59 year old female.  CHIEF COMPLAINT:     Chief Complaint   Patient presents with    Dysuria     Pt presents to clinic for c/o dysuria, frequency since Saturday.   12/7/24    HPI:   Patient presents with symptoms of UTI. Reports 3 day history of urinary frequency and dysuria.  Denies flank pain, hematuria, nausea, vomiting, fever or malaise.  Denies vaginal discharge. Denies unprotected sexual intercourse with a new (past 3 months) partner.   Last UTI was 11/4/2024-Klebsiella pneumoniae.    History of recurrent UTIs, averages several per year.  Has had UTIs since elementary school.  Patient is postmenopausal  Hx of iron deficiency anemia, has had 3 iron infusions.  History of MCTD/ mixed connective tissue disorder    Current Outpatient Medications   Medication Sig Dispense Refill    Metoprolol Tartrate 75 MG Oral Tab Take 1 tablet by mouth 2 (two) times daily.      Tirzepatide (MOUNJARO) 7.5 MG/0.5ML Subcutaneous Solution Auto-injector Inject 7.5 mg into the skin once a week. 6 mL 0    ATORVASTATIN 20 MG Oral Tab TAKE 1 TABLET NIGHTLY 90 tablet 0    Tirzepatide (MOUNJARO) 5 MG/0.5ML Subcutaneous Solution Pen-injector Inject 5 mg into the skin once a week. 6 mL 0    Tirzepatide (MOUNJARO) 2.5 MG/0.5ML Subcutaneous Solution Pen-injector Inject 2.5 mg into the skin once a week. 2 mL 0    Omeprazole 40 MG Oral Capsule Delayed Release Take 1 capsule (40 mg total) by mouth every morning before breakfast. Take 1 capsule by mouth daily before breakfast. 90 capsule 3    FREESTYLE LANCETS Does not apply Misc TEST DAILY AS DIRECTED 100 each 3    Glucose Blood (FREESTYLE LITE TEST) In Vitro Strip Use as directed. 100 strip 0    Blood Glucose Monitoring Suppl (FREESTYLE LITE) Does not apply Device 1 Device by Other route 2 (two) times daily. Use as directed. 1 each 3    hydroxychloroquine 200 MG Oral Tab Take 1 tablet (200 mg total) by mouth 2 (two) times daily. 180 tablet 3    cyclobenzaprine 10 MG Oral  Tab Take 1 tablet (10 mg total) by mouth nightly as needed for Muscle spasms. 90 tablet 3    nabumetone 750 MG Oral Tab Take 1 tablet (750 mg total) by mouth daily. 90 tablet 3    metoprolol tartrate 50 MG Oral Tab Take 1 tablet (50 mg total) by mouth 2 (two) times daily. 180 tablet 0    metFORMIN  MG Oral Tablet 24 Hr Take 1 tablet (750 mg total) by mouth 2 (two) times daily with meals. 180 tablet 0    losartan-hydroCHLOROthiazide 100-25 MG Oral Tab Take 1 tablet by mouth daily. 90 tablet 0    venlafaxine  MG Oral Capsule SR 24 Hr Take 1 capsule (150 mg total) by mouth daily. 90 capsule 0    traMADol 50 MG Oral Tab Take 1 tablet (50 mg total) by mouth every 8 (eight) hours as needed for Pain. 30 tablet 5    meclizine 25 MG Oral Tab Take 1 tablet (25 mg total) by mouth 3 (three) times daily as needed for Dizziness or Nausea. 20 tablet 0    Betamethasone Dipropionate Aug 0.05 % External Cream Apply 1 Application topically 2 (two) times daily. Apply to affected areas as needed bid 50 g 3      Past Medical History:    Abnormal vaginal bleeding    polypectomy    Anxiety    Depression    Diabetes (HCC)    Disorder of liver    Essential hypertension    Fibromyalgia    Gastropathy    Hemorrhoids    High blood pressure    High cholesterol    MCTD (mixed connective tissue disease) (HCC)    Mood disorder (HCC)    Other ill-defined conditions(799.89)    Per [employer]:  \"son and girlfriend with 4 kids moved in.\"    Sleep apnea    Spinal stenosis    Comments:  spondylosis.  Management:  surgery in 2009.    Visual impairment      Social History:  Social History     Socioeconomic History    Marital status:    Tobacco Use    Smoking status: Former     Current packs/day: 0.00     Types: Cigarettes     Quit date: 1989     Years since quittin.9    Smokeless tobacco: Never   Vaping Use    Vaping status: Never Used   Substance and Sexual Activity    Alcohol use: Yes     Alcohol/week: 2.0 standard drinks of  alcohol     Types: 2 Standard drinks or equivalent per week     Comment: Occasionally    Drug use: Yes     Frequency: 7.0 times per week     Types: Cannabis     Comment: Marijuana for pain nightly    Sexual activity: Yes     Partners: Male   Other Topics Concern    Caffeine Concern Yes     Comment: (Coffee, Soda) 2 cups daily    Pt has a pacemaker No    Pt has a defibrillator No    Reaction to local anesthetic No         REVIEW OF SYSTEMS:   GENERAL: Denies night sweats, chills, fever, weight loss, loss of appetite  SKIN: no rashes, no skin wounds or ulcers.  GI: See HPI. No N/V/C/D.   : See HPI.  NEURO: no headaches.    EXAM:   /82   Pulse 76   Resp 17   Ht 5' 1\" (1.549 m)   Wt 185 lb (83.9 kg)   LMP 09/06/2013 (Approximate)   SpO2 99%   BMI 34.96 kg/m²   GENERAL: well developed, well nourished, in no apparent distress  LUNGS nonlabored  : no suprapubic tenderness, no bladder distention, no CVAT   GI: Normal pace and pitch of BS, no rebound or guarding    Component      Latest Ref Rng 11/4/2024 11/11/2024 12/9/2024   Color Urine      Yellow  Yellow  Yellow  dark yellow    Spec Gravity      1.005 - 1.030  1.022  1.021  1.025    Glucose Urine      Negative mg/dL Normal  Normal  Negative    Bilirubin Urine      Negative  Negative  Negative  Negative    Ketones, UA      Negative - Trace mg/dL Negative  Negative  Negative    Blood Urine      Negative  Negative  Negative  Negative    PH Urine      5.0 - 8.0  5.0  6.0  7.0    Protein Urine      Negative - Trace mg/dL Trace !  Trace !  Negative    Urobilinogen Urine      0.2 - 1.0 mg/dL Normal  Normal  0.2    Nitrite Urine      Negative  2+ !  Negative  Negative    Leukocyte Esterase Urine      Negative    Small !    APPEARANCE      Clear    clear    Multistix Lot#      Numeric   [id number]    Multistix Expiration Date      Date   04/30/2025       LAB:Urine culture sent out  Urine testing and cultures reviewed from last year.  CMP: 9/16/24 reviewed. Normal  creat/BUN  Elevated glucose    ASSESSMENT AND PLAN:   Dana Marsh is a 59 year old female who presents with   Chief Complaint   Patient presents with    Dysuria     Pt presents to clinic for c/o dysuria, frequency since Saturday.       ASSESSMENT:  Encounter Diagnoses   Name Primary?    Dysuria Yes    Recurrent UTI        PLAN:  Requested Prescriptions     Signed Prescriptions Disp Refills    nitrofurantoin monohydrate macro 100 MG Oral Cap 14 capsule 0     Sig: Take one capsule 2 times daily for 7 days     Patient was advised to follow up if there is no improvement in 2-3 days. Patient was instructed to follow up immediately if fever, flank pain, nausea/vomiting occurs.        Patient Instructions   Try Vitamin C 250 mg at bedtime, to encourage a little acidity in urine during sleep.    URINARY TRACT INFECTION AVOIDANCE AND PREVENTION    1)  Avoid a full bladder or overfilled bladder. Be sure to wait an addiitional 10-15 seconds after you think you are done, as you may be able to finish going or go again.  \"Count to 10 and go again\". Do not postpone urinating or rush during urination. You want to be able to completely empty your bladder.  2) Please only use water to clean urinary and vaginal areas. NO SOAP or scented products between labia  Always wipe front to back-1 direction, especially checking up into the rectum area itself every time you urinate.  3)  Increase fluid intake at the first sign of infection.  Enough that you need to urinate about every 1.5 - 2 hours  4)  Do not use the same tissue to blow your nose as you do to wipe yourself. Your hands will also be contaminated from this.  5) Avoid your undergarment and pants coming in contact with toilet while seated on toilet.  6) Change undergarment daily or when soiled. Change after sweating, i.e. working out  7) Urinate after cycling, swimming, hot tub or bath tub use.  8) Avoid tight seamed pants and thong use.  9)  Void/urinate after sex. Avoid rear  entry approach.  10) Call your clinic or health care provider if symptoms are not resolved by the end of the antibiotic.        The patient indicates understanding of these issues and agrees to the plan.

## 2024-12-10 NOTE — PATIENT INSTRUCTIONS
Try Vitamin C 250 mg at bedtime, to encourage a little acidity in urine during sleep.    URINARY TRACT INFECTION AVOIDANCE AND PREVENTION    1)  Avoid a full bladder or overfilled bladder. Be sure to wait an addiitional 10-15 seconds after you think you are done, as you may be able to finish going or go again.  \"Count to 10 and go again\". Do not postpone urinating or rush during urination. You want to be able to completely empty your bladder.  2) Please only use water to clean urinary and vaginal areas. NO SOAP or scented products between labia  Always wipe front to back-1 direction, especially checking up into the rectum area itself every time you urinate.  3)  Increase fluid intake at the first sign of infection.  Enough that you need to urinate about every 1.5 - 2 hours  4)  Do not use the same tissue to blow your nose as you do to wipe yourself. Your hands will also be contaminated from this.  5) Avoid your undergarment and pants coming in contact with toilet while seated on toilet.  6) Change undergarment daily or when soiled. Change after sweating, i.e. working out  7) Urinate after cycling, swimming, hot tub or bath tub use.  8) Avoid tight seamed pants and thong use.  9)  Void/urinate after sex. Avoid rear entry approach.  10) Call your clinic or health care provider if symptoms are not resolved by the end of the antibiotic.

## 2024-12-12 ENCOUNTER — TELEPHONE (OUTPATIENT)
Dept: INTERNAL MEDICINE CLINIC | Facility: CLINIC | Age: 59
End: 2024-12-12

## 2024-12-12 RX ORDER — CEPHALEXIN 500 MG/1
500 CAPSULE ORAL 2 TIMES DAILY
Qty: 14 CAPSULE | Refills: 0 | Status: SHIPPED | OUTPATIENT
Start: 2024-12-12 | End: 2024-12-19

## 2025-01-02 ENCOUNTER — OFFICE VISIT (OUTPATIENT)
Dept: INTERNAL MEDICINE CLINIC | Facility: CLINIC | Age: 60
End: 2025-01-02
Payer: COMMERCIAL

## 2025-01-02 ENCOUNTER — NURSE TRIAGE (OUTPATIENT)
Dept: INTERNAL MEDICINE CLINIC | Facility: CLINIC | Age: 60
End: 2025-01-02

## 2025-01-02 VITALS
BODY MASS INDEX: 34.33 KG/M2 | TEMPERATURE: 98 F | HEIGHT: 61 IN | HEART RATE: 72 BPM | SYSTOLIC BLOOD PRESSURE: 108 MMHG | OXYGEN SATURATION: 99 % | DIASTOLIC BLOOD PRESSURE: 68 MMHG | WEIGHT: 181.81 LBS

## 2025-01-02 DIAGNOSIS — R30.0 DYSURIA: Primary | ICD-10-CM

## 2025-01-02 LAB
GLUCOSE (URINE DIPSTICK): 100 MG/DL
MULTISTIX LOT#: ABNORMAL NUMERIC
NITRITE, URINE: POSITIVE
PH, URINE: 7 (ref 4.5–8)
PROTEIN (URINE DIPSTICK): 100 MG/DL
SPECIFIC GRAVITY: 1.02 (ref 1–1.03)
UROBILINOGEN,SEMI-QN: 1 MG/DL (ref 0–1.9)

## 2025-01-02 PROCEDURE — 87186 SC STD MICRODIL/AGAR DIL: CPT

## 2025-01-02 PROCEDURE — 87086 URINE CULTURE/COLONY COUNT: CPT

## 2025-01-02 PROCEDURE — 87077 CULTURE AEROBIC IDENTIFY: CPT

## 2025-01-02 RX ORDER — CEPHALEXIN 500 MG/1
500 CAPSULE ORAL 3 TIMES DAILY
Qty: 21 CAPSULE | Refills: 0 | Status: SHIPPED | OUTPATIENT
Start: 2025-01-02 | End: 2025-01-09

## 2025-01-02 NOTE — PROGRESS NOTES
Dana Marsh is a 59 year old female.  CHIEF COMPLAINT:     Chief Complaint   Patient presents with    Urinary     Bladder infection, urinary frequency since yesterday 01/02/2024.        HPI:   Patient presents with symptoms of UTI. Reports 1 day history of urinary frequency and dysuria.  Denies flank pain, hematuria, nausea, vomiting, fever or malaise.  Denies vaginal discharge.  Had UTI +culture 11/4/2024 for Klebsiella pneumoniae.  Had another UTI 12/9/2024 + Proteus Mirabilis.  Prior to these she has not had a UTI for a while.  She has been under a lot of stress caring for her mother-in-law at their home  Current Outpatient Medications   Medication Sig Dispense Refill    cephALEXin 500 MG Oral Cap Take 1 capsule (500 mg total) by mouth 3 (three) times daily for 7 days. 21 capsule 0    methylPREDNISolone 4 MG Oral Tablet Therapy Pack Take as directed on package. 1 each 0    nitrofurantoin monohydrate macro 100 MG Oral Cap Take one capsule 2 times daily for 7 days 14 capsule 0    Metoprolol Tartrate 75 MG Oral Tab Take 1 tablet by mouth 2 (two) times daily.      Tirzepatide (MOUNJARO) 7.5 MG/0.5ML Subcutaneous Solution Auto-injector Inject 7.5 mg into the skin once a week. 6 mL 0    ATORVASTATIN 20 MG Oral Tab TAKE 1 TABLET NIGHTLY 90 tablet 0    Omeprazole 40 MG Oral Capsule Delayed Release Take 1 capsule (40 mg total) by mouth every morning before breakfast. Take 1 capsule by mouth daily before breakfast. 90 capsule 3    FREESTYLE LANCETS Does not apply Misc TEST DAILY AS DIRECTED 100 each 3    Glucose Blood (FREESTYLE LITE TEST) In Vitro Strip Use as directed. 100 strip 0    Blood Glucose Monitoring Suppl (FREESTYLE LITE) Does not apply Device 1 Device by Other route 2 (two) times daily. Use as directed. 1 each 3    hydroxychloroquine 200 MG Oral Tab Take 1 tablet (200 mg total) by mouth 2 (two) times daily. 180 tablet 3    cyclobenzaprine 10 MG Oral Tab Take 1 tablet (10 mg total) by mouth nightly as  needed for Muscle spasms. 90 tablet 3    nabumetone 750 MG Oral Tab Take 1 tablet (750 mg total) by mouth daily. 90 tablet 3    metoprolol tartrate 50 MG Oral Tab Take 1 tablet (50 mg total) by mouth 2 (two) times daily. 180 tablet 0    metFORMIN  MG Oral Tablet 24 Hr Take 1 tablet (750 mg total) by mouth 2 (two) times daily with meals. 180 tablet 0    losartan-hydroCHLOROthiazide 100-25 MG Oral Tab Take 1 tablet by mouth daily. 90 tablet 0    venlafaxine  MG Oral Capsule SR 24 Hr Take 1 capsule (150 mg total) by mouth daily. 90 capsule 0    traMADol 50 MG Oral Tab Take 1 tablet (50 mg total) by mouth every 8 (eight) hours as needed for Pain. 30 tablet 5    meclizine 25 MG Oral Tab Take 1 tablet (25 mg total) by mouth 3 (three) times daily as needed for Dizziness or Nausea. 20 tablet 0    Betamethasone Dipropionate Aug 0.05 % External Cream Apply 1 Application topically 2 (two) times daily. Apply to affected areas as needed bid 50 g 3    Tirzepatide (MOUNJARO) 5 MG/0.5ML Subcutaneous Solution Pen-injector Inject 5 mg into the skin once a week. (Patient not taking: Reported on 1/2/2025) 6 mL 0    Tirzepatide (MOUNJARO) 2.5 MG/0.5ML Subcutaneous Solution Pen-injector Inject 2.5 mg into the skin once a week. (Patient not taking: Reported on 1/2/2025) 2 mL 0      Past Medical History:    Abnormal vaginal bleeding    polypectomy    Anxiety    Depression    Diabetes (HCC)    Disorder of liver    Essential hypertension    Fibromyalgia    Gastropathy    Hemorrhoids    High blood pressure    High cholesterol    MCTD (mixed connective tissue disease) ([facility])    Mood disorder ([facility])    Other ill-defined conditions(799.89)    Per [employer]:  \"son and girlfriend with 4 kids moved in.\"    Sleep apnea    Spinal stenosis    Comments:  spondylosis.  Management:  surgery in 12/2009.    Visual impairment      Social History:  Social History     Socioeconomic History    Marital status:    Tobacco Use    Smoking status:  Former     Current packs/day: 0.00     Types: Cigarettes     Quit date: 1989     Years since quittin.9    Smokeless tobacco: Never   Vaping Use    Vaping status: Never Used   Substance and Sexual Activity    Alcohol use: Yes     Alcohol/week: 2.0 standard drinks of alcohol     Types: 2 Standard drinks or equivalent per week     Comment: Occasionally    Drug use: Yes     Frequency: 7.0 times per week     Types: Cannabis     Comment: Marijuana for pain nightly    Sexual activity: Yes     Partners: Male   Other Topics Concern    Caffeine Concern Yes     Comment: (Coffee, Soda) 2 cups daily    Pt has a pacemaker No    Pt has a defibrillator No    Reaction to local anesthetic No         REVIEW OF SYSTEMS:   GENERAL: Denies night sweats, chills, fever, weight loss, loss of appetite  SKIN: no rashes, no skin wounds or ulcers.  GI: See HPI. No N/V/C/D.   : See HPI.  NEURO: no headaches.    EXAM:   /68   Pulse 72   Temp 98.3 °F (36.8 °C)   Ht 5' 1\" (1.549 m)   Wt 181 lb 12.8 oz (82.5 kg)   LMP 2013 (Approximate)   SpO2 99%   BMI 34.35 kg/m²   GENERAL: well developed, well nourished, in no apparent distress  Resp: Nonlabored  LYMPH: No cervical lymphadenopathy  : Mild suprapubic tenderness, no bladder distention, no CVAT   Component      Latest Ref Rng 2025   Color Urine      Yellow  orange    Spec Gravity      1.005 - 1.030  1.025    Glucose Urine      Negative mg/dL 100 !    Bilirubin Urine      Negative  Small !    Ketones, UA      Negative - Trace mg/dL Trace    Blood Urine      Negative  Trace-intact !    PH Urine      5.0 - 8.0  7.0    Protein Urine      Negative - Trace mg/dL 100 !    Urobilinogen Urine      0.2 - 1.0 mg/dL 1.0    Nitrite Urine      Negative  Positive !    Leukocyte Esterase Urine      Negative  Large !    APPEARANCE      Clear  cloudy    Multistix Lot#      Numeric [id number]    Multistix Expiration Date      Date 2025         Urine culture sent  out    ASSESSMENT AND PLAN:   Dana Marsh is a 59 year old female who presents with   Chief Complaint   Patient presents with    Urinary     Bladder infection, urinary frequency since yesterday 01/02/2024.        ASSESSMENT:  Encounter Diagnosis   Name Primary?    Dysuria Yes       PLAN:  Requested Prescriptions     Signed Prescriptions Disp Refills    cephALEXin 500 MG Oral Cap 21 capsule 0     Sig: Take 1 capsule (500 mg total) by mouth 3 (three) times daily for 7 days.     Patient was advised to follow up if there is no improvement in 2-3 days. Patient was instructed to follow up immediately if fever, flank pain, nausea/vomiting occurs.    Patient Instructions   URINARY TRACT INFECTION AVOIDANCE AND PREVENTION    1)  Avoid a full bladder or overfilled bladder. Be sure to wait an addiitional 10-15 seconds after you think you are done, as you may be able to finish going or go again.  \"Count to 10 and go again\". Do not postpone urinating or rush during urination. You want to be able to completely empty your bladder.  2) Please only use water to clean urinary and vaginal areas. NO SOAP or scented products  3)  Increase fluid intake at the first sign of infection.  4)  Do not use the same tissue to blow your nose as you do to wipe yourself. Your hands will also be contaminated from this.  5) Avoid your undergarment and pants coming in contact with toilet while seated on toilet.  6) Change undergarment daily or when soiled. Change after sweating, i.e. working out  7) Urinate after cycling, swimming, hot tub or bath tub use.  8) Avoid tight seamed pants and thong use.  9)  Void/urinate after sex. Avoid rear entry approach.  10) Call your clinic or health care provider if symptoms are not resolved by the end of the antibiotic.        The patient indicates understanding of these issues and agrees to the plan.

## 2025-01-02 NOTE — TELEPHONE ENCOUNTER
Spoke with Dana has pain with urination started last night with urinary frequency. Patient denied blood in urine, low back or flank pain, or fever. Patient has history of frequent UTIs.     Disposition:  Seen in office today      There are no available appointments with Dr. Rojas, so Dana scheduled with Jacque RICHTER today at 4:30 PM.     RN advises Dana if develops severe pain, fever, or voiding only blood to go to the emergency department. Patient voiced understanding.    Reason for Disposition   Urinating more frequently than usual (i.e., frequency)    Answer Assessment - Initial Assessment Questions  1. SYMPTOM: \"What's the main symptom you're concerned about?\" (e.g., frequency, incontinence)      Dysuria and frequency  2. ONSET: \"When did the  last night  start?\"      Last night  3. PAIN: \"Is there any pain?\" If Yes, ask: \"How bad is it?\" (Scale: 1-10; mild, moderate, severe)      7/10  4. CAUSE: \"What do you think is causing the symptoms?\"      Possible UTI  5. OTHER SYMPTOMS: \"Do you have any other symptoms?\" (e.g., blood in urine, fever, flank pain, pain with urination)      Urinary frequency  6. PREGNANCY: \"Is there any chance you are pregnant?\" \"When was your last menstrual period?\"      N/A    Protocols used: Urinary Symptoms-A-[place]

## 2025-01-03 NOTE — PATIENT INSTRUCTIONS
URINARY TRACT INFECTION AVOIDANCE AND PREVENTION    1)  Avoid a full bladder or overfilled bladder. Be sure to wait an addiitional 10-15 seconds after you think you are done, as you may be able to finish going or go again.  \"Count to 10 and go again\". Do not postpone urinating or rush during urination. You want to be able to completely empty your bladder.  2) Please only use water to clean urinary and vaginal areas. NO SOAP or scented products  3)  Increase fluid intake at the first sign of infection.  4)  Do not use the same tissue to blow your nose as you do to wipe yourself. Your hands will also be contaminated from this.  5) Avoid your undergarment and pants coming in contact with toilet while seated on toilet.  6) Change undergarment daily or when soiled. Change after sweating, i.e. working out  7) Urinate after cycling, swimming, hot tub or bath tub use.  8) Avoid tight seamed pants and thong use.  9)  Void/urinate after sex. Avoid rear entry approach.  10) Call your clinic or health care provider if symptoms are not resolved by the end of the antibiotic.

## 2025-01-15 ENCOUNTER — TELEPHONE (OUTPATIENT)
Dept: INTERNAL MEDICINE CLINIC | Facility: CLINIC | Age: 60
End: 2025-01-15

## 2025-01-15 RX ORDER — SULFAMETHOXAZOLE AND TRIMETHOPRIM 800; 160 MG/1; MG/1
1 TABLET ORAL 2 TIMES DAILY
Qty: 14 TABLET | Refills: 0 | Status: SHIPPED | OUTPATIENT
Start: 2025-01-15 | End: 2025-01-22

## 2025-01-15 NOTE — TELEPHONE ENCOUNTER
Spoke with Dana states she continues to have burning with voiding. Patient states she received a [employer] message by  if she remains symptomatic she will prescribe Bactrim.       Patient requesting prescription for Bactrim.  .

## 2025-01-15 NOTE — TELEPHONE ENCOUNTER
Pt called and said she finished her first prescription for infection, said that she still feels like she has it and would like to switch to the other med that she and provider discussed

## 2025-01-16 NOTE — TELEPHONE ENCOUNTER
Spoke with Lianet karime Rojas sent in a prescription for Bactrim, but she does advise to follow-up with her Urologist. Patient voiced understanding.

## 2025-01-20 ENCOUNTER — NURSE TRIAGE (OUTPATIENT)
Dept: INTERNAL MEDICINE CLINIC | Facility: CLINIC | Age: 60
End: 2025-01-20

## 2025-01-20 RX ORDER — NYSTATIN 100000 [USP'U]/ML
5 SUSPENSION ORAL 4 TIMES DAILY
Qty: 60 ML | Refills: 0 | Status: SHIPPED | OUTPATIENT
Start: 2025-01-20

## 2025-01-20 NOTE — TELEPHONE ENCOUNTER
Spoke with Dana states she has thrush on lips while taking antibiotic and starting in her mouth. She has white patches on lips and starting in her mouth. Patient is eating and drinking normally. Patient requesting prescription for thrust sent to [facility] in [place] on file.

## 2025-01-21 DIAGNOSIS — N39.0 RECURRENT UTI: Primary | ICD-10-CM

## 2025-01-27 ENCOUNTER — OFFICE VISIT (OUTPATIENT)
Dept: SURGERY | Facility: CLINIC | Age: 60
End: 2025-01-27

## 2025-01-27 VITALS
HEART RATE: 105 BPM | HEIGHT: 61 IN | DIASTOLIC BLOOD PRESSURE: 81 MMHG | BODY MASS INDEX: 32.47 KG/M2 | WEIGHT: 172 LBS | SYSTOLIC BLOOD PRESSURE: 116 MMHG

## 2025-01-27 DIAGNOSIS — N39.0 RECURRENT UTI: Primary | ICD-10-CM

## 2025-01-27 PROCEDURE — 99204 OFFICE O/P NEW MOD 45 MIN: CPT | Performed by: UROLOGY

## 2025-01-27 RX ORDER — ESTRADIOL 0.1 MG/G
CREAM VAGINAL
Qty: 42.5 G | Refills: 11 | Status: SHIPPED | OUTPATIENT
Start: 2025-01-27

## 2025-01-27 RX ORDER — GINSENG 100 MG
CAPSULE ORAL
Qty: 30 CAPSULE | Refills: 11 | COMMUNITY
Start: 2025-01-27

## 2025-01-27 NOTE — PROGRESS NOTES
Vaishali Restrepo MD  Department of Urology  [address]  [place] [address]    T: [phone]  F: [phone]    To: Remy Rojas MD   [address] [facility] [address]    Re: Dana Marsh   MRN: [id number]  : 1965    Dear Remy Rojas MD,    Today I had the pleasure of seeing Dana Marsh in my clinic. As you know, Ms. Marsh is a pleasant 59 year old year old female who I am seeing for recurrent urinary tract infections. Patient was last seen in this department on Visit date not found.    Briefly, patient has had recurrent urinary tract infections for some time now.  They are Proteus last in January and December.  She is also had Klebsiella and E. coli.  She had a CT scan on 2024 that demonstrated no abnormalities.  She has seen Dr. Neves last in 2019.  She did have a cystoscopy in 2019 that was negative       PAST MEDICAL HISTORY:  Past Medical History:    Abnormal vaginal bleeding    polypectomy    Anxiety    Depression    Diabetes (HCC)    Disorder of liver    Essential hypertension    Fibromyalgia    Gastropathy    Hemorrhoids    High blood pressure    High cholesterol    MCTD (mixed connective tissue disease) (HCC)    Mood disorder    Other ill-defined conditions(799.89)    Per [employer]:  \"son and girlfriend with 4 kids moved in.\"    Sleep apnea    Spinal stenosis    Comments:  spondylosis.  Management:  surgery in 2009.    Visual impairment        PAST SURGICAL HISTORY:  Past Surgical History:   Procedure Laterality Date    Back surgery      Colonoscopy      Colonoscopy N/A 2023    Procedure: COLONOSCOPY;  Surgeon: Clari Smith MD;  Location: [facility] ENDOSCOPY    Hysteroscopy,diagnostic  2006    polypectomy    Other surgical history  2009    Surgery (due to spinal stenosis/spondylosis).    Other surgical history Right     ankle surgery         ALLERGIES:  Allergies[1]      MEDICATIONS:  Current Outpatient Medications    Medication Instructions    atorvastatin (LIPITOR) 20 mg, Oral, Nightly    Betamethasone Dipropionate Aug 0.05 % External Cream 1 Application , Topical, 2 times daily, Apply to affected areas as needed bid    Blood Glucose Monitoring Suppl (FREESTYLE LITE) Does not apply Device 1 Device, Other, 2 times daily, Use as directed.    cyclobenzaprine (FLEXERIL) 10 mg, Oral, Nightly PRN    fluconazole (DIFLUCAN) 100 mg, Oral, Daily    FREESTYLE LANCETS Does not apply Misc TEST DAILY AS DIRECTED    Glucose Blood (FREESTYLE LITE TEST) In Vitro Strip Use as directed.    hydroxychloroquine (PLAQUENIL) 200 mg, Oral, 2 times daily    losartan-hydroCHLOROthiazide 100-25 MG Oral Tab 1 tablet, Oral, Daily    meclizine (ANTIVERT) 25 mg, Oral, 3 times daily PRN    metFORMIN ER (GLUCOPHAGE XR) 750 mg, Oral, 2 times daily with meals    methylPREDNISolone 4 MG Oral Tablet Therapy Pack Take as directed on package.    metoprolol tartrate (LOPRESSOR) 50 mg, Oral, 2 times daily    Metoprolol Tartrate 75 MG Oral Tab 1 tablet, 2 times daily    Mounjaro 2.5 mg, Subcutaneous, Weekly    Mounjaro 5 mg, Subcutaneous, Weekly    Mounjaro 7.5 mg, Subcutaneous, Weekly    nabumetone (RELAFEN) 750 mg, Oral, Daily    nitrofurantoin monohydrate macro 100 MG Oral Cap Take one capsule 2 times daily for 7 days    nystatin (MYCOSTATIN) 500,000 Units, Oral, 4 times daily    Omeprazole 40 mg, Oral, Every morning before breakfast, Take 1 capsule by mouth daily before breakfast.    traMADol (ULTRAM) 50 mg, Oral, Every 8 hours PRN    venlafaxine ER (EFFEXOR-XR) 150 mg, Oral, Daily        FAMILY HISTORY:  Family History   Problem Relation Age of Onset    Other (osteoarthritis) Mother     Diabetes Mother     Heart Disease Mother     Hypertension Mother     Stroke Mother     Heart Disease Father         CAD, (cause of death) age 49    Diabetes Father     Heart Attack Father     Other (psoriasis) Sister     Hypertension Sister     Stroke Maternal Grandmother          Cerebrovascular accident (Stroke)    Diabetes Maternal Grandfather     Heart Disease Paternal Grandmother         CAD    Diabetes Paternal Grandfather     Breast Cancer Maternal Aunt         70s     Heart Disease Maternal Aunt         CAD    Heart Disease Maternal Aunt     Breast Cancer Other 55        cousin        SOCIAL HISTORY:  Social History     Socioeconomic History    Marital status:    Tobacco Use    Smoking status: Former     Current packs/day: 0.00     Types: Cigarettes     Quit date: 1989     Years since quittin.0    Smokeless tobacco: Never   Vaping Use    Vaping status: Never Used   Substance and Sexual Activity    Alcohol use: Yes     Alcohol/week: 2.0 standard drinks of alcohol     Types: 2 Standard drinks or equivalent per week     Comment: Occasionally    Drug use: Yes     Frequency: 7.0 times per week     Types: Cannabis     Comment: Marijuana for pain nightly    Sexual activity: Yes     Partners: Male   Other Topics Concern    Caffeine Concern Yes     Comment: (Coffee, Soda) 2 cups daily    Pt has a pacemaker No    Pt has a defibrillator No    Reaction to local anesthetic No          PHYSICAL EXAMINATION:  There were no vitals filed for this visit.  CONSTITUTIONAL: No apparent distress, cooperative and communicative  NEUROLOGIC: Alert and oriented   HEAD: Normocephalic, atraumatic   EYES: Sclera non-icteric   ENT: Hearing intact, moist mucous membranes   NECK: No obvious goiter or masses   RESPIRATORY: Normal respiratory effort, Nonlabored breathing on room air  SKIN: No evident rashes   ABDOMEN: Soft, nontender, nondistended, no rebound tenderness, no guarding, no masses      REVIEW OF SYSTEMS:    A comprehensive 10-point review of systems was completed.  Pertinent positives and negatives are noted in the the HPI.       LABORATORY DATA:  URINE CULTURE >100,000 CFU/ML Proteus mirabilis Abnormal         Resulting Agency: [facility] ([facility])     Susceptibility     Proteus mirabilis      Not Specified    Ampicillin <=2 Sensitive    Cefazolin <=4 Sensitive    Ciprofloxacin <=0.25 Sensitive    Gentamicin <=1 Sensitive    Levofloxacin <=0.12 Sensitive    Meropenem <=0.25 Sensitive    Nitrofurantoin 128 Resistant    Piperacillin + Tazobactam <=4 Sensitive    Trimethoprim/Sulfa <=20 Sensitive         URINE CULTURE >100,000 CFU/ML Proteus mirabilis Abnormal         Resulting Agency: [facility] ([facility])     Susceptibility     Proteus mirabilis     Not Specified    Ampicillin <=2 Sensitive    Cefazolin <=4 Sensitive    Ciprofloxacin <=0.25 Sensitive    Gentamicin <=1 Sensitive    Levofloxacin <=0.12 Sensitive    Meropenem <=0.25 Sensitive    Nitrofurantoin 128 Resistant    Piperacillin + Tazobactam <=4 Sensitive    Trimethoprim/Sulfa <=20 Sensitive                URINE CULTURE No Growth at 18-24 hrs.              IMAGING REVIEW:  Narrative   PROCEDURE:   CT ABDOMEN + PELVIS KIDNEYSTONE 2D RNDR (NO IV NO ORAL) (CPT=74176)     COMPARISON: None.     INDICATIONS: Z68.37 Class 2 severe obesity due to excess calories with serious comorbidity and body mass index (BMI) of 37.0 to 37.9 in adult ([facility]) E66.01 Class 2 severe ob*     TECHNIQUE:   CT images of the abdomen and pelvis were obtained without intravenous contrast material.  Automated exposure control for dose reduction was used. Adjustment of the mA and/or kV was done based on the patient's size. Use of iterative  reconstruction technique for dose reduction was used. Dose information is transmitted to the ACR (American College of Radiology) NRDR (National Radiology Data Registry) which includes the Dose Index Registry.        FINDINGS:     The study is limited secondary to lack of IV and oral contrast.  Given this limitation, there is no acute abnormality identified within the abdomen/pelvis.     The following nonacute findings were made:     1. The liver demonstrates very slight decreased attenuation compatible with minimal fatty infiltration.  There are  no focal hepatic lesions clearly demonstrated.     2. The there are moderate degenerative changes within the lower thoracic and lumbar spine.  The patient is status post lower lumbar fusion with inter pedicular screws and posterior fusion rods identified.  Note is made of the bilateral L5 and right S1  screws extending beyond the anterior cortex of the vertebral bodies.  There are no associated abnormalities.  There is no CT evidence of hardware failure.  There is no acute fracture.     The remainder of the examination is unremarkable.  Specifically, the lung bases are clear.  The visualized aspects of the spleen, pancreas, gallbladder, adrenals, kidneys/ureters/urinary bladder, stomach, small bowel, colon, and remaining soft tissues  demonstrated grossly normal CT appearance for patient of this age.  There is no free fluid or organized fluid collection.  There is no significant adenopathy.                  Impression   CONCLUSION: No acute abnormality identified within the abdomen/pelvis.  Nonacute findings are present and are described within the body of the report.           Dictated by (CST): Tino Lee MD on 11/12/2024 at 9:57 AM      Finalized by (CST): Tino Lee MD on 11/12/2024 at 10:03 AM           Result History    CT ABDOMEN+PELVIS KIDNEYSTONE 2D RNDR(NO IV,NO ORAL)(CPT=74176) (Order #[id number]) on 11/12/2024 - Order Result History Report     OTHER RELEVANT DATA:   noen     IMPRESSION: Recurrent urinary tract infections with negative CT in November 2024-recommended behavioral management.  Offered Estrace cream.  Offered cystoscopy.    We talked about UTI prevention with continuing good hydration, starting a women's probiotic (bottle should say women's, vaginal, genitourinary; main ingredient should be lactobacillus), Cranberry pills (Ellura, Utiva, Crancap -all are found on [employer] on their respective website; they should have 36 mg PAC), bowel regimen (colace, senna, miralax), voiding before  and after sexual activity and using pH balanced soaps. Can continue to check urine and treat when UCx is positive. If this persists,  can consider initiating low dose antibiotic prophylaxis for 6 months versus gentamicin irrigations if she would like a more local therapy. We will also initiate vaginal estrace cream every night for 1 week followed by every other night indefinitely.      Continuous antimicrobial prophylaxis regimens for women with recurrent UTIs have been recommended by several trials.  The dosing options for continuous prophylaxis includes the following:    TMP 100mg once daily  TMO-SMX 40mg/200mg once daily  TMP-SMX 40mg 200mg thrice weekly  Nitrofurantoin monohydrate/macrocrystals 50mg daily  Nitrofurantoin onohydrate/macrocrystals 100mg daily  Cephalexin 125mg once daily  Cephalexin 250mg once daily  Fosfomycin 3g every 10 days     These regimens can continue for 3 to 6 months.     Recommended instructions for antibiotic prophylaxis related to sexual intercourse include taking a single dose of antibiotic immediately before or after sexual intercourse.  Dosing options for prophylaxis includes the following:     TMP-SMX 40mg/200mg  TMP-SMX 80mg/400mg  Nitrofurantoin monohydrate/macrocrystals 50mg - 100mg  Cephalexin 250mg      PLAN:  Estrace cream  Behavioral management  Cysto if no improvement    Thank you for referring this very pleasant patient to my clinic. If you have any questions or concerns, please do not hesitate to contact me.    Sincerely,  Vaishali Restrepo MD    30 minutes were spent on this patient at this visit obtaining a history, reviewing medical records, developing a treatment plan, counseling and discussing treatment strategy with patient, coordination of care and documentation.     The 21st Century Cures Act makes medical notes available to patients in the interest of transparency.  However, please be advised that this is a medical document.  It is intended as a peer to peer  communication.  It is written in medical language and may contain abbreviations or verbiage that are technical and unfamiliar.  It may appear blunt or direct.  Medical documents are intended to carry relevant information, facts as evident, and the clinical opinion of the practitioner.         [1]   Allergies  Allergen Reactions    No Known Allergies UNKNOWN

## 2025-01-28 RX ORDER — VENLAFAXINE HYDROCHLORIDE 150 MG/1
150 CAPSULE, EXTENDED RELEASE ORAL DAILY
Qty: 90 CAPSULE | Refills: 1 | Status: SHIPPED | OUTPATIENT
Start: 2025-01-28 | End: 2025-04-28

## 2025-01-28 NOTE — H&P
History of present illness: This is a 17-year-old female who I last saw in May 20 17. She has a history of CREST syndrome. She was told at one point that she had an elevated GGTP on a wellness blood test at work.   Workup per our office included liver LOV-10/30/2024  NOV-1/30/2025  LF-11/25/2024     endoscopy.     4. GB polyp: 6 mm, recommend repeat US in 1 year    EGD consent: I have discussed the risks, benefits, and alternatives to upper endoscopy/enteroscopy with the patient [who demonstrated understanding], including but not limited to the risks o

## 2025-01-28 NOTE — TELEPHONE ENCOUNTER
Future Appointment:2/10/25      Last Appointment:1/2/25      Last Refill:2/29/24      Medication Requested:   Requested Prescriptions     Pending Prescriptions Disp Refills    venlafaxine  MG Oral Capsule SR 24 Hr 90 capsule 0     Sig: Take 1 capsule (150 mg total) by mouth daily.

## 2025-02-05 ENCOUNTER — PATIENT MESSAGE (OUTPATIENT)
Dept: INTERNAL MEDICINE CLINIC | Facility: CLINIC | Age: 60
End: 2025-02-05

## 2025-02-07 RX ORDER — VENLAFAXINE HYDROCHLORIDE 150 MG/1
150 CAPSULE, EXTENDED RELEASE ORAL DAILY
Qty: 10 CAPSULE | Refills: 0 | Status: SHIPPED | OUTPATIENT
Start: 2025-02-07 | End: 2025-02-17

## 2025-02-10 ENCOUNTER — OFFICE VISIT (OUTPATIENT)
Dept: INTERNAL MEDICINE CLINIC | Facility: CLINIC | Age: 60
End: 2025-02-10
Payer: COMMERCIAL

## 2025-02-10 VITALS
HEIGHT: 61 IN | HEART RATE: 86 BPM | OXYGEN SATURATION: 95 % | SYSTOLIC BLOOD PRESSURE: 110 MMHG | DIASTOLIC BLOOD PRESSURE: 60 MMHG | WEIGHT: 172 LBS | BODY MASS INDEX: 32.47 KG/M2

## 2025-02-10 DIAGNOSIS — I10 ESSENTIAL HYPERTENSION: ICD-10-CM

## 2025-02-10 DIAGNOSIS — K76.0 NAFLD (NONALCOHOLIC FATTY LIVER DISEASE): ICD-10-CM

## 2025-02-10 DIAGNOSIS — Z00.00 ANNUAL PHYSICAL EXAM: Primary | ICD-10-CM

## 2025-02-10 DIAGNOSIS — E11.9 TYPE 2 DIABETES MELLITUS WITHOUT COMPLICATION, UNSPECIFIED WHETHER LONG TERM INSULIN USE (HCC): ICD-10-CM

## 2025-02-10 DIAGNOSIS — N39.0 RECURRENT UTI: ICD-10-CM

## 2025-02-10 DIAGNOSIS — Z12.31 ENCOUNTER FOR SCREENING MAMMOGRAM FOR MALIGNANT NEOPLASM OF BREAST: ICD-10-CM

## 2025-02-10 PROCEDURE — 90471 IMMUNIZATION ADMIN: CPT | Performed by: INTERNAL MEDICINE

## 2025-02-10 PROCEDURE — 90715 TDAP VACCINE 7 YRS/> IM: CPT | Performed by: INTERNAL MEDICINE

## 2025-02-10 PROCEDURE — 99396 PREV VISIT EST AGE 40-64: CPT | Performed by: INTERNAL MEDICINE

## 2025-02-10 RX ORDER — BUPROPION HYDROCHLORIDE 150 MG/1
150 TABLET ORAL DAILY
Qty: 30 TABLET | Refills: 0 | Status: SHIPPED | OUTPATIENT
Start: 2025-02-10

## 2025-02-10 RX ORDER — ONDANSETRON 4 MG/1
4 TABLET, ORALLY DISINTEGRATING ORAL EVERY 8 HOURS PRN
Qty: 30 TABLET | Refills: 0 | Status: SHIPPED | OUTPATIENT
Start: 2025-02-10

## 2025-02-10 NOTE — PROGRESS NOTES
Dana Marsh is a 59 year old female.    Chief complaint: annual physical exam    HPI:     Dana Marsh is a 59 year old pleasant female who presents for annual physical exam   She was noticing that she is getting more emotional   She is crying over small stuff  On effexor         Following up with the cardiologist       JOMAR   On mounjaro         No smoking   Smoking marijuana for 3-4 years   Exercise : not where she wants to be   Family history of cancer:cousin, aunt : breast cancer   Aunt : lung cancer and bone           Current Outpatient Medications   Medication Sig Dispense Refill    venlafaxine  MG Oral Capsule SR 24 Hr Take 1 capsule (150 mg total) by mouth daily for 10 days. 10 capsule 0    venlafaxine  MG Oral Capsule SR 24 Hr Take 1 capsule (150 mg total) by mouth daily. 90 capsule 1    estradiol (ESTRACE) 0.1 MG/GM Vaginal Cream Apply fingertip amount of cream to the vagina (0.5-1g) every night for 1 week and then every other night indefinitely 42.5 g 11    Cranberry (ELLURA) 200 MG Oral Cap Take daily 30 capsule 11    fluconazole 100 MG Oral Tab Take 1 tablet (100 mg total) by mouth daily. 15 tablet 0    nystatin 660932 UNIT/ML Mouth/Throat Suspension Take 5 mL (500,000 Units total) by mouth 4 (four) times daily. 60 mL 0    methylPREDNISolone 4 MG Oral Tablet Therapy Pack Take as directed on package. 1 each 0    nitrofurantoin monohydrate macro 100 MG Oral Cap Take one capsule 2 times daily for 7 days (Patient not taking: Reported on 1/27/2025) 14 capsule 0    Metoprolol Tartrate 75 MG Oral Tab Take 1 tablet by mouth 2 (two) times daily.      Tirzepatide (MOUNJARO) 7.5 MG/0.5ML Subcutaneous Solution Auto-injector Inject 7.5 mg into the skin once a week. 6 mL 0    ATORVASTATIN 20 MG Oral Tab TAKE 1 TABLET NIGHTLY 90 tablet 0    Omeprazole 40 MG Oral Capsule Delayed Release Take 1 capsule (40 mg total) by mouth every morning before breakfast. Take 1 capsule by mouth daily before  breakfast. 90 capsule 3    FREESTYLE LANCETS Does not apply Misc TEST DAILY AS DIRECTED 100 each 3    Glucose Blood (FREESTYLE LITE TEST) In Vitro Strip Use as directed. 100 strip 0    Blood Glucose Monitoring Suppl (FREESTYLE LITE) Does not apply Device 1 Device by Other route 2 (two) times daily. Use as directed. 1 each 3    hydroxychloroquine 200 MG Oral Tab Take 1 tablet (200 mg total) by mouth 2 (two) times daily. 180 tablet 3    cyclobenzaprine 10 MG Oral Tab Take 1 tablet (10 mg total) by mouth nightly as needed for Muscle spasms. 90 tablet 3    nabumetone 750 MG Oral Tab Take 1 tablet (750 mg total) by mouth daily. 90 tablet 3    metoprolol tartrate 50 MG Oral Tab Take 1 tablet (50 mg total) by mouth 2 (two) times daily. 180 tablet 0    metFORMIN  MG Oral Tablet 24 Hr Take 1 tablet (750 mg total) by mouth 2 (two) times daily with meals. 180 tablet 0    losartan-hydroCHLOROthiazide 100-25 MG Oral Tab Take 1 tablet by mouth daily. 90 tablet 0    traMADol 50 MG Oral Tab Take 1 tablet (50 mg total) by mouth every 8 (eight) hours as needed for Pain. 30 tablet 5    meclizine 25 MG Oral Tab Take 1 tablet (25 mg total) by mouth 3 (three) times daily as needed for Dizziness or Nausea. 20 tablet 0    Betamethasone Dipropionate Aug 0.05 % External Cream Apply 1 Application topically 2 (two) times daily. Apply to affected areas as needed bid 50 g 3      Past Medical History:    Abnormal vaginal bleeding    polypectomy    Anxiety    Depression    Diabetes (HCC)    Disorder of liver    Essential hypertension    Fibromyalgia    Gastropathy    Hemorrhoids    High blood pressure    High cholesterol    MCTD (mixed connective tissue disease) (HCC)    Mood disorder    Other ill-defined conditions(159.89)    Per [employer]:  \"son and girlfriend with 4 kids moved in.\"    Sleep apnea    Spinal stenosis    Comments:  spondylosis.  Management:  surgery in 12/2009.    Visual impairment     Past Surgical History:   Procedure  Laterality Date    Back surgery      Colonoscopy  2014    Colonoscopy N/A 11/6/2023    Procedure: COLONOSCOPY;  Surgeon: Clari Smith MD;  Location: [facility] ENDOSCOPY    Hysteroscopy,diagnostic  08/2006    polypectomy    Other surgical history  12/2009    Surgery (due to spinal stenosis/spondylosis).    Other surgical history Right     ankle surgery             Family History   Problem Relation Age of Onset    Other (osteoarthritis) Mother     Diabetes Mother     Heart Disease Mother     Hypertension Mother     Stroke Mother     Heart Disease Father         CAD, (cause of death) age 49    Diabetes Father     Heart Attack Father     Other (psoriasis) Sister     Hypertension Sister     Stroke Maternal Grandmother         Cerebrovascular accident (Stroke)    Diabetes Maternal Grandfather     Heart Disease Paternal Grandmother         CAD    Diabetes Paternal Grandfather     Breast Cancer Maternal Aunt         70s     Heart Disease Maternal Aunt         CAD    Heart Disease Maternal Aunt     Breast Cancer Other 55        cousin     Patient Active Problem List   Diagnosis    NAFLD (nonalcoholic fatty liver disease)    Gallbladder polyp    Gastropathy    Routine health maintenance    Essential hypertension    Irritable bowel syndrome with diarrhea    Fibromyalgia    Mixed connective tissue disease (HCC)    Allergic rhinitis    Recurrent UTI    Obesity (BMI 30-39.9)    Dietary counseling and surveillance    Hyperlipidemia    Snoring    Fatigue    Postcoital bleeding    Nausea and vomiting    Constipation    Abnormal urine    Disorder of connective tissue (HCC)    Excessive or frequent menstruation    External hemorrhoids    Leukorrhea    Lumbosacral spondylosis without myelopathy    Metrorrhagia    Palpitations    Shortness of breath    Irregular menstrual cycle    Tachycardia    Type 2 diabetes mellitus without complication (HCC)    Vaginitis and vulvovaginitis    Primary fibromyalgia syndrome    Polyp of corpus uteri     MCTD (mixed connective tissue disease) (HCC)    Nonalcoholic fatty liver disease    Annual physical exam    Gall bladder polyp    Dizziness    Petechiae    Skin rash    Class 2 severe obesity due to excess calories with serious comorbidity and body mass index (BMI) of 37.0 to 37.9 in adult (HCC)    Dysphagia    MCI (mild cognitive impairment)    Sleep disorder breathing    Cervical radicular pain    Gastroesophageal reflux disease    Screening for cervical cancer    Mixed diabetic hyperlipidemia associated with type 2 diabetes mellitus (HCC)    TE (obstructive sleep apnea)    Insomnia    Depression    Palpitation    Iron deficiency    Primary hypertension    Suprapubic abdominal pain    Urinary tract infection without hematuria       REVIEW OF SYSTEMS:   A comprehensive 10 point review of systems was completed.  Pertinent positives and negatives noted in the the HPI            EXAM:   /60   Pulse 86   Ht 5' 1\" (1.549 m)   Wt 172 lb (78 kg)   LMP 09/06/2013 (Approximate)   SpO2 95%   BMI 32.50 kg/m²   GENERAL: well developed, well nourished,in no apparent distress  SKIN: multiple pigmented nevi   HEENT: atraumatic, normocephalic,right ear with wax build up   NECK: supple,no adenopathy  LUNGS: clear to auscultation  Breast : normal no lumps   CARDIO: RRR without murmur  GI: no masses, HSM or tenderness  EXTREMITIES: no cyanosis, clubbing or edema  NEURO: no gross deficits              No orders of the defined types were placed in this encounter.    CT ABDOMEN+PELVIS KIDNEYSTONE 2D RNDR(NO IV,NO ORAL)(CPT=74176)    Result Date: 11/12/2024  CONCLUSION: No acute abnormality identified within the abdomen/pelvis.  Nonacute findings are present and are described within the body of the report.    Dictated by (CST): Tino Lee MD on 11/12/2024 at 9:57 AM     Finalized by (CST): Tino Lee MD on 11/12/2024 at 10:03 AM                ASSESSMENT AND PLAN:       ICD-10-CM    1. Annual physical exam   Z00.00 CBC With Differential With Platelet     Comp Metabolic Panel (14) [E]     Lipid Panel [E]     Hemoglobin A1C (Glycohemoglobin) [E]     Microalb/Creat Ratio, Random Urine [E]     TdaP (Adacel, Boostrix) [89641]     ondansetron 4 MG Oral Tablet Dispersible     XR DEXA BONE DENSITOMETRY (CPT=77080)     buPROPion  MG Oral Tablet 24 Hr     JOSHUA OREN 2D+3D SCREENING BILAT (CPT=77067/31622)     Urinalysis with Culture Reflex [E]     Derm Referral - In Network      2. Encounter for screening mammogram for malignant neoplasm of breast  Z12.31 CBC With Differential With Platelet     Comp Metabolic Panel (14) [E]     Lipid Panel [E]     Hemoglobin A1C (Glycohemoglobin) [E]     Microalb/Creat Ratio, Random Urine [E]     TdaP (Adacel, Boostrix) [08769]     ondansetron 4 MG Oral Tablet Dispersible     XR DEXA BONE DENSITOMETRY (CPT=77080)     buPROPion  MG Oral Tablet 24 Hr     JOSHUA OREN 2D+3D SCREENING BILAT (CPT=77067/17549)     Urinalysis with Culture Reflex [E]     Derm Referral - In Network      3. NAFLD (nonalcoholic fatty liver disease)  K76.0 CBC With Differential With Platelet     Comp Metabolic Panel (14) [E]     Lipid Panel [E]     Hemoglobin A1C (Glycohemoglobin) [E]     Microalb/Creat Ratio, Random Urine [E]     TdaP (Adacel, Boostrix) [15936]     ondansetron 4 MG Oral Tablet Dispersible     XR DEXA BONE DENSITOMETRY (CPT=77080)     buPROPion  MG Oral Tablet 24 Hr     JOSHUA OREN 2D+3D SCREENING BILAT (CPT=77067/42315)     Urinalysis with Culture Reflex [E]     Derm Referral - In Network      4. Type 2 diabetes mellitus without complication, unspecified whether long term insulin use (HCC)  E11.9 CBC With Differential With Platelet     Comp Metabolic Panel (14) [E]     Lipid Panel [E]     Hemoglobin A1C (Glycohemoglobin) [E]     Microalb/Creat Ratio, Random Urine [E]     TdaP (Adacel, Boostrix) [71922]     ondansetron 4 MG Oral Tablet Dispersible     XR DEXA BONE DENSITOMETRY (CPT=77080)      buPROPion  MG Oral Tablet 24 Hr     JOSHUA OREN 2D+3D SCREENING BILAT (CPT=77067/09381)     Urinalysis with Culture Reflex [E]     Derm Referral - In Network      5. Recurrent UTI  N39.0 CBC With Differential With Platelet     Comp Metabolic Panel (14) [E]     Lipid Panel [E]     Hemoglobin A1C (Glycohemoglobin) [E]     Microalb/Creat Ratio, Random Urine [E]     TdaP (Adacel, Boostrix) [25214]     ondansetron 4 MG Oral Tablet Dispersible     XR DEXA BONE DENSITOMETRY (CPT=77080)     buPROPion  MG Oral Tablet 24 Hr     JOSHUA OREN 2D+3D SCREENING BILAT (CPT=77067/36192)     Urinalysis with Culture Reflex [E]     Derm Referral - In Network      6. Essential hypertension  I10 CBC With Differential With Platelet     Comp Metabolic Panel (14) [E]     Lipid Panel [E]     Hemoglobin A1C (Glycohemoglobin) [E]     Microalb/Creat Ratio, Random Urine [E]     TdaP (Adacel, Boostrix) [13505]     ondansetron 4 MG Oral Tablet Dispersible     XR DEXA BONE DENSITOMETRY (CPT=77080)     buPROPion  MG Oral Tablet 24 Hr     JOSHUA OREN 2D+3D SCREENING BILAT (CPT=77067/97889)     Urinalysis with Culture Reflex [E]     Derm Referral - In Network       Diet and exercise   Self breast exam   Sun screen recommended   Fasting blood work   Up-to-date with pap smear   Mammogram screening, offered high risk breast clinic referral : would like to hold off   Advised to have a DEXA scan since with mixed connective tissue disease  Tdap today  Will add Wellbutrin 150 mg once a day discussed the risk of serotonin syndrome discussed symptoms  To see a dermatologist for annual skin check      Please return to the clinic if you are having persistent symptoms. If worsening symptoms should go to the ER    Remy Rojas MD,   Diplomate of the American Board of Internal Medicine  Diplomate of the American Board of Obesity Medicine

## 2025-02-25 ENCOUNTER — TELEPHONE (OUTPATIENT)
Age: 60
End: 2025-02-25

## 2025-02-25 NOTE — TELEPHONE ENCOUNTER
Hello,     The [facility] team has attempted to reach you regarding an order from Dr. Rojas's office. We are reaching out in order to assist you in coordinating care and resources that may meet your needs. Please give our office a call at [phone]. For more immediate assistance you can contact our 24-hour help line at [phone]. We look forward to hearing from you soon.

## 2025-03-03 ENCOUNTER — OFFICE VISIT (OUTPATIENT)
Dept: PULMONOLOGY | Facility: CLINIC | Age: 60
End: 2025-03-03

## 2025-03-03 VITALS
WEIGHT: 172 LBS | RESPIRATION RATE: 16 BRPM | DIASTOLIC BLOOD PRESSURE: 65 MMHG | SYSTOLIC BLOOD PRESSURE: 101 MMHG | HEIGHT: 61 IN | OXYGEN SATURATION: 96 % | BODY MASS INDEX: 32.47 KG/M2 | HEART RATE: 78 BPM

## 2025-03-03 DIAGNOSIS — R10.2 SUPRAPUBIC ABDOMINAL PAIN: ICD-10-CM

## 2025-03-03 DIAGNOSIS — N39.0 URINARY TRACT INFECTION WITHOUT HEMATURIA, SITE UNSPECIFIED: ICD-10-CM

## 2025-03-03 DIAGNOSIS — E66.01 CLASS 2 SEVERE OBESITY DUE TO EXCESS CALORIES WITH SERIOUS COMORBIDITY AND BODY MASS INDEX (BMI) OF 37.0 TO 37.9 IN ADULT (HCC): ICD-10-CM

## 2025-03-03 DIAGNOSIS — E66.812 CLASS 2 SEVERE OBESITY DUE TO EXCESS CALORIES WITH SERIOUS COMORBIDITY AND BODY MASS INDEX (BMI) OF 37.0 TO 37.9 IN ADULT (HCC): ICD-10-CM

## 2025-03-03 DIAGNOSIS — R06.02 SHORTNESS OF BREATH: Primary | ICD-10-CM

## 2025-03-03 DIAGNOSIS — E11.9 TYPE 2 DIABETES MELLITUS WITHOUT COMPLICATION, UNSPECIFIED WHETHER LONG TERM INSULIN USE (HCC): ICD-10-CM

## 2025-03-03 PROCEDURE — 99213 OFFICE O/P EST LOW 20 MIN: CPT | Performed by: INTERNAL MEDICINE

## 2025-03-03 RX ORDER — TIRZEPATIDE 7.5 MG/.5ML
7.5 INJECTION, SOLUTION SUBCUTANEOUS WEEKLY
Qty: 6 ML | Refills: 0 | Status: SHIPPED | OUTPATIENT
Start: 2025-03-03

## 2025-03-03 NOTE — PROGRESS NOTES
The patient is a 59-year-old female who I know well from prior evaluation who comes in now for follow-up.  Her usage of CPAP is not great.  When she uses it, her average daily usage is 3 hours and 46 minutes with residual events of 4/h.  She is benefiting from ongoing usage.  She has trouble with the mask on the bridge of her nose.    Review of Systems:  Vision normal. Ear nose and throat normal. Bowel normal. Bladder function normal. No depression. No thyroid disease. No lymphatic system concerns.  No rash. Muscles and joints unremarkable. No weight loss no weight gain.    Physical Examination:  Vital signs normal. HEENT examination is unremarkable with pupils equal round and reactive to light and accommodation. Neck without adenopathy, thyromegaly, JVD nor bruit. Lungs clear to auscultation and percussion. Cardiac regular rate and rhythm no murmur. Abdomen nontender, without hepatosplenomegaly and no mass appreciable. Extremities and Musculoskeletal without clubbing cyanosis nor edema, and mobility acceptable. Neurologic grossly intact with symmetric tone and strength and reflex.    Assessment and plan:  1.  Modest obstructive sleep apnea-baseline events 14.3/h.    Recommendations: Will try switching the style of mask interface to the AirFit F20 medium size, weight loss, avoid alcohol, avoid sedating drug, never drive if sleepy, vigilance with CPAP every night all night, see me in the office at the 1 year interval or sooner if needed and contact me promptly if new trouble.

## 2025-03-11 ENCOUNTER — TELEPHONE (OUTPATIENT)
Age: 60
End: 2025-03-11

## 2025-03-11 NOTE — TELEPHONE ENCOUNTER
Marisel Cortez,     Here are some psychiatry and therapy resources that may be a good fit. Please verify your insurance coverage with any providers that you may choose to call and schedule with directly. If there is anything else I can assist with, then please give me a call at [phone]. If you need more immediate assistance, or assistance outside of business hours, please contact the [facility] 24/7 helpline at [phone].     Psychiatry:    Timbo Nash, [facility]  [address]  [address]  [place] [address]  Phone: [phone]    Liane Polanco PA-C  [address]  [address]  [place] [address]   Phone: [phone]    Zainab Tena, [facility]  [address]  [place] [address]   Phone: [phone]    Therapy:    Krystle Archuleta [facility]  [address] [place] [address]  Phone: [phone]     [place] [address]  Phone: [phone]    Radha Cornell, [facility]  [facility]  [address]  [address]  [place] [address]   Phone: [phone]      Carla Carrillo (she/her/hers)  Patient Care Navigator Mental Health   [facility]  [phone] or 24/7 help line: [phone]  [email or web address]  Request an assessment or support »

## 2025-03-12 ENCOUNTER — OFFICE VISIT (OUTPATIENT)
Dept: RHEUMATOLOGY | Facility: CLINIC | Age: 60
End: 2025-03-12

## 2025-03-12 ENCOUNTER — TELEPHONE (OUTPATIENT)
Dept: RHEUMATOLOGY | Facility: CLINIC | Age: 60
End: 2025-03-12

## 2025-03-12 VITALS
WEIGHT: 168.31 LBS | SYSTOLIC BLOOD PRESSURE: 112 MMHG | RESPIRATION RATE: 16 BRPM | DIASTOLIC BLOOD PRESSURE: 73 MMHG | HEART RATE: 79 BPM | BODY MASS INDEX: 31.78 KG/M2 | HEIGHT: 61 IN

## 2025-03-12 DIAGNOSIS — M79.7 FIBROMYALGIA: ICD-10-CM

## 2025-03-12 DIAGNOSIS — M79.609 POPLITEAL PAIN: ICD-10-CM

## 2025-03-12 DIAGNOSIS — M35.1 MCTD (MIXED CONNECTIVE TISSUE DISEASE) (HCC): Primary | ICD-10-CM

## 2025-03-12 DIAGNOSIS — M79.662 PAIN OF LEFT CALF: ICD-10-CM

## 2025-03-12 PROCEDURE — 99214 OFFICE O/P EST MOD 30 MIN: CPT | Performed by: INTERNAL MEDICINE

## 2025-03-12 RX ORDER — TRAMADOL HYDROCHLORIDE 50 MG/1
50 TABLET ORAL EVERY 8 HOURS PRN
Qty: 90 TABLET | Refills: 3 | Status: SHIPPED | OUTPATIENT
Start: 2025-03-12

## 2025-03-12 RX ORDER — HYDROXYCHLOROQUINE SULFATE 200 MG/1
200 TABLET, FILM COATED ORAL 2 TIMES DAILY
Qty: 180 TABLET | Refills: 3 | Status: SHIPPED | OUTPATIENT
Start: 2025-03-12

## 2025-03-12 RX ORDER — CYCLOBENZAPRINE HCL 10 MG
10 TABLET ORAL NIGHTLY PRN
Qty: 90 TABLET | Refills: 3 | Status: SHIPPED | OUTPATIENT
Start: 2025-03-12

## 2025-03-12 NOTE — PROGRESS NOTES
Dana Marsh is a 59 year old female who presents for   Chief Complaint   Patient presents with    Mixed Connective Tissue Disease    Medication Follow-Up       HPI:     I had the pleasure of seeing Dana Marsh on 4/5/2017 for evaluation. Referred here by Dr. Perez and Dr. Miguel.     She is a pleasant 51 year old who has recnetly been dx with MCTD and fibromyalgia and is here ro re-establish.   Dr. Sanchez was her previous rheumatolgoist.   She has recently had elevated liver funciton tests and is concerned about the.   She was diagnoesd in 9/2015.     She got her eye exam for glaucoma and for plaquenil. She had a bad eye infection in 10 an 11/2016. She then saw her eye doctor - Lei Magallon.. She had this sudden swolling her her eye. She took drops for a few days and thought it was infection. No pink eye. No pain.     She had tremndous arm pain in 9/2015. She had this for 8 months in both arms. She was then sent to rheumatolgosit and got labs. She was then diagnosed. She was started on plaquenil 200mg bid. She didn't feel that helped the pain. She then stopped her cholesterol medication.   She has right knee apin right now.   She has fibormyalgia since age 28.   She just started lyrica and savella in the last 3 months.   She feelst he pain is better. It's not totally gone. Dr. Miguel started these.   She didn't want narcotics.   She's stopped meloxicam - but taking only as needeed. She also stopped gabpentin 3 months ago and effexor 3 months.   She was on cymbalta years ago.     Only her right knee is hurting. Her arms and hips can still hurt. If she cleans the garage she can't do anything for 1 1/2 days. It used to be a week.   Back of her legs hurt. She uses massage therapy. sshe feels they are swollen.   She has occl left sided neck pain.   She ahs some lower back pain - she had hx of lower back sx. In 12/2009.     4/19/2017  Repeat RAINE nd RNP are negative - for MCTD. She's never flet plaquenil helped  her.   She has no sob.   She has been having n/v. That' sbeen ahving a lot. She doesn's tknow if it's her IBS.   sarbjit didn't see GI yet. She got labs and us liver.   She has been having loose bowel movement and goto the bathrrom and then she throws up. - did this easter Sunday. Then has been fine . X 1 days. She gets this every 1-2 weeks. For the last couple of months. herh right slower hip are anad stomach area bothers her when she is sick.   Sarbjit has 5/10 pain.   She feels her pain is so much deejay sharonda lyrica and savella.   She's not on cyclbenzaprine.   The arm pain is better. Its bothering her on and off. It's a tooth ache in her body. She's osn lyirca 150mg bid and savella is 50mg bid. She occl forgets her pills in am.   The back of her right knee still hurts.   She only takes melxoicam as needed. He hasn't fellt she's bad enough to take it. Th eknee pain is going on for 6-7 months. It's not helping.     8/28/2017  She was haivng a lot of pain in her hands , arms and back and right knee. Dr. Miguel gave her a prednisone pack for 5 days. She felt a little better so she is back.   She cut back on all three of meds.   Her knee felt better on the prednisone.   The fingers and hands felt better. Her fibro was still hurting.   She can be irritable sometimes.   She feels her knee never got better until she was taking the prednisone pack.     11/30/2017  She doesn't feel great. She felt 3 weeks ago she reached her point. She had a pcp. She saw him.   She restarted meloxicam from Dr. Miguel and she's feeling better.   She increased the lyrica back to 150mg twice a day. Then she increased the savella to 50mg bid. She's seeing more an improvmeent now.   She's vomitting again. It's happened to her before. She doesn't feel meloxicam makes her feel worse.   She's noticing some dizziness with meloxicam so she is taking it at night.   She still has soreness in her arms. She couldn't lay on her hips bfore now that' sbetter.   She  felt her right knee was bad. Now she' sbetter with laying on it now.   No rashes.     She has 5/10 apin in her hips, legs and arms .     4/11/2018   She had a bad flare of neck pain 1-2 weeks ago. She felt advil and tyelnol wasn't helping.   She's been having improved right arm funciton after physical therapy.   She still has hpain in her elbows.   She called 2 weeks ago with pain in her.   She is not covered for the savella. It's still 140 30 days suppply - she's started with a 25mg twice a day - and she feels nauseaous from thsi. Hasn't thrown up since going offof it.   She was offo of it for 2 weeks and shes' gradually going back on.   She was not nasueeated int he past.   She also felt more pain off the savell in the last 2 weeks.   She has 5/10 pain right now.   She feelst he nabuemtone 750mg bid is helping her much more than the meloxicam. It's helped her arms.   Not much gastritis with it.     8/15/2018  She was getting sick with lyrica and savella b/c she was vomitting 2-3 times a week. She had seen Dr. Vallecillo and Dr. Kenney - and they determined it was the medications.   She ahs been off for 1 month. She is having 3/10. In her right knee , right wrist , arm and hand.   She feels better off the meds.   Years ago she was on lyrica - she is bloated and crabby while on lyrica. So she wanted ot stop both even though savella was more linked to the nausea.   She had trouble with sleeping -   Her knee and wrist was bothering her now.   She's been on savella nd lyrica combination was for 3 years.     1/16/2019  She has had a few bouts of really bad pain. Her neck and right shoulders. Sh ehad to goto immediate care   . Prednisone burst given and it helped. She was also given a painkiller.   She was good for 1 week   Then her left side shoulder started hurting and she was in bed last weekend.   The nabumetone is not hleping much.   She is dealing with depression and she is now on venalfaxine by dr. Rojas.   She is  getting a lot of UTI's.   She doesn't have pain much at times and then it can come on suddenly.   She had physical therapy for her neck years ago.     4/2/2019  Her hips and knee are bad. For the most part , she feels ok. But she gets bouts of pain. Her arms are much better. She is still going to physical therapy for pressure points and that's helping.she's getting it for neck and arms - and that has gotten better.   Physical lifting hurts her. Over doing cleaning can hurt.   She has to lay on the heating pad.   Her knee doesn't hurt while walking but bending her knee hurts. Going up and down the stairs.   At night she's on amitripytline 10mg at night.   She's had 8/10 pain.   The hip pain just started last tnight.    2/24/2020    She is doing well.   She has pain in her  B/l 2nd fingers . She has 4/10 pain.   She has been tired again lately.   Her hips are not worse but still havin gpain.   She keeps getting bladder infections.   She is having repeat infetions but no cysitis.   She's had an infection once a month.     1/19/2021  VIDEO VISIT  She has extreme fatigue.   She had covid exposure last week and is quaratining. That's why she is doing a video visit today.   She had this fatigue on 1/13/2021  She had pink eye thought to be from contacts in 11/2020 -saw opthlmologist weekly and was put on steroids. She was tested for inflammation at that time and told that this was fine.   She was sent back to cardiologist b/c she has tachycardia - 120s. She was d with PVCs. She is on metoprolol.   hse had fatigue priror to starting betablocker.   She's getting a sleep study.   She missed cylocbenzaprien 5mg at night - and she noticed that she doenst' sleep well without it.   She's not super achy.   Her hips are touchy but not like before.   She has stress - mother had open heart sx , motherin law had panic attacks and she had lost her job and switched to a new job. Leaving her other job was nice.   She takes nabumetone as  needed - it's not every day - it's was a few weeks ago she used it.     7/16/2021  She feels the last 2 weeks the pain has been unbearable in her elbows /arms and her hips .   Her arms are the most pain. She bought pain patches and not helping.   She has about 8/10 pain   She takes edibles during the night.   She had a bad earache this year and she even took a norco from taht time and it didn't help and she couldn't even sleep with it.   She started taking the nabumetone twice a day it's not doing anything.   giovanna was taking it as needed.   She was pretty good until the last 2 weeks.   It's affecting her daily life and work.   It's sensitive to the touch     She is helping take care of ehr mother in law at home and she has a littl stress with work. She thinks she is handling it. But she feels the pain is like when she was first diagnosed.   She just had the covid vaccine 2 weeks.     10/11/2021  She can barely move since Thursday and can't move well. She has a flare of her left hip and lower back.   She can't lift her left left much off the ground. No sciatic pain.   Usually a flare goes away after a few days.   She has 9/10 pain   She tried to take an extra flexeril a tnight and taking advil but she's not better.   If fshe lays flat on a sleeping   She also might have a UTI.     She never tried the lyrica 50mg bid.   She had a wedding last week and she is good.   She was taking nabumetone but not better on this.     8/4/2022  She has been so so . She has had back pain for the last 3 months. She went to chiropracter this week and is starting treatment.   She had covid. The pain was bad before and has gotten progressively worse.   She goes to bed 2-3 times and when she lays on her left side her whole left side is miserable.   She ran out of cycloenzaprine - 3 weeks ago . She feels worse off of ithis.   No recent injury except soemthing fell on her left leg at work 3 weeks ago.   She is mostly having mid back pain and  now her upper back is so inflammed.     Stress ful home life.   She feels some days she is sleeping. She is exahusted after work.     Her heart was racing more sine her last viist - she had her metoprolol raised.     She has been diagnosed with diabetse since -     1/4/2023  She got the flu over break.   She's getting numbness down her left arm and it's more and more often. It was here and there. But it's more often.   And affects her left 4th and 5th fingers.   Her neck doenst' bother her during these episodes now.   She never did do physical therapy b/c her upper back and shoulder pain calmed down.   Was going to the [facility] and was off for several weeks -b/c she was sick . She felt it was helping her.   holiding up her arm she has more tingling.   Today there is more tingling. No neck or shoulder pain.     Her pain is about 3/10 pain.   Not taking tramadol or nabuemtone.     7/5/2023    She's getting a left arm  tingling and numbness. X 1month.   She seems like she can get this under control the pain hasn't been able to get   At night she has hd to ice and using heating pad   The last 2 days - her pain is increased - in her left hand - 2 days constant numbness and tingling -   She called on 6/18 - and tramadol given.   In the past gabpaneitn did not helpher.   She had a right knee steroid injection - with dr. Stokes in 4/2023 - she's doing ok -     4/17/2024  She has increased brain fog - bothering her so much - that she went to see neurology. Saw Dr Fuentes in 1/2024 - and she has joint pain in her arms in her elbows .   If she does something she has pain in her neck and lower back.   Her knees have been so and so .   She takes the nabumetone more often - it helps her when she takes it as needed.   Reviewed notes from neurology - mentioned she has Mild cognitive defect and dylexia. Mentioned to improve her fibromyalgia control to help the fibrofog.   - she thinks brain fog increased with work stress - now  she changed jobs - it's better -   She doesn't think it's the medications - so she feels it was the stress.     She takes medical marijuana every night.   She was smoking at night and that makes her very hungry versus the edibles.   She sleeps well with the edibles.     3/21/2025  She is tender to the touch - struggling - not sure what is happening but her thighs and legs are hurring.   It might be the weather.   She had her knees pospping - the back of her left knee is hurting.   She is afraid she had a blood clot. The back of her leg looks swollen. So she iced it.   She can walk on it now.     Lost 35lbs on [employer]   She has had baker's cyst in the past.     Wt Readings from Last 2 Encounters:   03/12/25 168 lb 4.8 oz (76.3 kg)   03/03/25 172 lb (78 kg)     Body mass index is 31.8 kg/m².      Current Outpatient Medications   Medication Sig Dispense Refill    PROBIOTIC ACIDOPHILUS OR Take by mouth.      MOUNJARO 7.5 MG/0.5ML Subcutaneous Solution Auto-injector ADMINISTER 7.5 MG UNDER THE SKIN 1 TIME A WEEK 6 mL 0    ondansetron 4 MG Oral Tablet Dispersible Take 1 tablet (4 mg total) by mouth every 8 (eight) hours as needed. 30 tablet 0    venlafaxine  MG Oral Capsule SR 24 Hr Take 1 capsule (150 mg total) by mouth daily. 90 capsule 1    estradiol (ESTRACE) 0.1 MG/GM Vaginal Cream Apply fingertip amount of cream to the vagina (0.5-1g) every night for 1 week and then every other night indefinitely 42.5 g 11    Cranberry (ELLURA) 200 MG Oral Cap Take daily 30 capsule 11    fluconazole 100 MG Oral Tab Take 1 tablet (100 mg total) by mouth daily. 15 tablet 0    nystatin 864754 UNIT/ML Mouth/Throat Suspension Take 5 mL (500,000 Units total) by mouth 4 (four) times daily. 60 mL 0    Metoprolol Tartrate 75 MG Oral Tab Take 1 tablet by mouth 2 (two) times daily.      ATORVASTATIN 20 MG Oral Tab TAKE 1 TABLET NIGHTLY 90 tablet 0    Omeprazole 40 MG Oral Capsule Delayed Release Take 1 capsule (40 mg total) by mouth  every morning before breakfast. Take 1 capsule by mouth daily before breakfast. 90 capsule 3    FREESTYLE LANCETS Does not apply Misc TEST DAILY AS DIRECTED 100 each 3    Glucose Blood (FREESTYLE LITE TEST) In Vitro Strip Use as directed. 100 strip 0    Blood Glucose Monitoring Suppl (FREESTYLE LITE) Does not apply Device 1 Device by Other route 2 (two) times daily. Use as directed. 1 each 3    hydroxychloroquine 200 MG Oral Tab Take 1 tablet (200 mg total) by mouth 2 (two) times daily. 180 tablet 3    cyclobenzaprine 10 MG Oral Tab Take 1 tablet (10 mg total) by mouth nightly as needed for Muscle spasms. 90 tablet 3    nabumetone 750 MG Oral Tab Take 1 tablet (750 mg total) by mouth daily. 90 tablet 3    metoprolol tartrate 50 MG Oral Tab Take 1 tablet (50 mg total) by mouth 2 (two) times daily. 180 tablet 0    metFORMIN  MG Oral Tablet 24 Hr Take 1 tablet (750 mg total) by mouth 2 (two) times daily with meals. 180 tablet 0    losartan-hydroCHLOROthiazide 100-25 MG Oral Tab Take 1 tablet by mouth daily. 90 tablet 0    traMADol 50 MG Oral Tab Take 1 tablet (50 mg total) by mouth every 8 (eight) hours as needed for Pain. 30 tablet 5    meclizine 25 MG Oral Tab Take 1 tablet (25 mg total) by mouth 3 (three) times daily as needed for Dizziness or Nausea. 20 tablet 0    Betamethasone Dipropionate Aug 0.05 % External Cream Apply 1 Application topically 2 (two) times daily. Apply to affected areas as needed bid 50 g 3    methylPREDNISolone 4 MG Oral Tablet Therapy Pack Take as directed on package. (Patient not taking: Reported on 3/12/2025) 1 each 0    nitrofurantoin monohydrate macro 100 MG Oral Cap Take one capsule 2 times daily for 7 days (Patient not taking: Reported on 3/12/2025) 14 capsule 0      Past Medical History:    Abnormal vaginal bleeding    polypectomy    Anxiety    Depression    Diabetes (HCC)    Disorder of liver    Essential hypertension    Fibromyalgia    Gastropathy    Hemorrhoids    High blood  pressure    High cholesterol    MCTD (mixed connective tissue disease) (HCC)    Mood disorder    Other ill-defined conditions(799.89)    Per [employer]:  \"son and girlfriend with 4 kids moved in.\"    Sleep apnea    Spinal stenosis    Comments:  spondylosis.  Management:  surgery in 2009.    Visual impairment      Past Surgical History:   Procedure Laterality Date    Back surgery      Colonoscopy      Colonoscopy N/A 2023    Procedure: COLONOSCOPY;  Surgeon: Clari Smith MD;  Location: [facility] ENDOSCOPY    Hysteroscopy,diagnostic  2006    polypectomy    Other surgical history  2009    Surgery (due to spinal stenosis/spondylosis).    Other surgical history Right     ankle surgery      Family History   Problem Relation Age of Onset    Other (osteoarthritis) Mother     Diabetes Mother     Heart Disease Mother     Hypertension Mother     Stroke Mother     Heart Disease Father         CAD, (cause of death) age 49    Diabetes Father     Heart Attack Father     Other (psoriasis) Sister     Hypertension Sister     Stroke Maternal Grandmother         Cerebrovascular accident (Stroke)    Diabetes Maternal Grandfather     Heart Disease Paternal Grandmother         CAD    Diabetes Paternal Grandfather     Breast Cancer Maternal Aunt         70s     Heart Disease Maternal Aunt         CAD    Heart Disease Maternal Aunt     Breast Cancer Other 55        cousin   mom - has arthriits   Sister doesn't have psa. 3rd cousin has psa.    Social History:  Social History     Socioeconomic History    Marital status:    Tobacco Use    Smoking status: Former     Current packs/day: 0.00     Types: Cigarettes     Quit date: 1989     Years since quittin.1     Passive exposure: Past    Smokeless tobacco: Never   Vaping Use    Vaping status: Never Used   Substance and Sexual Activity    Alcohol use: Yes     Alcohol/week: 2.0 standard drinks of alcohol     Types: 2 Standard drinks or equivalent per week     Comment:  Occasionally    Drug use: Yes     Frequency: 7.0 times per week     Types: Cannabis     Comment: Marijuana for pain nightly    Sexual activity: Yes     Partners: Male   Other Topics Concern    Caffeine Concern Yes     Comment: (Coffee, Soda) 2 cups daily    Pt has a pacemaker No    Pt has a defibrillator No    Reaction to local anesthetic No    35 years ago she quit smoking.   Works in [employer].   , 3 boys, youngest will live at home -      REVIEW OF SYSTEMS:   Review Of Systems:  Fatigue  Constitutional:No fever, no change in weight or appetitie  Derm: No rashes, no oral ulcers, no alopecia, no photosensitivity, no psoriasis  HEENT: No dry eyes, has dry mouth, no Raynaud's, hands always cold, no nasal ulcers, no parotid swelling, no neck pain, no jaw pain, no temple pain  Eyes: No visual changes, eye exam - 10 or 11/2016 - was done - with dr. Magallon, has contacts as well, episode of eye swelling left eye - in 10-11/2016.   CVS: No chest pain, no heart disease, had echo long time ago - thought she had skipped beat - not recently - on [facility] -   RS: No SOB, no Cough, No Pleurtic pain,   GI: ocla vomiiting, no abominal pain, no hx of ulcer, no gastritis, no heartburn, no dyshpagia, no BRBPR or melena, irritable bowel - lately she has n/v   Colonoscopy - 2 years ago - in [place] - dr. Vallecillo 1/30/2014 - normal -   : no dysuria, hx of UTIs - 3 childner, had several hosptilizations as a child for urinary tract issues, 1 this last year, no hx of miscarriages, no DVT Hx, no hx of OCP,   Neuro: No numbness or tingling, no headache, no hx of seizures,   Psych: no hx of anxiety or depression  ENDO: no hx of thyroid disease, no hx of DM  Joint/Muscluskeltal: see HPI,   All other ROS are negative.     EXAM:   /73   Pulse 79   Resp 16   Ht 5' 1\" (1.549 m)   Wt 168 lb 4.8 oz (76.3 kg)   LMP 09/06/2013 (Approximate)   BMI 31.80 kg/m²   HEENT: , EOM intact, clear sclera, PERRLA,  pleasant, no acute distress, no CAD, no neck tendnerness, good ROM,   No rashes  CVS: RRR, no murmurs  RS: CTAB, no crackles, no rhonchi  ABD: Soft Non tender,   Joint exam:    tender points - very tender to light touch in shhoulders, neck, elbows and arms   Also in  and hips, and knees a 14/18 tender points. -  Lherimitte's sign positive - - left sided   Neck tendenress thoughtout c spine - and left sided  paracervical tenderness.    b/l trochanteric bursitis -   Left medial area of left elbows - not tender - to compression    latearl part of elbow tender to touch -   Not  tender in lower back -   no left hip tenderness   SLR negative.     No synvoitis seen  Good cap refill   Good grasp.   No edema    Component      Latest Ref Rng & Units 11/18/2020 7/15/2020   WBC      4.0 - 11.0 x10(3) uL 8.2    RBC      3.80 - 5.30 x10(6)uL 3.96    Hemoglobin      12.0 - 16.0 g/dL 12.4    Hematocrit      35.0 - 48.0 % 37.0    MCV      80.0 - 100.0 fL 93.4    MCH      26.0 - 34.0 pg 31.3    MCHC      31.0 - 37.0 g/dL 33.5    RDW-SD      35.1 - 46.3 fL 40.5    RDW      11.0 - 15.0 % 11.9    Platelet Count      150.0 - 450.0 10(3)uL 319.0    Prelim Neutrophil Abs      1.50 - 7.70 x10 (3) uL 4.53    Neutrophils Absolute      1.50 - 7.70 x10(3) uL 4.53    Lymphocytes Absolute      1.00 - 4.00 x10(3) uL 2.73    Monocytes Absolute      0.10 - 1.00 x10(3) uL 0.71    Eosinophils Absolute      0.00 - 0.70 x10(3) uL 0.12    Basophils Absolute      0.00 - 0.20 x10(3) uL 0.06    Immature Granulocyte Absolute      0.00 - 1.00 x10(3) uL 0.05    Neutrophils %      % 55.2    Lymphocytes %      % 33.3    Monocytes %      % 8.7    Eosinophils %      % 1.5    Basophils %      % 0.7    Immature Granulocyte %      % 0.6    Glucose      70 - 99 mg/dL 93    Sodium      136 - 145 mmol/L 137    Potassium      3.5 - 5.1 mmol/L 4.1    Chloride      98 - 112 mmol/L 101    Carbon Dioxide, Total      21.0 - 32.0 mmol/L 32.0    ANION GAP      0 - 18 mmol/L 4     BUN      7 - 18 mg/dL 17    CREATININE      0.55 - 1.02 mg/dL 1.00    BUN/CREAT Ratio      10.0 - 20.0 17.0    CALCIUM      8.5 - 10.1 mg/dL 10.3 (H)    CALCULATED OSMOLALITY      275 - 295 mOsm/kg 285    eGFR NON-AFR. AMERICAN      >=60 63    eGFR       >=60 72    ALT (SGPT)      13 - 56 U/L 33    AST (SGOT)      15 - 37 U/L 18    ALKALINE PHOSPHATASE      46 - 118 U/L 59    Total Bilirubin      0.1 - 2.0 mg/dL 0.3    TOTAL PROTEIN      6.4 - 8.2 g/dL 8.1    Albumin      3.4 - 5.0 g/dL 4.4    Globulin      2.8 - 4.4 g/dL 3.7    A/G Ratio      1.0 - 2.0 1.2    Patient Fasting?       No    Color Urine      Yellow Yellow    CLARITY URINE      Clear Clear    Spec Gravity      1.002 - 1.035 1.010    Glucose Urine      Negative mg/dL Negative    Bilirubin      Negative Negative    Ketones, UA      Negative mg/dL Negative    Blood Urine      Negative Negative    PH Urine      5.0 - 8.0 6.0    Protein Urine      Negative mg/dL Negative    Urobilinogen Urine      <2.0 <2.0    Nitrite Urine      Negative Negative    Leukocyte Esterase Urine      Negative Small (A)    SQUAM EPI CELLS UR      /HPF Few    WBC Urine      0 - 5 /HPF 4    RBC URINE      0 - 2 /HPF 1    Bacteria Urine      None Seen /HPF Few (A)    RAINE Titer/Pattern      <80  160 (A)   Reviewed By:        Ismael Mcwilliams M.D.   Anti-Sjogren's A      Negative  Negative   Anti-Sjogren's B      Negative  Negative   Anti-Smith Antibody      Negative  Negative   Anti-Holcomb/RNP Antibody      Negative  Negative   SED RATE      0 - 30 mm/Hr  12   RHEUMATOID FACTOR      <15 IU/mL  <10   C-Citrullinated Peptide IgG AB      0.0 - 6.9 U/mL  1.1   C-REACTIVE PROTEIN      <0.30 mg/dL  <0.29   RAINE SCREEN WITH REFLEX (S)      Negative  Positive (A)   Anti Double Strand DNA      <10  <10     Component      Latest Ref Rng & Units 2/23/2021   Anti-Holcomb/RNP Antibody      Negative Negative       Component      Latest Ref Rng 9/16/2024   WBC      4.0 - 11.0 x10(3) uL 5.1     RBC      3.80 - 5.30 x10(6)uL 3.70 (L)    Hemoglobin      12.0 - 16.0 g/dL 11.1 (L)    Hematocrit      35.0 - 48.0 % 34.2 (L)    MCV      80.0 - 100.0 fL 92.4    MCH      26.0 - 34.0 pg 30.0    MCHC      31.0 - 37.0 g/dL 32.5    RDW-SD      35.1 - 46.3 fL 39.8    RDW      11.0 - 15.0 % 11.7    Platelet Count      150.0 - 450.0 10(3)uL 350.0    Prelim Neutrophil Abs      1.50 - 7.70 x10 (3) uL 1.82    Neutrophils Absolute      1.50 - 7.70 x10(3) uL 1.82    Lymphocytes Absolute      1.00 - 4.00 x10(3) uL 2.61    Monocytes Absolute      0.10 - 1.00 x10(3) uL 0.47    Eosinophils Absolute      0.00 - 0.70 x10(3) uL 0.11    Basophils Absolute      0.00 - 0.20 x10(3) uL 0.04    Immature Granulocyte Absolute      0.00 - 1.00 x10(3) uL 0.03    Neutrophils %      % 35.7    Lymphocytes %      % 51.4    Monocytes %      % 9.3    Eosinophils %      % 2.2    Basophils %      % 0.8    Immature Granulocyte %      % 0.6    Glucose      70 - 99 mg/dL 118 (H)    Sodium      136 - 145 mmol/L 139    Potassium      3.5 - 5.1 mmol/L 4.4    Chloride      98 - 112 mmol/L 103    Carbon Dioxide, Total      21.0 - 32.0 mmol/L 29.0    ANION GAP      0 - 18 mmol/L 7    BUN      9 - 23 mg/dL 14    CREATININE      0.55 - 1.02 mg/dL 0.84    BUN/CREATININE RATIO      10.0 - 20.0  16.7    CALCIUM      8.7 - 10.4 mg/dL 10.1    CALCULATED OSMOLALITY      275 - 295 mOsm/kg 290    EGFR      >=60 mL/min/1.73m2 79    ALT (SGPT)      10 - 49 U/L 12    AST (SGOT)      <34 U/L 12    ALKALINE PHOSPHATASE      50 - 130 U/L 67    Total Bilirubin      0.2 - 1.1 mg/dL 0.3    PROTEIN, TOTAL      5.7 - 8.2 g/dL 7.2    Albumin      3.2 - 4.8 g/dL 4.5    Globulin      2.0 - 3.5 g/dL 2.7    A/G Ratio      1.0 - 2.0  1.7    Patient Fasting for CMP? Yes    Cholesterol, Total      <200 mg/dL 146    HDL Cholesterol      40 - 59 mg/dL 45    Triglycerides      30 - 149 mg/dL 115    LDL Cholesterol Calc      <100 mg/dL 80    VLDL      0 - 30 mg/dL 18    NON-HDL  CHOLESTEROL      <130 mg/dL 101    Patient Fasting for Lipid? Yes    Iron, Serum      50 - 170 ug/dL 74    Transferrin      250 - 380 mg/dL 320    Iron Bind.Cap.(TIBC)      250 - 425 ug/dL 477 (H)    Iron Saturation      15 - 50 % 16    MALB URINE      mg/dL 2.10    CREATININE UR RANDOM      mg/dL 234.50    MALB/CRE CALC      <=30.0 ug/mg 9.0    HEMOGLOBIN A1c      <5.7 % 6.2 (H)    ESTIMATED AVERAGE GLUCOSE      68 - 126 mg/dL 131 (H)    TSH      0.550 - 4.780 mIU/mL 1.598    FERRITIN      50.0 - 306.0 ng/mL 27.8 (L)    SED RATE      0 - 30 mm/Hr 20    C-REACTIVE PROTEIN      <1.00 mg/dL <0.40       Legend:  (L) Low  (H) High    1/10/2014 - ct abd/pelvis  1. Stable findings from January 8, 2012.  2. No acute disease.  3. Normal appendix.  4. 1.57 cm splenule.  5. Small umbilical hernia.  6. IUD in place.  7. Postop changes lumbar spine.  8. Small amount of air in the urinary bladder probably iatrogenically     introduced    2/4/2016 - lumbar xray   1. Postop changes L5-S1 without obvious complication.  2. Degenerative arthritis.  3. Limited range of motion but no abnormal motion.  4. IUD in the pelvis    3/17/2016 - c spine xray   1. Straightening of cervical curve.  2. Moderate localized degenerative arthritis which has progressed     significantly since September 20, 2006.\    4/10/2017 - us/ liver  1. Normal size liver. Mild diffuse heterogeneous increased echogenicity with sonographic appearance consistent with fatty infiltration and/or hepatocellular disease.  2. Normal color flow. No focal hepatic masses or intraductal dilatation.  3. Small gallbladder wall polyp; gallbladder otherwise unremarkable.    5/4/2018 - 2decho  Study Conclusions  1. Left ventricle: The cavity size was normal. Wall thickness was     increased in a pattern of mild LVH. Systolic function was     normal. The estimated ejection fraction was 55-60%. Wall motion     was normal; there were no regional wall motion abnormalities.     Left  ventricular diastolic function parameters were normal.    5/14/2018 - pfts -   The PFTs are Normal. DLCO 92%.     4/2019 - 2decho   - normal     10/21/2022 - lumbar xray   1. L5-S1:  Laminectomy with posterior and interbody fusion.  No evidence of complication.  Minimal stable anterolisthesis.   2. Mild multilevel lumbar degenerative disc disease.   3. Left lower pole renal calculi.   4. Atherosclerotic vascular calcification.       7/5/2023 - c spine xray   1. Multilevel degenerative changes of the cervical spine, particularly at C5-C6 and C6-C7.      2. Suspected neural foraminal compromise, suboptimally evaluated on this examination. Consider follow-up MRI of the cervical spine for further assessment.      3. No evidence of provoked subluxation on dynamic views.      4. No radiographically visible acute osseous injury of the cervical spine.     10/18/2023 - mri c spine  1. Straightening of the normal cervical lordosis with suggestion of a minimal grade 1 retrolisthesis of C5 on C6. Otherwise normal cervical and with intact cervical vertebral heights.   2. Mild to moderate diffuse cervical spondylosis and degenerative facet disease throughout the cervical spine resulting in mild spinal stenosis at C3-C4, C5-C6, and C6-C7 with neural foraminal narrowing at these levels as described above.     ASSESSMENT AND PLAN:   Dana Marsh is a 59 year old female who presents for   Chief Complaint   Patient presents with    Mixed Connective Tissue Disease    Medication Follow-Up       1.  MCTD  - Repeat RAINE is positive 1:160    fatigue -increasing , stable    Left ulnar neuropathy - with neck pain -   - will increase   hydroxychlrqouien 200mg to twice  day -   since esr slightly elevated and increasing joint pain  Eye exam done in 2023   - 5/2018 - baseline echo and pfts   - got work up from dr. tatum and the were not showing the RNP or RAINE - general labs normal -   Return to clinic in 4-6 months.  Check labs in 6  months       in the past, She had a flare up really bad all over and in hands and got pred burst and felt better  -  but she doesn't like to take it b/c it makes her angry -     2. fibromylagia -  with now with left sided cervical radiculoapthy -   - stable   -  on cyclobenzaprien 10mg atn ight - this is helping   And she takes an edible at night - and that helps. -     For incresaing left ulnar neuropathy - asked her to use foam pad for desk and use a towel wrap at night   - also asked her to do a maintenacne check with her chiropracter- to see if this can help if it's related to her neck - currently ahs no neck pain   - cervical radiculoapthy pain improved but has chronic neck pain  - s/p 2  medrol andressa , and it helped.   - s/p  physical therapy for neck and it helped   - cont.  [place] 750mg twic e adya - prn -   - cont. tramadol 50mg daiy prn   In the past:   N/v with savella   - lyrica - blaoting and increased emotionally - crabby -   She didn't feel the cycmbalta and gabapentin helped her in the past - no side effects -   rtc in 6 months.     In the past :   Was doing better on  savella 50mg bid - - and lyrica 150mg bid x 3 years - but now severe n/v - so off since 7/2018 -   Off . amitriptyline from 10mg to 20mg a at night. To avoid palpiattations   - was on effexor and gabpentin without complete relief  Cont. cbd oil - - she takes it prn    - hx of using cymbalta without it helping -   - melxoicam as needed.   - declines low dose naltrexone.   - tried amitiprtyline in the past - cont. 10mg at night.      3. DIEHL - Elevated GGT - is going to see Dr. Vallecillo end of the month.   Intermittent vomitting - heart burn can make worse - omeprazole does help a little bit. Likely lfrom savella.   Take omepraozoel   4. Right knee pain - seems like bakers cysts - meloxicma prn info given  5/2017 -  u/s of right knee - poplitleat area was normal  - if pain increases she can get this.   dicofenac gel 1%. - didn't help her.    Left knee pain - intermittent - better with PT   5. DM - was on ozempic but bad reflux with this,  , now on manjuora -   6. Eye exam - dr. westbrook - yearly  - 11/2023  7. Depression - on venlafaxine -   8. palpaitions - f/u cards, cont. Metoprolol   9. recurrent UTI's  Dysuria - check ua and urine culture -   Hx of bladder infection - had 4 - 5 leojo anni   Went to urologist and did tests for this   10. Left leg pain in popliteal area and calf - check us leg - rule out dvt        -   Summary:     1. Cont. hydroxychrouqine 200mg - twice a day    2. Cont. Cyclobenzaprine 10 at night as needed  3.  . Tramadol 50mg as needed.  4. Treatment with sleep apnea  5. Eye exam yearly - due in 2025 -   6  refills put in   7. Check labs in 6 -12 months.   8. Return to clinic in 6 -12 months.      - cont. cbd oil is ok - and edibles -   - she declines gabapentin and lyrica     Hay Lynn MD  3/12/2025   2:31 PM

## 2025-03-12 NOTE — TELEPHONE ENCOUNTER
1/6/2025 - she was going to come for appt.  but rescheduled before her appointment that day - and she was charged no show fee -   Can we cancel that for her -   Who is the number or email we have to give this to

## 2025-03-12 NOTE — PATIENT INSTRUCTIONS
1. Cont. hydroxychrouqine 200mg - twice a day    2. Cont. Cyclobenzaprine 10 at night as needed  3.  . Tramadol 50mg as needed.  4. Treatment with sleep apnea  5. Eye exam yearly - due in 2025 -   6  refills put in   7. Check labs in 6 -12 months.   8. Return to clinic in 6 -12 months.

## 2025-03-13 ENCOUNTER — TELEPHONE (OUTPATIENT)
Dept: RHEUMATOLOGY | Facility: CLINIC | Age: 60
End: 2025-03-13

## 2025-03-14 ENCOUNTER — HOSPITAL ENCOUNTER (OUTPATIENT)
Dept: ULTRASOUND IMAGING | Facility: HOSPITAL | Age: 60
Discharge: HOME | End: 2025-03-14
Attending: INTERNAL MEDICINE
Payer: COMMERCIAL

## 2025-03-14 DIAGNOSIS — M79.662 PAIN OF LEFT CALF: ICD-10-CM

## 2025-03-14 DIAGNOSIS — M79.609 POPLITEAL PAIN: ICD-10-CM

## 2025-03-14 PROCEDURE — 93971 EXTREMITY STUDY: CPT | Performed by: INTERNAL MEDICINE

## 2025-03-21 ENCOUNTER — TELEPHONE (OUTPATIENT)
Dept: RHEUMATOLOGY | Facility: CLINIC | Age: 60
End: 2025-03-21

## 2025-03-21 RX ORDER — METHYLPREDNISOLONE 4 MG/1
TABLET ORAL
Qty: 1 EACH | Refills: 0 | Status: SHIPPED | OUTPATIENT
Start: 2025-03-21

## 2025-03-21 NOTE — TELEPHONE ENCOUNTER
Called patient verified patient name and . Patient c/o left shoulder pain again feels like a \"hot knife\". Tried tramadol, pain patch, massage gun, and TENS unit for the pain. Had pain at this level for 4 days. Asking for prednisone or something to help with the pain.    Pain  Now 8/10  Worst 10/10    Please advise.

## 2025-03-21 NOTE — TELEPHONE ENCOUNTER
Patient would like a call back from nurse regarding left shoulder pain. Patient would like a prescription for prednisone. Please advise

## 2025-05-01 ENCOUNTER — PATIENT MESSAGE (OUTPATIENT)
Dept: PULMONOLOGY | Facility: CLINIC | Age: 60
End: 2025-05-01

## 2025-05-01 ENCOUNTER — PATIENT MESSAGE (OUTPATIENT)
Dept: INTERNAL MEDICINE CLINIC | Facility: CLINIC | Age: 60
End: 2025-05-01

## 2025-05-01 ENCOUNTER — PATIENT MESSAGE (OUTPATIENT)
Dept: PHYSICAL MEDICINE AND REHAB | Facility: CLINIC | Age: 60
End: 2025-05-01

## 2025-05-01 DIAGNOSIS — E66.9 OBESITY (BMI 30-39.9): Primary | ICD-10-CM

## 2025-05-01 NOTE — TELEPHONE ENCOUNTER
Yes it was Brandon lynn in [facility]  [id number], [address]  [place], [place] [address] [phone]

## 2025-05-02 RX ORDER — TRAMADOL HYDROCHLORIDE 50 MG/1
50 TABLET ORAL EVERY 8 HOURS PRN
Qty: 15 TABLET | Refills: 0 | Status: SHIPPED | OUTPATIENT
Start: 2025-05-02

## 2025-05-02 RX ORDER — METHYLPREDNISOLONE 4 MG/1
TABLET ORAL
Qty: 21 EACH | Refills: 0 | Status: SHIPPED | OUTPATIENT
Start: 2025-05-02

## 2025-05-12 NOTE — TELEPHONE ENCOUNTER
Current Outpatient Medications   Medication Sig Dispense Refill    Omeprazole 40 MG Oral Capsule Delayed Release Take 1 capsule (40 mg total) by mouth every morning before breakfast. Take 1 capsule by mouth daily before breakfast. 90 capsule 3     PLAN DOES NOT COVER

## 2025-05-14 NOTE — TELEPHONE ENCOUNTER
RN attempted to do PA via covermymeds but site said no PA is required.     RN called pharmacy, they verified that med does need PA for exceeding quantity limit.    RN spoke to [facility] rep, they state RN must contact [facility] to do a quantity limit override.    RN called [facility] at #[phone] and [facility] Rep was able to do a verbal PA for quantity limit and med approved for one year. Kolorific message sent to mar ONEIL # [phone]  Valid from 5/14/25-5/14/26

## 2025-05-14 NOTE — TELEPHONE ENCOUNTER
Dr. Smith,   Do you want pt to continue taking omeprazole 40 mg daily? Last EGD done 6/17/24 with recs to take daily. LOV was telemedicine with [facility] on 10/2/23.      I was able to get insurance approval for another year. Please see refill and pend or say if office visit is needed in order to get more refills.   Thank you,  Jackelyn

## 2025-05-15 RX ORDER — OMEPRAZOLE 40 MG/1
40 CAPSULE, DELAYED RELEASE ORAL
Qty: 90 CAPSULE | Refills: 3 | Status: SHIPPED | OUTPATIENT
Start: 2025-05-15

## 2025-05-15 NOTE — TELEPHONE ENCOUNTER
Follow up in the office given has been over 2 years to discuss-- ok with me or Carla Jarrett NP    Refill signed    Thank you.

## 2025-05-16 NOTE — TELEPHONE ENCOUNTER
GI office received fax stating that omeprazole is approved. Sent to scan. Pt scheduled for a clinic appt on 8/20/25.

## 2025-05-19 ENCOUNTER — TELEPHONE (OUTPATIENT)
Dept: RHEUMATOLOGY | Facility: CLINIC | Age: 60
End: 2025-05-19

## 2025-05-19 RX ORDER — METHYLPREDNISOLONE 4 MG/1
TABLET ORAL
Qty: 1 EACH | Refills: 0 | Status: SHIPPED | OUTPATIENT
Start: 2025-05-19

## 2025-05-19 NOTE — TELEPHONE ENCOUNTER
Talked to pt. - medrol andressa -   For right knee pain -   If it does not help - she can call back and can try to get her in for an injection

## 2025-05-19 NOTE — TELEPHONE ENCOUNTER
Called patient verified patient name and . Did US of left knee in March had a bakers cyst but otherwise it has been ok. This week the pain has gotten \"super\" bad yesterday can't even put pressure on the knee to get in bed. The pain shoots down the calf. Doesn't appear swollen per . Pain 8/10 currently at its worse.    Patient would like to see about possibly getting it drained like discussed previously. Got a medrol andressa from her PCP for her shoulder as she thought Dr. Lynn was gone. As she finished the prednisone shoulder was better but her knee started to hurt.    Please advise.

## 2025-05-19 NOTE — TELEPHONE ENCOUNTER
Patient called in left knee and states she is in discomfort. Patient said that Dr told her that her knee could possibly need to be drained. Please advise

## 2025-05-23 ENCOUNTER — PATIENT MESSAGE (OUTPATIENT)
Dept: INTERNAL MEDICINE CLINIC | Facility: CLINIC | Age: 60
End: 2025-05-23

## 2025-05-23 DIAGNOSIS — R00.0 TACHYCARDIA: ICD-10-CM

## 2025-05-23 DIAGNOSIS — I10 ESSENTIAL HYPERTENSION: Primary | ICD-10-CM

## 2025-05-23 DIAGNOSIS — R00.2 PALPITATIONS: ICD-10-CM

## 2025-05-23 DIAGNOSIS — E78.49 OTHER HYPERLIPIDEMIA: ICD-10-CM

## 2025-05-23 DIAGNOSIS — R06.09 DOE (DYSPNEA ON EXERTION): ICD-10-CM

## 2025-05-23 DIAGNOSIS — M35.1 MCTD (MIXED CONNECTIVE TISSUE DISEASE) (HCC): ICD-10-CM

## 2025-05-27 ENCOUNTER — TELEPHONE (OUTPATIENT)
Dept: RHEUMATOLOGY | Facility: CLINIC | Age: 60
End: 2025-05-27

## 2025-05-27 NOTE — TELEPHONE ENCOUNTER
Patient called to speak to a nurse about not being able to put any weight on their leg due to baker's cyst.

## 2025-05-27 NOTE — TELEPHONE ENCOUNTER
Name and  verified. Patient reported pain left knee. She stated steroid burst did help but it was returning on the 3 day of the Medrol Dosepak. She is concerned because it is difficulty to walk and bear weight on her leg. Please advise. Pt also reported she will be going on vacation soon.

## 2025-05-27 NOTE — TELEPHONE ENCOUNTER
Spoke with pt and appointment given for Thursday for knee injection.      Future Appointments   Date Time Provider Department Center   5/29/2025  3:50 PM Hay Lynn MD E[facility] [place]   7/16/2025  8:40 AM Remy Rojas MD E[facility]LM [facility] [address]   7/22/2025  8:40 AM Remy Rojas MD [facility] [address]   8/20/2025  3:00 PM Clari Smith MD [facility]   11/11/2025  3:20 PM [facility]

## 2025-05-29 ENCOUNTER — OFFICE VISIT (OUTPATIENT)
Dept: RHEUMATOLOGY | Facility: CLINIC | Age: 60
End: 2025-05-29
Payer: COMMERCIAL

## 2025-05-29 VITALS
DIASTOLIC BLOOD PRESSURE: 70 MMHG | HEART RATE: 71 BPM | HEIGHT: 61 IN | BODY MASS INDEX: 30.4 KG/M2 | WEIGHT: 161 LBS | SYSTOLIC BLOOD PRESSURE: 124 MMHG

## 2025-05-29 DIAGNOSIS — M71.22 BAKER'S CYST OF KNEE, LEFT: ICD-10-CM

## 2025-05-29 DIAGNOSIS — M79.7 FIBROMYALGIA: ICD-10-CM

## 2025-05-29 DIAGNOSIS — M35.1 MCTD (MIXED CONNECTIVE TISSUE DISEASE) (HCC): Primary | ICD-10-CM

## 2025-05-29 DIAGNOSIS — M17.12 OSTEOARTHRITIS OF LEFT KNEE, UNSPECIFIED OSTEOARTHRITIS TYPE: ICD-10-CM

## 2025-05-29 PROCEDURE — 99214 OFFICE O/P EST MOD 30 MIN: CPT | Performed by: INTERNAL MEDICINE

## 2025-05-29 PROCEDURE — 20610 DRAIN/INJ JOINT/BURSA W/O US: CPT | Performed by: INTERNAL MEDICINE

## 2025-05-29 RX ORDER — TRIAMCINOLONE ACETONIDE 40 MG/ML
40 INJECTION, SUSPENSION INTRA-ARTICULAR; INTRAMUSCULAR ONCE
Status: COMPLETED | OUTPATIENT
Start: 2025-05-29 | End: 2025-05-29

## 2025-05-29 RX ORDER — VENLAFAXINE HYDROCHLORIDE 150 MG/1
150 CAPSULE, EXTENDED RELEASE ORAL DAILY
COMMUNITY
Start: 2025-04-29

## 2025-05-29 NOTE — PATIENT INSTRUCTIONS
1. Cont. hydroxychrouqine 200mg - twice a day    2. Cont. Cyclobenzaprine 10 at night as needed  3.  . Tramadol 50mg as needed.  4. Treatment with sleep apnea  5. Eye exam yearly - due in 2025 -   6  refills put in   7. Check labs in 6 -12 months.   8. Return to clinic in 6 -12 months.    9. Rest left knee x 3 days

## 2025-05-29 NOTE — PROCEDURES
With paitent's consent, I injected pt's left knee with 1ml lidocaine 1 % and 1 ml kenalog 40. It was done under sterile technique using iodine and alcohol swabs and ethyl chloride was used as an anaesthetic spray. Pt.  tolerated it well.

## 2025-05-29 NOTE — PROGRESS NOTES
Dana Marsh is a 59 year old female who presents for   Chief Complaint   Patient presents with    Mixed Connective Tissue Disease    Knee Pain     left       HPI:     I had the pleasure of seeing Dana Marsh on 4/5/2017 for evaluation. Referred here by Dr. Perez and Dr. Miguel.     She is a pleasant 51 year old who has recnetly been dx with MCTD and fibromyalgia and is here ro re-establish.   Dr. Sanchez was her previous rheumatolgoist.   She has recently had elevated liver funciton tests and is concerned about the.   She was diagnoesd in 9/2015.     She got her eye exam for glaucoma and for plaquenil. She had a bad eye infection in 10 an 11/2016. She then saw her eye doctor - Lei Magallon.. She had this sudden swolling her her eye. She took drops for a few days and thought it was infection. No pink eye. No pain.     She had tremndous arm pain in 9/2015. She had this for 8 months in both arms. She was then sent to rheumatolgosit and got labs. She was then diagnosed. She was started on plaquenil 200mg bid. She didn't feel that helped the pain. She then stopped her cholesterol medication.   She has right knee apin right now.   She has fibormyalgia since age 28.   She just started lyrica and savella in the last 3 months.   She feelst he pain is better. It's not totally gone. Dr. Miguel started these.   She didn't want narcotics.   She's stopped meloxicam - but taking only as needeed. She also stopped gabpentin 3 months ago and effexor 3 months.   She was on cymbalta years ago.     Only her right knee is hurting. Her arms and hips can still hurt. If she cleans the garage she can't do anything for 1 1/2 days. It used to be a week.   Back of her legs hurt. She uses massage therapy. sshe feels they are swollen.   She has occl left sided neck pain.   She ahs some lower back pain - she had hx of lower back sx. In 12/2009.     4/19/2017  Repeat RAINE nd RNP are negative - for MCTD. She's never flet plaquenil helped  her.   She has no sob.   She has been having n/v. That' sbeen ahving a lot. She doesn's tknow if it's her IBS.   sarbjit didn't see GI yet. She got labs and us liver.   She has been having loose bowel movement and goto the bathrrom and then she throws up. - did this easter Sunday. Then has been fine . X 1 days. She gets this every 1-2 weeks. For the last couple of months. herh right slower hip are anad stomach area bothers her when she is sick.   Sarbjit has 5/10 pain.   She feels her pain is so much deejay sharonda lyrica and savella.   She's not on cyclbenzaprine.   The arm pain is better. Its bothering her on and off. It's a tooth ache in her body. She's osn lyirca 150mg bid and savella is 50mg bid. She occl forgets her pills in am.   The back of her right knee still hurts.   She only takes melxoicam as needed. He hasn't fellt she's bad enough to take it. Th eknee pain is going on for 6-7 months. It's not helping.     8/28/2017  She was haivng a lot of pain in her hands , arms and back and right knee. Dr. Miguel gave her a prednisone pack for 5 days. She felt a little better so she is back.   She cut back on all three of meds.   Her knee felt better on the prednisone.   The fingers and hands felt better. Her fibro was still hurting.   She can be irritable sometimes.   She feels her knee never got better until she was taking the prednisone pack.     11/30/2017  She doesn't feel great. She felt 3 weeks ago she reached her point. She had a pcp. She saw him.   She restarted meloxicam from Dr. Miguel and she's feeling better.   She increased the lyrica back to 150mg twice a day. Then she increased the savella to 50mg bid. She's seeing more an improvmeent now.   She's vomitting again. It's happened to her before. She doesn't feel meloxicam makes her feel worse.   She's noticing some dizziness with meloxicam so she is taking it at night.   She still has soreness in her arms. She couldn't lay on her hips bfore now that' sbetter.   She  felt her right knee was bad. Now she' sbetter with laying on it now.   No rashes.     She has 5/10 apin in her hips, legs and arms .     4/11/2018   She had a bad flare of neck pain 1-2 weeks ago. She felt advil and tyelnol wasn't helping.   She's been having improved right arm funciton after physical therapy.   She still has hpain in her elbows.   She called 2 weeks ago with pain in her.   She is not covered for the savella. It's still 140 30 days suppply - she's started with a 25mg twice a day - and she feels nauseaous from thsi. Hasn't thrown up since going offof it.   She was offo of it for 2 weeks and shes' gradually going back on.   She was not nasueeated int he past.   She also felt more pain off the savell in the last 2 weeks.   She has 5/10 pain right now.   She feelst he nabuemtone 750mg bid is helping her much more than the meloxicam. It's helped her arms.   Not much gastritis with it.     8/15/2018  She was getting sick with lyrica and savella b/c she was vomitting 2-3 times a week. She had seen Dr. Vallecillo and Dr. Kenney - and they determined it was the medications.   She ahs been off for 1 month. She is having 3/10. In her right knee , right wrist , arm and hand.   She feels better off the meds.   Years ago she was on lyrica - she is bloated and crabby while on lyrica. So she wanted ot stop both even though savella was more linked to the nausea.   She had trouble with sleeping -   Her knee and wrist was bothering her now.   She's been on savella nd lyrica combination was for 3 years.     1/16/2019  She has had a few bouts of really bad pain. Her neck and right shoulders. Sh ehad to goto immediate care   . Prednisone burst given and it helped. She was also given a painkiller.   She was good for 1 week   Then her left side shoulder started hurting and she was in bed last weekend.   The nabumetone is not hleping much.   She is dealing with depression and she is now on venalfaxine by dr. Rojas.   She is  getting a lot of UTI's.   She doesn't have pain much at times and then it can come on suddenly.   She had physical therapy for her neck years ago.     4/2/2019  Her hips and knee are bad. For the most part , she feels ok. But she gets bouts of pain. Her arms are much better. She is still going to physical therapy for pressure points and that's helping.she's getting it for neck and arms - and that has gotten better.   Physical lifting hurts her. Over doing cleaning can hurt.   She has to lay on the heating pad.   Her knee doesn't hurt while walking but bending her knee hurts. Going up and down the stairs.   At night she's on amitripytline 10mg at night.   She's had 8/10 pain.   The hip pain just started last tnight.    2/24/2020    She is doing well.   She has pain in her  B/l 2nd fingers . She has 4/10 pain.   She has been tired again lately.   Her hips are not worse but still havin gpain.   She keeps getting bladder infections.   She is having repeat infetions but no cysitis.   She's had an infection once a month.     1/19/2021  VIDEO VISIT  She has extreme fatigue.   She had covid exposure last week and is quaratining. That's why she is doing a video visit today.   She had this fatigue on 1/13/2021  She had pink eye thought to be from contacts in 11/2020 -saw opthlmologist weekly and was put on steroids. She was tested for inflammation at that time and told that this was fine.   She was sent back to cardiologist b/c she has tachycardia - 120s. She was d with PVCs. She is on metoprolol.   hse had fatigue priror to starting betablocker.   She's getting a sleep study.   She missed cylocbenzaprien 5mg at night - and she noticed that she doenst' sleep well without it.   She's not super achy.   Her hips are touchy but not like before.   She has stress - mother had open heart sx , motherin law had panic attacks and she had lost her job and switched to a new job. Leaving her other job was nice.   She takes nabumetone as  needed - it's not every day - it's was a few weeks ago she used it.     7/16/2021  She feels the last 2 weeks the pain has been unbearable in her elbows /arms and her hips .   Her arms are the most pain. She bought pain patches and not helping.   She has about 8/10 pain   She takes edibles during the night.   She had a bad earache this year and she even took a norco from taht time and it didn't help and she couldn't even sleep with it.   She started taking the nabumetone twice a day it's not doing anything.   giovanna was taking it as needed.   She was pretty good until the last 2 weeks.   It's affecting her daily life and work.   It's sensitive to the touch     She is helping take care of ehr mother in law at home and she has a littl stress with work. She thinks she is handling it. But she feels the pain is like when she was first diagnosed.   She just had the covid vaccine 2 weeks.     10/11/2021  She can barely move since Thursday and can't move well. She has a flare of her left hip and lower back.   She can't lift her left left much off the ground. No sciatic pain.   Usually a flare goes away after a few days.   She has 9/10 pain   She tried to take an extra flexeril a tnight and taking advil but she's not better.   If fshe lays flat on a sleeping   She also might have a UTI.     She never tried the lyrica 50mg bid.   She had a wedding last week and she is good.   She was taking nabumetone but not better on this.     8/4/2022  She has been so so . She has had back pain for the last 3 months. She went to chiropracter this week and is starting treatment.   She had covid. The pain was bad before and has gotten progressively worse.   She goes to bed 2-3 times and when she lays on her left side her whole left side is miserable.   She ran out of cycloenzaprine - 3 weeks ago . She feels worse off of ithis.   No recent injury except soemthing fell on her left leg at work 3 weeks ago.   She is mostly having mid back pain and  now her upper back is so inflammed.     Stress ful home life.   She feels some days she is sleeping. She is exahusted after work.     Her heart was racing more sine her last viist - she had her metoprolol raised.     She has been diagnosed with diabetse since -     1/4/2023  She got the flu over break.   She's getting numbness down her left arm and it's more and more often. It was here and there. But it's more often.   And affects her left 4th and 5th fingers.   Her neck doenst' bother her during these episodes now.   She never did do physical therapy b/c her upper back and shoulder pain calmed down.   Was going to the [employer] and was off for several weeks -b/c she was sick . She felt it was helping her.   holiding up her arm she has more tingling.   Today there is more tingling. No neck or shoulder pain.     Her pain is about 3/10 pain.   Not taking tramadol or nabuemtone.     7/5/2023    She's getting a left arm  tingling and numbness. X 1month.   She seems like she can get this under control the pain hasn't been able to get   At night she has hd to ice and using heating pad   The last 2 days - her pain is increased - in her left hand - 2 days constant numbness and tingling -   She called on 6/18 - and tramadol given.   In the past gabpaneitn did not helpher.   She had a right knee steroid injection - with dr. Stokes in 4/2023 - she's doing ok -     4/17/2024  She has increased brain fog - bothering her so much - that she went to see neurology. Saw Dr Fuentes in 1/2024 - and she has joint pain in her arms in her elbows .   If she does something she has pain in her neck and lower back.   Her knees have been so and so .   She takes the nabumetone more often - it helps her when she takes it as needed.   Reviewed notes from neurology - mentioned she has Mild cognitive defect and dylexia. Mentioned to improve her fibromyalgia control to help the fibrofog.   - she thinks brain fog increased with work stress - now  she changed jobs - it's better -   She doesn't think it's the medications - so she feels it was the stress.     She takes medical marijuana every night.   She was smoking at night and that makes her very hungry versus the edibles.   She sleeps well with the edibles.     3/21/2025  She is tender to the touch - struggling - not sure what is happening but her thighs and legs are hurring.   It might be the weather.   She had her knees pospping - the back of her left knee is hurting.   She is afraid she had a blood clot. The back of her leg looks swollen. So she iced it.   She can walk on it now.     Lost 35lbs on manjurao   She has had baker's cyst in the past.     5/29/2025  Her left knee is hurting -flared again since 5/2 - and then got better but pain retured on day 3   On 3/12 - her us venous doppler was negativ e- showed bakers'cysts   She is also noted severe pain on 5/27 - terrible - but now she is walking better   But is going to vacation in [place] next week   She is having 5/10 pain   Last got a left knee injection in 1/2025 -   Wt Readings from Last 2 Encounters:   05/29/25 161 lb (73 kg)   03/12/25 168 lb 4.8 oz (76.3 kg)     Body mass index is 30.42 kg/m².      Current Outpatient Medications   Medication Sig Dispense Refill    venlafaxine  MG Oral Capsule SR 24 Hr Take 1 capsule (150 mg total) by mouth daily.      Omeprazole 40 MG Oral Capsule Delayed Release Take 1 capsule (40 mg total) by mouth every morning before breakfast. Take 1 capsule by mouth daily before breakfast. 90 capsule 3    traMADol 50 MG Oral Tab Take 1 tablet (50 mg total) by mouth every 8 (eight) hours as needed for Pain. 15 tablet 0    PROBIOTIC ACIDOPHILUS OR Take by mouth.      hydroxychloroquine 200 MG Oral Tab Take 1 tablet (200 mg total) by mouth 2 (two) times daily. 180 tablet 3    traMADol 50 MG Oral Tab Take 1 tablet (50 mg total) by mouth every 8 (eight) hours as needed for Pain. 90 tablet 3    cyclobenzaprine 10 MG Oral  Tab Take 1 tablet (10 mg total) by mouth nightly as needed for Muscle spasms. 90 tablet 3    MOUNJARO 7.5 MG/0.5ML Subcutaneous Solution Auto-injector ADMINISTER 7.5 MG UNDER THE SKIN 1 TIME A WEEK 6 mL 0    ondansetron 4 MG Oral Tablet Dispersible Take 1 tablet (4 mg total) by mouth every 8 (eight) hours as needed. 30 tablet 0    estradiol (ESTRACE) 0.1 MG/GM Vaginal Cream Apply fingertip amount of cream to the vagina (0.5-1g) every night for 1 week and then every other night indefinitely 42.5 g 11    Cranberry (ELLURA) 200 MG Oral Cap Take daily 30 capsule 11    fluconazole 100 MG Oral Tab Take 1 tablet (100 mg total) by mouth daily. 15 tablet 0    nystatin 702136 UNIT/ML Mouth/Throat Suspension Take 5 mL (500,000 Units total) by mouth 4 (four) times daily. 60 mL 0    Metoprolol Tartrate 75 MG Oral Tab Take 1 tablet by mouth 2 (two) times daily.      ATORVASTATIN 20 MG Oral Tab TAKE 1 TABLET NIGHTLY 90 tablet 0    nabumetone 750 MG Oral Tab Take 1 tablet (750 mg total) by mouth daily. 90 tablet 3    metoprolol tartrate 50 MG Oral Tab Take 1 tablet (50 mg total) by mouth 2 (two) times daily. 180 tablet 0    metFORMIN  MG Oral Tablet 24 Hr Take 1 tablet (750 mg total) by mouth 2 (two) times daily with meals. 180 tablet 0    losartan-hydroCHLOROthiazide 100-25 MG Oral Tab Take 1 tablet by mouth daily. 90 tablet 0    meclizine 25 MG Oral Tab Take 1 tablet (25 mg total) by mouth 3 (three) times daily as needed for Dizziness or Nausea. 20 tablet 0    Betamethasone Dipropionate Aug 0.05 % External Cream Apply 1 Application topically 2 (two) times daily. Apply to affected areas as needed bid 50 g 3    methylPREDNISolone 4 MG Oral Tablet Therapy Pack Take as directed on package. 1 each 0    methylPREDNISolone (MEDROL) 4 MG Oral Tablet Therapy Pack As directed. 21 each 0    methylPREDNISolone 4 MG Oral Tablet Therapy Pack Take as directed on package. 1 each 0    nitrofurantoin monohydrate macro 100 MG Oral Cap Take  one capsule 2 times daily for 7 days (Patient not taking: Reported on 3/12/2025) 14 capsule 0    FREESTYLE LANCETS Does not apply Misc TEST DAILY AS DIRECTED 100 each 3    Glucose Blood (FREESTYLE LITE TEST) In Vitro Strip Use as directed. 100 strip 0    Blood Glucose Monitoring Suppl (FREESTYLE LITE) Does not apply Device 1 Device by Other route 2 (two) times daily. Use as directed. 1 each 3      Past Medical History:    Abnormal vaginal bleeding    polypectomy    Anxiety    Depression    Diabetes (HCC)    Disorder of liver    Essential hypertension    Fibromyalgia    Gastropathy    Hemorrhoids    High blood pressure    High cholesterol    MCTD (mixed connective tissue disease) (HCC)    Mood disorder    Other ill-defined conditions(799.89)    Per [employer]:  \"son and girlfriend with 4 kids moved in.\"    Sleep apnea    Spinal stenosis    Comments:  spondylosis.  Management:  surgery in 12/2009.    Visual impairment      Past Surgical History:   Procedure Laterality Date    Back surgery      Colonoscopy  2014    Colonoscopy N/A 11/6/2023    Procedure: COLONOSCOPY;  Surgeon: Clari Smith MD;  Location: [facility] ENDOSCOPY    Hysteroscopy,diagnostic  08/2006    polypectomy    Other surgical history  12/2009    Surgery (due to spinal stenosis/spondylosis).    Other surgical history Right     ankle surgery      Family History   Problem Relation Age of Onset    Other (osteoarthritis) Mother     Diabetes Mother     Heart Disease Mother     Hypertension Mother     Stroke Mother     Heart Disease Father         CAD, (cause of death) age 49    Diabetes Father     Heart Attack Father     Other (psoriasis) Sister     Hypertension Sister     Stroke Maternal Grandmother         Cerebrovascular accident (Stroke)    Diabetes Maternal Grandfather     Heart Disease Paternal Grandmother         CAD    Diabetes Paternal Grandfather     Breast Cancer Maternal Aunt         70s     Heart Disease Maternal Aunt         CAD    Heart Disease  Maternal Aunt     Breast Cancer Other 55        cousin   mom - has arthriits   Sister doesn't have psa. 3rd cousin has psa.    Social History:  Social History     Socioeconomic History    Marital status:    Tobacco Use    Smoking status: Former     Current packs/day: 0.00     Types: Cigarettes     Quit date: 1989     Years since quittin.3     Passive exposure: Past    Smokeless tobacco: Never   Vaping Use    Vaping status: Never Used   Substance and Sexual Activity    Alcohol use: Yes     Alcohol/week: 2.0 standard drinks of alcohol     Types: 2 Standard drinks or equivalent per week     Comment: Occasionally    Drug use: Yes     Frequency: 7.0 times per week     Types: Cannabis     Comment: Marijuana for pain nightly    Sexual activity: Yes     Partners: Male   Other Topics Concern    Caffeine Concern Yes     Comment: (Coffee, Soda) 2 cups daily    Pt has a pacemaker No    Pt has a defibrillator No    Reaction to local anesthetic No    35 years ago she quit smoking.   Works in [employer].   , 3 boys, youngest will live at home -      REVIEW OF SYSTEMS:   Review Of Systems:  Fatigue  Constitutional:No fever, no change in weight or appetitie  Derm: No rashes, no oral ulcers, no alopecia, no photosensitivity, no psoriasis  HEENT: No dry eyes, has dry mouth, no Raynaud's, hands always cold, no nasal ulcers, no parotid swelling, no neck pain, no jaw pain, no temple pain  Eyes: No visual changes, eye exam - 10 or 2016 - was done - with dr. Magallon, has contacts as well, episode of eye swelling left eye - in .   CVS: No chest pain, no heart disease, had echo long time ago - thought she had skipped beat - not recently - on [facility] -   RS: No SOB, no Cough, No Pleurtic pain,   GI: ocla vomiiting, no abominal pain, no hx of ulcer, no gastritis, no heartburn, no dyshpagia, no BRBPR or melena, irritable bowel - lately she has n/v   Colonoscopy - 2 years ago - in  [place] 1/30/2014 - normal -   : no dysuria, hx of UTIs - 3 childner, had several hosptilizations as a child for urinary tract issues, 1 this last year, no hx of miscarriages, no DVT Hx, no hx of OCP,   Neuro: No numbness or tingling, no headache, no hx of seizures,   Psych: no hx of anxiety or depression  ENDO: no hx of thyroid disease, no hx of DM  Joint/Muscluskeltal: see HPI,   All other ROS are negative.     EXAM:   /70 (BP Location: Right arm, Patient Position: Sitting, Cuff Size: adult)   Pulse 71   Ht 5' 1\" (1.549 m)   Wt 161 lb (73 kg)   LMP 09/06/2013 (Approximate)   BMI 30.42 kg/m²   HEENT: , EOM intact, clear sclera, PERRLA, pleasant, no acute distress, no CAD, no neck tendnerness, good ROM,   No rashes  CVS: RRR, no murmurs  RS: CTAB, no crackles, no rhonchi  ABD: Soft Non tender,   Joint exam:    tender points - very tender to light touch in shhoulders, neck, elbows and arms   Also in  and hips, and knees a 14/18 tender points. -  Lherimitte's sign positive - - left sided   Neck tendenress thoughtout c spine - and left sided  paracervical tenderness.    b/l trochanteric bursitis -   Left medial area of left elbows - not tender - to compression    latearl part of elbow tender to touch -   Not  tender in lower back -   no left hip tenderness   Left calft tender, no sweling , no redness, no edema  Left knee tender - no crepaitus - , no swelling  SLR negative.     No synvoitis seen  Good cap refill   Good grasp.   No edema    Component      Latest Ref Rng & Units 11/18/2020 7/15/2020   WBC      4.0 - 11.0 x10(3) uL 8.2    RBC      3.80 - 5.30 x10(6)uL 3.96    Hemoglobin      12.0 - 16.0 g/dL 12.4    Hematocrit      35.0 - 48.0 % 37.0    MCV      80.0 - 100.0 fL 93.4    MCH      26.0 - 34.0 pg 31.3    MCHC      31.0 - 37.0 g/dL 33.5    RDW-SD      35.1 - 46.3 fL 40.5    RDW      11.0 - 15.0 % 11.9    Platelet Count      150.0 - 450.0 10(3)uL 319.0    Prelim Neutrophil Abs      1.50  - 7.70 x10 (3) uL 4.53    Neutrophils Absolute      1.50 - 7.70 x10(3) uL 4.53    Lymphocytes Absolute      1.00 - 4.00 x10(3) uL 2.73    Monocytes Absolute      0.10 - 1.00 x10(3) uL 0.71    Eosinophils Absolute      0.00 - 0.70 x10(3) uL 0.12    Basophils Absolute      0.00 - 0.20 x10(3) uL 0.06    Immature Granulocyte Absolute      0.00 - 1.00 x10(3) uL 0.05    Neutrophils %      % 55.2    Lymphocytes %      % 33.3    Monocytes %      % 8.7    Eosinophils %      % 1.5    Basophils %      % 0.7    Immature Granulocyte %      % 0.6    Glucose      70 - 99 mg/dL 93    Sodium      136 - 145 mmol/L 137    Potassium      3.5 - 5.1 mmol/L 4.1    Chloride      98 - 112 mmol/L 101    Carbon Dioxide, Total      21.0 - 32.0 mmol/L 32.0    ANION GAP      0 - 18 mmol/L 4    BUN      7 - 18 mg/dL 17    CREATININE      0.55 - 1.02 mg/dL 1.00    BUN/CREAT Ratio      10.0 - 20.0 17.0    CALCIUM      8.5 - 10.1 mg/dL 10.3 (H)    CALCULATED OSMOLALITY      275 - 295 mOsm/kg 285    eGFR NON-AFR. AMERICAN      >=60 63    eGFR       >=60 72    ALT (SGPT)      13 - 56 U/L 33    AST (SGOT)      15 - 37 U/L 18    ALKALINE PHOSPHATASE      46 - 118 U/L 59    Total Bilirubin      0.1 - 2.0 mg/dL 0.3    TOTAL PROTEIN      6.4 - 8.2 g/dL 8.1    Albumin      3.4 - 5.0 g/dL 4.4    Globulin      2.8 - 4.4 g/dL 3.7    A/G Ratio      1.0 - 2.0 1.2    Patient Fasting?       No    Color Urine      Yellow Yellow    CLARITY URINE      Clear Clear    Spec Gravity      1.002 - 1.035 1.010    Glucose Urine      Negative mg/dL Negative    Bilirubin      Negative Negative    Ketones, UA      Negative mg/dL Negative    Blood Urine      Negative Negative    PH Urine      5.0 - 8.0 6.0    Protein Urine      Negative mg/dL Negative    Urobilinogen Urine      <2.0 <2.0    Nitrite Urine      Negative Negative    Leukocyte Esterase Urine      Negative Small (A)    SQUAM EPI CELLS UR      /HPF Few    WBC Urine      0 - 5 /HPF 4    RBC URINE      0  - 2 /HPF 1    Bacteria Urine      None Seen /HPF Few (A)    RAINE Titer/Pattern      <80  160 (A)   Reviewed By:        Ismael Mcwilliams M.D.   Anti-Sjogren's A      Negative  Negative   Anti-Sjogren's B      Negative  Negative   Anti-Smith Antibody      Negative  Negative   Anti-Holcomb/RNP Antibody      Negative  Negative   SED RATE      0 - 30 mm/Hr  12   RHEUMATOID FACTOR      <15 IU/mL  <10   C-Citrullinated Peptide IgG AB      0.0 - 6.9 U/mL  1.1   C-REACTIVE PROTEIN      <0.30 mg/dL  <0.29   RAINE SCREEN WITH REFLEX (S)      Negative  Positive (A)   Anti Double Strand DNA      <10  <10     Component      Latest Ref Rng & Units 2/23/2021   Anti-Holcomb/RNP Antibody      Negative Negative       Component      Latest Ref Rng 9/16/2024   WBC      4.0 - 11.0 x10(3) uL 5.1    RBC      3.80 - 5.30 x10(6)uL 3.70 (L)    Hemoglobin      12.0 - 16.0 g/dL 11.1 (L)    Hematocrit      35.0 - 48.0 % 34.2 (L)    MCV      80.0 - 100.0 fL 92.4    MCH      26.0 - 34.0 pg 30.0    MCHC      31.0 - 37.0 g/dL 32.5    RDW-SD      35.1 - 46.3 fL 39.8    RDW      11.0 - 15.0 % 11.7    Platelet Count      150.0 - 450.0 10(3)uL 350.0    Prelim Neutrophil Abs      1.50 - 7.70 x10 (3) uL 1.82    Neutrophils Absolute      1.50 - 7.70 x10(3) uL 1.82    Lymphocytes Absolute      1.00 - 4.00 x10(3) uL 2.61    Monocytes Absolute      0.10 - 1.00 x10(3) uL 0.47    Eosinophils Absolute      0.00 - 0.70 x10(3) uL 0.11    Basophils Absolute      0.00 - 0.20 x10(3) uL 0.04    Immature Granulocyte Absolute      0.00 - 1.00 x10(3) uL 0.03    Neutrophils %      % 35.7    Lymphocytes %      % 51.4    Monocytes %      % 9.3    Eosinophils %      % 2.2    Basophils %      % 0.8    Immature Granulocyte %      % 0.6    Glucose      70 - 99 mg/dL 118 (H)    Sodium      136 - 145 mmol/L 139    Potassium      3.5 - 5.1 mmol/L 4.4    Chloride      98 - 112 mmol/L 103    Carbon Dioxide, Total      21.0 - 32.0 mmol/L 29.0    ANION GAP      0 - 18 mmol/L 7    BUN      9 - 23  mg/dL 14    CREATININE      0.55 - 1.02 mg/dL 0.84    BUN/CREATININE RATIO      10.0 - 20.0  16.7    CALCIUM      8.7 - 10.4 mg/dL 10.1    CALCULATED OSMOLALITY      275 - 295 mOsm/kg 290    EGFR      >=60 mL/min/1.73m2 79    ALT (SGPT)      10 - 49 U/L 12    AST (SGOT)      <34 U/L 12    ALKALINE PHOSPHATASE      50 - 130 U/L 67    Total Bilirubin      0.2 - 1.1 mg/dL 0.3    PROTEIN, TOTAL      5.7 - 8.2 g/dL 7.2    Albumin      3.2 - 4.8 g/dL 4.5    Globulin      2.0 - 3.5 g/dL 2.7    A/G Ratio      1.0 - 2.0  1.7    Patient Fasting for CMP? Yes    Cholesterol, Total      <200 mg/dL 146    HDL Cholesterol      40 - 59 mg/dL 45    Triglycerides      30 - 149 mg/dL 115    LDL Cholesterol Calc      <100 mg/dL 80    VLDL      0 - 30 mg/dL 18    NON-HDL CHOLESTEROL      <130 mg/dL 101    Patient Fasting for Lipid? Yes    Iron, Serum      50 - 170 ug/dL 74    Transferrin      250 - 380 mg/dL 320    Iron Bind.Cap.(TIBC)      250 - 425 ug/dL 477 (H)    Iron Saturation      15 - 50 % 16    MALB URINE      mg/dL 2.10    CREATININE UR RANDOM      mg/dL 234.50    MALB/CRE CALC      <=30.0 ug/mg 9.0    HEMOGLOBIN A1c      <5.7 % 6.2 (H)    ESTIMATED AVERAGE GLUCOSE      68 - 126 mg/dL 131 (H)    TSH      0.550 - 4.780 mIU/mL 1.598    FERRITIN      50.0 - 306.0 ng/mL 27.8 (L)    SED RATE      0 - 30 mm/Hr 20    C-REACTIVE PROTEIN      <1.00 mg/dL <0.40       Legend:  (L) Low  (H) High    1/10/2014 - ct abd/pelvis  1. Stable findings from January 8, 2012.  2. No acute disease.  3. Normal appendix.  4. 1.57 cm splenule.  5. Small umbilical hernia.  6. IUD in place.  7. Postop changes lumbar spine.  8. Small amount of air in the urinary bladder probably iatrogenically     introduced    2/4/2016 - lumbar xray   1. Postop changes L5-S1 without obvious complication.  2. Degenerative arthritis.  3. Limited range of motion but no abnormal motion.  4. IUD in the pelvis    3/17/2016 - c spine xray   1. Straightening of cervical  curve.  2. Moderate localized degenerative arthritis which has progressed     significantly since September 20, 2006.\    4/10/2017 - us/ liver  1. Normal size liver. Mild diffuse heterogeneous increased echogenicity with sonographic appearance consistent with fatty infiltration and/or hepatocellular disease.  2. Normal color flow. No focal hepatic masses or intraductal dilatation.  3. Small gallbladder wall polyp; gallbladder otherwise unremarkable.    5/4/2018 - 2decho  Study Conclusions  1. Left ventricle: The cavity size was normal. Wall thickness was     increased in a pattern of mild LVH. Systolic function was     normal. The estimated ejection fraction was 55-60%. Wall motion     was normal; there were no regional wall motion abnormalities.     Left ventricular diastolic function parameters were normal.    5/14/2018 - pfts -   The PFTs are Normal. DLCO 92%.     4/2019 - 2decho   - normal     10/21/2022 - lumbar xray   1. L5-S1:  Laminectomy with posterior and interbody fusion.  No evidence of complication.  Minimal stable anterolisthesis.   2. Mild multilevel lumbar degenerative disc disease.   3. Left lower pole renal calculi.   4. Atherosclerotic vascular calcification.       7/5/2023 - c spine xray   1. Multilevel degenerative changes of the cervical spine, particularly at C5-C6 and C6-C7.      2. Suspected neural foraminal compromise, suboptimally evaluated on this examination. Consider follow-up MRI of the cervical spine for further assessment.      3. No evidence of provoked subluxation on dynamic views.      4. No radiographically visible acute osseous injury of the cervical spine.     10/18/2023 - mri c spine  1. Straightening of the normal cervical lordosis with suggestion of a minimal grade 1 retrolisthesis of C5 on C6. Otherwise normal cervical and with intact cervical vertebral heights.   2. Mild to moderate diffuse cervical spondylosis and degenerative facet disease throughout the cervical spine  resulting in mild spinal stenosis at C3-C4, C5-C6, and C6-C7 with neural foraminal narrowing at these levels as described above.     ASSESSMENT AND PLAN:   Dana Marsh is a 59 year old female who presents for   Chief Complaint   Patient presents with    Mixed Connective Tissue Disease    Knee Pain     left       1.  Left knee and calf pain in popliteal area and calf - check us leg negative showed baker's cyst   Pain got better with medrolpak in 3/2025 - and then got worse and restent in 5/2 - and pain returned on day 3   Then on 5/27 her pain got much owrse   Possibly her baker's cyst ruptured - she's beter but she is going on vacation in 1 week and still can't walk   Her last knee injectiosn was in 1/202 5- with dr. Stokes's, ortho   Today here for left knee injections   With paitent's consent, I injected pt's left knee with 1ml lidocaine 1 % and 1 ml kenalog 40. It was done under sterile technique using iodine and alcohol swabs and ethyl chloride was used as an anaesthetic spray. Pt.  tolerated it well.       2. MCTD  - Repeat RAINE is positive 1:160    fatigue -increasing , stable    Left ulnar neuropathy - with neck pain -   - will increase   hydroxychlrqouien 200mg to twice  day -   since esr slightly elevated and increasing joint pain  Eye exam done in 2023   - 5/2018 - baseline echo and pfts   - got work up from dr. tatum and the were not showing the RNP or RAINE - general labs normal -   Return to clinic in 4-6 months.  Check labs in 6 months       in the past, She had a flare up really bad all over and in hands and got pred burst and felt better  -  but she doesn't like to take it b/c it makes her angry -     3. fibromylagia -  with now with left sided cervical radiculoapthy -   - stable   -  on cyclobenzaprien 10mg atn ight - this is helping   And she takes an edible at night - and that helps. -     For  incresaing left ulnar neuropathy - asked her to use foam pad for desk and use a towel wrap at  night   - also asked her to do a maintenacne check with her chiropracter- to see if this can help if it's related to her neck - currently ahs no neck pain   - cervical radiculoapthy pain improved but has chronic neck pain  - s/p 2  medrol andressa , and it helped.   - s/p  physical therapy for neck and it helped   - cont.  [place] 750mg twic e adya - prn -   - cont. tramadol 50mg daiy prn   In the past:   N/v with savella   - lyrica - blaoting and increased emotionally - crabby -   She didn't feel the cycmbalta and gabapentin helped her in the past - no side effects -   rtc in 6 months.     In the past :   Was doing better on  savella 50mg bid - - and lyrica 150mg bid x 3 years - but now severe n/v - so off since 7/2018 -   Off . amitriptyline from 10mg to 20mg a at night. To avoid palpiattations   - was on effexor and gabpentin without complete relief  Cont. cbd oil - - she takes it prn    - hx of using cymbalta without it helping -   - melxoicam as needed.   - declines low dose naltrexone.   - tried amitiprtyline in the past - cont. 10mg at night.      4. DIEHL - Elevated GGT - is going to see Dr. Vallecillo end of the month.   Intermittent vomitting - heart burn can make worse - omeprazole does help a little bit. Likely lfrom savella.   Take omepraozoel   5. Right knee pain - seems like bakers cysts - meloxicma prn info given  5/2017 -  u/s of right knee - poplitleat area was normal  - if pain increases she can get this.   dicofenac gel 1%. - didn't help her.   Left knee pain - intermittent - better with PT   6. DM - was on ozempic but bad reflux with this,  , now on manjuora -   7. Eye exam - dr. westbrook - yearly  - 11/2023  8. Depression - on venlafaxine -   9. palpaitions - f/u cards, cont. Metoprolol   10. recurrent UTI's  Dysuria - check ua and urine culture - intermittently   Hx of bladder infection - had 4 - 5 suki   Went to urologist and did tests for this        -   Summary:     1. Cont. hydroxychrouqine 200mg  - twice a day    2. Cont. Cyclobenzaprine 10 at night as needed  3.  . Tramadol 50mg as needed.  4. Treatment with sleep apnea  5. Eye exam yearly - due in 2025 -   6  refills put in   7. Check labs in 6 -12 months.   8. Return to clinic in 6 -12 months.    9. Rest left knee x 3 days     - cont. cbd oil is ok - and edibles -   - she declines gabapentin and lyrica     Hay Lynn MD  5/29/2025   4:01 PM

## 2025-07-14 ENCOUNTER — TELEPHONE (OUTPATIENT)
Dept: RHEUMATOLOGY | Facility: CLINIC | Age: 60
End: 2025-07-14

## 2025-07-14 NOTE — TELEPHONE ENCOUNTER
Called patient who stated she trying calling [facility] and they told her she does not have refills left.    Called [facility] and spoke with Kathleen who stated they do have the refills left and they will shipped her medication as soon as possible.

## 2025-07-14 NOTE — TELEPHONE ENCOUNTER
Patient calling to request refill, stated is completely out of medication, unsure if ran out of medication or if misplaced bottle, verified [facility]. Former patient of Dr. Lynn, has appointment with Dr. Quiñones for 11/11/2025.     Current Outpatient Medications   Medication Sig Dispense Refill    hydroxychloroquine 200 MG Oral Tab Take 1 tablet (200 mg total) by mouth 2 (two) times daily. 180 tablet 3

## 2025-07-16 ENCOUNTER — TELEPHONE (OUTPATIENT)
Dept: INTERNAL MEDICINE CLINIC | Facility: CLINIC | Age: 60
End: 2025-07-16

## 2025-07-16 ENCOUNTER — OFFICE VISIT (OUTPATIENT)
Dept: INTERNAL MEDICINE CLINIC | Facility: CLINIC | Age: 60
End: 2025-07-16
Payer: COMMERCIAL

## 2025-07-16 VITALS
HEART RATE: 90 BPM | WEIGHT: 153 LBS | BODY MASS INDEX: 28.89 KG/M2 | OXYGEN SATURATION: 99 % | DIASTOLIC BLOOD PRESSURE: 62 MMHG | HEIGHT: 61 IN | SYSTOLIC BLOOD PRESSURE: 100 MMHG

## 2025-07-16 DIAGNOSIS — E11.9 TYPE 2 DIABETES MELLITUS WITHOUT COMPLICATION, UNSPECIFIED WHETHER LONG TERM INSULIN USE (HCC): Primary | ICD-10-CM

## 2025-07-16 DIAGNOSIS — E66.3 OVERWEIGHT (BMI 25.0-29.9): ICD-10-CM

## 2025-07-16 DIAGNOSIS — I10 ESSENTIAL HYPERTENSION: ICD-10-CM

## 2025-07-16 PROCEDURE — 99214 OFFICE O/P EST MOD 30 MIN: CPT | Performed by: INTERNAL MEDICINE

## 2025-07-16 RX ORDER — TIRZEPATIDE 10 MG/.5ML
10 INJECTION, SOLUTION SUBCUTANEOUS WEEKLY
Qty: 6 ML | Refills: 0 | Status: SHIPPED | OUTPATIENT
Start: 2025-07-16

## 2025-07-16 NOTE — TELEPHONE ENCOUNTER
Prior Authorization for  Tirzepatide (MOUNJARO) 10 MG/0.5ML Subcutaneous Solution Auto-injector   KEY:[id number]

## 2025-07-16 NOTE — PROGRESS NOTES
Dana Marsh is a 59 year old female.    Chief complaint:weight loss follow up , medication refill, therapeutic drug monitoring    HPI:   DANA here for follow up on weight loss   Wt Readings from Last 12 Encounters:   07/16/25 153 lb (69.4 kg)   05/29/25 161 lb (73 kg)   03/12/25 168 lb 4.8 oz (76.3 kg)   03/03/25 172 lb (78 kg)   02/10/25 172 lb (78 kg)   01/27/25 172 lb (78 kg)   01/02/25 181 lb 12.8 oz (82.5 kg)   12/09/24 185 lb (83.9 kg)   11/11/24 183 lb 6.4 oz (83.2 kg)   11/04/24 184 lb 9.6 oz (83.7 kg)   09/16/24 190 lb (86.2 kg)   07/09/24 190 lb (86.2 kg)     Starting weight: 192 lbs   Total weight loss: 39 lbs   Medication: Mounjaro 10 mg       Typical diet   Breakfast: Lunch: Dinner: Snacks:   Skips breakfast   Fasts until noon  Cottage cheese and tomatoes    Potatoes once in a while   Pasta may be once a week   Used to do it more often   She is eating protein   Low carb bread         She is eating less than 60 g per day     Soda/ juice/ alcohol: cut down a lot of alcohol   Once in a while may be in the weekend     Water intake: inadequate  Exercise: doesn't due to the knee  Challenges: exercise   Side effect of medication: no   Score to self ( how well she is doing ):7/10     Current Medications[1]   Past Medical History[2]  Past Surgical History[3]     Social History:  Short Social Hx on File[4]   Family History[5]  Problem List[6]            REVIEW OF SYSTEMS:   A comprehensive 10 point review of systems was completed.  Pertinent positives and negatives noted in the the HPI          PHYSICAL EXAM:   /62   Pulse 90   Ht 5' 1\" (1.549 m)   Wt 153 lb (69.4 kg)   LMP 09/06/2013 (Approximate)   SpO2 99%   BMI 28.91 kg/m²   GENERAL: well developed, well nourished,in no apparent distress  LUNGS: clear to auscultation  CARDIO: RRR without murmur  NEURO: no gross deficits              No orders of the defined types were placed in this encounter.        ASSESSMENT/PLAN:       ICD-10-CM    1.  Type 2 diabetes mellitus without complication, unspecified whether long term insulin use (HCC)  E11.9 Tirzepatide (MOUNJARO) 10 MG/0.5ML Subcutaneous Solution Auto-injector      2. Essential hypertension  I10 Tirzepatide (MOUNJARO) 10 MG/0.5ML Subcutaneous Solution Auto-injector      3. Overweight (BMI 25.0-29.9)  E66.3 Tirzepatide (MOUNJARO) 10 MG/0.5ML Subcutaneous Solution Auto-injector         Plan:  Patient has lost 15 lbs # since LOV. Patient has lost a total weight loss of 39 lbs # since first weight loss consult.  Labs reviewed  Advised to continue with low carb diet   Goal is less than 100 g per day   Advised to read nutrition labels  Log in carbs if possible   Increase protein intake goal is 90 g per day   exercise goal is 1 hour 3 times per week cardio and strength training   discussed challenges with weight loss   Weigh once a week at least   Avoid sugary drinks or artificially sweetened drinks  Water intake goal is 64 oz per day   Goal weigh loss is 7 lbs in 3 months   Continue Mounjaro   Advised to complete blood work as ordered before next visit     Medication contraindication / History:    Plan:  Nutrition: low carb diet   Referral RD/nutritionist : no  Behavior:  Motivational interviewing performed      Discussed strategies to overcome habits/challenges       Reviewed:  Nutrition and the importance of regular protein intake  Labs ordered:  no  Treatment plan       I spent 30 minutes at the day of the service seeing the patient, examination, reviewing labs, independently interpreting results, completing charting and counseling the patient and/or on coordination of care.  The diagnosis, prognosis, and general treatment was explained to the patient.   Please return to the clinic if you are having persistent or worsening symptoms   Remy Rojas MD,   Diplomate of the American Board of Internal Medicine  Diplomate of the American Board of Obesity Medicine            [1]   Current Outpatient Medications    Medication Sig Dispense Refill    Tirzepatide (MOUNJARO) 10 MG/0.5ML Subcutaneous Solution Auto-injector Inject 10 mg into the skin once a week. 6 mL 0    venlafaxine  MG Oral Capsule SR 24 Hr Take 1 capsule (150 mg total) by mouth daily.      methylPREDNISolone 4 MG Oral Tablet Therapy Pack Take as directed on package. 1 each 0    Omeprazole 40 MG Oral Capsule Delayed Release Take 1 capsule (40 mg total) by mouth every morning before breakfast. Take 1 capsule by mouth daily before breakfast. 90 capsule 3    methylPREDNISolone (MEDROL) 4 MG Oral Tablet Therapy Pack As directed. 21 each 0    traMADol 50 MG Oral Tab Take 1 tablet (50 mg total) by mouth every 8 (eight) hours as needed for Pain. 15 tablet 0    methylPREDNISolone 4 MG Oral Tablet Therapy Pack Take as directed on package. 1 each 0    PROBIOTIC ACIDOPHILUS OR Take by mouth.      hydroxychloroquine 200 MG Oral Tab Take 1 tablet (200 mg total) by mouth 2 (two) times daily. 180 tablet 3    traMADol 50 MG Oral Tab Take 1 tablet (50 mg total) by mouth every 8 (eight) hours as needed for Pain. 90 tablet 3    cyclobenzaprine 10 MG Oral Tab Take 1 tablet (10 mg total) by mouth nightly as needed for Muscle spasms. 90 tablet 3    ondansetron 4 MG Oral Tablet Dispersible Take 1 tablet (4 mg total) by mouth every 8 (eight) hours as needed. 30 tablet 0    estradiol (ESTRACE) 0.1 MG/GM Vaginal Cream Apply fingertip amount of cream to the vagina (0.5-1g) every night for 1 week and then every other night indefinitely 42.5 g 11    Cranberry (ELLURA) 200 MG Oral Cap Take daily 30 capsule 11    fluconazole 100 MG Oral Tab Take 1 tablet (100 mg total) by mouth daily. 15 tablet 0    nystatin 222066 UNIT/ML Mouth/Throat Suspension Take 5 mL (500,000 Units total) by mouth 4 (four) times daily. 60 mL 0    Metoprolol Tartrate 75 MG Oral Tab Take 1 tablet by mouth 2 (two) times daily.      ATORVASTATIN 20 MG Oral Tab TAKE 1 TABLET NIGHTLY 90 tablet 0    FREESTYLE LANCETS  Does not apply Misc TEST DAILY AS DIRECTED 100 each 3    nabumetone 750 MG Oral Tab Take 1 tablet (750 mg total) by mouth daily. 90 tablet 3    metoprolol tartrate 50 MG Oral Tab Take 1 tablet (50 mg total) by mouth 2 (two) times daily. 180 tablet 0    metFORMIN  MG Oral Tablet 24 Hr Take 1 tablet (750 mg total) by mouth 2 (two) times daily with meals. 180 tablet 0    losartan-hydroCHLOROthiazide 100-25 MG Oral Tab Take 1 tablet by mouth daily. 90 tablet 0    meclizine 25 MG Oral Tab Take 1 tablet (25 mg total) by mouth 3 (three) times daily as needed for Dizziness or Nausea. 20 tablet 0    Betamethasone Dipropionate Aug 0.05 % External Cream Apply 1 Application topically 2 (two) times daily. Apply to affected areas as needed bid 50 g 3    MOUNJARO 7.5 MG/0.5ML Subcutaneous Solution Auto-injector ADMINISTER 7.5 MG UNDER THE SKIN 1 TIME A WEEK (Patient not taking: Reported on 7/16/2025) 6 mL 0    nitrofurantoin monohydrate macro 100 MG Oral Cap Take one capsule 2 times daily for 7 days (Patient not taking: Reported on 7/16/2025) 14 capsule 0   [2]   Past Medical History:   Abnormal vaginal bleeding    polypectomy    Anxiety    Depression    Diabetes (HCC)    Disorder of liver    Essential hypertension    Fibromyalgia    Gastropathy    Hemorrhoids    High blood pressure    High cholesterol    MCTD (mixed connective tissue disease) (HCC)    Mood disorder    Other ill-defined conditions(799.89)    Per [employer]:  \"son and girlfriend with 4 kids moved in.\"    Sleep apnea    Spinal stenosis    Comments:  spondylosis.  Management:  surgery in 12/2009.    Visual impairment   [3]   Past Surgical History:  Procedure Laterality Date    Back surgery      Colonoscopy  2014    Colonoscopy N/A 11/6/2023    Procedure: COLONOSCOPY;  Surgeon: Clari Smith MD;  Location: [facility] ENDOSCOPY    Hysteroscopy,diagnostic  08/2006    polypectomy    Other surgical history  12/2009    Surgery (due to spinal stenosis/spondylosis).    Other  surgical history Right     ankle surgery   [4]   Social History  Socioeconomic History    Marital status:    Tobacco Use    Smoking status: Former     Current packs/day: 0.00     Types: Cigarettes     Quit date: 1989     Years since quittin.5     Passive exposure: Past    Smokeless tobacco: Never   Vaping Use    Vaping status: Never Used   Substance and Sexual Activity    Alcohol use: Yes     Alcohol/week: 2.0 standard drinks of alcohol     Types: 2 Standard drinks or equivalent per week     Comment: Occasionally    Drug use: Yes     Frequency: 7.0 times per week     Types: Cannabis     Comment: Marijuana for pain nightly    Sexual activity: Yes     Partners: Male   Other Topics Concern    Caffeine Concern Yes     Comment: (Coffee, Soda) 2 cups daily    Pt has a pacemaker No    Pt has a defibrillator No    Reaction to local anesthetic No   [5]   Family History  Problem Relation Age of Onset    Other (osteoarthritis) Mother     Diabetes Mother     Heart Disease Mother     Hypertension Mother     Stroke Mother     Heart Disease Father         CAD, (cause of death) age 49    Diabetes Father     Heart Attack Father     Other (psoriasis) Sister     Hypertension Sister     Stroke Maternal Grandmother         Cerebrovascular accident (Stroke)    Diabetes Maternal Grandfather     Heart Disease Paternal Grandmother         CAD    Diabetes Paternal Grandfather     Breast Cancer Maternal Aunt         70s     Heart Disease Maternal Aunt         CAD    Heart Disease Maternal Aunt     Breast Cancer Other 55        cousin   [6]   Patient Active Problem List  Diagnosis    NAFLD (nonalcoholic fatty liver disease)    Gallbladder polyp    Gastropathy    Routine health maintenance    Essential hypertension    Irritable bowel syndrome with diarrhea    Fibromyalgia    Mixed connective tissue disease (HCC)    Allergic rhinitis    Recurrent UTI    Obesity (BMI 30-39.9)    Dietary counseling and surveillance     Hyperlipidemia    Snoring    Fatigue    Postcoital bleeding    Nausea and vomiting    Constipation    Abnormal urine    Disorder of connective tissue (HCC)    Excessive or frequent menstruation    External hemorrhoids    Leukorrhea    Lumbosacral spondylosis without myelopathy    Metrorrhagia    Palpitations    Shortness of breath    Irregular menstrual cycle    Tachycardia    Type 2 diabetes mellitus without complication (HCC)    Vaginitis and vulvovaginitis    Primary fibromyalgia syndrome    Polyp of corpus uteri    MCTD (mixed connective tissue disease) ([facility])    Nonalcoholic fatty liver disease    Annual physical exam    Gall bladder polyp    Dizziness    Petechiae    Skin rash    Class 2 severe obesity due to excess calories with serious comorbidity and body mass index (BMI) of 37.0 to 37.9 in adult ([facility])    Dysphagia    MCI (mild cognitive impairment)    Sleep disorder breathing    Cervical radicular pain    Gastroesophageal reflux disease    Screening for cervical cancer    Mixed diabetic hyperlipidemia associated with type 2 diabetes mellitus (HCC)    TE (obstructive sleep apnea)    Insomnia    Depression    Palpitation    Iron deficiency    Primary hypertension    Suprapubic abdominal pain    Urinary tract infection without hematuria

## 2025-07-17 NOTE — TELEPHONE ENCOUNTER
Prior authorization for Mounjaro completed through CoverMyMeds, key [id number]    Information regarding your request  A PA is already in process for this member/drug. For further inquiries please contact the number on the back of the member prescription card

## 2025-07-24 RX ORDER — VENLAFAXINE HYDROCHLORIDE 150 MG/1
150 CAPSULE, EXTENDED RELEASE ORAL DAILY
Qty: 90 CAPSULE | Refills: 3 | Status: SHIPPED | OUTPATIENT
Start: 2025-07-24

## 2025-07-24 NOTE — TELEPHONE ENCOUNTER
Refill passed [facility] protocol.     Please review due to medication is listed as external.

## 2025-07-31 ENCOUNTER — PATIENT MESSAGE (OUTPATIENT)
Dept: PULMONOLOGY | Facility: CLINIC | Age: 60
End: 2025-07-31

## 2025-08-07 ENCOUNTER — TELEPHONE (OUTPATIENT)
Age: 60
End: 2025-08-07

## 2025-08-11 RX ORDER — METHYLPREDNISOLONE 4 MG/1
TABLET ORAL
Qty: 1 EACH | Refills: 0 | Status: SHIPPED | OUTPATIENT
Start: 2025-08-11

## 2025-08-22 ENCOUNTER — TELEPHONE (OUTPATIENT)
Age: 60
End: 2025-08-22

## 2025-08-25 ENCOUNTER — NURSE TRIAGE (OUTPATIENT)
Dept: INTERNAL MEDICINE CLINIC | Facility: CLINIC | Age: 60
End: 2025-08-25

## 2025-08-25 ENCOUNTER — TELEPHONE (OUTPATIENT)
Dept: PHYSICAL MEDICINE AND REHAB | Facility: CLINIC | Age: 60
End: 2025-08-25

## 2025-08-29 ENCOUNTER — LAB ENCOUNTER (OUTPATIENT)
Dept: LAB | Facility: HOSPITAL | Age: 60
End: 2025-08-29
Attending: INTERNAL MEDICINE

## 2025-08-29 DIAGNOSIS — N39.0 RECURRENT UTI: ICD-10-CM

## 2025-08-29 DIAGNOSIS — K76.0 NAFLD (NONALCOHOLIC FATTY LIVER DISEASE): ICD-10-CM

## 2025-08-29 DIAGNOSIS — I10 ESSENTIAL HYPERTENSION: ICD-10-CM

## 2025-08-29 DIAGNOSIS — E11.9 TYPE 2 DIABETES MELLITUS WITHOUT COMPLICATION, UNSPECIFIED WHETHER LONG TERM INSULIN USE (HCC): ICD-10-CM

## 2025-08-29 DIAGNOSIS — M35.1 MCTD (MIXED CONNECTIVE TISSUE DISEASE) (HCC): ICD-10-CM

## 2025-08-29 DIAGNOSIS — Z12.31 ENCOUNTER FOR SCREENING MAMMOGRAM FOR MALIGNANT NEOPLASM OF BREAST: ICD-10-CM

## 2025-08-29 DIAGNOSIS — Z00.00 ANNUAL PHYSICAL EXAM: ICD-10-CM

## 2025-08-29 LAB
ALBUMIN SERPL-MCNC: 4.7 G/DL (ref 3.2–4.8)
ALBUMIN/GLOB SERPL: 2.1 (ref 1–2)
ALP LIVER SERPL-CCNC: 50 U/L (ref 50–130)
ALT SERPL-CCNC: 10 U/L (ref 10–49)
ANION GAP SERPL CALC-SCNC: 7 MMOL/L (ref 0–18)
AST SERPL-CCNC: 15 U/L (ref ?–34)
BASOPHILS # BLD AUTO: 0.08 X10(3) UL (ref 0–0.2)
BASOPHILS NFR BLD AUTO: 1.2 %
BILIRUB SERPL-MCNC: 0.6 MG/DL (ref 0.2–1.1)
BILIRUB UR QL: NEGATIVE
BUN BLD-MCNC: 18 MG/DL (ref 9–23)
BUN/CREAT SERPL: 20.9 (ref 10–20)
CALCIUM BLD-MCNC: 10.3 MG/DL (ref 8.7–10.4)
CHLORIDE SERPL-SCNC: 104 MMOL/L (ref 98–112)
CHOLEST SERPL-MCNC: 158 MG/DL (ref ?–200)
CLARITY UR: CLEAR
CO2 SERPL-SCNC: 27 MMOL/L (ref 21–32)
COLOR UR: YELLOW
CREAT BLD-MCNC: 0.86 MG/DL (ref 0.55–1.02)
CREAT UR-SCNC: 216.3 MG/DL
CRP SERPL-MCNC: <0.5 MG/DL (ref ?–0.5)
DEPRECATED RDW RBC AUTO: 39.4 FL (ref 35.1–46.3)
EGFRCR SERPLBLD CKD-EPI 2021: 77 ML/MIN/1.73M2 (ref 60–?)
EOSINOPHIL # BLD AUTO: 0.22 X10(3) UL (ref 0–0.7)
EOSINOPHIL NFR BLD AUTO: 3.3 %
ERYTHROCYTE [DISTWIDTH] IN BLOOD BY AUTOMATED COUNT: 11.6 % (ref 11–15)
ERYTHROCYTE [SEDIMENTATION RATE] IN BLOOD: 16 MM/HR (ref 0–30)
EST. AVERAGE GLUCOSE BLD GHB EST-MCNC: 117 MG/DL (ref 68–126)
FASTING PATIENT LIPID ANSWER: YES
FASTING STATUS PATIENT QL REPORTED: YES
GLOBULIN PLAS-MCNC: 2.2 G/DL (ref 2–3.5)
GLUCOSE BLD-MCNC: 94 MG/DL (ref 70–99)
GLUCOSE UR-MCNC: NORMAL MG/DL
HBA1C MFR BLD: 5.7 % (ref ?–5.7)
HCT VFR BLD AUTO: 34.7 % (ref 35–48)
HDLC SERPL-MCNC: 54 MG/DL (ref 40–59)
HGB BLD-MCNC: 11.6 G/DL (ref 12–16)
HGB UR QL STRIP.AUTO: NEGATIVE
IMM GRANULOCYTES # BLD AUTO: 0.02 X10(3) UL (ref 0–1)
IMM GRANULOCYTES NFR BLD: 0.3 %
KETONES UR-MCNC: NEGATIVE MG/DL
LDLC SERPL CALC-MCNC: 85 MG/DL (ref ?–100)
LEUKOCYTE ESTERASE UR QL STRIP.AUTO: 75
LYMPHOCYTES # BLD AUTO: 2.73 X10(3) UL (ref 1–4)
LYMPHOCYTES NFR BLD AUTO: 41 %
MCH RBC QN AUTO: 30.9 PG (ref 26–34)
MCHC RBC AUTO-ENTMCNC: 33.4 G/DL (ref 31–37)
MCV RBC AUTO: 92.3 FL (ref 80–100)
MICROALBUMIN UR-MCNC: 0.6 MG/DL
MICROALBUMIN/CREAT 24H UR-RTO: 2.8 UG/MG (ref ?–30)
MONOCYTES # BLD AUTO: 0.6 X10(3) UL (ref 0.1–1)
MONOCYTES NFR BLD AUTO: 9 %
NEUTROPHILS # BLD AUTO: 3.01 X10 (3) UL (ref 1.5–7.7)
NEUTROPHILS # BLD AUTO: 3.01 X10(3) UL (ref 1.5–7.7)
NEUTROPHILS NFR BLD AUTO: 45.2 %
NITRITE UR QL STRIP.AUTO: NEGATIVE
NONHDLC SERPL-MCNC: 104 MG/DL (ref ?–130)
OSMOLALITY SERPL CALC.SUM OF ELEC: 288 MOSM/KG (ref 275–295)
PH UR: 6 (ref 5–8)
PLATELET # BLD AUTO: 272 10(3)UL (ref 150–450)
POTASSIUM SERPL-SCNC: 3.8 MMOL/L (ref 3.5–5.1)
PROT SERPL-MCNC: 6.9 G/DL (ref 5.7–8.2)
PROT UR-MCNC: 20 MG/DL
RBC # BLD AUTO: 3.76 X10(6)UL (ref 3.8–5.3)
SODIUM SERPL-SCNC: 138 MMOL/L (ref 136–145)
SP GR UR STRIP: 1.03 (ref 1–1.03)
TRIGL SERPL-MCNC: 104 MG/DL (ref 30–149)
UROBILINOGEN UR STRIP-ACNC: NORMAL
VLDLC SERPL CALC-MCNC: 17 MG/DL (ref 0–30)
WBC # BLD AUTO: 6.7 X10(3) UL (ref 4–11)

## 2025-08-29 PROCEDURE — 83036 HEMOGLOBIN GLYCOSYLATED A1C: CPT

## 2025-08-29 PROCEDURE — 80061 LIPID PANEL: CPT

## 2025-08-29 PROCEDURE — 82570 ASSAY OF URINE CREATININE: CPT

## 2025-08-29 PROCEDURE — 85652 RBC SED RATE AUTOMATED: CPT

## 2025-08-29 PROCEDURE — 86140 C-REACTIVE PROTEIN: CPT

## 2025-08-29 PROCEDURE — 87088 URINE BACTERIA CULTURE: CPT

## 2025-08-29 PROCEDURE — 80053 COMPREHEN METABOLIC PANEL: CPT

## 2025-08-29 PROCEDURE — 87186 SC STD MICRODIL/AGAR DIL: CPT

## 2025-08-29 PROCEDURE — 87086 URINE CULTURE/COLONY COUNT: CPT

## 2025-08-29 PROCEDURE — 81001 URINALYSIS AUTO W/SCOPE: CPT

## 2025-08-29 PROCEDURE — 85025 COMPLETE CBC W/AUTO DIFF WBC: CPT

## 2025-08-29 PROCEDURE — 82043 UR ALBUMIN QUANTITATIVE: CPT

## 2025-08-29 PROCEDURE — 36415 COLL VENOUS BLD VENIPUNCTURE: CPT

## (undated) DIAGNOSIS — E66.01 CLASS 2 SEVERE OBESITY WITH SERIOUS COMORBIDITY AND BODY MASS INDEX (BMI) OF 35.0 TO 35.9 IN ADULT, UNSPECIFIED OBESITY TYPE (HCC): ICD-10-CM

## (undated) DIAGNOSIS — R06.02 SOB (SHORTNESS OF BREATH) ON EXERTION: Primary | ICD-10-CM

## (undated) DIAGNOSIS — E11.9 NEWLY DIAGNOSED DIABETES (HCC): Primary | ICD-10-CM

## (undated) DIAGNOSIS — I10 PRIMARY HYPERTENSION: ICD-10-CM

## (undated) DIAGNOSIS — R06.02 SOB (SHORTNESS OF BREATH): ICD-10-CM

## (undated) DIAGNOSIS — Z01.818 PREPROCEDURAL EXAMINATION: Primary | ICD-10-CM

## (undated) DIAGNOSIS — E11.69 TYPE 2 DIABETES MELLITUS WITH OTHER SPECIFIED COMPLICATION, WITHOUT LONG-TERM CURRENT USE OF INSULIN (HCC): ICD-10-CM

## (undated) DIAGNOSIS — K31.9 GASTROPATHY: ICD-10-CM

## (undated) DIAGNOSIS — R94.31 T WAVE INVERSION IN EKG: ICD-10-CM

## (undated) DIAGNOSIS — E11.9 CONTROLLED TYPE 2 DIABETES MELLITUS WITHOUT COMPLICATION, UNSPECIFIED WHETHER LONG TERM INSULIN USE (HCC): ICD-10-CM

## (undated) DIAGNOSIS — Z11.59 ENCOUNTER FOR SCREENING FOR OTHER VIRAL DISEASES: ICD-10-CM

## (undated) DIAGNOSIS — B37.0 ORAL THRUSH: ICD-10-CM

## (undated) DIAGNOSIS — R73.09 BLOOD GLUCOSE ABNORMAL: ICD-10-CM

## (undated) DIAGNOSIS — I10 ESSENTIAL HYPERTENSION: ICD-10-CM

## (undated) DIAGNOSIS — E88.81 METABOLIC SYNDROME: ICD-10-CM

## (undated) DIAGNOSIS — M35.1 MIXED CONNECTIVE TISSUE DISEASE (HCC): ICD-10-CM

## (undated) DEVICE — FORCEP RADIAL JAW 4

## (undated) DEVICE — SYRINGE, LUER SLIP, STERILE, 60ML: Brand: MEDLINE

## (undated) DEVICE — Device

## (undated) DEVICE — GIJAW SINGLE-USE BIOPSY FORCEPS WITH NEEDLE: Brand: GIJAW

## (undated) DEVICE — CONMED SCOPE SAVER BITE BLOCK, 20X27 MM: Brand: SCOPE SAVER

## (undated) DEVICE — MEDI-VAC NON-CONDUCTIVE SUCTION TUBING 6MM X 1.8M (6FT.) L: Brand: CARDINAL HEALTH

## (undated) DEVICE — Device: Brand: DUAL NARE NASAL CANNULAE FEMALE LUER CON 7FT O2 TUBE

## (undated) DEVICE — ENDOSCOPY PACK UPPER: Brand: MEDLINE INDUSTRIES, INC.

## (undated) DEVICE — ASSEMBLY SYR G 60ML DISP ALLIANCE II INFL SYS

## (undated) DEVICE — KIT CLEAN ENDOKIT 1.1OZ GOWNX2

## (undated) DEVICE — KIT ENDO ORCAPOD 160/180/190

## (undated) DEVICE — MEDI-VAC NON-CONDUCTIVE SUCTION TUBING: Brand: CARDINAL HEALTH

## (undated) DEVICE — 60 ML SYRINGE REGULAR TIP: Brand: MONOJECT

## (undated) DEVICE — YANKAUER SUCTION INSTRUMENT NO CONTROL VENT, BULB TIP, CLEAR: Brand: YANKAUER

## (undated) DEVICE — DEVICE INFL 60ML 15ATM PRSS COOK SPHR

## (undated) DEVICE — Device: Brand: DEFENDO AIR/WATER/SUCTION AND BIOPSY VALVE

## (undated) DEVICE — FORCEPS BX L240CM DIA2.4MM L NDL RAD JAW 4

## (undated) DEVICE — HERCULES 3 STAGE BALLOON ESOPHAGEAL: Brand: HERCULES

## (undated) DEVICE — YANKAUER,BULB TIP,W/O VENT,RIGID,STERILE: Brand: MEDLINE

## (undated) NOTE — LETTER
[facility]  [address], [place], [place]    Authorization for Surgical Operation and Procedure                               I hereby authorize Clari Smith MD, my physician and his/her assistants (if applicable), which may include medical students, residents, and/or fellows, to perform the following surgical operation/ procedure and administer such anesthesia as may be determined necessary by my physician: Operation/Procedure name (s) ESOPHAGOGASTRODUODENOSCOPY with Dilation on Danaoneyda Marsh   2.   I recognize that during the surgical operation/procedure, unforeseen conditions may necessitate additional or different procedures than those listed above.  I, therefore, further authorize and request that the above-named surgeon, assistants, or designees perform such procedures as are, in their judgment, necessary and desirable.    3.   My surgeon/physician has discussed prior to my surgery the potential benefits, risks and side effects of this procedure; the likelihood of achieving goals; and potential problems that might occur during recuperation.  They also discussed reasonable alternatives to the procedure, including risks, benefits, and side effects related to the alternatives and risks related to not receiving this procedure.  I have had all my questions answered and I acknowledge that no guarantee has been made as to the result that may be obtained.    4.   Should the need arise during my operation/procedure, which includes change of level of care prior to discharge, I also consent to the administration of blood and/or blood products.  Further, I understand that despite careful testing and screening of blood or blood products by collecting agencies, I may still be subject to ill effects as a result of receiving a blood transfusion and/or blood products.  The following are some, but not all, of the potential risks that can occur: fever and allergic reactions, hemolytic reactions,  transmission of diseases such as Hepatitis, AIDS and Cytomegalovirus (CMV) and fluid overload.  In the event that I wish to have an autologous transfusion of my own blood, or a directed donor transfusion, I will discuss this with my physician.  Check only if Refusing Blood or Blood Products  I understand refusal of blood or blood products as deemed necessary by my physician may have serious consequences to my condition to include possible death. I hereby assume responsibility for my refusal and release the hospital, its personnel, and my physicians from any responsibility for the consequences of my refusal.    o  Refuse   5.   I authorize the use of any specimen, organs, tissues, body parts or foreign objects that may be removed from my body during the operation/procedure for diagnosis, research or teaching purposes and their subsequent disposal by hospital authorities.  I also authorize the release of specimen test results and/or written reports to my treating physician on the hospital medical staff or other referring or consulting physicians involved in my care, at the discretion of the Pathologist or my treating physician.    6.   I consent to the photographing or videotaping of the operations or procedures to be performed, including appropriate portions of my body for medical, scientific, or educational purposes, provided my identity is not revealed by the pictures or by descriptive texts accompanying them.  If the procedure has been photographed/videotaped, the surgeon will obtain the original picture, image, videotape or CD.  The hospital will not be responsible for storage, release or maintenance of the picture, image, tape or CD.    7.   I consent to the presence of a  or observers in the operating room as deemed necessary by my physician or their designees.    8.   I recognize that in the event my procedure results in extended X-Ray/fluoroscopy time, I may develop a skin reaction.    9. If  I have a Do Not Attempt Resuscitation (DNAR) order in place, that status will be suspended while in the operating room, procedural suite, and during the recovery period unless otherwise explicitly stated by me (or a person authorized to consent on my behalf). The surgeon or my attending physician will determine when the applicable recovery period ends for purposes of reinstating the DNAR order.  10. Patients having a sterilization procedure: I understand that if the procedure is successful the results will be permanent and it will therefore be impossible for me to inseminate, conceive, or bear children.  I also understand that the procedure is intended to result in sterility, although the result has not been guaranteed.   11. I acknowledge that my physician has explained sedation/analgesia administration to me including the risk and benefits I consent to the administration of sedation/analgesia as may be necessary or desirable in the judgment of my physician.    I CERTIFY THAT I HAVE READ AND FULLY UNDERSTAND THE ABOVE CONSENT TO OPERATION and/or OTHER PROCEDURE.     ____________________________________  _________________________________        ______________________________  Signature of Patient    Signature of Responsible Person                Printed Name of Responsible Person                                      ____________________________________  _____________________________                ________________________________  Signature of Witness        Date  Time         Relationship to Patient    STATEMENT OF PHYSICIAN My signature below affirms that prior to the time of the procedure; I have explained to the patient and/or his/her legal representative, the risks and benefits involved in the proposed treatment and any reasonable alternative to the proposed treatment. I have also explained the risks and benefits involved in refusal of the proposed treatment and alternatives to the proposed treatment and have  answered the patient's questions. If I have a significant financial interest in a co-management agreement or a significant financial interest in any product or implant, or other significant relationship used in this procedure/surgery, I have disclosed this and had a discussion with my patient.     _____________________________________________________              _____________________________  (Signature of Physician)                                                                                         (Date)                                   (Time)  Patient Name: Dana Xiaoamerico      : 1965      Printed: 2024     Medical Record #: [id number]                                      Page 1 of 1

## (undated) NOTE — Clinical Note
Dear Rachael,  I had the opportunity to see your patient Dana Marsh recently. I appreciate your confidence in me to care for your patients. Please feel free call me with any questions at [phone] or contact me through Epic.  Sincerely, Alfredo Perez MD Board Certified, Physical Medicine and Rehabilitation Specializing in Sports Medicine, Spine Medicine and Electrodiagnostic Medicine [facility]  CC: Dr. Rojas

## (undated) NOTE — LETTER
AUTHORIZATION FOR SURGICAL OPERATION OR OTHER PROCEDURE    1. I hereby authorize Dr. Dimas/Mario, and [facility] staff assigned to my case to perform the following operation and/or procedure at the [facility] site:    _______________________________________________________________________________________________    Right knee cortisone injection  _______________________________________________________________________________________________    2.  My physician has explained the nature and purpose of the operation or other procedure, possible alternative methods of treatment, the risks involved, and the possibility of complication to me.  I acknowledge that no guarantee has been made as to the result that may be obtained.  3.  I recognize that, during the course of this operation, or other procedure, unforseen conditions may necessitate additional or different procedure than those listed above.  I, therefore, further authorize and request that the above named physician, his/her physician assistants or designees perform such procedures as are, in his/her professional opinion, necessary and desirable.  4.  Any tissue or organs removed in the operation or other procedure may be disposed of by and at the discretion of the [facility] and [facility].  5.  I understand that in the event of a medical emergency, I will be transported by local paramedics to [facility] or other hospital emergency department.  6.  I certify that I have read and fully understand the above consent to operation and/or other procedure.    7.  I acknowledge that my physician has explained sedation/analgesia administration to me including the risks and benefits.  I consent to the administration of sedation/analgesia as may be necessary or desirable in the judgement of my physician.    Witness signature: ___________________________________________________ Date:   ______/______/_____                    Time:  ________ A.M.  P.M.       Patient Name:  ______________________________________________________  (please print)      Patient signature:  ___________________________________________________             Relationship to Patient:           []  Parent    Responsible person                          []  Spouse  In case of minor or                    [] Other  _____________   Incompetent name:  __________________________________________________                               (please print)      _____________      Responsible person  In case of minor or  Incompetent signature:  _______________________________________________    Statement of Physician  My signature below affirms that prior to the time of the procedure, I have explained to the patient and/or his/her guardian, the risks and benefits involved in the proposed treatment and any reasonable alternative to the proposed treatment.  I have also explained the risks and benefits involved in the refusal of the proposed treatment and have answered the patient's questions.                        Date:  ______/______/_______  Provider                      Signature:  __________________________________________________________       Time:  ___________ A.M    P.M.

## (undated) NOTE — LETTER
[facility]  Administration of Anesthesia  IDana agree to be cared for by a physician anesthesiologist alone and/or with a nurse anesthetist, who is specially trained to monitor me and give me medicine to put me to sleep or keep me comfortable during my procedure    I understand that my anesthesiologist and/or anesthetist is not an employee or agent of [facility] or [employer]. He or she works for [facility] [employer]    As the patient asking for anesthesia services, I agree to:  Allow the anesthesiologist (anesthesia doctor) to give me medicine and do additional procedures as necessary. Some examples are: Starting or using an “IV” to give me medicine, fluids or blood during my procedure, and having a breathing tube placed to help me breathe when I’m asleep (intubation). In the event that my heart stops working properly, I understand that my anesthesiologist will make every effort to sustain my life, unless otherwise directed by [facility] Do Not Resuscitate documents.  Tell my anesthesia doctor before my procedure:  If I am pregnant.  The last time that I ate or drank.  iii. All of the medicines I take (including prescriptions, herbal supplements, and pills I can buy without a prescription (including street drugs/illegal medications). Failure to inform my anesthesiologist about these medicines may increase my risk of anesthetic complications.  iv.If I am allergic to anything or have had a reaction to anesthesia before.  I understand how the anesthesia medicine will help me (benefits).  I understand that with any type of anesthesia medicine there are risks:  The most common risks are: nausea, vomiting, sore throat, muscle soreness, damage to my eyes, mouth, or teeth (from breathing tube placement).  Rare risks include: remembering what happened during my procedure, allergic reactions to medications, injury to my airway, heart, lungs, vision, nerves, or  muscles and in extremely rare instances death.  My doctor has explained to me other choices available to me for my care (alternatives).  Pregnant Patients (“epidural”):  I understand that the risks of having an epidural (medicine given into my back to help control pain during labor), include itching, low blood pressure, difficulty urinating, headache or slowing of the baby’s heart. Very rare risks include infection, bleeding, seizure, irregular heart rhythms and nerve injury.  Regional Anesthesia (“spinal”, “epidural”, & “nerve blocks”):  I understand that rare but potential complications include headache, bleeding, infection, seizure, irregular heart rhythms, and nerve injury.    _____________________________________________________________________________  Patient (or Representative) Signature/Relationship to Patient  Date   Time    _____________________________________________________________________________   Name (if used)    Language/Organization   Time    _____________________________________________________________________________  Nurse Anesthetist Signature     Date   Time  _____________________________________________________________________________  Anesthesiologist Signature     Date   Time  I have discussed the procedure and information above with the patient (or patient’s representative) and answered their questions. The patient or their representative has agreed to have anesthesia services.    _____________________________________________________________________________  Witness        Date   Time  I have verified that the signature is that of the patient or patient’s representative, and that it was signed before the procedure  Patient Name: Dana Marsh     : 1965                 Printed: 2024 at 1:58 PM    Medical Record #: [id number]                                            Page 1 of 1  ----------ANESTHESIA CONSENT----------

## (undated) NOTE — LETTER
02/25/22        [place] SANT[place]AG[address]      Dear Guadalupe Villarreal records indicate that you have outstanding lab work and or testing that was ordered for you and has not yet been completed:  [address] (Order #[id number]) on 1/26/22    To provide you with the best possible care, please complete these orders at your earliest convenience. If you have recently completed these orders please disregard this letter. If you have any questions please call the office at No information on file. .     Thank you,   Ed Fernandez

## (undated) NOTE — LETTER
AUTHORIZATION FOR SURGICAL OPERATION OR OTHER PROCEDURE    1. I hereby authorize Dr. Delma Murphy PA-C, and [place] [email or web address] staff assigned to my case to perform the following operation and/or procedure at the [place] [email or web address]:    ___________________Right knee cortisone injection______________________      _______________________________________________________________________________________________    2. My physician has explained the nature and purpose of the operation or other procedure, possible alternative methods of treatment, the risks involved, and the possibility of complication to me. I acknowledge that no guarantee has been made as to the result that may be obtained. 3.  I recognize that, during the course of this operation, or other procedure, unforseen conditions may necessitate additional or different procedure than those listed above. I, therefore, further authorize and request that the above named physician, his/her physician assistants or designees perform such procedures as are, in his/her professional opinion, necessary and desirable. 4.  Any tissue or organs removed in the operation or other procedure may be disposed of by and at the discretion of the [email or web address] and [facility]. 5.  I understand that in the event of a medical emergency, I will be transported by local paramedics to [facility] or other hospital emergency department. 6.  I certify that I have read and fully understand the above consent to operation and/or other procedure. 7.  I acknowledge that my physician has explained sedation/analgesia administration to me including the risks and benefits. I consent to the administration of sedation/analgesia as may be necessary or desirable in the judgement of my physician. Witness signature: ___________________________________________________ Date:  ______/______/_____                    Time:  ________ GISSELLE TURCIOS Patient Name:  ______________________________________________________  (please print)      Patient signature:  ___________________________________________________             Relationship to Patient:           []  Parent    Responsible person                          []  Spouse  In case of minor or                    [] Other  _____________   Incompetent name:  __________________________________________________                               (please print)      _____________      Responsible person  In case of minor or  Incompetent signature:  _______________________________________________    Statement of Physician  My signature below affirms that prior to the time of the procedure, I have explained to the patient and/or his/her guardian, the risks and benefits involved in the proposed treatment and any reasonable alternative to the proposed treatment. I have also explained the risks and benefits involved in the refusal of the proposed treatment and have answered the patient's questions.                         Date:  ______/______/_______  Provider                      Signature:  __________________________________________________________       Time:  ___________ A.M    P.M.

## (undated) NOTE — LETTER
AUTHORIZATION FOR SURGICAL OPERATION OR OTHER PROCEDURE    1.  I hereby authorize Dr. Vel Hernandes, and [address] [place] [address] staff assigned to my case to perform the following operation and/or procedure at the [address] [place] [address]:    __________________________ Patient signature:  ___________________________________________________             Relationship to Patient:             Parent    Responsible person                            Spouse  In case of minor or                     Other  _____________   Incompet

## (undated) NOTE — LETTER
04/25/19        [place] S[address]      Dear Ray Chanel records indicate that you have outstanding lab work and or testing that was ordered for you and has not yet been completed:        H. Pylori Stool Ag, EIA

## (undated) NOTE — LETTER
WHERE IS YOUR PAIN NOW?  Navya the areas on your body where you feel the described sensations.  Use the appropriate symbol.  Navya the areas of radiation.  Include all affected areas.  Just to complete the picture, please draw in the face.     ACHE:  ^ ^ ^   NUMBNESS:  0000   PINS & NEEDLES:  = = = =                              ^ ^ ^                       0000              = = = =                                    ^ ^ ^                       0000            = = = =      BURNING:  XXXX   STABBING: ////                  XXXX                ////                         XXXX          ////     Please navya the line below indicating your degree of pain right now  with 0 being no pain 10 being the worst pain possible.                                         0             1             2              3             4              5              6              7             8             9             10         Patient Signature:

## (undated) NOTE — LETTER
2/9/2024              Dana Marsh        [address]        [facility] [address]         To Whom It May Concern,  Patient is clear to go for dental procedure. Minimize head extention ( nothing more than 20 degrees)      Sincerely,    Remy Rojas MD  [address] [address]  [phone]        Document electronically generated by:  Remy Rojas MD

## (undated) NOTE — Clinical Note
Dear Dr. Rojas,  I had the opportunity to see your patient Dana Marsh recently. I appreciate your confidence in me to care for your patients. Please feel free call me with any questions at [phone] or contact me through Epic.  Sincerely, Alfredo Perez MD Board Certified, Physical Medicine and Rehabilitation Specializing in Sports Medicine, Spine Medicine and Electrodiagnostic Medicine [facility]

## (undated) NOTE — Clinical Note
April 10, 2017         Audra Yesenia, [address] [place] [id number]      Patient: Susu Bernstein   YOB: 1965   Date of Visit: 4/5/2017       Dear Dr. Roma Meraz,     I saw your patient, Susu Bernstein, on 4 She didn't want narcotics. She's stopped meloxicam - but taking only as needeed. She also stopped gabpentin 3 months ago and effexor 3 months. She was on cymbalta years ago.      ASSESSMENT AND PLAN:   Anabella Best is a 46year old female who prese

## (undated) NOTE — LETTER
10/27/2023              Key Finder        [address] [facility]        [address] [address]         Dear Billie Vega,    Our records indicate that the tests ordered for you by Trip Porras. Abeba Houser  have not been done. XR VIDEO SWALLOW (CPT=74230) (Order #[id number]) on 8/22/23        If you have, in fact, already completed the tests or you do not wish to have the tests done, please contact our office at [address] [place] [address]. Otherwise, please proceed with the testing. Enclosed is a duplicate order for your convenience. Sincerely,    [facility]  [place] , [facility]  [address] [address] [phone]

## (undated) NOTE — MR AVS SNAPSHOT
[address] [address]  [phone]               Thank you for choosing us for your health care visit with Antonio Abel MD.  We are glad to serve you and happy to provide you with this james Knee Pain           Medical Issues Discussed Today     MCTD (mixed connective tissue disease) ([facility] [address]    -  Primary    Therapeutic drug monitoring        Vitamin D deficiency          Instructions and Information about Your Health    1. Check labs  2. Cont. flow to the leg. It can also be treated with arthrocentesis and steroid injections. · Compartment syndrome. This causes intense pain and problems moving the foot or toes. Compartment syndrome is a medical emergency. It needs immediate surgery.  It can lead injury or disease. As the pressure builds up, the fluid may bulge into the back of the knee. This can cause the cyst.  Symptoms of a Baker’s cyst  A Baker’s cyst often doesn’t cause symptoms.  A cyst will more often be seen on an imaging test, like MRI, don BIOTIN PLUS/CALCIUM/VIT D3 Tabs   Take  by mouth. diazepam 5 MG Tabs   Take 5 mg by mouth nightly. Commonly known as:  VALIUM           Dicyclomine HCl 10 MG Caps   Take 10 mg by mouth as needed.    What changed:  Another medication with the sa return for a follow-up Blood Pressure Check in 1 month.      Lifestyle Modification Recommendations:    Modification Recommendation   Weight Reduction Maintain normal body weight (body mass index 18.5-24.9 kg/m2)   DASH eating plan Adopt a diet rich in frui 2 ½ hours per week – spread out over time Use a manisha to keep you motivated   Don’t forget strength training with weights and resistance Set goals and track your progress   You don’t need to join a gym. Home exercises work great.  Put more priority on exe

## (undated) NOTE — LETTER
[address]  Authorization for Surgical Operation and Procedure                                                                                           I hereby authorize Bashir De La Torre MD, my physician and his/her assistants (if applicable), which may include medical students, residents, and/or fellows, to perform the following surgical operation/ procedure and administer such anesthesia as may be determined necessary by my physician: Operation/Procedure name (s) COLONOSCOPY / ESOPHAGOGASTRODUODENOSCOPY w/ Possible DILATION on [place]   2. I recognize that during the surgical operation/procedure, unforeseen conditions may necessitate additional or different procedures than those listed above. I, therefore, further authorize and request that the above-named surgeon, assistants, or designees perform such procedures as are, in their judgment, necessary and desirable. 3.   My surgeon/physician has discussed prior to my surgery the potential benefits, risks and side effects of this procedure; the likelihood of achieving goals; and potential problems that might occur during recuperation. They also discussed reasonable alternatives to the procedure, including risks, benefits, and side effects related to the alternatives and risks related to not receiving this procedure. I have had all my questions answered and I acknowledge that no guarantee has been made as to the result that may be obtained. 4.   Should the need arise during my operation/procedure, which includes change of level of care prior to discharge, I also consent to the administration of blood and/or blood products. Further, I understand that despite careful testing and screening of blood or blood products by collecting agencies, I may still be subject to ill effects as a result of receiving a blood transfusion and/or blood products.   The following are some, but not all, of the potential risks that can occur: fever and allergic reactions, hemolytic reactions, transmission of diseases such as Hepatitis, AIDS and Cytomegalovirus (CMV) and fluid overload. In the event that I wish to have an autologous transfusion of my own blood, or a directed donor transfusion, I will discuss this with my physician. Check only if Refusing Blood or Blood Products  I understand refusal of blood or blood products as deemed necessary by my physician may have serious consequences to my condition to include possible death. I hereby assume responsibility for my refusal and release the hospital, its personnel, and my physicians from any responsibility for the consequences of my refusal.    o  Refuse   5. I authorize the use of any specimen, organs, tissues, body parts or foreign objects that may be removed from my body during the operation/procedure for diagnosis, research or teaching purposes and their subsequent disposal by hospital authorities. I also authorize the release of specimen test results and/or written reports to my treating physician on the hospital medical staff or other referring or consulting physicians involved in my care, at the discretion of the Pathologist or my treating physician. 6.   I consent to the photographing or videotaping of the operations or procedures to be performed, including appropriate portions of my body for medical, scientific, or educational purposes, provided my identity is not revealed by the pictures or by descriptive texts accompanying them. If the procedure has been photographed/videotaped, the surgeon will obtain the original picture, image, videotape or CD. The hospital will not be responsible for storage, release or maintenance of the picture, image, tape or CD.    7.   I consent to the presence of a  or observers in the operating room as deemed necessary by my physician or their designees.     8.   I recognize that in the event my procedure results in extended X-Ray/fluoroscopy time, I may develop a skin reaction. 9. If I have a Do Not Attempt Resuscitation (DNAR) order in place, that status will be suspended while in the operating room, procedural suite, and during the recovery period unless otherwise explicitly stated by me (or a person authorized to consent on my behalf). The surgeon or my attending physician will determine when the applicable recovery period ends for purposes of reinstating the DNAR order. 10. Patients having a sterilization procedure: I understand that if the procedure is successful the results will be permanent and it will therefore be impossible for me to inseminate, conceive, or bear children. I also understand that the procedure is intended to result in sterility, although the result has not been guaranteed. 11. I acknowledge that my physician has explained sedation/analgesia administration to me including the risk and benefits I consent to the administration of sedation/analgesia as may be necessary or desirable in the judgment of my physician. I CERTIFY THAT I HAVE READ AND FULLY UNDERSTAND THE ABOVE CONSENT TO OPERATION and/or OTHER PROCEDURE.     _________________________________________ _________________________________     ___________________________________  Signature of Patient     Signature of Responsible Person                   Printed Name of Responsible Person                              _________________________________________ ______________________________        ___________________________________  Signature of Witness         Date  Time         Relationship to Patient    STATEMENT OF PHYSICIAN My signature below affirms that prior to the time of the procedure; I have explained to the patient and/or his/her legal representative, the risks and benefits involved in the proposed treatment and any reasonable alternative to the proposed treatment.  I have also explained the risks and benefits involved in refusal of the proposed treatment and alternatives to the proposed treatment and have answered the patient's questions.  If I have a significant financial interest in a co-management agreement or a significant financial interest in any product or implant, or other significant relationship used in this procedure/surgery, I have disclosed this and had a discussion with my patient.     _______________________________________________________________ _____________________________  Anabella Grief of Physician)                                                                                         (Date)                                   (Time)  Patient Name: Angella Campuzano    : 1965   Printed: 2023      Medical Record #: [id number]                                              Page 1 of 1

## (undated) NOTE — MR AVS SNAPSHOT
[address] [address]  [phone]               Thank you for choosing us for your health care visit with Coni Parada MD.  We are glad to serve you and happy to provide you with this james insurance company's guidelines for prior authorization for this test.  You may be held responsible for payment in full if proper authorization is not acquired.   Please contact the Patient Business Office at [phone] if you have any questions related to Losartan Potassium-HCTZ 50-12.5 MG Tabs   Take 1 tablet by mouth daily. Commonly known as:  HYZAAR           LYRICA 150 MG Caps   Generic drug:  pregabalin   TK ONE C PO BID           Meloxicam 7.5 MG Tabs   Take 7.5 mg by mouth.  As needed           TAMANNA

## (undated) NOTE — MR AVS SNAPSHOT
[facility]  [address] [place] [address] [phone]               Thank you for choosing us for your health care visit with Fernandez Willard MD.  We are glad to serve you and happy to provide you with this summary of yo Take by mouth. Take 2 caps every day. FLAXSEED OIL OR   Take by mouth daily.            FREESTYLE LANCETS Misc           FREESTYLE LITE TEST Strp   Generic drug:  Glucose Blood           Hydroxychloroquine Sulfate 200 MG Tabs   Take 200 mg by mout

## (undated) NOTE — ED AVS SNAPSHOT
Kortney Hendrickson   MRN: [id number]    Department:  [facility] Emergency Department   Date of Visit:  2/20/2020           Disclosure     Insurance plans vary and the physician(s) referred by the ER may not be covered by your plan.  Please contac CARE PHYSICIAN AT ONCE OR RETURN IMMEDIATELY TO THE EMERGENCY DEPARTMENT. If you have been prescribed any medication(s), please fill your prescription right away and begin taking the medication(s) as directed.   If you believe that any of the medications

## (undated) NOTE — LETTER
Patient Name: Chelsea Aguyao  YOB: 1965          MRN number:  [id number]  Date:  11/1/2023  Referring Physician: Radha Humphrey       ADULT VIDEOFLUOROSCOPIC SWALLOWING STUDY       ADULT VIDEOFLUOROSCOPIC SWALLOWING STUDY:   Referring Physician: Pat Mejia      Radiologist: Dr. Breanna Blair  Diagnosis: dysphagia    Date of Service: 11/1/2023     PATIENT SUMMARY   Chief Complaint: Pt reports food getting stuck in her chest area, coughing and choking and vomiting on food. She notes discomfort when swallowing until the food passes. She has a history of GERD. Pt lost 8 pound in 4 days, but has gained 2 back. An esophagram revealed hiatal hernia, Schatzki's ring and aspiration. Pt is scheduled for a colonoscopy and EGD for Monday. She had a recent visit to the ER for vomiting. She denies shortness of breath and difficulty with pills. Current Diet: regular foods and liquids, avoiding rice. Problem List  Active Problems:  Active Problems:    * No active hospital problems. *      Past Medical History  Past Medical History:   Diagnosis Date    Abnormal vaginal bleeding     polypectomy    Fibromyalgia     Gastropathy 1/18/2018    Hemorrhoids 2014    High blood pressure     High cholesterol     MCTD (mixed connective tissue disease) (HCC)     Mood disorder (HCC)     Other ill-defined conditions(799.89)     Per [employer]:  \"son and girlfriend with 4 kids moved in. \"    Prediabetes     Spinal stenosis     Comments:  spondylosis. Management:  surgery in 12/2009. Visual impairment         Imaging results: 10/24/23 CXR:  No radiographic evidence of acute cardiopulmonary process. 9/11/23 esophagram:  CONCLUSION:   1. Small sliding hiatal hernia with a Schatzki's ring and a large amount of gastroesophageal reflux to the upper esophagus. No stricture, ulceration or mass. 2. Aspiration of a small amount of barium into the proximal trachea without cough reflex. Correlate clinically.    3. Somewhat nodular appearance to the duodenum may suggest either prolapse of antral mucosa versus duodenitis. No well-defined ulcer or mass. ASSESSMENT   DYSPHAGIA ASSESSMENT  Test completed in conjunction with Radiologist.   Food/Liquid Types Presented: puree, solid, and thin liquids. Study Position and View:  Patient was seated upright and viewed laterally and A-P. Pain Assessment: The patient reports pain at a level of 0/10. Oral phase:  Minimally reduced bolus control and containment resulted in intermittent premature spillage with thin liquids. Mild oral residue of all consistencies was cleared with second swallow. Pharyngeal phase:  The pharyngeal response triggered at the tongue base, valleculae and pyriform sinuses for thin liquids due to intermittent premature spillage. Puree and solids triggered at the valleculae. Slightly delayed epiglottic inversion was observed, but did not affect the safety of the swallow as no airway invasion was observed with any consistency. Base of tongue retraction and hyolaryngeal excursion was adequate. Minimal vallecular retention remained intermittently with puree and solids. Esophageal phase:   Adequate flow of bolus through upper esophagus     Penetration Aspiration Scale: 1/8. Material does not enter the airway. Overall Impression: Essentially normal swallow without laryngeal penetration or aspiration. FCM category and level: Swallowing, 7  PLAN   Diet Recommendations:  Solids: Regular  Liquids: Thin    Recommended compensatory strategies:   Sit upright when eating and for 1 hour afterwards  Alternate liquids and solids    Medication Administration:  No restrictions    Further Follow-up:  No follow up warranted with this service. Follow up with GI.      EDUCATION/INSTRUCTION  Reviewed results and recommendations with patient. Written instructions were provided.   Agreement/Understanding verbalized and all questions answered to their apparent satisfaction. INTERDISCIPLINARY COMMUNICATION  Reviewed results with Radiologist; agreement verbalized. Patient was advised of these findings, precautions, recommendations and treatment options and has agreed to actively participate in planning and for this course of care. Thank you for your referral. Please co-sign or sign and return this letter via fax as soon as possible. If you have any questions, please contact me at .     Cecelia Stone MA/LAY-SLP  Speech Language Pathologist  [address] [place] [address]  [phone]    Electronically signed by therapist: LAZARUS Simmons  Physician's certification required: No